# Patient Record
Sex: MALE | Race: OTHER | NOT HISPANIC OR LATINO | URBAN - METROPOLITAN AREA
[De-identification: names, ages, dates, MRNs, and addresses within clinical notes are randomized per-mention and may not be internally consistent; named-entity substitution may affect disease eponyms.]

---

## 2022-04-21 ENCOUNTER — INPATIENT (INPATIENT)
Facility: HOSPITAL | Age: 62
LOS: 9 days | Discharge: NURSING, REHAB OR LONG-TERM CARE | DRG: 982 | End: 2022-05-01
Attending: INTERNAL MEDICINE | Admitting: INTERNAL MEDICINE
Payer: MEDICAID

## 2022-04-21 VITALS
DIASTOLIC BLOOD PRESSURE: 65 MMHG | RESPIRATION RATE: 20 BRPM | TEMPERATURE: 96 F | HEART RATE: 61 BPM | SYSTOLIC BLOOD PRESSURE: 132 MMHG | OXYGEN SATURATION: 100 %

## 2022-04-21 DIAGNOSIS — I63.9 CEREBRAL INFARCTION, UNSPECIFIED: ICD-10-CM

## 2022-04-21 DIAGNOSIS — N17.9 ACUTE KIDNEY FAILURE, UNSPECIFIED: ICD-10-CM

## 2022-04-21 DIAGNOSIS — E87.5 HYPERKALEMIA: ICD-10-CM

## 2022-04-21 LAB
ALBUMIN SERPL ELPH-MCNC: 4.2 G/DL — SIGNIFICANT CHANGE UP (ref 3.3–5)
ALBUMIN SERPL ELPH-MCNC: 4.3 G/DL — SIGNIFICANT CHANGE UP (ref 3.3–5)
ALP SERPL-CCNC: 92 U/L — SIGNIFICANT CHANGE UP (ref 40–120)
ALP SERPL-CCNC: 96 U/L — SIGNIFICANT CHANGE UP (ref 40–120)
ALT FLD-CCNC: 32 U/L — SIGNIFICANT CHANGE UP (ref 10–45)
ALT FLD-CCNC: 32 U/L — SIGNIFICANT CHANGE UP (ref 10–45)
AMMONIA BLD-MCNC: 11 UMOL/L — SIGNIFICANT CHANGE UP (ref 11–55)
ANION GAP SERPL CALC-SCNC: 13 MMOL/L — SIGNIFICANT CHANGE UP (ref 5–17)
ANION GAP SERPL CALC-SCNC: 17 MMOL/L — SIGNIFICANT CHANGE UP (ref 5–17)
ANION GAP SERPL CALC-SCNC: 19 MMOL/L — HIGH (ref 5–17)
APPEARANCE UR: CLEAR — SIGNIFICANT CHANGE UP
APTT BLD: 26.6 SEC — LOW (ref 27.5–35.5)
AST SERPL-CCNC: 23 U/L — SIGNIFICANT CHANGE UP (ref 10–40)
AST SERPL-CCNC: 23 U/L — SIGNIFICANT CHANGE UP (ref 10–40)
BACTERIA # UR AUTO: NEGATIVE — SIGNIFICANT CHANGE UP
BASE EXCESS BLDV CALC-SCNC: -11.9 MMOL/L — LOW (ref -2–2)
BASE EXCESS BLDV CALC-SCNC: -9.3 MMOL/L — LOW (ref -2–2)
BASE EXCESS BLDV CALC-SCNC: -9.8 MMOL/L — LOW (ref -2–2)
BASOPHILS # BLD AUTO: 0 K/UL — SIGNIFICANT CHANGE UP (ref 0–0.2)
BASOPHILS # BLD AUTO: 0 K/UL — SIGNIFICANT CHANGE UP (ref 0–0.2)
BASOPHILS NFR BLD AUTO: 0 % — SIGNIFICANT CHANGE UP (ref 0–2)
BASOPHILS NFR BLD AUTO: 0 % — SIGNIFICANT CHANGE UP (ref 0–2)
BILIRUB SERPL-MCNC: 0.3 MG/DL — SIGNIFICANT CHANGE UP (ref 0.2–1.2)
BILIRUB SERPL-MCNC: 0.4 MG/DL — SIGNIFICANT CHANGE UP (ref 0.2–1.2)
BILIRUB UR-MCNC: NEGATIVE — SIGNIFICANT CHANGE UP
BLD GP AB SCN SERPL QL: NEGATIVE — SIGNIFICANT CHANGE UP
BUN SERPL-MCNC: 77 MG/DL — HIGH (ref 7–23)
BUN SERPL-MCNC: 85 MG/DL — HIGH (ref 7–23)
BUN SERPL-MCNC: 87 MG/DL — HIGH (ref 7–23)
BURR CELLS BLD QL SMEAR: SIGNIFICANT CHANGE UP
CA-I SERPL-SCNC: 1.28 MMOL/L — SIGNIFICANT CHANGE UP (ref 1.15–1.33)
CA-I SERPL-SCNC: 1.3 MMOL/L — SIGNIFICANT CHANGE UP (ref 1.15–1.33)
CA-I SERPL-SCNC: 1.33 MMOL/L — SIGNIFICANT CHANGE UP (ref 1.15–1.33)
CALCIUM SERPL-MCNC: 10 MG/DL — SIGNIFICANT CHANGE UP (ref 8.4–10.5)
CALCIUM SERPL-MCNC: 9.4 MG/DL — SIGNIFICANT CHANGE UP (ref 8.4–10.5)
CALCIUM SERPL-MCNC: 9.7 MG/DL — SIGNIFICANT CHANGE UP (ref 8.4–10.5)
CHLORIDE BLDV-SCNC: 104 MMOL/L — SIGNIFICANT CHANGE UP (ref 96–108)
CHLORIDE BLDV-SCNC: 105 MMOL/L — SIGNIFICANT CHANGE UP (ref 96–108)
CHLORIDE BLDV-SCNC: 105 MMOL/L — SIGNIFICANT CHANGE UP (ref 96–108)
CHLORIDE SERPL-SCNC: 103 MMOL/L — SIGNIFICANT CHANGE UP (ref 96–108)
CHLORIDE SERPL-SCNC: 103 MMOL/L — SIGNIFICANT CHANGE UP (ref 96–108)
CHLORIDE SERPL-SCNC: 105 MMOL/L — SIGNIFICANT CHANGE UP (ref 96–108)
CO2 BLDV-SCNC: 17 MMOL/L — LOW (ref 22–26)
CO2 BLDV-SCNC: 19 MMOL/L — LOW (ref 22–26)
CO2 BLDV-SCNC: 19 MMOL/L — LOW (ref 22–26)
CO2 SERPL-SCNC: 13 MMOL/L — LOW (ref 22–31)
CO2 SERPL-SCNC: 15 MMOL/L — LOW (ref 22–31)
CO2 SERPL-SCNC: 16 MMOL/L — LOW (ref 22–31)
COLOR SPEC: SIGNIFICANT CHANGE UP
CREAT ?TM UR-MCNC: 46 MG/DL — SIGNIFICANT CHANGE UP
CREAT SERPL-MCNC: 2.32 MG/DL — HIGH (ref 0.5–1.3)
CREAT SERPL-MCNC: 2.62 MG/DL — HIGH (ref 0.5–1.3)
CREAT SERPL-MCNC: 2.76 MG/DL — HIGH (ref 0.5–1.3)
DIFF PNL FLD: NEGATIVE — SIGNIFICANT CHANGE UP
DIGOXIN SERPL-MCNC: 1.9 NG/ML — SIGNIFICANT CHANGE UP (ref 0.8–2)
EGFR: 25 ML/MIN/1.73M2 — LOW
EGFR: 27 ML/MIN/1.73M2 — LOW
EGFR: 31 ML/MIN/1.73M2 — LOW
EOSINOPHIL # BLD AUTO: 0.27 K/UL — SIGNIFICANT CHANGE UP (ref 0–0.5)
EOSINOPHIL # BLD AUTO: 0.39 K/UL — SIGNIFICANT CHANGE UP (ref 0–0.5)
EOSINOPHIL NFR BLD AUTO: 4.3 % — SIGNIFICANT CHANGE UP (ref 0–6)
EOSINOPHIL NFR BLD AUTO: 5 % — SIGNIFICANT CHANGE UP (ref 0–6)
EPI CELLS # UR: 0 /HPF — SIGNIFICANT CHANGE UP
GAS PNL BLDV: 129 MMOL/L — LOW (ref 136–145)
GAS PNL BLDV: 131 MMOL/L — LOW (ref 136–145)
GAS PNL BLDV: 135 MMOL/L — LOW (ref 136–145)
GAS PNL BLDV: SIGNIFICANT CHANGE UP
GLUCOSE BLDV-MCNC: 151 MG/DL — HIGH (ref 70–99)
GLUCOSE BLDV-MCNC: 174 MG/DL — HIGH (ref 70–99)
GLUCOSE BLDV-MCNC: 65 MG/DL — LOW (ref 70–99)
GLUCOSE SERPL-MCNC: 144 MG/DL — HIGH (ref 70–99)
GLUCOSE SERPL-MCNC: 178 MG/DL — HIGH (ref 70–99)
GLUCOSE SERPL-MCNC: 67 MG/DL — LOW (ref 70–99)
GLUCOSE UR QL: NEGATIVE — SIGNIFICANT CHANGE UP
HCO3 BLDV-SCNC: 16 MMOL/L — LOW (ref 22–29)
HCO3 BLDV-SCNC: 18 MMOL/L — LOW (ref 22–29)
HCO3 BLDV-SCNC: 18 MMOL/L — LOW (ref 22–29)
HCT VFR BLD CALC: 19.3 % — CRITICAL LOW (ref 39–50)
HCT VFR BLD CALC: 35 % — LOW (ref 39–50)
HCT VFR BLDA CALC: 32 % — LOW (ref 39–51)
HCT VFR BLDA CALC: 36 % — LOW (ref 39–51)
HCT VFR BLDA CALC: 36 % — LOW (ref 39–51)
HGB BLD CALC-MCNC: 10.7 G/DL — LOW (ref 12.6–17.4)
HGB BLD CALC-MCNC: 12 G/DL — LOW (ref 12.6–17.4)
HGB BLD CALC-MCNC: 12 G/DL — LOW (ref 12.6–17.4)
HGB BLD-MCNC: 11.5 G/DL — LOW (ref 13–17)
HGB BLD-MCNC: 6.4 G/DL — CRITICAL LOW (ref 13–17)
INR BLD: 1.23 RATIO — HIGH (ref 0.88–1.16)
KETONES UR-MCNC: NEGATIVE — SIGNIFICANT CHANGE UP
LACTATE BLDV-MCNC: 1.2 MMOL/L — SIGNIFICANT CHANGE UP (ref 0.7–2)
LACTATE BLDV-MCNC: 2.3 MMOL/L — HIGH (ref 0.7–2)
LACTATE BLDV-MCNC: 6.9 MMOL/L — CRITICAL HIGH (ref 0.7–2)
LEUKOCYTE ESTERASE UR-ACNC: NEGATIVE — SIGNIFICANT CHANGE UP
LYMPHOCYTES # BLD AUTO: 0.6 K/UL — LOW (ref 1–3.3)
LYMPHOCYTES # BLD AUTO: 1.42 K/UL — SIGNIFICANT CHANGE UP (ref 1–3.3)
LYMPHOCYTES # BLD AUTO: 11 % — LOW (ref 13–44)
LYMPHOCYTES # BLD AUTO: 15.7 % — SIGNIFICANT CHANGE UP (ref 13–44)
MAGNESIUM SERPL-MCNC: 1.6 MG/DL — SIGNIFICANT CHANGE UP (ref 1.6–2.6)
MANUAL SMEAR VERIFICATION: SIGNIFICANT CHANGE UP
MANUAL SMEAR VERIFICATION: SIGNIFICANT CHANGE UP
MCHC RBC-ENTMCNC: 30.3 PG — SIGNIFICANT CHANGE UP (ref 27–34)
MCHC RBC-ENTMCNC: 30.3 PG — SIGNIFICANT CHANGE UP (ref 27–34)
MCHC RBC-ENTMCNC: 32.9 GM/DL — SIGNIFICANT CHANGE UP (ref 32–36)
MCHC RBC-ENTMCNC: 33.2 GM/DL — SIGNIFICANT CHANGE UP (ref 32–36)
MCV RBC AUTO: 91.5 FL — SIGNIFICANT CHANGE UP (ref 80–100)
MCV RBC AUTO: 92.3 FL — SIGNIFICANT CHANGE UP (ref 80–100)
MONOCYTES # BLD AUTO: 0.27 K/UL — SIGNIFICANT CHANGE UP (ref 0–0.9)
MONOCYTES # BLD AUTO: 0.55 K/UL — SIGNIFICANT CHANGE UP (ref 0–0.9)
MONOCYTES NFR BLD AUTO: 5 % — SIGNIFICANT CHANGE UP (ref 2–14)
MONOCYTES NFR BLD AUTO: 6.1 % — SIGNIFICANT CHANGE UP (ref 2–14)
MYELOCYTES NFR BLD: 1 % — HIGH (ref 0–0)
NEUTROPHILS # BLD AUTO: 4.23 K/UL — SIGNIFICANT CHANGE UP (ref 1.8–7.4)
NEUTROPHILS # BLD AUTO: 6.7 K/UL — SIGNIFICANT CHANGE UP (ref 1.8–7.4)
NEUTROPHILS NFR BLD AUTO: 73 % — SIGNIFICANT CHANGE UP (ref 43–77)
NEUTROPHILS NFR BLD AUTO: 78 % — HIGH (ref 43–77)
NEUTS BAND # BLD: 0.9 % — SIGNIFICANT CHANGE UP (ref 0–8)
NITRITE UR-MCNC: NEGATIVE — SIGNIFICANT CHANGE UP
NRBC # BLD: 0 /100 — SIGNIFICANT CHANGE UP (ref 0–0)
PCO2 BLDV: 41 MMHG — LOW (ref 42–55)
PCO2 BLDV: 43 MMHG — SIGNIFICANT CHANGE UP (ref 42–55)
PCO2 BLDV: 46 MMHG — SIGNIFICANT CHANGE UP (ref 42–55)
PH BLDV: 7.18 — CRITICAL LOW (ref 7.32–7.43)
PH BLDV: 7.2 — LOW (ref 7.32–7.43)
PH BLDV: 7.24 — LOW (ref 7.32–7.43)
PH UR: 6 — SIGNIFICANT CHANGE UP (ref 5–8)
PHOSPHATE SERPL-MCNC: 4.5 MG/DL — SIGNIFICANT CHANGE UP (ref 2.5–4.5)
PLAT MORPH BLD: NORMAL — SIGNIFICANT CHANGE UP
PLAT MORPH BLD: NORMAL — SIGNIFICANT CHANGE UP
PLATELET # BLD AUTO: 113 K/UL — LOW (ref 150–400)
PLATELET # BLD AUTO: 162 K/UL — SIGNIFICANT CHANGE UP (ref 150–400)
PO2 BLDV: 33 MMHG — SIGNIFICANT CHANGE UP (ref 25–45)
PO2 BLDV: 37 MMHG — SIGNIFICANT CHANGE UP (ref 25–45)
PO2 BLDV: 38 MMHG — SIGNIFICANT CHANGE UP (ref 25–45)
POIKILOCYTOSIS BLD QL AUTO: SIGNIFICANT CHANGE UP
POTASSIUM BLDV-SCNC: 5.3 MMOL/L — HIGH (ref 3.5–5.1)
POTASSIUM BLDV-SCNC: 5.6 MMOL/L — HIGH (ref 3.5–5.1)
POTASSIUM BLDV-SCNC: 7.4 MMOL/L — CRITICAL HIGH (ref 3.5–5.1)
POTASSIUM SERPL-MCNC: 5.3 MMOL/L — SIGNIFICANT CHANGE UP (ref 3.5–5.3)
POTASSIUM SERPL-MCNC: 5.5 MMOL/L — HIGH (ref 3.5–5.3)
POTASSIUM SERPL-MCNC: 7.3 MMOL/L — CRITICAL HIGH (ref 3.5–5.3)
POTASSIUM SERPL-SCNC: 5.3 MMOL/L — SIGNIFICANT CHANGE UP (ref 3.5–5.3)
POTASSIUM SERPL-SCNC: 5.5 MMOL/L — HIGH (ref 3.5–5.3)
POTASSIUM SERPL-SCNC: 7.3 MMOL/L — CRITICAL HIGH (ref 3.5–5.3)
PROT ?TM UR-MCNC: 17 MG/DL — HIGH (ref 0–12)
PROT SERPL-MCNC: 6.9 G/DL — SIGNIFICANT CHANGE UP (ref 6–8.3)
PROT SERPL-MCNC: 7.1 G/DL — SIGNIFICANT CHANGE UP (ref 6–8.3)
PROT UR-MCNC: ABNORMAL
PROT/CREAT UR-RTO: 0.4 RATIO — HIGH (ref 0–0.2)
PROTHROM AB SERPL-ACNC: 14.3 SEC — HIGH (ref 10.5–13.4)
RAPID RVP RESULT: SIGNIFICANT CHANGE UP
RBC # BLD: 2.11 M/UL — LOW (ref 4.2–5.8)
RBC # BLD: 3.79 M/UL — LOW (ref 4.2–5.8)
RBC # FLD: 12.9 % — SIGNIFICANT CHANGE UP (ref 10.3–14.5)
RBC # FLD: 13 % — SIGNIFICANT CHANGE UP (ref 10.3–14.5)
RBC BLD AUTO: ABNORMAL
RBC BLD AUTO: SIGNIFICANT CHANGE UP
RBC CASTS # UR COMP ASSIST: 1 /HPF — SIGNIFICANT CHANGE UP (ref 0–4)
RH IG SCN BLD-IMP: POSITIVE — SIGNIFICANT CHANGE UP
SAO2 % BLDV: 39.1 % — LOW (ref 67–88)
SAO2 % BLDV: 55.8 % — LOW (ref 67–88)
SAO2 % BLDV: 58.7 % — LOW (ref 67–88)
SARS-COV-2 RNA SPEC QL NAA+PROBE: SIGNIFICANT CHANGE UP
SARS-COV-2 RNA SPEC QL NAA+PROBE: SIGNIFICANT CHANGE UP
SODIUM SERPL-SCNC: 132 MMOL/L — LOW (ref 135–145)
SODIUM SERPL-SCNC: 133 MMOL/L — LOW (ref 135–145)
SODIUM SERPL-SCNC: 139 MMOL/L — SIGNIFICANT CHANGE UP (ref 135–145)
SODIUM UR-SCNC: 79 MMOL/L — SIGNIFICANT CHANGE UP
SP GR SPEC: 1.03 — HIGH (ref 1.01–1.02)
TARGETS BLD QL SMEAR: SIGNIFICANT CHANGE UP
TROPONIN T, HIGH SENSITIVITY RESULT: 37 NG/L — SIGNIFICANT CHANGE UP (ref 0–51)
TSH SERPL-MCNC: 0.79 UIU/ML — SIGNIFICANT CHANGE UP (ref 0.27–4.2)
UROBILINOGEN FLD QL: NEGATIVE — SIGNIFICANT CHANGE UP
WBC # BLD: 5.42 K/UL — SIGNIFICANT CHANGE UP (ref 3.8–10.5)
WBC # BLD: 9.06 K/UL — SIGNIFICANT CHANGE UP (ref 3.8–10.5)
WBC # FLD AUTO: 5.42 K/UL — SIGNIFICANT CHANGE UP (ref 3.8–10.5)
WBC # FLD AUTO: 9.06 K/UL — SIGNIFICANT CHANGE UP (ref 3.8–10.5)
WBC UR QL: 1 /HPF — SIGNIFICANT CHANGE UP (ref 0–5)

## 2022-04-21 PROCEDURE — 99283 EMERGENCY DEPT VISIT LOW MDM: CPT | Mod: GC

## 2022-04-21 PROCEDURE — 99291 CRITICAL CARE FIRST HOUR: CPT

## 2022-04-21 PROCEDURE — 99232 SBSQ HOSP IP/OBS MODERATE 35: CPT | Mod: GC

## 2022-04-21 PROCEDURE — 71045 X-RAY EXAM CHEST 1 VIEW: CPT | Mod: 26

## 2022-04-21 PROCEDURE — 70498 CT ANGIOGRAPHY NECK: CPT | Mod: 26,MA

## 2022-04-21 PROCEDURE — 0042T: CPT

## 2022-04-21 PROCEDURE — 70496 CT ANGIOGRAPHY HEAD: CPT | Mod: 26,MA

## 2022-04-21 PROCEDURE — 93010 ELECTROCARDIOGRAM REPORT: CPT

## 2022-04-21 RX ORDER — ALBUTEROL 90 UG/1
10 AEROSOL, METERED ORAL ONCE
Refills: 0 | Status: DISCONTINUED | OUTPATIENT
Start: 2022-04-21 | End: 2022-04-21

## 2022-04-21 RX ORDER — DEXTROSE 50 % IN WATER 50 %
50 SYRINGE (ML) INTRAVENOUS ONCE
Refills: 0 | Status: COMPLETED | OUTPATIENT
Start: 2022-04-21 | End: 2022-04-21

## 2022-04-21 RX ORDER — SODIUM ZIRCONIUM CYCLOSILICATE 10 G/10G
10 POWDER, FOR SUSPENSION ORAL ONCE
Refills: 0 | Status: COMPLETED | OUTPATIENT
Start: 2022-04-21 | End: 2022-04-21

## 2022-04-21 RX ORDER — ASPIRIN/CALCIUM CARB/MAGNESIUM 324 MG
81 TABLET ORAL DAILY
Refills: 0 | Status: DISCONTINUED | OUTPATIENT
Start: 2022-04-21 | End: 2022-04-29

## 2022-04-21 RX ORDER — CHLORHEXIDINE GLUCONATE 213 G/1000ML
1 SOLUTION TOPICAL
Refills: 0 | Status: DISCONTINUED | OUTPATIENT
Start: 2022-04-21 | End: 2022-04-24

## 2022-04-21 RX ORDER — SODIUM BICARBONATE 1 MEQ/ML
50 SYRINGE (ML) INTRAVENOUS ONCE
Refills: 0 | Status: COMPLETED | OUTPATIENT
Start: 2022-04-21 | End: 2022-04-21

## 2022-04-21 RX ORDER — ATORVASTATIN CALCIUM 80 MG/1
80 TABLET, FILM COATED ORAL AT BEDTIME
Refills: 0 | Status: DISCONTINUED | OUTPATIENT
Start: 2022-04-21 | End: 2022-04-24

## 2022-04-21 RX ORDER — ALBUTEROL 90 UG/1
10 AEROSOL, METERED ORAL ONCE
Refills: 0 | Status: COMPLETED | OUTPATIENT
Start: 2022-04-21 | End: 2022-04-21

## 2022-04-21 RX ORDER — SODIUM BICARBONATE 1 MEQ/ML
0.19 SYRINGE (ML) INTRAVENOUS
Qty: 150 | Refills: 0 | Status: DISCONTINUED | OUTPATIENT
Start: 2022-04-21 | End: 2022-04-22

## 2022-04-21 RX ORDER — CALCIUM GLUCONATE 100 MG/ML
2 VIAL (ML) INTRAVENOUS ONCE
Refills: 0 | Status: COMPLETED | OUTPATIENT
Start: 2022-04-21 | End: 2022-04-21

## 2022-04-21 RX ADMIN — Medication 75 MEQ/KG/HR: at 17:46

## 2022-04-21 RX ADMIN — Medication 50 MILLIEQUIVALENT(S): at 15:29

## 2022-04-21 RX ADMIN — Medication 50 MILLILITER(S): at 13:19

## 2022-04-21 RX ADMIN — ALBUTEROL 10 MILLIGRAM(S): 90 AEROSOL, METERED ORAL at 13:32

## 2022-04-21 RX ADMIN — SODIUM ZIRCONIUM CYCLOSILICATE 10 GRAM(S): 10 POWDER, FOR SUSPENSION ORAL at 15:27

## 2022-04-21 RX ADMIN — Medication 200 GRAM(S): at 12:41

## 2022-04-21 NOTE — CONSULT NOTE ADULT - SUBJECTIVE AND OBJECTIVE BOX
[id number]  Patient is a 61y old  Male who presents with a chief complaint of   HPI:      PAST MEDICAL & SURGICAL HISTORY:    FAMILY HISTORY:    Social Hx:  Nonsmoker, no drug or alcohol use    Home Medications:    MEDICATIONS  (STANDING):  sodium bicarbonate  Injectable 50 milliEquivalent(s) IV Push Once  sodium zirconium cyclosilicate 10 Gram(s) Oral Once    MEDICATIONS  (PRN):    Allergies  No Known Allergies    Intolerances      REVIEW OF SYSTEMS  General:	  Ophthalmologic:  Respiratory and Thorax:	  Cardiovascular:	  Musculoskeletal:	  Neurological:	  	    ROS: Pertinent positives in HPI, all other ROS were reviewed and are negative.      Vital Signs Last 24 Hrs  T(C): 36.4 (21 Apr 2022 14:15), Max: 36.4 (21 Apr 2022 14:15)  T(F): 97.5 (21 Apr 2022 14:15), Max: 97.5 (21 Apr 2022 14:15)  HR: 88 (21 Apr 2022 14:15) (47 - 88)  BP: 163/70 (21 Apr 2022 14:15) (132/65 - 163/70)  BP(mean): 93 (21 Apr 2022 14:00) (86 - 95)  RR: 20 (21 Apr 2022 14:15) (20 - 20)  SpO2: 99% (21 Apr 2022 14:15) (99% - 100%)    GENERAL EXAM:  Constitutional: awake and alert.   HEENT: PERRL, EOMI  Musculoskeletal: no joint swelling/tenderness, no abnormal movements  Skin: no rashes    NEUROLOGICAL EXAM:  MS: AAOX3 to verbal stimulation. There is mild aphasia and dysarthria noted. Follows commands.   CN: VFF, EOMI, PERRL. Mild RT gaze palsy that V1-3 intact, no facial asymmetry, t/p midline,  Motor: Strength: 5/5 4x. Tone: normal. Bulk: normal. DTR 2+ symm.  Plantar flex b/l. Sensation: intact to LT/PP/Vibration/Position/Temperature 4x.   Coordination: intact 4x.   Gait:  Romberg negative, pull test negative; walks with narrow base, pivots in 2 steps.    NIHSS  mRS    Labs:   cbc                      6.4    5.42  )-----------( 113      ( 21 Apr 2022 13:04 )             19.3     [id number]    132<L>  |  103  |  87<H>  ----------------------------<  67<L>  7.3<HH>   |  16<L>  |  2.76<H>    Ca    9.4      21 Apr 2022 13:04  Phos  4.6     04-21  Mg     1.7     04-21    TPro  6.9  /  Alb  4.3  /  TBili  0.3  /  DBili  x   /  AST  23  /  ALT  32  /  AlkPhos  92  04-21    CoagsPT/INR - ( 21 Apr 2022 13:04 )   PT: 14.3 sec;   INR: 1.23 ratio         PTT - ( 21 Apr 2022 13:04 )  PTT:26.6 sec  Lipids  A1C  CardiacMarkers    LFTsLIVER FUNCTIONS - ( 21 Apr 2022 13:04 )  Alb: 4.3 g/dL / Pro: 6.9 g/dL / ALK PHOS: 92 U/L / ALT: 32 U/L / AST: 23 U/L / GGT: x           UA  CSF  Immunological Labs    Radiology:  -CT Head  -MRI brain  -MRA brain/Carotids  -EEG  -EKG  -TTE/СВЕТЛАНА [id number]  Patient is a 61y old  Male who presents with a chief complaint of   HPI:  Patient is a 62yo RT handed Kazakh speaking M with pmh of HTN, DM-II,  dilated cardiomyopathy presents to [facility] as a transfer for Rt M1 occlusion. Per chart review, patient's LWK was at midnight on 4/21. Patient's son was at bedside and provided history. Patient was in the kitchen making coffee and patient suddenly fell. Patient's son states patient had left sided weakness and slurred speech. EMS was called and patient was sent to [facility]. Patient had CT and CTA which was noted to show Rt M1 occlusion. Patient was not a TPa candidate due to out of time window. Patient was MT candidate at that time thus was agreed to transfer patient. Patient had initial NIHSS 4 then progressed to 9 as per repeat code stroke on arrival to [facility]. Patient had repeat imaging which was still consistent with Rt M1 occlusion. 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 Patient was deemed not a candidate at this time due to medical instability. Patient was noted to have NIHSS 6 on repeat exam s/p CT imaging. Patient will be transferred to MICU for further monitoring.       PAST MEDICAL & SURGICAL HISTORY:    FAMILY HISTORY:    Social Hx:  Nonsmoker, no drug or alcohol use    Home Medications:    MEDICATIONS  (STANDING):  sodium bicarbonate  Injectable 50 milliEquivalent(s) IV Push Once  sodium zirconium cyclosilicate 10 Gram(s) Oral Once    MEDICATIONS  (PRN):    Allergies  No Known Allergies    Intolerances      REVIEW OF SYSTEMS  General: Denies fever and chills	  Ophthalmologic: Denies blurred vision   Respiratory and Thorax:	Denies sob   Cardiovascular:	Denies chest pain   Musculoskeletal:	 Denies chest pain   Neurological: Mentions slurred speech   	    ROS: Pertinent positives in HPI, all other ROS were reviewed and are negative.      Vital Signs Last 24 Hrs  T(C): 36.4 (21 Apr 2022 14:15), Max: 36.4 (21 Apr 2022 14:15)  T(F): 97.5 (21 Apr 2022 14:15), Max: 97.5 (21 Apr 2022 14:15)  HR: 88 (21 Apr 2022 14:15) (47 - 88)  BP: 163/70 (21 Apr 2022 14:15) (132/65 - 163/70)  BP(mean): 93 (21 Apr 2022 14:00) (86 - 95)  RR: 20 (21 Apr 2022 14:15) (20 - 20)  SpO2: 99% (21 Apr 2022 14:15) (99% - 100%)    GENERAL EXAM:  Constitutional: awake and alert.   HEENT: PERRL, EOMI  Musculoskeletal: no joint swelling/tenderness, no abnormal movements  Skin: no rashes    NEUROLOGICAL EXAM:  MS: AAOX3 to verbal stimulation. There is mild aphasia and dysarthria noted. Follows commands.   CN: VFF, EOMI, PERRL. Mild RT gaze palsy that corrects past midline.   V1-3 intact, mild left facial palsy, t/p midline,  Motor: Strength: 5/5 in the UE and LE b/l.   Tone: normal. Bulk: normal.   DTR 2+ symm.  in biceps/triceps/brachoradialis  Plantar flex b/l.   Sensation: intact to LT/PP/Vibration/Position/Temperature 4x.   Coordination: FTN intact b/l.   Gait:  Deferred  NIHSS 6  mRS 0    Labs:   cbc                      6.4    5.42  )-----------( 113      ( 21 Apr 2022 13:04 )             19.3     [id number]    132<L>  |  103  |  87<H>  ----------------------------<  67<L>  7.3<HH>   |  16<L>  |  2.76<H>    Ca    9.4      21 Apr 2022 13:04  Phos  4.6     04-21  Mg     1.7     04-21    TPro  6.9  /  Alb  4.3  /  TBili  0.3  /  DBili  x   /  AST  23  /  ALT  32  /  AlkPhos  92  04-21    CoagsPT/INR - ( 21 Apr 2022 13:04 )   PT: 14.3 sec;   INR: 1.23 ratio         PTT - ( 21 Apr 2022 13:04 )  PTT:26.6 sec      LFTsLIVER FUNCTIONS - ( 21 Apr 2022 13:04 )  Alb: 4.3 g/dL / Pro: 6.9 g/dL / ALK PHOS: 92 U/L / ALT: 32 U/L / AST: 23 U/L / GGT: x               Radiology:  CT PERFUSION:  Tmax >6s: 125 mL, right MCA territory.  CBF <30%: 6 mL, right MCA territory.  Mismatch volume: 119 mL.  Mismatch ratio: 20.8.      IMPRESSION:    CT head:  -Loss of gray-white matter differentiation in the right lateral frontal   lobe and insula concerning for acute infarction.  -No acute intracranial hemorrhage.    CT angiogram neck:  -At least mild stenosis of the left vertebral artery origin.  -Atherosclerotic changes at the bilateral carotid bifurcations without   stenosis.    CT angiogram head:  -Acute occlusion of the right MCA distal M1 and proximal M2 divisions.    CT perfusion:  At risk ischemic tissue: 125 mL, right MCA territory.  Core infarct: 6 mL, right MCA territory.  Mismatch volume: 119 mL.  Mismatch ratio: 20.8.    Findings discussed with Dr. Kowalski, neurology 1:28 PM 4/21/2022.

## 2022-04-21 NOTE — H&P ADULT - NSHPLABSRESULTS_GEN_ALL_CORE
11.5   9.06  )-----------( 162      ( 21 Apr 2022 15:10 )             35.0     04-21    133<L>  |  103  |  85<H>  ----------------------------<  178<H>  5.5<H>   |  13<L>  |  2.62<H>    Ca    10.0      21 Apr 2022 15:09  Phos  4.6     04-21  Mg     1.7     04-21    TPro  7.1  /  Alb  4.2  /  TBili  0.4  /  DBili  x   /  AST  23  /  ALT  32  /  AlkPhos  96  04-21    PT/INR - ( 21 Apr 2022 13:04 )   PT: 14.3 sec;   INR: 1.23 ratio         PTT - ( 21 Apr 2022 13:04 )  PTT:26.6 sec    < from: CT Angio Neck w/ IV Cont (04.21.22 @ 14:16) >    IMPRESSION:    CT head:  -Loss of gray-white matter differentiation in the right lateral frontal   lobe and insula concerning for acute infarction.  -No acute intracranial hemorrhage.    CT angiogram neck:  -At least mild stenosis of the left vertebral artery origin.  -Atherosclerotic changes at the bilateral carotid bifurcations without   stenosis.    CT angiogram head:  -Acute occlusion of the right MCA distal M1 and proximal M2 divisions.    CT perfusion:  At risk ischemic tissue: 125 mL, right MCA territory.  Core infarct: 6 mL, right MCA territory.  Mismatch volume: 119 mL.  Mismatch ratio: 20.8.    < end of copied text >

## 2022-04-21 NOTE — ED ADULT NURSE REASSESSMENT NOTE - NS ED NURSE REASSESS COMMENT FT1
spoke with pharmacy regarding sodium bicarbonate infusion. pending medication to be sent down by pharmacy to [facility]

## 2022-04-21 NOTE — ED ADULT NURSE REASSESSMENT NOTE - NS ED NURSE REASSESS COMMENT FT1
Ordered stat labs not yet drawn as per MD order to draw labs after albuterol nebulizer is complete. MICU at bedside currently.

## 2022-04-21 NOTE — ED ADULT NURSE REASSESSMENT NOTE - NS ED NURSE REASSESS COMMENT FT1
MICU present at bedside for US of bladder. Taylor catheter inserted using sterile technique as per MICU, order placed by ED MD. Second RN Rogelio present to confirm sterility. Bedside drainage to gravity. 500mL of clear yellow urine noted in drainage bag. UA/UC collected and sent. Stat lock in place.

## 2022-04-21 NOTE — CONSULT NOTE ADULT - PROBLEM SELECTOR RECOMMENDATION 9
On presentation with K 7.3, s/p lokelma, IV Ca gluc, bicarb amp x1  found with urinary retention and valencia placed with 550cc urine   Also at home on lisinopril and eplerenone     Currently HR has improved ~80-90s - No plan for urgent HD  medical management prn for K >5.5  start bicarb gtt at 75cc/hr x 10 hrs.  hold eplerenone and lisinopril  monitor serial BMP

## 2022-04-21 NOTE — CONSULT NOTE ADULT - ASSESSMENT
61 year old Iranian speaking male with past medical history of DM, HTN, unknown heart condition on digoxin, p/w left hemiparesis, 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 61 year old Greenlandic speaking male with past medical history of DM, HTN, unknown heart condition on digoxin, p/w left hemiparesis, 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 MICU consulted for ARF. Patient is a 61 year old Panamanian speaking male with past medical history of DM, HTN, dilated cardiomyopathy, and possible (CAD?) , p/w left hemiparesis, 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 MICU consulted for ARF and possible need for urgent dialysis most likely 2/2 to bladder outlet obstruction given distended bladder and b/l hydronephrosis seen on POCUS. Suspect ARF i/s/o of post renal etiology from obstruction. Course notably complicated by slow afib w/ peaked t waves and hypoglycemia.     Patient does not require MICU admission at this time and can be admitted to a telemetry monitoring floor.     #Acute renal failure with hyperkalemia   - I/s/o bladder outlet obstruction  - POCUS with bladder distention and hydronephrosis   - Taylor placed with 500cc drained   - Strict i's and O's  - Discussed with Nephrology, no urgent need for dialysis  - Per Nephrology, hyperkalemia can be managed medically  - Admit to a telemetry unit     #Hypoglycemia  - Likely 2/2 to home sulfonylurea i/s/o renal failure   - Recommend obtaining sulfonylurea level   - If hypoglycemic again would start D5W at 50cc/hour  - Recommend finger sticks q4 hours until improvement of hypoglycemic episodes    #Anemia   - Hemoglobin noted to be 6 in ED, 12 at outside hospital suspect lab error  - Repeat CBC pending, follow up repeat CBC   - Transfuse for goal hemoglobin <7   - Patient without signs or symptoms of bleeding      #Stroke   - Neuro checks q4 hours   - Stroke management per Neurology team     #Atrial fibrillation   - Management per Cardiology team   - On bisoprolol at home and digoxin   - No AC     Case was discussed with Attending Dr. Nicko Rubio MD   Internal Medicine PGY2    Patient is a 61 year old St Helenian speaking male with past medical history of DM, HTN, dilated cardiomyopathy, and possible (CAD?) , p/w left hemiparesis, 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 MICU consulted for ARF and possible need for urgent dialysis most likely 2/2 to bladder outlet obstruction given distended bladder and b/l hydronephrosis seen on POCUS. Suspect ARF i/s/o of post renal etiology from obstruction. Course notably complicated by slow afib w/ peaked t waves and hypoglycemia.     #Acute renal failure with hyperkalemia   - I/s/o bladder outlet obstruction  - POCUS with bladder distention and hydronephrosis   - Taylor placed with 500cc drained   - Strict i's and O's  - Discussed with Nephrology, no urgent need for dialysis  - Per Nephrology, hyperkalemia can be managed medically  - Admit to a telemetry unit     #Hypoglycemia  - Likely 2/2 to home sulfonylurea i/s/o renal failure   - Recommend obtaining sulfonylurea level   - If hypoglycemic again would start D5W at 50cc/hour  - Recommend finger sticks q4 hours until improvement of hypoglycemic episodes    #Anemia   - Hemoglobin noted to be 6 in ED, 12 at outside hospital suspect lab error  - Repeat CBC pending, follow up repeat CBC   - Transfuse for goal hemoglobin <7   - Patient without signs or symptoms of bleeding      #Stroke   - Neuro checks q4 hours   - Stroke management per Neurology team     #Atrial fibrillation   - Management per Cardiology team   - On bisoprolol at home and digoxin   - No AC     It was decided patient required critical care services for stroke and neuro check monitoring and not for urgent dialysis.       Case was discussed with Attending Dr. Nicko Rubio MD   Internal Medicine PGY2

## 2022-04-21 NOTE — CONSULT NOTE ADULT - ASSESSMENT
61M with PMhx DM, HTN, unknown heart condition on digoxin, transferred from OSH for stroke evaluation. Nephrology consulted for NANCY and hyperkalemia.

## 2022-04-21 NOTE — CONSULT NOTE ADULT - ASSESSMENT
Patient is a 62yo RT handed Tajik speaking M with pmh of HTN, DM-II,  dilated cardiomyopathy presents to [facility] as a transfer for Rt M1 occlusion. Per chart review, patient's LWK was at midnight on 4/21. Patient had CT and CTA which was noted to show Rt M1 occlusion. Patient was not a TPa candidate due to out of time window. Patient was MT candidate at that time thus was agreed to transfer patient. Patient had initial NIHSS 4 whcih increased to NIHSS 9 then NIHSS 6 on repeat examination. Will need to be further monitored.       Impression   Left hemisensory paresis 2/2 to Rt distal M1 occlusion. Etiology ischemic. Mechanism : Embolic Stroke of Unknown source at this time.   Recommendations:   Keep Permissive HTN for 24hrs then gradual normotension   MRI brain w/o contrast   Start ASA 81mg Qday, if unable to tolerate PO then 300mg Rectal ASA   Lipitor 80mg QHS titrate ldl less than 70   Lipid panel, tsh, hgb a1c   Echo with bubble study   Telemetry   Baseline EKG   Neurochecks Q1hrs   Keep Na above 140- 150   Consult Neurosurgery- Ethan crani watch   dysphagia screen   rest of management per primary team       Case discussed with Stroke Fellow, Dr. Melissa Rosenberg. Case to be seen with Stroke Neurology Attending, Dr. Libman,.

## 2022-04-21 NOTE — ED PROVIDER NOTE - PROGRESS NOTE DETAILS
pt neuro in ed on arrival for rescue stroke for m1 occlusion possible embolectomy , pt with bradycardia given atropine for hr 30 at oSH labs [id number] pt was keiko with hyperk k 5.9 and renal failure cr 2.9 -- concern for brash syndrome -- iv calcium caden as dig level at osh 1.6 - in ed slow afib 30-50 - w hyperacute t wave - tx with ca, tx with glucose no insulin, cards and micu aware will hold on ir intervention as cardiovasc status more imperative at this time pt has bl hydro with retention on micu eval -- valencia placed - awaitng renal consultation Renal at bedside, rpt labs sent. Will ctm pending dispo. Toni Martin, EM PGY3 ap- pt signed out to me pending results of repeat CMP/CBC, anemic, concern for symptomatic hyperkalemia w/ bradycardia vs BRASH syndrome, labs currently sent tp HR improved in the 90s,

## 2022-04-21 NOTE — CONSULT NOTE ADULT - SUBJECTIVE AND OBJECTIVE BOX
Patient seen and evaluated at bedside    Chief Complaint: hemiparesis, found to have slow atrial fibrillation     HPI:      PMHx:       PSHx:       Allergies:  No Known Allergies      Home Meds: As per admission medication reconcilliation.     Current Medications:   sodium bicarbonate  Injectable 50 milliEquivalent(s) IV Push Once  sodium zirconium cyclosilicate 10 Gram(s) Oral Once      FAMILY HISTORY:       Social History: From [place]. Came here 2 weeks ago. Son involved with care.     REVIEW OF SYSTEMS:  Constitutional:     [x ] negative [ ] fevers [ ] chills [ ] weight loss [ ] weight gain  HEENT:                  [x ] negative [ ] dry eyes [ ] eye irritation [ ] postnasal drip [ ] nasal congestion  CV:                         [ x] negative  [ ] chest pain [ ] orthopnea [ ] palpitations [ ] murmur  Resp:                     [ ] negative [ ] cough [x ] shortness of breath [ ] dyspnea [ ] wheezing [ ] sputum [ ]hemoptysis  GI:                          [ x] negative [ ] nausea [ ] vomiting [ ] diarrhea [ ] constipation [ ] abd pain [ ] dysphagia   :                        [ x] negative [ ] dysuria [ ] nocturia [ ] hematuria [ ] increased urinary frequency  Musculoskeletal: [x ] negative [ ] back pain [ ] myalgias [ ] arthralgias [ ] fracture  Skin:                       [ x] negative [ ] rash [ ] itch  Neurological:        [ x] negative [ ] headache [ ] dizziness [ ] syncope [ ] weakness [ ] numbness  Psychiatric:           [ x] negative [ ] anxiety [ ] depression  Endocrine:            [ x] negative [ ] diabetes [ ] thyroid problem  Heme/Lymph:      [ x] negative [ ] anemia [ ] bleeding problem  Allergic/Immune: [ x] negative [ ] itchy eyes [ ] nasal discharge [ ] hives [ ] angioedema    [ x] All other systems negative  [ ] Unable to assess ROS due to      Physical Exam:  T(F): 97.5 (04-21), Max: 97.5 (04-21)  HR: 88 (04-21) (47 - 88)  BP: 163/70 (04-21) (132/65 - 163/70)  RR: 20 (04-21)  SpO2: 99% (04-21)  General: Alert, no acute distress   HEENT: No scleral icterus, no facial dysmorphia, no external ear lesions   Cardiac: slow and irregular rate/rhythm, no murmurs, no rubs, no gallops   Pulmonary: Clear breath sounds throughout, no wheezing, no stridor, no crackles   Abdomen: Nondistended, nontender, appears soft   Skin: no obvious rash or lesions   Extremities: no LE edema  Neurological: Moving all 4 extremities, no overt focal deficits noted on exam  Psych: normal mood and affect     Cardiovascular Diagnostic Testing:    ECG: Personally reviewed:  Several ECGs seen.     Echo: Personally reviewed:  Pending     Stress Testing:    Cath:    Imaging:    CXR: Personally reviewed    Labs: Personally reviewed                        6.4    5.42  )-----------( 113      ( 21 Apr 2022 13:04 )             19.3     04-21    132<L>  |  103  |  87<H>  ----------------------------<  67<L>  7.3<HH>   |  16<L>  |  2.76<H>    Ca    9.4      21 Apr 2022 13:04  Phos  4.6     04-21  Mg     1.7     04-21    TPro  6.9  /  Alb  4.3  /  TBili  0.3  /  DBili  x   /  AST  23  /  ALT  32  /  AlkPhos  92  04-21    PT/INR - ( 21 Apr 2022 13:04 )   PT: 14.3 sec;   INR: 1.23 ratio         PTT - ( 21 Apr 2022 13:04 )  PTT:26.6 sec         Patient seen and evaluated at bedside    Chief Complaint:  Hemiparesis, found to have atrial fibrillation with slow VR.    61M Italian speaking M with pmhx of HTN, HLD, DM and ?dilated cardiomyopathy.     Per son, pt woke up this morning with left hemiparesis and presented to the ER at [facility].  CTA showed R M1 occlusion; patient not a candidate for tPA as LKN > 4.5 hours. Transferred to [facility] as stroke rescue for possible thrombectomy.     At OSH,  HR intermittently in the 30s-40s, reportedly in second degree heart block (but no EKG available); given atropine in route to the hospital.  [facility] staff also concerned re possible acute renal failure (Cr. 2.8, BUN 88, K 5.8 (untreated)) with increased BNP.  Initially they were concerned for digoxin toxicity, although digoxin level was anoted to be 1.6 at OSH.     In the [facility], ED found with HR 30-50s. K of 7.3. BUN/Cr: 87/2.76. s/p IV Ca gluc, Lokelma and 1 amp of bicarb. Patient found with urinary retention and Taylor cath was placed with about 550cc of urine drained.   Pt. describes generalized weakness at this time. Denies CP, shortness of breath, nausea, vomiting. No recent illness.      PMHx: as above      Allergies:  No Known Allergies      HOME MEDICATIONS:   metformin 850mg BID   glyburide 5mg BID  Vildagliptin 50mgs qd   bispropolol 2.5mg daily   lisinopril 10mg   furosemide 20mg qdaily   Spironolactone 25mg daily  digoxin 0.25mg daily   simvastatin 20mg daily   aspirin 81mg daily      Current Medications:   sodium bicarbonate  Injectable 50 milliEquivalent(s) IV Push Once  sodium zirconium cyclosilicate 10 Gram(s) Oral Once      FAMILY HISTORY:   No heart disease    Social History:    From  Came here 2 weeks ago. Son involved with care.         REVIEW OF SYSTEMS:  Constitutional:     [x ] negative [ ] fevers [ ] chills [ ] weight loss [ ] weight gain  HEENT:                  [x ] negative [ ] dry eyes [ ] eye irritation [ ] postnasal drip [ ] nasal congestion  CV:                         [ x] negative  [ ] chest pain [ ] orthopnea [ ] palpitations [ ] murmur  Resp:                     [ ] negative [ ] cough [x ] shortness of breath [ ] dyspnea [ ] wheezing [ ] sputum [ ]hemoptysis  GI:                          [ x] negative [ ] nausea [ ] vomiting [ ] diarrhea [ ] constipation [ ] abd pain [ ] dysphagia   :                        [ x] negative [ ] dysuria [ ] nocturia [ ] hematuria [ ] increased urinary frequency  Musculoskeletal: [x ] negative [ ] back pain [ ] myalgias [ ] arthralgias [ ] fracture  Skin:                       [ x] negative [ ] rash [ ] itch  Psychiatric:           [ x] negative [ ] anxiety [ ] depression  Endocrine:            [ x] negative [ ] diabetes [ ] thyroid problem  Heme/Lymph:      [ x] negative [ ] anemia [ ] bleeding problem  Allergic/Immune: [ x] negative [ ] itchy eyes [ ] nasal discharge [ ] hives [ ] angioedema  [ x] All other systems negative      Physical Exam:  T(F): 97.5 (04-21), Max: 97.5 (04-21)  HR: 88 (04-21) (47 - 88)  BP: 163/70 (04-21) (132/65 - 163/70)  RR: 20 (04-21)  SpO2: 99% (04-21)  General: Alert, no acute distress   HEENT: No scleral icterus, no facial dysmorphia, no external ear lesions   Cardiac: slow and irregular rate/rhythm, no murmurs, no rubs, no gallops   Pulmonary: Clear breath sounds throughout, no wheezing, no stridor, no crackles   Abdomen: Nondistended, nontender, appears soft   Skin: no obvious rash or lesions   Extremities: no LE edema  Neurological: Moving all 4 extremities, no overt focal deficits noted on exam  Psych: normal mood and affect       ECG:  A fib with slow to mod VR of approx 50 bpm.  Indeterminate IVCD/BBB, NS ST/T abnormalities      Labs:                       6.4    5.42  )-----------( 113      ( 21 Apr 2022 13:04 )             19.3     04-21  132<L>  |  103  |  87<H>  ----------------------------<  67<L>  7.3<HH>   |  16<L>  |  2.76<H>    Ca    9.4      21 Apr 2022 13:04  Phos  4.6     04-21  Mg     1.7     04-21    TPro  6.9  /  Alb  4.3  /  TBili  0.3  /  DBili  x   /  AST  23  /  ALT  32  /  AlkPhos  92  04-21    PT/INR - ( 21 Apr 2022 13:04 )   PT: 14.3 sec;   INR: 1.23 ratio    PTT - ( 21 Apr 2022 13:04 )  PTT:26.6 sec    Trop T  37 -> 36    Pro-Brain Natriuretic Peptide (04.21.22 @ 13:04): 2012 pg/mL

## 2022-04-21 NOTE — CONSULT NOTE ADULT - ASSESSMENT
61M history of HTN, DM, ?afib (on digoxin from foreign records) who presented to OSH with left hemiparesis. Found to have acute CVA, not candidate for tPA as LKN > 4.5 hours.  Transfer to [facility] as stroke rescue for possible thrombectomy.   At [facility], found to be in HR 30-60s, irregular and with K 7.3; prompting cardiology consult.     #Bradycardia (slow atrial fibrillation with IVCD)   -Likely 2/2 hyperkalemia or possibly digoxin toxicity in setting of reduced renal clearance  -No AC agent listed for medications. CHADSVASC at least 4, AC recommended particularly with now documented CVA. However given acuity of CVA, would need neurology input on when to safely start on full AC (concern for ICH and/or hemorrhagic conversion of CVA).   -Currently HDS (actually hypertensive) with HR 40-50s. Would hold any AVN blocking agents. Bradycardia will likely improve with normalization of potassium (may be emergent HD candidate).   -Obtain formal TTE.   -Telemetry for at least 48 hours.     #HTN  -SBP 160s. Likely would benefit from permissive HTN given ischemic CVA. Will defer to neurology for further input.  61 M history of HTN, HLD, DM, A. fib and ?dilated cardiomyopathy.  He presented to OSH with left hemiparesis and was found to have acute CVA, not candidate for tPA as LKN > 4.5 hours.    Transferred to [facility] as stroke rescue for possible thrombectomy.   Found to be in AF with VR 30-60s, irregular in setting of K 7.3, prompting cardiology consult.       REC:  #1.  Bradycardia (Atrial fibrillation slow ventricular response and with IVCD/BBB)   - Likely 2/2 hyperkalemia.  Apparently digoxin toxicity ruled out at referring hospital.  - Would hold any AVN blocking agents; bradycardia will likely improve with hold digoxin and normalization of potassium (may be emergent HD candidate).   - Obtain formal TTE to evaluated cardiac structure and function.   - Telemetry for at least 48 hours.     #2.  CVA -No A/C agent listed among medications. CHADSVASC at least 4, A/C recommended, particularly with now documented CVA.   - given acuity of CVA, need neurology input on when to safely start on full A/C (concern for ICH and/or hemorrhagic conversion of CVA).   - Currently HDS (actually hypertensive) with HR 40-50s.     #3.  HTN -SBP 160s.   - Likely would benefit from permissive HTN given ischemic CVA; will defer to neurology.       Samia Rosen M.D.  Cardiology fellow     Plan discussed with cardiology fellow.  Patient seen and examined.  Hx., exam and labs as above.  I agree with the assessment and recommendations. which I have reviewed and edited where appropriate.  Tez Lacy M.D.  Cardiology Attending, Consult Service  For Cardiology consults and questions, all Cardiology service information can be found 24/7 on [email or web address] - use password: [email or web address] to log in.

## 2022-04-21 NOTE — CHART NOTE - NSCHARTNOTEFT_GEN_A_CORE
Call from Dr. Villatoro from [facility] ER.  Patient is a 61 year old Portuguese speaking male with past medical history of DM, HTN, unknown heart condition on digoxin.  LKN midnight, witnessed by wife.  He woke up this morning with left hemiparesis.  NIHSS at [facility] 4 for left hemiparesis.  CTA with right M1 occlusion.  mRS 0.  HR intermittently in the 40s.  Not a candidate for tPA as LKN > 4.5 hours.  Transfer to [facility] as stroke rescue for possible thrombectomy.  OZZY aware.  Will repeat all neuroimaging.  [facility] called back after patient left ER.  Cr. 2.8, BUN 88, K 5.8 (untreated).  BNP increased.  Concern for digoxin toxicity, digoxin therapeutic.  Heart rate in the 30s, given atropine, reportedly stable when he left the ER.    Impression:  Left hemiparesis from right cerebral dysfunction.  Ischemic stroke in the setting of Right M1 occlusion.  Embolic stroke, possibly cardioembolic in the setting of heart failure    Melissa Rosenberg MD  [id number]  Vascular Neurology Fellow Call from Dr. Villatoro from [facility] ER.  Patient is a 61 year old Persian speaking male with past medical history of DM, HTN, unknown heart condition on digoxin.  LKN midnight, witnessed by wife.  He woke up this morning with left hemiparesis.  NIHSS at [facility] 4 for left hemiparesis.  CTA with right M1 occlusion.  mRS 0.  HR intermittently in the 40s.  Not a candidate for tPA as LKN > 4.5 hours.  Transfer to [facility] as stroke rescue for possible thrombectomy.  OZZY aware.  Will repeat all neuroimaging.  [facility] called back after patient left ER.  Cr. 2.8, BUN 88, K 5.8 (untreated).  BNP increased.  Concern for digoxin toxicity, digoxin therapeutic.  Heart rate in the 30s, given atropine, reportedly stable when he left the ER.    Impression:  Left hemiparesis from right cerebral dysfunction.  Ischemic stroke in the setting of Right M1 occlusion.  Embolic stroke, possibly cardioembolic in the setting of heart failure    Melissa Rosenberg MD  PGY5  Vascular Neurology Fellow    Stroke attending. Agree with above

## 2022-04-21 NOTE — H&P ADULT - NSHPREVIEWOFSYSTEMS_GEN_ALL_CORE
CONSTITUTIONAL: No weakness, fevers or chills  EYES/ENT: No visual changes;  No vertigo or throat pain   NECK: No pain or stiffness  RESPIRATORY: No cough, wheezing, hemoptysis; No shortness of breath  CARDIOVASCULAR: No chest pain or palpitations  GASTROINTESTINAL: No abdominal or epigastric pain. No nausea, vomiting, or hematemesis; No diarrhea or constipation. No melena or hematochezia.  GENITOURINARY: Intermittent urine streams and last urination this morning, No dysuria, frequency or hematuria  NEUROLOGICAL: left sided weakness with slurred speech   SKIN: No itching, burning, rashes, or lesions   All other review of systems is negative unless indicated above.

## 2022-04-21 NOTE — ED PROVIDER NOTE - CARE PLAN
Principal Discharge DX:	Stroke  Secondary Diagnosis:	Hyperkalemia  Secondary Diagnosis:	Renal failure, acute  Secondary Diagnosis:	Bradycardia   1

## 2022-04-21 NOTE — H&P ADULT - HISTORY OF PRESENT ILLNESS
61 year old Albanian speaking male with past medical history of DM, HTN, dilated cardiomyopathy. LKN midnight, witnessed by wife. This morning he woke up this morning with left hemiparesis. NIHSS at [facility] 4 for left hemiparesis. CTA with right M1 occlusion. HR intermittently in the 40s, reportedly in second degree heart block (but no EKG on file), given atropine in route to the hospital.  Not a candidate for tPA as LKN > 4.5 hours.  He was transferred to [facility] as stroke rescue for possible thrombectomy.  OZZY aware.    Will repeat all neuroimaging.      [place] staff contacted [facility] staff notified the patient was found to have acute renal failure Cr. 2.8, BUN 88, K 5.8 (untreated).  BNP increased.  Concern for digoxin toxicity, although digoxin level noted to be 1.6 at OSH.     In the ED, T96.4, HR 50, /65, on %. Slow afib with HR 30-50s. FSG 56, given amp D50. Given albuterol and Ca gluc 2g.     Admitted to MICU for CVA and possible thrombectomy. 61 year old Welsh speaking male with past medical history of DM, HTN, dilated cardiomyopathy. LKN midnight, witnessed by wife. This morning he woke up this morning with left hemiparesis. NIHSS at [facility] 4 for left hemiparesis. CTA with right M1 occlusion. HR intermittently in the 40s, reportedly in second degree heart block (but no EKG on file), given atropine in route to the hospital.  Not a candidate for tPA as LKN > 4.5 hours.  He was transferred to [facility] as stroke rescue for possible thrombectomy.  OZZY aware.    Will repeat all neuroimaging.      [place] staff contacted [facility] staff notified the patient was found to have acute renal failure Cr. 2.8, BUN 88, K 5.8 (untreated).  BNP increased.  Concern for digoxin toxicity, although digoxin level noted to be 1.6 at OSH.     In the ED, T96.4, HR 50, /65, on %. Slow afib with HR 30-50s. FSG 56, given amp D50. Given albuterol and Ca gluc 2g.     Admitted to MICU for CVA neuro checks and possible thrombectomy.

## 2022-04-21 NOTE — H&P ADULT - NSHPPHYSICALEXAM_GEN_ALL_CORE
General: NAD, lying in bed   HEENT: No facial droop. No icterus or conjunctival injection   Neck: Supple,   Respiratory: Subtle expiratory wheeze with deep exhalation, Otherwise CTA bilaterally   Cardiovascular: Normal s1 and s2 w/o m/r/g, Irregular rhythm with frequent PVC's   Abdomen: Soft, NT, ND, No hepatosplenomegaly, + suprapubic tenderness   Extremities: Normal temperature without cyanosis, edema, or erythema   Skin: No apparent rashes   Neurological: Alert and Oriented x3, Left sided weakness 3-4/5 right sided 5/5   Psychiatry: Normal affect

## 2022-04-21 NOTE — ED ADULT NURSE NOTE - OBJECTIVE STATEMENT
61 yr old male by EMS txfer from [place] as *stroke rescue, (hx: DM, htn, on digoxin for heart condition) Kinyarwanda speaking male with past medical history of DM, HTN, unknown heart condition on digoxin.  LKN midnight, witnessed by wife.  He woke up this morning with left hemiparesis.  NIHSS at [place] 4 for left hemiparesis.  CTA with right M1 occlusion.  mRS 0.  HR intermittently in the 40s.  Not a candidate for tPA as LKN > 4.5 hours.  Transfer to [facility] as stroke rescue for possible thrombectomy.  OZZY aware.  Will repeat all neuroimaging.  [facility] called back after patient left ER.  Cr. 2.8, BUN 88, K 5.8 (untreated).  BNP increased.  Concern for digoxin toxicity, digoxin therapeutic.  Heart rate in the 30s, given atropine, reportedly stable when he left the ER.    Impression:  Left hemiparesis from right cerebral dysfunction.  Ischemic stroke in the setting of Right M1 occlusion.  Embolic stroke, possibly cardioembolic in the setting of heart failure 61 yr old male by EMS txfer from [place] as *stroke rescue, (hx: DM, htn, on digoxin for heart condition), last known normal: 12 midnight today, witnessed by wife, woke up with L hemiSpanish speaking male with past medical history of DM, HTN, unknown heart condition on digoxin.  LKN midnight, witnessed by wife.  He woke up this morning with left hemiparesis.  NIHSS at [place] 4 for left hemiparesis.  CTA with right M1 occlusion.  mRS 0.  HR intermittently in the 40s.  Not a candidate for tPA as LKN > 4.5 hours.  Transfer to [facility] as stroke rescue for possible thrombectomy.  OZZY aware.  Will repeat all neuroimaging.  [facility] called back after patient left ER.  Cr. 2.8, BUN 88, K 5.8 (untreated).  BNP increased.  Concern for digoxin toxicity, digoxin therapeutic.  Heart rate in the 30s, given atropine, reportedly stable when he left the ER.    Impression:  Left hemiparesis from right cerebral dysfunction.  Ischemic stroke in the setting of Right M1 occlusion.  Embolic stroke, possibly cardioembolic in the setting of heart failure 61 yr old male by EMS txfer from [place] as *stroke rescue, (hx: DM, htn, on digoxin for heart condition), last known normal: 12 midnight today, witnessed by wife, woke up with L hemiparesis, NIHSS at [place] 4 for left hemiparesis, head CTA revealed MCA occlusion, HR intermittently in the 40s - given atropine 1 mg,  Not a candidate for tPA as LKN > 4.5 hours.  Transfer to [facility] as stroke rescue for possible thrombectomy, upon arrival to ED, A&Ox3, following commands with son translating, NIHSS: 9, *code stroke initiated, taken to CT, +noted L sided weakness, skin wdi

## 2022-04-21 NOTE — ED PROVIDER NOTE - NSICDXPASTSURGICALHX_GEN_ALL_CORE_FT
Follow-up for your diabetes in March    Patient Education     Viral or Bacterial Bronchitis with Wheezing (Adult)    Bronchitis is an infection of the air passages. It often occurs during a cold and is usually caused by a virus. Symptoms include cough with mucus (phlegm) and low-grade fever. This illness is contagious during the first few days and is spread through the air by coughing and sneezing, or by direct contact (touching the sick person and then touching your own eyes, nose, or mouth).  If there is a lot of inflammation, air flow is restricted. The air passages may also go into spasm, especially if you have asthma. This causes wheezing and difficulty breathing even in people who do not have asthma.  Bronchitis usually lasts 7 to 14 days. The wheezing should improve with treatment during the first week. An inhaler is often prescribed to relax the air passages and stop wheezing. Antibiotics will be prescribed if your doctor thinks there is also a secondary bacterial infection.  Home care    If symptoms are severe, rest at home for the first 2 to 3 days. When you go back to your usual activities, don't let yourself get too tired.    Dont smoke. Also avoid being exposed to secondhand smoke.    You may use over-the-counter medicine to control fever or pain, unless another medicine was prescribed. Note: If you have chronic liver or kidney disease or have ever had a stomach ulcer or gastrointestinal bleeding, talk with your healthcare provider before using these medicines. Also talk to your provider if you are taking medicine to prevent blood clots.) Aspirin should never be given to anyone younger than 18 years of age who is ill with a viral infection or fever. It may cause severe liver or brain damage.    Your appetite may be poor, so a light diet is fine. Stay well hydrated by drinking 6 to 8 glasses of fluids per day (such as water, soft drinks, sports drinks, juices, tea, or soup). Extra fluids will help loosen  secretions in the nose and lungs.    Over-the-counter cough, cold, and sore-throat medicines will not shorten the length of the illness, but they may be helpful to reduce symptoms. (Note: Don't use decongestants if you have high blood pressure.)    If you were given an inhaler, use it exactly as directed. If you need to use it more often than prescribed, your condition may be worsening. If this happens, contact your healthcare provider.    If prescribed, finish all antibiotic medicine, even if you are feeling better after only a few days.  Follow-up care  Follow up with your healthcare provider, or as advised. If you had an X-ray or ECG (electrocardiogram), a specialist will review it. You will be notified of any new findings that may affect your care.  If you are age 65 or older, or if you have a chronic lung disease or condition that affects your immune system, or you smoke, ask your healthcare provider about getting a pneumococcal vaccine and a yearly flu shot (influenza vaccine).  When to seek medical advice  Call your healthcare provider right away if any of these occur:    Fever of 100.4 F (38 C) or higher, or as directed by your healthcare provider    Coughing up increasing amounts of colored sputum    Weakness, drowsiness, headache, facial pain, ear pain, or a stiff neck  Call 911  Call 911 if any of these occur.    Coughing up blood    Worsening weakness, drowsiness, headache, or stiff neck    Increased wheezing not helped with medication, shortness of breath, or pain with breathing  [employer] last reviewed this educational content on 6/1/2018 2000-2020 [employer]. [address] [place] [address], [place] [address]. All rights reserved. This information is not intended as a substitute for professional medical care. Always follow your healthcare professional's instructions.            PAST SURGICAL HISTORY:  No significant past surgical history

## 2022-04-21 NOTE — ED PROVIDER NOTE - OBJECTIVE STATEMENT
61M with pmhx of HTN, HLD, "heart disease" presenting as a stroke transfer from [facility] for neuro interventional eval for large vessel occlusion. Patient originally presenting to the ER for L sided weakness. Of note patient found to be bradycardic at OSH - to the 30s, sinus keiko s/p atropine. On digoxin per med review. Pt endorses generalized weakness at this time. Denies CP, shortness of breath, nausea, vomiting. No recent illness.

## 2022-04-21 NOTE — CONSULT NOTE ADULT - SUBJECTIVE AND OBJECTIVE BOX
CHIEF COMPLAINT: ARF/ stroke    HPI:  61 year old Czech speaking male with past medical history of DM, HTN, unknown heart condition on digoxin.  LKN midnight, witnessed by wife.  He woke up this morning with left hemiparesis.  NIHSS at [facility] 4 for left hemiparesis.  CTA with right M1 occlusion. HR intermittently in the 40s, reportedly in second degree heart block (but no EKG on file), given atropine.  Not a candidate for tPA as LKN > 4.5 hours.  Transfer to [facility] as stroke rescue for possible thrombectomy.  OZZY aware.  Will repeat all neuroimaging.  [facility] called back after patient left ER.  Cr. 2.8, BUN 88, K 5.8 (untreated).  BNP increased.  Concern for digoxin toxicity, digoxin therapeutic.      In the ED, T96.4, HR 50, /65, on %. Slow afib with HR 30-50s. FSG 56, given amp D50. Given albuterol and Ca gluc 2g. MICU consulted for ARF.    PAST MEDICAL & SURGICAL HISTORY:  as above    Allergies    No Known Allergies    Intolerances        HOME MEDICATIONS:    REVIEW OF SYSTEMS:  Constitutional:   Eyes:  ENT:  CV:  Resp:  GI:  :  MSK:  Integumentary:  Neurological:  Psychiatric:  Endocrine:  Hematologic/Lymphatic:  Allergic/Immunologic:  [ ] All other systems negative  [ ] Unable to assess ROS because ________    OBJECTIVE:  ICU Vital Signs Last 24 Hrs  T(C): 36.4 (21 Apr 2022 14:15), Max: 36.4 (21 Apr 2022 14:15)  T(F): 97.5 (21 Apr 2022 14:15), Max: 97.5 (21 Apr 2022 14:15)  HR: 88 (21 Apr 2022 14:15) (47 - 88)  BP: 163/70 (21 Apr 2022 14:15) (132/65 - 163/70)  BP(mean): 93 (21 Apr 2022 14:00) (86 - 95)  ABP: --  ABP(mean): --  RR: 20 (21 Apr 2022 14:15) (20 - 20)  SpO2: 99% (21 Apr 2022 14:15) (99% - 100%)        CAPILLARY BLOOD GLUCOSE      POCT Blood Glucose.: 58 mg/dL (21 Apr 2022 13:11)      PHYSICAL EXAM:  General:   HEENT:   Lymph Nodes:  Neck:   Respiratory:   Cardiovascular:   Abdomen:   Extremities:   Skin:   Neurological:  Psychiatry:    HOSPITAL MEDICATIONS:  MEDICATIONS  (STANDING):  sodium bicarbonate  Injectable 50 milliEquivalent(s) IV Push Once  sodium zirconium cyclosilicate 10 Gram(s) Oral Once    MEDICATIONS  (PRN):      LABS:                        6.4    5.42  )-----------( 113      ( 21 Apr 2022 13:04 )             19.3     04-21    132<L>  |  103  |  87<H>  ----------------------------<  67<L>  7.3<HH>   |  16<L>  |  2.76<H>    Ca    9.4      21 Apr 2022 13:04  Phos  4.6     04-21  Mg     1.7     04-21    TPro  6.9  /  Alb  4.3  /  TBili  0.3  /  DBili  x   /  AST  23  /  ALT  32  /  AlkPhos  92  04-21    PT/INR - ( 21 Apr 2022 13:04 )   PT: 14.3 sec;   INR: 1.23 ratio         PTT - ( 21 Apr 2022 13:04 )  PTT:26.6 sec      Venous Blood Gas:  04-21 @ 13:04  7.20/46/33/18/39.1  VBG Lactate: 1.2      MICROBIOLOGY:     RADIOLOGY:  [ ] Reviewed and interpreted by me    EKG: CHIEF COMPLAINT: ARF/ stroke    HPI:  Patient is a 61 year old Northern Irish speaking male with past medical history of DM, HTN, dilated cardiomyopathy. LKN midnight, witnessed by wife. This morning he woke up this morning with left hemiparesis. NIHSS at [facility] 4 for left hemiparesis. CTA with right M1 occlusion. HR intermittently in the 40s, reportedly in second degree heart block (but no EKG on file), given atropine in route to the hospital.  Not a candidate for tPA as LKN > 4.5 hours.  He was transferred to [facility] as stroke rescue for possible thrombectomy.  OZZY aware.    Will repeat all neuroimaging.      [place] staff contacted [facility] staff notified the patient was found to have acute renal failure Cr. 2.8, BUN 88, K 5.8 (untreated).  BNP increased.  Concern for digoxin toxicity, although digoxin level noted to be 1.6 at OSH.     In the ED, T96.4, HR 50, /65, on %. Slow afib with HR 30-50s. FSG 56, given amp D50. Given albuterol and Ca gluc 2g.     MICU consulted for ARF. Subjective     PAST MEDICAL & SURGICAL HISTORY:  as above    Allergies    No Known Allergies    Intolerances        HOME MEDICATIONS: Reviewed  - Metformin 850mg BID   - Glyburide 5mg BID  - VBildagliptina 50mgs qdaily  - Bispropolol 2.5mg daily   - Lisinopril 10mg   - Furosemide 20mg qdaily   - Spironolactone 25mg daily  - Digoxin 0.25mg daily   - Simvastatin 20mg daily   - aspirin 81mg daily      REVIEW OF SYSTEMS:  CONSTITUTIONAL: No weakness, fevers or chills  EYES/ENT: No visual changes;  No vertigo or throat pain   NECK: No pain or stiffness  RESPIRATORY: No cough, wheezing, hemoptysis; No shortness of breath  CARDIOVASCULAR: No chest pain or palpitations  GASTROINTESTINAL: No abdominal or epigastric pain. No nausea, vomiting, or hematemesis; No diarrhea or constipation. No melena or hematochezia.  GENITOURINARY: Intermittent urine streams and last urination this morning, No dysuria, frequency or hematuria  NEUROLOGICAL: left sided weakness with slurred speech   SKIN: No itching, burning, rashes, or lesions   All other review of systems is negative unless indicated above.      OBJECTIVE:  ICU Vital Signs Last 24 Hrs  T(C): 36.4 (21 Apr 2022 14:15), Max: 36.4 (21 Apr 2022 14:15)  T(F): 97.5 (21 Apr 2022 14:15), Max: 97.5 (21 Apr 2022 14:15)  HR: 88 (21 Apr 2022 14:15) (47 - 88)  BP: 163/70 (21 Apr 2022 14:15) (132/65 - 163/70)  BP(mean): 93 (21 Apr 2022 14:00) (86 - 95)  ABP: --  ABP(mean): --  RR: 20 (21 Apr 2022 14:15) (20 - 20)  SpO2: 99% (21 Apr 2022 14:15) (99% - 100%)        CAPILLARY BLOOD GLUCOSE      POCT Blood Glucose.: 58 mg/dL (21 Apr 2022 13:11)      PHYSICAL EXAM:  General: NAD, lying in bed   HEENT: No facial droop. No icterus or conjunctival injection   Neck: Supple,   Respiratory: Subtle expiratory wheeze with deep exhalation, Otherwise CTA bilaterally   Cardiovascular: Normal s1 and s2 w/o m/r/g, Irregular rhythm with frequent PVC's   Abdomen: Soft, NT, ND, No hepatosplenomegaly, + suprapubic tenderness   Extremities: Normal temperature without cyanosis, edema, or erythema   Skin: No apparent rashes   Neurological: Alert and Oriented x3, Left sided weakness 3-4/5 right sided 5/5   Psychiatry: Normal affect     POCUS: Bladder severely enlarged with hydronephrosis bilaterally     HOSPITAL MEDICATIONS:  MEDICATIONS  (STANDING):  sodium bicarbonate  Injectable 50 milliEquivalent(s) IV Push Once  sodium zirconium cyclosilicate 10 Gram(s) Oral Once    MEDICATIONS  (PRN):      LABS:                        6.4    5.42  )-----------( 113      ( 21 Apr 2022 13:04 )             19.3     04-21    132<L>  |  103  |  87<H>  ----------------------------<  67<L>  7.3<HH>   |  16<L>  |  2.76<H>    Ca    9.4      21 Apr 2022 13:04  Phos  4.6     04-21  Mg     1.7     04-21    TPro  6.9  /  Alb  4.3  /  TBili  0.3  /  DBili  x   /  AST  23  /  ALT  32  /  AlkPhos  92  04-21    PT/INR - ( 21 Apr 2022 13:04 )   PT: 14.3 sec;   INR: 1.23 ratio         PTT - ( 21 Apr 2022 13:04 )  PTT:26.6 sec      Venous Blood Gas:  04-21 @ 13:04  7.20/46/33/18/39.1  VBG Lactate: 1.2      MICROBIOLOGY:     RADIOLOGY:  [ ] Reviewed and interpreted by me    EKG: Bigeminy, trigeminy with frequent PVC's and Slow afib, Noted to have Peaked T waves

## 2022-04-21 NOTE — ED ADULT NURSE REASSESSMENT NOTE - NS ED NURSE REASSESS COMMENT FT1
received report from MENDEZ Summers. pt transported to critical room D following CT scan. pt's son is present at bedside. pt is a&o4, spontaneous unlabored respirations, bradycardic to 40s, TOBY, equal strength in extremities b/l. US guided IV line placed by MD, labs drawn and sent. EKG done at bedside, pt placed on CCM, pt placed on pacer pads. Medications administered as per MD. Neuro MD and MICU MD consulted at bedside. Pt pending CT scan and repeat labs. received report from MENDEZ Summers. pt transported to critical room D following CT scan. pt's son is present at bedside. pt is a&ox4, spontaneous unlabored respirations, bradycardic to 40s, TOBY, equal strength in extremities b/l, intermittently tremulous. see paper chart for neuro flow-sheet. US guided IV line placed by MD, labs drawn and sent. EKG done at bedside, pt placed on CCM, pt placed on pacer pads. Medications administered as per MD. Neuro MD and MICU MD consulted at bedside. Pt pending CT scan and repeat labs.

## 2022-04-21 NOTE — STROKE CODE NOTE - NSMDCONSULT QTN_Y FT
Case discussed with ER and OZZY.  Patient is currently medically unstable for mechanical thrombectomy and therefore excluded.

## 2022-04-21 NOTE — ED PROVIDER NOTE - CLINICAL SUMMARY MEDICAL DECISION MAKING FREE TEXT BOX
clay - 61 m w dm htn unk kevin blocker meds - presents from osh for stroke recue with dizziness and lsided wseakness found to have a occlusive cva transferred for ir possibvle embolectomy - codes troke on arrival as per ems - pt was keiko - given atropine for hr 30 - reported 2hb no ekg confirming --- pt labs reviewed found pt to be hyper k and renal failure concern for BRash syndrom e- _ pt on dig - dig level at osh 1.6 will give CA -- d50 wo insulin as fs 56- nebs-- iv fluids - will need admisison

## 2022-04-21 NOTE — CONSULT NOTE ADULT - PROBLEM SELECTOR RECOMMENDATION 2
Patient with Cr at 2.7 on admission, likely CKD. Unknown baseline Cr as patient [place] is [place] ([place] recently came from [place] noted with urinary retention s/p Taylor placement.     Please send UA, urine electrolytes, UPCR  check US kidney/bladder.   Monitor labs and urine output.   Avoid NSAIDs, hold lisinopril   Dose medications as per eGFR.

## 2022-04-21 NOTE — H&P ADULT - ASSESSMENT
61 year old Sami speaking male with past medical history of DM, HTN, dilated cardiomyopathy, and possible (CAD?) , p/w left hemiparesis, 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 Admitted to MICU for neurological exams s/p CVA and possible urgent thrombectomy.    Neuro  #MCA CVA  - Neuro checks q1 hours, if acute change in mental status, possible thrombectomy  - Appreciate neuro recs  -  f/u MR brain w/o contrast  - ASA, atorvastatin 80mg  - f/u lipid, tsh, hgA1c  - TTE with bubble study    CV  #Atrial fibrillation with bradycardia likely 2/2 hyperkalemia  - On bisoprolol at home and digoxin, normal digoxin level at OSH  - hold AV kevin blocking agents  - f/u TTE    Resp  -no active issues    GI  -NPO except meds for possible thrombectomy    /Renal  #Acute renal failure with hyperkalemia   - I/s/o bladder outlet obstruction given distended bladder and b/l hydronephrosis seen on POCUS  - POCUS with bladder distention and hydronephrosis   - Taylor placed with 500cc drained   - Strict i's and O's  - Discussed with Nephrology, no urgent need for dialysis  - Per Nephrology, hyperkalemia can be managed medically    ID  -No active issues    Endo  #Hypoglycemia  - Likely 2/2 to home sulfonylurea i/s/o renal failure   - Recommend obtaining sulfonylurea level   - If hypoglycemic again would start D5W at 50cc/hour  - Recommend finger sticks q4 hours until improvement of hypoglycemic episodes    Heme  #Anemia   - Hemoglobin noted to be 6 in ED, 12 at outside hospital suspect lab error  - Repeat CBC pending, follow up repeat CBC   - Transfuse for goal hemoglobin <7   - Patient without signs or symptoms of bleeding      Ethics  -Full code         61 year old Pashto speaking male with past medical history of DM, HTN, dilated cardiomyopathy, and possible (CAD?) , p/w left hemiparesis, 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 Admitted to MICU for neurological exams s/p CVA and possible urgent thrombectomy.    Neuro  #MCA CVA  - Neuro checks q1 hours, if acute change in mental status, possible thrombectomy  - Appreciate neuro recs  -  f/u MR brain w/o contrast  - ASA, atorvastatin 80mg  - f/u lipid, tsh, hgA1c  - TTE with bubble study    CV  #Atrial fibrillation with bradycardia likely 2/2 hyperkalemia  - On bisoprolol at home and digoxin, normal digoxin level at OSH  - hold AV kevin blocking agents  - f/u TTE    #dilated cardiomyopathy  Documented in [facility] [place] paperwork  -hold home meds spironolactone, lasix, lisinopril    Resp  -no active issues    GI  -NPO except meds for possible thrombectomy    /Renal  #Acute renal failure with hyperkalemia   - I/s/o bladder outlet obstruction given distended bladder and b/l hydronephrosis seen on POCUS  - POCUS with bladder distention and hydronephrosis   - Taylor placed with 500cc drained   - Strict i's and O's  - Discussed with Nephrology, no urgent need for dialysis  - Per Nephrology, hyperkalemia can be managed medically    ID  -No active issues    Endo  #Hypoglycemia  - Likely 2/2 to home sulfonylurea i/s/o renal failure   - Recommend obtaining sulfonylurea level   - If hypoglycemic again would start D5W at 50cc/hour  - Recommend finger sticks q4 hours until improvement of hypoglycemic episodes    Heme  #Anemia   - Hemoglobin noted to be 6 in ED, 12 at outside hospital suspect lab error  - Repeat CBC pending, follow up repeat CBC   - Transfuse for goal hemoglobin <7   - Patient without signs or symptoms of bleeding      Ethics  -Full code

## 2022-04-21 NOTE — CONSULT NOTE ADULT - SUBJECTIVE AND OBJECTIVE BOX
[facility] DIVISION OF KIDNEY DISEASES AND HYPERTENSION -- [phone]  -- INITIAL CONSULT NOTE  --------------------------------------------------------------------------------  If any questions, please feel free to contact me  NS pager: [phone], [facility]: [phone]  Fred Alvarado M.D.  Nephrology Fellow  --------------------------------------------------------------------------------    HPI:  61 year old [place] speaking male with past medical history of DM, HTN, unknown heart condition on digoxin. transferred from OSH for stroke evaluation. As per son pt woke up this morning with left hemiparesis.  NIHSS at OSH 4 for left hemiparesis.  CTA with right M1 occlusion. Not a candidate for tPA as LKN > 4.5 hours. Transfer to [facility] as stroke rescue for possible thrombectomy. In the [facility] ED found with HR 30-50s. K of 7.3. BUN/Cr: 87/2.76. s/p IV Ca gluc, lokelma and 1 amp of bicarb. Patient found with urinary retention and Taylor cath was placed with about 550cc of Urine drained.     of note, as per review of home medications he is taking Digoxin, Eplerenone, Metformin, lasix, bisoprolol, and lisinopril.     Nephrology consulted for NANCY    PAST HISTORY  --------------------------------------------------------------------------------  PAST MEDICAL & SURGICAL HISTORY:  HTN (hypertension)    HLD (hyperlipidemia)    No significant past surgical history      FAMILY HISTORY:  non contributory   PAST SOCIAL HISTORY:    ALLERGIES & MEDICATIONS  --------------------------------------------------------------------------------  Allergies    No Known Allergies    Intolerances      Standing Inpatient Medications  sodium bicarbonate  Injectable 50 milliEquivalent(s) IV Push Once  sodium zirconium cyclosilicate 10 Gram(s) Oral Once    PRN Inpatient Medications      REVIEW OF SYSTEMS  --------------------------------------------------------------------------------  Gen: +fatigue +weakness No fevers/chills  Skin: No rashes  Respiratory: No dyspnea, cough  CV: No chest pain  GI: No abdominal pain, diarrhea  MSK: No  edema    All other systems were reviewed and are negative, except as noted.    VITALS/PHYSICAL EXAM  --------------------------------------------------------------------------------  T(C): 36.4 (04-21-22 @ 14:15), Max: 36.4 (04-21-22 @ 14:15)  HR: 88 (04-21-22 @ 14:15) (47 - 88)  BP: 163/70 (04-21-22 @ 14:15) (132/65 - 163/70)  RR: 20 (04-21-22 @ 14:15) (20 - 20)  SpO2: 99% (04-21-22 @ 14:15) (99% - 100%)  Wt(kg): --    Weight (kg): 59.9 (04-21-22 @ 13:07)      Physical Exam:  	Gen: seems in NAD  	Pulm: CTA B/L  	CV: S1S2  	Abd: Soft, +BS   	Ext: No LE edema B/L  	Neuro: Awake  	Skin: Warm and dry            : +Taylor cath with clear urine    LABS/STUDIES  --------------------------------------------------------------------------------              6.4    5.42  >-----------<  113      [04-21-22 @ 13:04]              19.3     132  |  103  |  87  ----------------------------<  67      [04-21-22 @ 13:04]  7.3   |  16  |  2.76        Ca     9.4     [04-21-22 @ 13:04]      Mg     1.7     [04-21-22 @ 13:04]      Phos  4.6     [04-21-22 @ 13:04]    TPro  6.9  /  Alb  4.3  /  TBili  0.3  /  DBili  x   /  AST  23  /  ALT  32  /  AlkPhos  92  [04-21-22 @ 13:04]    PT/INR: PT 14.3 , INR 1.23       [04-21-22 @ 13:04]  PTT: 26.6       [04-21-22 @ 13:04]      Creatinine Trend:  SCr 2.76 [04-21 @ 13:04]

## 2022-04-21 NOTE — CONSULT NOTE ADULT - TIME BILLING
61-year-old right-handed gentleman first evaluated at [facility] on 4/22/2022 with aphasia.  History and exam as above, except that there is no evidence of aphasia.  ROS otherwise negative.  CT head (4/21/2022) to my eye showed a small-moderate-sized acute right MCA infarct involving the operculum and insula.  CTA neck and head (4/21/2022) to my eye showed a right M1 occlusion.    Impression.  Mild agitation and very mild left hemiparesis, due to a right MCA infarct with a right M1 occlusion.  Mechanism may be cardioembolism related to cardiomyopathy, although if his cardiomyopathy is not severe, then mechanism may be embolic stroke of undetermined source.  He did not undergo endovascular thrombectomy due to medical instability.  Suggest.  If feasible and clinically stable: MRI brain/MRA neck and head; TTE; eventual ILR; continue aspirin; atorvastatin-titrate dose depending on 10-year ASCVD risk calculator; permissive hypertension if safe from the cardiac standpoint; on hemicraniectomy watch (so far the infarct is small, but this can change); PT/OT.

## 2022-04-22 LAB
A1C WITH ESTIMATED AVERAGE GLUCOSE RESULT: 7.6 % — HIGH (ref 4–5.6)
ALBUMIN SERPL ELPH-MCNC: 3.3 G/DL — SIGNIFICANT CHANGE UP (ref 3.3–5)
ALBUMIN SERPL ELPH-MCNC: 4.1 G/DL — SIGNIFICANT CHANGE UP (ref 3.3–5)
ALP SERPL-CCNC: 77 U/L — SIGNIFICANT CHANGE UP (ref 40–120)
ALP SERPL-CCNC: 87 U/L — SIGNIFICANT CHANGE UP (ref 40–120)
ALT FLD-CCNC: 23 U/L — SIGNIFICANT CHANGE UP (ref 10–45)
ALT FLD-CCNC: 31 U/L — SIGNIFICANT CHANGE UP (ref 10–45)
ANION GAP SERPL CALC-SCNC: 12 MMOL/L — SIGNIFICANT CHANGE UP (ref 5–17)
ANION GAP SERPL CALC-SCNC: 15 MMOL/L — SIGNIFICANT CHANGE UP (ref 5–17)
ANION GAP SERPL CALC-SCNC: 18 MMOL/L — HIGH (ref 5–17)
APTT BLD: 26.5 SEC — LOW (ref 27.5–35.5)
APTT BLD: 27.7 SEC — SIGNIFICANT CHANGE UP (ref 27.5–35.5)
AST SERPL-CCNC: 22 U/L — SIGNIFICANT CHANGE UP (ref 10–40)
AST SERPL-CCNC: 22 U/L — SIGNIFICANT CHANGE UP (ref 10–40)
BASOPHILS # BLD AUTO: 0.02 K/UL — SIGNIFICANT CHANGE UP (ref 0–0.2)
BASOPHILS # BLD AUTO: 0.03 K/UL — SIGNIFICANT CHANGE UP (ref 0–0.2)
BASOPHILS NFR BLD AUTO: 0.2 % — SIGNIFICANT CHANGE UP (ref 0–2)
BASOPHILS NFR BLD AUTO: 0.4 % — SIGNIFICANT CHANGE UP (ref 0–2)
BILIRUB SERPL-MCNC: 0.4 MG/DL — SIGNIFICANT CHANGE UP (ref 0.2–1.2)
BILIRUB SERPL-MCNC: 0.6 MG/DL — SIGNIFICANT CHANGE UP (ref 0.2–1.2)
BLD GP AB SCN SERPL QL: NEGATIVE — SIGNIFICANT CHANGE UP
BUN SERPL-MCNC: 39 MG/DL — HIGH (ref 7–23)
BUN SERPL-MCNC: 53 MG/DL — HIGH (ref 7–23)
BUN SERPL-MCNC: 73 MG/DL — HIGH (ref 7–23)
CALCIUM SERPL-MCNC: 10.2 MG/DL — SIGNIFICANT CHANGE UP (ref 8.4–10.5)
CALCIUM SERPL-MCNC: 10.6 MG/DL — HIGH (ref 8.4–10.5)
CALCIUM SERPL-MCNC: 9.5 MG/DL — SIGNIFICANT CHANGE UP (ref 8.4–10.5)
CHLORIDE SERPL-SCNC: 105 MMOL/L — SIGNIFICANT CHANGE UP (ref 96–108)
CHLORIDE SERPL-SCNC: 106 MMOL/L — SIGNIFICANT CHANGE UP (ref 96–108)
CHLORIDE SERPL-SCNC: 110 MMOL/L — HIGH (ref 96–108)
CHOLEST SERPL-MCNC: 103 MG/DL — SIGNIFICANT CHANGE UP
CO2 SERPL-SCNC: 18 MMOL/L — LOW (ref 22–31)
CO2 SERPL-SCNC: 20 MMOL/L — LOW (ref 22–31)
CO2 SERPL-SCNC: 22 MMOL/L — SIGNIFICANT CHANGE UP (ref 22–31)
CREAT SERPL-MCNC: 1.26 MG/DL — SIGNIFICANT CHANGE UP (ref 0.5–1.3)
CREAT SERPL-MCNC: 1.52 MG/DL — HIGH (ref 0.5–1.3)
CREAT SERPL-MCNC: 2.21 MG/DL — HIGH (ref 0.5–1.3)
CRP SERPL-MCNC: 5 MG/L — HIGH (ref 0–4)
CULTURE RESULTS: NO GROWTH — SIGNIFICANT CHANGE UP
EGFR: 33 ML/MIN/1.73M2 — LOW
EGFR: 52 ML/MIN/1.73M2 — LOW
EGFR: 65 ML/MIN/1.73M2 — SIGNIFICANT CHANGE UP
EOSINOPHIL # BLD AUTO: 0.48 K/UL — SIGNIFICANT CHANGE UP (ref 0–0.5)
EOSINOPHIL # BLD AUTO: 0.91 K/UL — HIGH (ref 0–0.5)
EOSINOPHIL NFR BLD AUTO: 12.5 % — HIGH (ref 0–6)
EOSINOPHIL NFR BLD AUTO: 4.8 % — SIGNIFICANT CHANGE UP (ref 0–6)
ESTIMATED AVERAGE GLUCOSE: 171 MG/DL — HIGH (ref 68–114)
FERRITIN SERPL-MCNC: 299 NG/ML — SIGNIFICANT CHANGE UP (ref 30–400)
FOLATE SERPL-MCNC: 16.1 NG/ML — SIGNIFICANT CHANGE UP
GLUCOSE BLDC GLUCOMTR-MCNC: 113 MG/DL — HIGH (ref 70–99)
GLUCOSE BLDC GLUCOMTR-MCNC: 172 MG/DL — HIGH (ref 70–99)
GLUCOSE BLDC GLUCOMTR-MCNC: 230 MG/DL — HIGH (ref 70–99)
GLUCOSE BLDC GLUCOMTR-MCNC: 86 MG/DL — SIGNIFICANT CHANGE UP (ref 70–99)
GLUCOSE SERPL-MCNC: 128 MG/DL — HIGH (ref 70–99)
GLUCOSE SERPL-MCNC: 150 MG/DL — HIGH (ref 70–99)
GLUCOSE SERPL-MCNC: 85 MG/DL — SIGNIFICANT CHANGE UP (ref 70–99)
HAPTOGLOB SERPL-MCNC: 166 MG/DL — SIGNIFICANT CHANGE UP (ref 34–200)
HCT VFR BLD CALC: 30.6 % — LOW (ref 39–50)
HCT VFR BLD CALC: 33.9 % — LOW (ref 39–50)
HCV AB S/CO SERPL IA: 0.09 S/CO — SIGNIFICANT CHANGE UP (ref 0–0.99)
HCV AB SERPL-IMP: SIGNIFICANT CHANGE UP
HDLC SERPL-MCNC: 42 MG/DL — SIGNIFICANT CHANGE UP
HGB BLD-MCNC: 10.3 G/DL — LOW (ref 13–17)
HGB BLD-MCNC: 11.3 G/DL — LOW (ref 13–17)
IMM GRANULOCYTES NFR BLD AUTO: 0.4 % — SIGNIFICANT CHANGE UP (ref 0–1.5)
IMM GRANULOCYTES NFR BLD AUTO: 0.5 % — SIGNIFICANT CHANGE UP (ref 0–1.5)
INR BLD: 1.18 RATIO — HIGH (ref 0.88–1.16)
INR BLD: 1.28 RATIO — HIGH (ref 0.88–1.16)
IRON SATN MFR SERPL: 28 % — SIGNIFICANT CHANGE UP (ref 16–55)
IRON SATN MFR SERPL: 77 UG/DL — SIGNIFICANT CHANGE UP (ref 45–165)
LACTATE SERPL-SCNC: 4.3 MMOL/L — CRITICAL HIGH (ref 0.7–2)
LDH SERPL L TO P-CCNC: 159 U/L — SIGNIFICANT CHANGE UP (ref 50–242)
LDH SERPL L TO P-CCNC: 199 U/L — SIGNIFICANT CHANGE UP (ref 50–242)
LIPID PNL WITH DIRECT LDL SERPL: 44 MG/DL — SIGNIFICANT CHANGE UP
LYMPHOCYTES # BLD AUTO: 0.7 K/UL — LOW (ref 1–3.3)
LYMPHOCYTES # BLD AUTO: 0.79 K/UL — LOW (ref 1–3.3)
LYMPHOCYTES # BLD AUTO: 10.8 % — LOW (ref 13–44)
LYMPHOCYTES # BLD AUTO: 7.1 % — LOW (ref 13–44)
MAGNESIUM SERPL-MCNC: 1.4 MG/DL — LOW (ref 1.6–2.6)
MAGNESIUM SERPL-MCNC: 1.6 MG/DL — SIGNIFICANT CHANGE UP (ref 1.6–2.6)
MCHC RBC-ENTMCNC: 30.2 PG — SIGNIFICANT CHANGE UP (ref 27–34)
MCHC RBC-ENTMCNC: 30.4 PG — SIGNIFICANT CHANGE UP (ref 27–34)
MCHC RBC-ENTMCNC: 33.3 GM/DL — SIGNIFICANT CHANGE UP (ref 32–36)
MCHC RBC-ENTMCNC: 33.7 GM/DL — SIGNIFICANT CHANGE UP (ref 32–36)
MCV RBC AUTO: 90.3 FL — SIGNIFICANT CHANGE UP (ref 80–100)
MCV RBC AUTO: 90.6 FL — SIGNIFICANT CHANGE UP (ref 80–100)
MONOCYTES # BLD AUTO: 0.58 K/UL — SIGNIFICANT CHANGE UP (ref 0–0.9)
MONOCYTES # BLD AUTO: 0.84 K/UL — SIGNIFICANT CHANGE UP (ref 0–0.9)
MONOCYTES NFR BLD AUTO: 8 % — SIGNIFICANT CHANGE UP (ref 2–14)
MONOCYTES NFR BLD AUTO: 8.5 % — SIGNIFICANT CHANGE UP (ref 2–14)
NEUTROPHILS # BLD AUTO: 4.95 K/UL — SIGNIFICANT CHANGE UP (ref 1.8–7.4)
NEUTROPHILS # BLD AUTO: 7.81 K/UL — HIGH (ref 1.8–7.4)
NEUTROPHILS NFR BLD AUTO: 67.9 % — SIGNIFICANT CHANGE UP (ref 43–77)
NEUTROPHILS NFR BLD AUTO: 78.9 % — HIGH (ref 43–77)
NON HDL CHOLESTEROL: 61 MG/DL — SIGNIFICANT CHANGE UP
NRBC # BLD: 0 /100 WBCS — SIGNIFICANT CHANGE UP (ref 0–0)
NRBC # BLD: 0 /100 WBCS — SIGNIFICANT CHANGE UP (ref 0–0)
PHOSPHATE SERPL-MCNC: 3.4 MG/DL — SIGNIFICANT CHANGE UP (ref 2.5–4.5)
PHOSPHATE SERPL-MCNC: 4.7 MG/DL — HIGH (ref 2.5–4.5)
PLATELET # BLD AUTO: 169 K/UL — SIGNIFICANT CHANGE UP (ref 150–400)
PLATELET # BLD AUTO: 171 K/UL — SIGNIFICANT CHANGE UP (ref 150–400)
POTASSIUM SERPL-MCNC: 4.6 MMOL/L — SIGNIFICANT CHANGE UP (ref 3.5–5.3)
POTASSIUM SERPL-MCNC: 5.1 MMOL/L — SIGNIFICANT CHANGE UP (ref 3.5–5.3)
POTASSIUM SERPL-MCNC: 5.6 MMOL/L — HIGH (ref 3.5–5.3)
POTASSIUM SERPL-SCNC: 4.6 MMOL/L — SIGNIFICANT CHANGE UP (ref 3.5–5.3)
POTASSIUM SERPL-SCNC: 5.1 MMOL/L — SIGNIFICANT CHANGE UP (ref 3.5–5.3)
POTASSIUM SERPL-SCNC: 5.6 MMOL/L — HIGH (ref 3.5–5.3)
PROCALCITONIN SERPL-MCNC: 0.11 NG/ML — HIGH (ref 0.02–0.1)
PROT SERPL-MCNC: 6.2 G/DL — SIGNIFICANT CHANGE UP (ref 6–8.3)
PROT SERPL-MCNC: 6.9 G/DL — SIGNIFICANT CHANGE UP (ref 6–8.3)
PROTHROM AB SERPL-ACNC: 13.6 SEC — HIGH (ref 10.5–13.4)
PROTHROM AB SERPL-ACNC: 14.7 SEC — HIGH (ref 10.5–13.4)
RBC # BLD: 3.39 M/UL — LOW (ref 4.2–5.8)
RBC # BLD: 3.74 M/UL — LOW (ref 4.2–5.8)
RBC # FLD: 12.9 % — SIGNIFICANT CHANGE UP (ref 10.3–14.5)
RBC # FLD: 13 % — SIGNIFICANT CHANGE UP (ref 10.3–14.5)
RH IG SCN BLD-IMP: POSITIVE — SIGNIFICANT CHANGE UP
SODIUM SERPL-SCNC: 141 MMOL/L — SIGNIFICANT CHANGE UP (ref 135–145)
SODIUM SERPL-SCNC: 141 MMOL/L — SIGNIFICANT CHANGE UP (ref 135–145)
SODIUM SERPL-SCNC: 144 MMOL/L — SIGNIFICANT CHANGE UP (ref 135–145)
SPECIMEN SOURCE: SIGNIFICANT CHANGE UP
TIBC SERPL-MCNC: 279 UG/DL — SIGNIFICANT CHANGE UP (ref 220–430)
TRIGL SERPL-MCNC: 84 MG/DL — SIGNIFICANT CHANGE UP
TSH SERPL-MCNC: 0.78 UIU/ML — SIGNIFICANT CHANGE UP (ref 0.27–4.2)
TSH SERPL-MCNC: 0.89 UIU/ML — SIGNIFICANT CHANGE UP (ref 0.27–4.2)
UIBC SERPL-MCNC: 202 UG/DL — SIGNIFICANT CHANGE UP (ref 110–370)
VIT B12 SERPL-MCNC: 291 PG/ML — SIGNIFICANT CHANGE UP (ref 232–1245)
WBC # BLD: 7.29 K/UL — SIGNIFICANT CHANGE UP (ref 3.8–10.5)
WBC # BLD: 9.9 K/UL — SIGNIFICANT CHANGE UP (ref 3.8–10.5)
WBC # FLD AUTO: 7.29 K/UL — SIGNIFICANT CHANGE UP (ref 3.8–10.5)
WBC # FLD AUTO: 9.9 K/UL — SIGNIFICANT CHANGE UP (ref 3.8–10.5)

## 2022-04-22 PROCEDURE — 99291 CRITICAL CARE FIRST HOUR: CPT

## 2022-04-22 PROCEDURE — 99232 SBSQ HOSP IP/OBS MODERATE 35: CPT | Mod: GC

## 2022-04-22 PROCEDURE — 93010 ELECTROCARDIOGRAM REPORT: CPT

## 2022-04-22 PROCEDURE — 99233 SBSQ HOSP IP/OBS HIGH 50: CPT | Mod: GC

## 2022-04-22 PROCEDURE — 99255 IP/OBS CONSLTJ NEW/EST HI 80: CPT

## 2022-04-22 PROCEDURE — 70450 CT HEAD/BRAIN W/O DYE: CPT | Mod: 26

## 2022-04-22 PROCEDURE — 99233 SBSQ HOSP IP/OBS HIGH 50: CPT

## 2022-04-22 RX ORDER — DEXMEDETOMIDINE HYDROCHLORIDE IN 0.9% SODIUM CHLORIDE 4 UG/ML
0.2 INJECTION INTRAVENOUS
Qty: 200 | Refills: 0 | Status: DISCONTINUED | OUTPATIENT
Start: 2022-04-22 | End: 2022-04-24

## 2022-04-22 RX ORDER — DEXTROSE 50 % IN WATER 50 %
50 SYRINGE (ML) INTRAVENOUS ONCE
Refills: 0 | Status: COMPLETED | OUTPATIENT
Start: 2022-04-22 | End: 2022-04-22

## 2022-04-22 RX ORDER — HYDRALAZINE HCL 50 MG
25 TABLET ORAL EVERY 8 HOURS
Refills: 0 | Status: DISCONTINUED | OUTPATIENT
Start: 2022-04-22 | End: 2022-04-23

## 2022-04-22 RX ORDER — LIDOCAINE HCL 20 MG/ML
4 VIAL (ML) INJECTION ONCE
Refills: 0 | Status: DISCONTINUED | OUTPATIENT
Start: 2022-04-22 | End: 2022-05-01

## 2022-04-22 RX ORDER — MAGNESIUM SULFATE 500 MG/ML
2 VIAL (ML) INJECTION ONCE
Refills: 0 | Status: COMPLETED | OUTPATIENT
Start: 2022-04-22 | End: 2022-04-23

## 2022-04-22 RX ORDER — INSULIN HUMAN 100 [IU]/ML
5 INJECTION, SOLUTION SUBCUTANEOUS ONCE
Refills: 0 | Status: COMPLETED | OUTPATIENT
Start: 2022-04-22 | End: 2022-04-22

## 2022-04-22 RX ORDER — HYDRALAZINE HCL 50 MG
25 TABLET ORAL ONCE
Refills: 0 | Status: COMPLETED | OUTPATIENT
Start: 2022-04-22 | End: 2022-04-22

## 2022-04-22 RX ORDER — CALCIUM GLUCONATE 100 MG/ML
2 VIAL (ML) INTRAVENOUS ONCE
Refills: 0 | Status: COMPLETED | OUTPATIENT
Start: 2022-04-22 | End: 2022-04-22

## 2022-04-22 RX ADMIN — Medication 75 MEQ/KG/HR: at 08:27

## 2022-04-22 RX ADMIN — DEXMEDETOMIDINE HYDROCHLORIDE IN 0.9% SODIUM CHLORIDE 3 MICROGRAM(S)/KG/HR: 4 INJECTION INTRAVENOUS at 08:27

## 2022-04-22 RX ADMIN — Medication 81 MILLIGRAM(S): at 12:44

## 2022-04-22 RX ADMIN — Medication 50 MILLILITER(S): at 05:44

## 2022-04-22 RX ADMIN — INSULIN HUMAN 5 UNIT(S): 100 INJECTION, SOLUTION SUBCUTANEOUS at 05:42

## 2022-04-22 RX ADMIN — CHLORHEXIDINE GLUCONATE 1 APPLICATION(S): 213 SOLUTION TOPICAL at 15:08

## 2022-04-22 RX ADMIN — DEXMEDETOMIDINE HYDROCHLORIDE IN 0.9% SODIUM CHLORIDE 3 MICROGRAM(S)/KG/HR: 4 INJECTION INTRAVENOUS at 04:13

## 2022-04-22 RX ADMIN — Medication 75 MEQ/KG/HR: at 01:49

## 2022-04-22 RX ADMIN — Medication 25 MILLIGRAM(S): at 15:08

## 2022-04-22 RX ADMIN — DEXMEDETOMIDINE HYDROCHLORIDE IN 0.9% SODIUM CHLORIDE 3 MICROGRAM(S)/KG/HR: 4 INJECTION INTRAVENOUS at 20:55

## 2022-04-22 RX ADMIN — Medication 25 MILLIGRAM(S): at 21:53

## 2022-04-22 RX ADMIN — ATORVASTATIN CALCIUM 80 MILLIGRAM(S): 80 TABLET, FILM COATED ORAL at 21:54

## 2022-04-22 RX ADMIN — Medication 200 GRAM(S): at 05:44

## 2022-04-22 NOTE — PROGRESS NOTE ADULT - ATTENDING COMMENTS
61 M history of HTN, HLD, DM, A. fib and ?dilated cardiomyopathy presented to OSH with left hemiparesis and was found to have right MCA CVA, but not candidate for tPA as LKN > 4.5 hours. Transferred to [facility] as stroke rescue for possible thrombectomy.   Found to be in AF with VR 30-60s, irregular in setting of K 7.3 and NANCY.  Found to have b/l hydronephrosis due to urinary retention, voided >500cc urine post valencia placement.  Patient sedated on precedex at time of my exam but noted improvement in LLE weakness with persistent LUE weakness but less profound, and persistent flattening of the right nasolabial fold. HR in 50s on precedex, normotensive.  Good UOP.  Holding digoxin and BB in setting of bradycardia.  Continue ASA and statin.  Neurology follow-up appreciated - outside window for thrombectomy.  Will need repeat Head CT.  Once neurocheck no longer necessary q1hr patient can be stepped down from MICU.

## 2022-04-22 NOTE — CONSULT NOTE ADULT - ASSESSMENT
OLEGARIO PRADHAN  61M hx dialated cardiomyopathy xferred from [facility] for neuor IR eval yesterday AM for R M1 occlusion. LKW 12AM 4/21, CTH ASPECTS 8, CTA distal M1 occl, also stenosis L vert origin, CTP mismatch 120cc. NIHSS at [facility] 4, repeat stroke code on arrival NIHSS 9 per report. Pt noted to be hyperkalemic w/ lactic acidosis likely 2/2 BRASH syndrome (AV kevin agent toxicity) w/ NANCY/shock, deemed to medically unstable for thrombectomy, ADM MICU. Called overnight for worsening in exam: AOx3, dec BTT on L, partial hemianopia, PERRL, gaze crosses midline, PERRL, +L facial, LUE+drift hits bed, LLE+drift doesn't hit bed, RUE/RLE 5/5, +L neglect. NIHSS 8.   - no acute nsgy intervention, now >24h since LKW  - repeat CTH in AM, if any e/o malignant MCA infarct, please stop ASA and re-consult  - stroke care/ w/u per neurology

## 2022-04-22 NOTE — PROGRESS NOTE ADULT - SUBJECTIVE AND OBJECTIVE BOX
[facility] DIVISION OF KIDNEY DISEASES AND HYPERTENSION -- FOLLOW UP NOTE  --------------------------------------------------------------------------------  If any questions, please feel free to contact me  NS pager: [phone], [facility]: [phone]  Fred Alvarado M.D.  Nephrology Fellow  --------------------------------------------------------------------------------    HPI:  61M PMHx of DM, HTN, unknown heart condition on digoxin. transferred from OSH for stroke evaluation. CTA with right M1 occlusion. In the [facility] ED found with HR 30-50s. K of 7.3. BUN/Cr: 87/2.76. s/p medical management. Patient found with urinary retention and Taylor cath was placed with about 550cc of Urine drained. At home on Digoxin, Eplerenone, and lisinopril. Nephrology consulted for NANCY and Hyperkalemia.     Patient seen and examined at bedside, in NAD, in ICU for neuro checks. Currently he reports no acute complaints. Cr today improving at 2.2mg/dl, K improving - on Bicarb gtt. Vitals/labs/imaging reviewed     PAST HISTORY  --------------------------------------------------------------------------------  No significant changes to PMH, PSH, FHx, SHx, unless otherwise noted    ALLERGIES & MEDICATIONS  --------------------------------------------------------------------------------  Allergies    No Known Allergies    Intolerances      Standing Inpatient Medications  aspirin enteric coated 81 milliGRAM(s) Oral daily  atorvastatin 80 milliGRAM(s) Oral at bedtime  chlorhexidine 4% Liquid 1 Application(s) Topical <User Schedule>  dexMEDEtomidine Infusion 0.2 MICROgram(s)/kG/Hr IV Continuous <Continuous>  lidocaine 2% Jelly 4 milliLiter(s) IntraUrethral once  sodium bicarbonate  Infusion 0.188 mEq/kG/Hr IV Continuous <Continuous>    PRN Inpatient Medications      REVIEW OF SYSTEMS  --------------------------------------------------------------------------------  Gen: +fatigue  No fevers/chills  Skin: No rashes  Respiratory: No dyspnea, cough  CV: No chest pain  GI: No abdominal pain, diarrhea  : No dysuria, hematuria  MSK: No  edema    All other systems were reviewed and are negative, except as noted.    VITALS/PHYSICAL EXAM  --------------------------------------------------------------------------------  T(C): 36.9 (04-22-22 @ 04:00), Max: 37.1 (04-21-22 @ 22:00)  HR: 61 (04-22-22 @ 08:00) (47 - 96)  BP: 133/65 (04-22-22 @ 08:00) (114/64 - 163/70)  RR: 28 (04-22-22 @ 08:00) (19 - 65)  SpO2: 100% (04-22-22 @ 08:00) (84% - 100%)  Wt(kg): --    Weight (kg): 59.9 (04-21-22 @ 13:07)      04-21-22 @ 07:01  -  04-22-22 @ 07:00  --------------------------------------------------------  IN: 778.7 mL / OUT: 1290 mL / NET: -511.3 mL    04-22-22 @ 07:01  -  04-22-22 @ 08:45  --------------------------------------------------------  IN: 167 mL / OUT: 400 mL / NET: -233 mL        Physical Exam:  	Gen: NAD  	Pulm: CTA B/L  	CV: S1S2  	Abd: Soft, +BS   	Ext: No LE edema B/L  	Neuro: Awake, left facial weakness   	Skin: Warm and dry  	: +Brandon greenfield.       LABS/STUDIES  --------------------------------------------------------------------------------              10.3   9.90  >-----------<  171      [04-22-22 @ 01:46]              30.6     141  |  105  |  73  ----------------------------<  150      [04-22-22 @ 01:46]  5.6   |  18  |  2.21        Ca     10.2     [04-22-22 @ 01:46]      Mg     1.6     [04-22-22 @ 01:46]      Phos  4.7     [04-22-22 @ 01:46]    TPro  6.9  /  Alb  4.1  /  TBili  0.4  /  DBili  x   /  AST  22  /  ALT  31  /  AlkPhos  87  [04-22-22 @ 01:46]    PT/INR: PT 13.6 , INR 1.18       [04-22-22 @ 01:46]  PTT: 26.5       [04-22-22 @ 01:46]      Creatinine Trend:  SCr 2.21 [04-22 @ 01:46]  SCr 2.32 [04-21 @ 21:13]  SCr 2.62 [04-21 @ 15:09]  SCr 2.76 [04-21 @ 13:04]    Urinalysis - [04-21-22 @ 15:13]      Color Light Yellow / Appearance Clear / SG 1.028 / pH 6.0      Gluc Negative / Ketone Negative  / Bili Negative / Urobili Negative       Blood Negative / Protein Trace / Leuk Est Negative / Nitrite Negative      RBC 1 / WBC 1 / Hyaline  / Gran  / Sq Epi  / Non Sq Epi 0 / Bacteria Negative    Urine Creatinine 46      [04-21-22 @ 17:36]  Urine Protein 17      [04-21-22 @ 17:36]  Urine Sodium 79      [04-21-22 @ 17:36]    TSH 0.89      [04-22-22 @ 06:03]

## 2022-04-22 NOTE — PROGRESS NOTE ADULT - ASSESSMENT
61 year old Sami speaking male with past medical history of DM, HTN, dilated cardiomyopathy, and possible (CAD?) , p/w left hemiparesis, 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 Admitted to MICU for neurological exams s/p CVA and possible urgent thrombectomy.    Neuro  #MCA CVA  - Appreciate neuro recs, out of window for TPA and thrombectomy  -  f/u MR brain w/o contrast, and CTH  - TTE with bubble study  - ASA, atorvastatin 80mg  - lipid panel normal, TSH normal    CV  #Atrial fibrillation with bradycardia likely 2/2 hyperkalemia  - On bisoprolol at home and digoxin, normal digoxin level at OSH  - hold AV kevin blocking agents  - f/u TTE  - bedside POCUs showing enlarged LV, currently HR 50s    #dilated cardiomyopathy  Documented in [place] paperwork  -hold home meds spironolactone, lasix, lisinopril    Resp  -no active issues    GI  -NPO except meds    /Renal  #Acute renal failure with hyperkalemia   - I/s/o bladder outlet obstruction given distended bladder and b/l hydronephrosis seen on POCUS  - POCUS with bladder distention and hydronephrosis   - Taylor placed with 500cc drained   - Strict i's and O's  - Discussed with Nephrology, no urgent need for dialysis  - Per Nephrology, hyperkalemia can be managed medically    ID  -No active issues    Endo  #Hypoglycemia  - Likely 2/2 to home sulfonylurea i/s/o renal failure   - Recommend obtaining sulfonylurea level   - If hypoglycemic again would start D5W at 50cc/hour  - Recommend finger sticks q4 hours until improvement of hypoglycemic episodes  - Hg A1c 7.6    Heme  #Anemia   - Hemoglobin noted to be 6 in ED, 12 at outside hospital suspect lab error  - Repeat CBC pending, follow up repeat CBC   - Transfuse for goal hemoglobin <7   - Patient without signs or symptoms of bleeding   - F/u iron studies     Ethics  -Full code

## 2022-04-22 NOTE — CHART NOTE - NSCHARTNOTEFT_GEN_A_CORE
MICU Transfer Note  ---------------------------    Transfer from: MICU  Transfer to:  (  ) Medicine    (  ) Telemetry    (  ) RCU    (  ) Palliative    (  ) Stroke Unit    (  ) _______________  Accepting Physician:    JUANIS  61 year old Azerbaijani speaking male with past medical history of DM, HTN, dilated cardiomyopathy. LKN midnight, witnessed by wife. This morning he woke up this morning with left hemiparesis. NIHSS at [facility] 4 for left hemiparesis. CTA with right M1 occlusion. HR intermittently in the 40s, reportedly in second degree heart block (but no EKG on file), given atropine in route to the hospital.  Not a candidate for tPA as LKN > 4.5 hours.  He was transferred to [facility] as stroke rescue for possible thrombectomy.  OZZY aware.    Will repeat all neuroimaging.      [place] staff contacted [facility] staff notified the patient was found to have acute renal failure Cr. 2.8, BUN 88, K 5.8 (untreated).  BNP increased.  Concern for digoxin toxicity, although digoxin level noted to be 1.6 at OSH.     In the ED, T96.4, HR 50, /65, on %. Slow afib with HR 30-50s. FSG 56, given amp D50. Given albuterol and Ca gluc 2g.     Admitted to MICU for CVA neuro checks and possible thrombectomy    MICU COURSE  Monitored on MICU for neuro checks. Evaluated by neuro overnight for increase in stroke scale, recommended to repeat CTH. Repeat CTH pending, MRI pending. Pt given precedex for agitation. Pt is out of window for TPA and thrombectomy.       OBJECTIVE --  Vital Signs Last 24 Hrs  T(C): 37 (22 Apr 2022 11:00), Max: 37.1 (21 Apr 2022 22:00)  T(F): 98.6 (22 Apr 2022 11:00), Max: 98.8 (21 Apr 2022 22:00)  HR: 56 (22 Apr 2022 13:00) (47 - 96)  BP: 187/79 (22 Apr 2022 13:00) (114/64 - 187/79)  BP(mean): 113 (22 Apr 2022 13:00) (80 - 113)  RR: 30 (22 Apr 2022 13:00) (14 - 65)  SpO2: 100% (22 Apr 2022 13:00) (84% - 100%)    I&O's Summary    21 Apr 2022 07:01  -  22 Apr 2022 07:00  --------------------------------------------------------  IN: 778.7 mL / OUT: 1290 mL / NET: -511.3 mL    22 Apr 2022 07:01  -  22 Apr 2022 13:28  --------------------------------------------------------  IN: 167 mL / OUT: 400 mL / NET: -233 mL        MEDICATIONS  (STANDING):  aspirin enteric coated 81 milliGRAM(s) Oral daily  atorvastatin 80 milliGRAM(s) Oral at bedtime  chlorhexidine 4% Liquid 1 Application(s) Topical <User Schedule>  dexMEDEtomidine Infusion 0.2 MICROgram(s)/kG/Hr (3 mL/Hr) IV Continuous <Continuous>  lidocaine 2% Jelly 4 milliLiter(s) IntraUrethral once    MEDICATIONS  (PRN):        LABS                                            10.3                  Neurophils% (auto):   78.9   (04-22 @ 01:46):    9.90 )-----------(171          Lymphocytes% (auto):  7.1                                           30.6                   Eosinphils% (auto):   4.8      Manual%: Neutrophils x    ; Lymphocytes x    ; Eosinophils x    ; Bands%: x    ; Blasts x                                    141    |  105    |  73                  Calcium: 10.2  / iCa: x      (04-22 @ 01:46)    ----------------------------<  150       Magnesium: 1.6                              5.6     |  18     |  2.21             Phosphorous: 4.7      TPro  6.9    /  Alb  4.1    /  TBili  0.4    /  DBili  x      /  AST  22     /  ALT  31     /  AlkPhos  87     22 Apr 2022 01:46    ( 04-22 @ 01:46 )   PT: 13.6 sec;   INR: 1.18 ratio  aPTT: 26.5 sec          ASSESSMENT & PLAN:   61 year old Azerbaijani speaking male with past medical history of DM, HTN, dilated cardiomyopathy, and possible (CAD?) , p/w left hemiparesis, 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 Admitted to MICU for neurological exams s/p CVA and possible urgent thrombectomy.    Neuro  #MCA CVA  - Appreciate neuro recs, out of window for TPA and thrombectomy  -  f/u MR brain w/o contrast, and CTH  - TTE with bubble study  - ASA, atorvastatin 80mg  - lipid panel normal, TSH normal    CV  #Atrial fibrillation with bradycardia likely 2/2 hyperkalemia  - On bisoprolol at home and digoxin, normal digoxin level at OSH  - hold AV kevin blocking agents  - f/u TTE  - bedside POCUs showing enlarged LV, currently HR 50s    #dilated cardiomyopathy  Documented in [place] paperwork  -hold home meds spironolactone, lasix, lisinopril    Resp  -no active issues    GI  -NPO except meds    /Renal  #Acute renal failure with hyperkalemia   - I/s/o bladder outlet obstruction given distended bladder and b/l hydronephrosis seen on POCUS  - POCUS with bladder distention and hydronephrosis   - Taylor placed with 500cc drained   - Strict i's and O's  - Discussed with Nephrology, no urgent need for dialysis  - Per Nephrology, hyperkalemia can be managed medically    ID  -No active issues    Endo  #Hypoglycemia  - Likely 2/2 to home sulfonylurea i/s/o renal failure   - Recommend obtaining sulfonylurea level   - If hypoglycemic again would start D5W at 50cc/hour  - Recommend finger sticks q4 hours until improvement of hypoglycemic episodes  - Hg A1c 7.6    Heme  #Anemia   - Hemoglobin noted to be 6 in ED, 12 at outside hospital suspect lab error  - Repeat CBC pending, follow up repeat CBC   - Transfuse for goal hemoglobin <7   - Patient without signs or symptoms of bleeding   - F/u iron studies     Ethics  -Full code        For Follow-Up:  [ ] F/u CTH  [ ] F/u MR brain  [ ] F/u ECHO with bubble study  [ ] F/u iron studies MICU Transfer Note  ---------------------------    Transfer from: MICU  Transfer to:  (  ) Medicine    ( x ) Telemetry    (  ) RCU    (  ) Palliative    (  ) Stroke Unit    (  ) _______________  Accepting Physician:    JUANIS  61 year old Filipino speaking male with past medical history of DM, HTN, dilated cardiomyopathy. LKN midnight, witnessed by wife. This morning he woke up this morning with left hemiparesis. NIHSS at [facility] 4 for left hemiparesis. CTA with right M1 occlusion. HR intermittently in the 40s, reportedly in second degree heart block (but no EKG on file), given atropine in route to the hospital.  Not a candidate for tPA as LKN > 4.5 hours.  He was transferred to [facility] as stroke rescue for possible thrombectomy.      [place] staff contacted [facility] staff notified the patient was found to have acute renal failure Cr. 2.8, BUN 88, K 5.8 (untreated).  BNP increased.  Concern for digoxin toxicity, although digoxin level noted to be 1.6 at OSH.     MICU COURSE  Admitted to MICU for CVA neuro checks and possible thrombectomy. Evaluated by neuro overnight for increase in stroke scale, recommended to repeat CTH. Repeat CTH pending, MRI pending. Pt given precedex for agitation. Pt is out of window for TPA and thrombectomy. HR 50s, no trigeminy noted on EKG      OBJECTIVE --  Vital Signs Last 24 Hrs  T(C): 37 (22 Apr 2022 11:00), Max: 37.1 (21 Apr 2022 22:00)  T(F): 98.6 (22 Apr 2022 11:00), Max: 98.8 (21 Apr 2022 22:00)  HR: 56 (22 Apr 2022 13:00) (47 - 96)  BP: 187/79 (22 Apr 2022 13:00) (114/64 - 187/79)  BP(mean): 113 (22 Apr 2022 13:00) (80 - 113)  RR: 30 (22 Apr 2022 13:00) (14 - 65)  SpO2: 100% (22 Apr 2022 13:00) (84% - 100%)    I&O's Summary    21 Apr 2022 07:01  -  22 Apr 2022 07:00  --------------------------------------------------------  IN: 778.7 mL / OUT: 1290 mL / NET: -511.3 mL    22 Apr 2022 07:01  -  22 Apr 2022 13:28  --------------------------------------------------------  IN: 167 mL / OUT: 400 mL / NET: -233 mL        MEDICATIONS  (STANDING):  aspirin enteric coated 81 milliGRAM(s) Oral daily  atorvastatin 80 milliGRAM(s) Oral at bedtime  chlorhexidine 4% Liquid 1 Application(s) Topical <User Schedule>  dexMEDEtomidine Infusion 0.2 MICROgram(s)/kG/Hr (3 mL/Hr) IV Continuous <Continuous>  lidocaine 2% Jelly 4 milliLiter(s) IntraUrethral once    MEDICATIONS  (PRN):        LABS                                            10.3                  Neurophils% (auto):   78.9   (04-22 @ 01:46):    9.90 )-----------(171          Lymphocytes% (auto):  7.1                                           30.6                   Eosinphils% (auto):   4.8      Manual%: Neutrophils x    ; Lymphocytes x    ; Eosinophils x    ; Bands%: x    ; Blasts x                                    141    |  105    |  73                  Calcium: 10.2  / iCa: x      (04-22 @ 01:46)    ----------------------------<  150       Magnesium: 1.6                              5.6     |  18     |  2.21             Phosphorous: 4.7      TPro  6.9    /  Alb  4.1    /  TBili  0.4    /  DBili  x      /  AST  22     /  ALT  31     /  AlkPhos  87     22 Apr 2022 01:46    ( 04-22 @ 01:46 )   PT: 13.6 sec;   INR: 1.18 ratio  aPTT: 26.5 sec          ASSESSMENT & PLAN:   61 year old Filipino speaking male with past medical history of DM, HTN, dilated cardiomyopathy, and possible (CAD?) , p/w left hemiparesis, 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 Admitted to MICU for neurological exams s/p CVA and possible urgent thrombectomy.    Neuro  #MCA CVA  - Appreciate neuro recs, out of window for TPA and thrombectomy  -  f/u MR brain w/o contrast, and CTH  - TTE with bubble study  - ASA, atorvastatin 80mg  - lipid panel normal, TSH normal    CV  #Atrial fibrillation with bradycardia likely 2/2 hyperkalemia  - On bisoprolol at home and digoxin, normal digoxin level at OSH  - hold AV kevin blocking agents  - f/u TTE  - bedside POCUs showing enlarged LV, currently HR 50s    #dilated cardiomyopathy  Documented in [place] paperwork  -hold home meds spironolactone, lasix, lisinopril    Resp  -no active issues    GI  -NPO except meds    /Renal  #Acute renal failure with hyperkalemia   - I/s/o bladder outlet obstruction given distended bladder and b/l hydronephrosis seen on POCUS  - POCUS with bladder distention and hydronephrosis   - Taylor placed with 500cc drained   - Strict i's and O's  - Discussed with Nephrology, no urgent need for dialysis  - Per Nephrology, hyperkalemia can be managed medically    ID  -No active issues    Endo  #Hypoglycemia  - Likely 2/2 to home sulfonylurea i/s/o renal failure   - Recommend obtaining sulfonylurea level   - If hypoglycemic again would start D5W at 50cc/hour  - Recommend finger sticks q4 hours until improvement of hypoglycemic episodes  - Hg A1c 7.6    Heme  #Anemia   - Hemoglobin noted to be 6 in ED, 12 at outside hospital suspect lab error  - Repeat CBC pending, follow up repeat CBC   - Transfuse for goal hemoglobin <7   - Patient without signs or symptoms of bleeding   - F/u iron studies     Ethics  -Full code        For Follow-Up:  [ ] F/u CTH  [ ] F/u MR brain  [ ] F/u ECHO with bubble study  [ ] F/u iron studies MICU Transfer Note  ---------------------------    Transfer from: MICU  Transfer to:  (  ) Medicine    ( x ) Telemetry    (  ) RCU    (  ) Palliative    (  ) Stroke Unit    (  ) _______________  Accepting Physician:    JUANIS  61 year old Taiwanese speaking male with past medical history of DM, HTN, dilated cardiomyopathy. LKN midnight, witnessed by wife. This morning he woke up this morning with left hemiparesis. NIHSS at [facility] 4 for left hemiparesis. CTA with right M1 occlusion. HR intermittently in the 40s, reportedly in second degree heart block (but no EKG on file), given atropine in route to the hospital.  Not a candidate for tPA as LKN > 4.5 hours.  He was transferred to [facility] as stroke rescue for possible thrombectomy.      [place] staff contacted [facility] staff notified the patient was found to have acute renal failure Cr. 2.8, BUN 88, K 5.8 (untreated).  BNP increased.  Concern for digoxin toxicity, although digoxin level noted to be 1.6 at OSH.     MICU COURSE  Admitted to MICU for CVA neuro checks and possible thrombectomy. Evaluated by neuro overnight for increase in stroke scale, recommended to repeat CTH. Repeat CTH showing R frontal temporal cortical lucency consistent with small infarcts in R MCA. MRI pending. Pt given precedex for agitation. Pt is out of window for TPA and thrombectomy. HR 50s, no trigeminy noted on EKG.      OBJECTIVE --  Vital Signs Last 24 Hrs  T(C): 37 (22 Apr 2022 11:00), Max: 37.1 (21 Apr 2022 22:00)  T(F): 98.6 (22 Apr 2022 11:00), Max: 98.8 (21 Apr 2022 22:00)  HR: 56 (22 Apr 2022 13:00) (47 - 96)  BP: 187/79 (22 Apr 2022 13:00) (114/64 - 187/79)  BP(mean): 113 (22 Apr 2022 13:00) (80 - 113)  RR: 30 (22 Apr 2022 13:00) (14 - 65)  SpO2: 100% (22 Apr 2022 13:00) (84% - 100%)    I&O's Summary    21 Apr 2022 07:01  -  22 Apr 2022 07:00  --------------------------------------------------------  IN: 778.7 mL / OUT: 1290 mL / NET: -511.3 mL    22 Apr 2022 07:01  -  22 Apr 2022 13:28  --------------------------------------------------------  IN: 167 mL / OUT: 400 mL / NET: -233 mL        MEDICATIONS  (STANDING):  aspirin enteric coated 81 milliGRAM(s) Oral daily  atorvastatin 80 milliGRAM(s) Oral at bedtime  chlorhexidine 4% Liquid 1 Application(s) Topical <User Schedule>  dexMEDEtomidine Infusion 0.2 MICROgram(s)/kG/Hr (3 mL/Hr) IV Continuous <Continuous>  lidocaine 2% Jelly 4 milliLiter(s) IntraUrethral once    MEDICATIONS  (PRN):        LABS                                            10.3                  Neurophils% (auto):   78.9   (04-22 @ 01:46):    9.90 )-----------(171          Lymphocytes% (auto):  7.1                                           30.6                   Eosinphils% (auto):   4.8      Manual%: Neutrophils x    ; Lymphocytes x    ; Eosinophils x    ; Bands%: x    ; Blasts x                                    141    |  105    |  73                  Calcium: 10.2  / iCa: x      (04-22 @ 01:46)    ----------------------------<  150       Magnesium: 1.6                              5.6     |  18     |  2.21             Phosphorous: 4.7      TPro  6.9    /  Alb  4.1    /  TBili  0.4    /  DBili  x      /  AST  22     /  ALT  31     /  AlkPhos  87     22 Apr 2022 01:46    ( 04-22 @ 01:46 )   PT: 13.6 sec;   INR: 1.18 ratio  aPTT: 26.5 sec          ASSESSMENT & PLAN:   61 year old Taiwanese speaking male with past medical history of DM, HTN, dilated cardiomyopathy, and possible (CAD?) , p/w left hemiparesis, 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 Admitted to MICU for neurological exams s/p CVA and possible urgent thrombectomy.    Neuro  #MCA CVA  - Appreciate neuro recs, out of window for TPA and thrombectomy  -  f/u MR brain w/o contrast, and CTH  - TTE with bubble study  - ASA, atorvastatin 80mg  - lipid panel normal, TSH normal    CV  #Atrial fibrillation with bradycardia likely 2/2 hyperkalemia  - On bisoprolol at home and digoxin, normal digoxin level at OSH  - hold AV kevin blocking agents  - f/u TTE  - bedside POCUs showing enlarged LV, currently HR 50s    #dilated cardiomyopathy  Documented in [place] paperwork  -hold home meds spironolactone, lasix, lisinopril    Resp  -no active issues    GI  -NPO except meds    /Renal  #Acute renal failure with hyperkalemia   - I/s/o bladder outlet obstruction given distended bladder and b/l hydronephrosis seen on POCUS  - POCUS with bladder distention and hydronephrosis   - Taylor placed with 500cc drained   - Strict i's and O's  - Discussed with Nephrology, no urgent need for dialysis  - Per Nephrology, hyperkalemia can be managed medically    ID  -No active issues    Endo  #Hypoglycemia  - Likely 2/2 to home sulfonylurea i/s/o renal failure   - Recommend obtaining sulfonylurea level   - If hypoglycemic again would start D5W at 50cc/hour  - Recommend finger sticks q4 hours until improvement of hypoglycemic episodes  - Hg A1c 7.6    Heme  #Anemia   - Hemoglobin noted to be 6 in ED, 12 at outside hospital suspect lab error  - Repeat CBC pending, follow up repeat CBC   - Transfuse for goal hemoglobin <7   - Patient without signs or symptoms of bleeding   - F/u iron studies     Ethics  -Full code        For Follow-Up:  [ ] F/u MR brain  [ ] F/u ECHO with bubble study  [ ] F/u iron studies  [ ] F/u neuro recs  [ ] F/u card recs MICU Transfer Note  ---------------------------    Transfer from: MICU  Transfer to:  (  ) Medicine    ( x ) Telemetry    (  ) RCU    (  ) Palliative    (  ) Stroke Unit    (  ) _______________  Accepting Physician: Dr Rosenberg     HPI  61 year old [place]ut[place] speaking male with past medical history of DM, HTN, dilated cardiomyopathy. LKN midnight, witnessed by wife. This morning he woke up this morning with left hemiparesis. NIHSS at [facility] 4 for left hemiparesis. CTA with right M1 occlusion. HR intermittently in the 40s, reportedly in second degree heart block (but no EKG on file), given atropine in route to the hospital.  Not a candidate for tPA as LKN > 4.5 hours.  He was transferred to [facility] as stroke rescue for possible thrombectomy.      [place] staff contacted [facility] staff notified the patient was found to have acute renal failure Cr. 2.8, BUN 88, K 5.8 (untreated).  BNP increased.  Concern for digoxin toxicity, although digoxin level noted to be 1.6 at OSH.     MICU COURSE  Admitted to MICU for CVA neuro checks and possible thrombectomy. Evaluated by neuro overnight for increase in stroke scale, recommended to repeat CTH. Repeat CTH showing R frontal temporal cortical lucency consistent with small infarcts in R MCA. MRI pending. Pt given precedex for agitation. Pt is out of window for TPA and thrombectomy. HR 50s, no trigeminy noted on EKG.      OBJECTIVE --  Vital Signs Last 24 Hrs  T(C): 37 (22 Apr 2022 11:00), Max: 37.1 (21 Apr 2022 22:00)  T(F): 98.6 (22 Apr 2022 11:00), Max: 98.8 (21 Apr 2022 22:00)  HR: 56 (22 Apr 2022 13:00) (47 - 96)  BP: 187/79 (22 Apr 2022 13:00) (114/64 - 187/79)  BP(mean): 113 (22 Apr 2022 13:00) (80 - 113)  RR: 30 (22 Apr 2022 13:00) (14 - 65)  SpO2: 100% (22 Apr 2022 13:00) (84% - 100%)    I&O's Summary    21 Apr 2022 07:01  -  22 Apr 2022 07:00  --------------------------------------------------------  IN: 778.7 mL / OUT: 1290 mL / NET: -511.3 mL    22 Apr 2022 07:01  -  22 Apr 2022 13:28  --------------------------------------------------------  IN: 167 mL / OUT: 400 mL / NET: -233 mL        MEDICATIONS  (STANDING):  aspirin enteric coated 81 milliGRAM(s) Oral daily  atorvastatin 80 milliGRAM(s) Oral at bedtime  chlorhexidine 4% Liquid 1 Application(s) Topical <User Schedule>  dexMEDEtomidine Infusion 0.2 MICROgram(s)/kG/Hr (3 mL/Hr) IV Continuous <Continuous>  lidocaine 2% Jelly 4 milliLiter(s) IntraUrethral once    MEDICATIONS  (PRN):        LABS                                            10.3                  Neurophils% (auto):   78.9   (04-22 @ 01:46):    9.90 )-----------(171          Lymphocytes% (auto):  7.1                                           30.6                   Eosinphils% (auto):   4.8      Manual%: Neutrophils x    ; Lymphocytes x    ; Eosinophils x    ; Bands%: x    ; Blasts x                                    141    |  105    |  73                  Calcium: 10.2  / iCa: x      (04-22 @ 01:46)    ----------------------------<  150       Magnesium: 1.6                              5.6     |  18     |  2.21             Phosphorous: 4.7      TPro  6.9    /  Alb  4.1    /  TBili  0.4    /  DBili  x      /  AST  22     /  ALT  31     /  AlkPhos  87     22 Apr 2022 01:46    ( 04-22 @ 01:46 )   PT: 13.6 sec;   INR: 1.18 ratio  aPTT: 26.5 sec          ASSESSMENT & PLAN:   61 year old Bhutanese speaking male with past medical history of DM, HTN, dilated cardiomyopathy, and possible (CAD?) , p/w left hemiparesis, 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 Admitted to MICU for neurological exams s/p CVA and possible urgent thrombectomy.    Neuro  #MCA CVA  - Appreciate neuro recs, out of window for TPA and thrombectomy  -  f/u MR brain w/o contrast, and CTH  - TTE with bubble study  - ASA, atorvastatin 80mg  - lipid panel normal, TSH normal    CV  #Atrial fibrillation with bradycardia likely 2/2 hyperkalemia  - On bisoprolol at home and digoxin, normal digoxin level at OSH  - hold AV kevin blocking agents  - f/u TTE  - bedside POCUs showing enlarged LV, currently HR 50s    #dilated cardiomyopathy  Documented in [place] paperwork  -hold home meds spironolactone, lasix, lisinopril    Resp  -no active issues    GI  -NPO except meds    /Renal  #Acute renal failure with hyperkalemia   - I/s/o bladder outlet obstruction given distended bladder and b/l hydronephrosis seen on POCUS  - POCUS with bladder distention and hydronephrosis   - Taylor placed with 500cc drained   - Strict i's and O's  - Discussed with Nephrology, no urgent need for dialysis  - Per Nephrology, hyperkalemia can be managed medically    ID  -No active issues    Endo  #Hypoglycemia  - Likely 2/2 to home sulfonylurea i/s/o renal failure   - Recommend obtaining sulfonylurea level   - If hypoglycemic again would start D5W at 50cc/hour  - Recommend finger sticks q4 hours until improvement of hypoglycemic episodes  - Hg A1c 7.6    Heme  #Anemia   - Hemoglobin noted to be 6 in ED, 12 at outside hospital suspect lab error  - Repeat CBC pending, follow up repeat CBC   - Transfuse for goal hemoglobin <7   - Patient without signs or symptoms of bleeding   - F/u iron studies     Ethics  -Full code        For Follow-Up:  [ ] F/u MR brain  [ ] F/u ECHO with bubble study  [ ] F/u iron studies  [ ] F/u neuro recs  [ ] F/u card recs MICU Transfer Note  ---------------------------    Transfer from: MICU  Transfer to:  (  ) Medicine    (  ) Telemetry    (  ) RCU    (  ) Palliative    ( X ) Stroke Unit    (  ) _______________  Accepting Physician: Dr Rosenberg     HPI  61 year old British speaking male with past medical history of DM, HTN, dilated cardiomyopathy. LKN midnight, witnessed by wife. This morning he woke up this morning with left hemiparesis. NIHSS at [place] 4 for left hemiparesis. CTA with right M1 occlusion. HR intermittently in the 40s, reportedly in second degree heart block (but no EKG on file), given atropine in route to the hospital.  Not a candidate for tPA as LKN > 4.5 hours.  He was transferred to [facility] as stroke rescue for possible thrombectomy.      [place] staff contacted [facility] staff notified the patient was found to have acute renal failure Cr. 2.8, BUN 88, K 5.8 (untreated).  BNP increased.  Concern for digoxin toxicity, although digoxin level noted to be 1.6 at OSH.     MICU COURSE  Admitted to MICU for CVA neuro checks and possible thrombectomy. Evaluated by neuro overnight for increase in stroke scale, recommended to repeat CTH. Repeat CTH showing R frontal temporal cortical lucency consistent with small infarcts in R MCA. MRI pending. Pt given precedex for agitation. Pt is out of window for TPA and thrombectomy. HR 50s, no trigeminy noted on EKG. Patient's left sided hemiparesis gradually resolved.       OBJECTIVE --  Vital Signs Last 24 Hrs  T(C): 37 (22 Apr 2022 11:00), Max: 37.1 (21 Apr 2022 22:00)  T(F): 98.6 (22 Apr 2022 11:00), Max: 98.8 (21 Apr 2022 22:00)  HR: 56 (22 Apr 2022 13:00) (47 - 96)  BP: 187/79 (22 Apr 2022 13:00) (114/64 - 187/79)  BP(mean): 113 (22 Apr 2022 13:00) (80 - 113)  RR: 30 (22 Apr 2022 13:00) (14 - 65)  SpO2: 100% (22 Apr 2022 13:00) (84% - 100%)    I&O's Summary    21 Apr 2022 07:01  -  22 Apr 2022 07:00  --------------------------------------------------------  IN: 778.7 mL / OUT: 1290 mL / NET: -511.3 mL    22 Apr 2022 07:01  -  22 Apr 2022 13:28  --------------------------------------------------------  IN: 167 mL / OUT: 400 mL / NET: -233 mL        MEDICATIONS  (STANDING):  aspirin enteric coated 81 milliGRAM(s) Oral daily  atorvastatin 80 milliGRAM(s) Oral at bedtime  chlorhexidine 4% Liquid 1 Application(s) Topical <User Schedule>  dexMEDEtomidine Infusion 0.2 MICROgram(s)/kG/Hr (3 mL/Hr) IV Continuous <Continuous>  lidocaine 2% Jelly 4 milliLiter(s) IntraUrethral once    MEDICATIONS  (PRN):        LABS                                            10.3                  Neurophils% (auto):   78.9   (04-22 @ 01:46):    9.90 )-----------(171          Lymphocytes% (auto):  7.1                                           30.6                   Eosinphils% (auto):   4.8      Manual%: Neutrophils x    ; Lymphocytes x    ; Eosinophils x    ; Bands%: x    ; Blasts x                                    141    |  105    |  73                  Calcium: 10.2  / iCa: x      (04-22 @ 01:46)    ----------------------------<  150       Magnesium: 1.6                              5.6     |  18     |  2.21             Phosphorous: 4.7      TPro  6.9    /  Alb  4.1    /  TBili  0.4    /  DBili  x      /  AST  22     /  ALT  31     /  AlkPhos  87     22 Apr 2022 01:46    ( 04-22 @ 01:46 )   PT: 13.6 sec;   INR: 1.18 ratio  aPTT: 26.5 sec          ASSESSMENT & PLAN:   61 year old British speaking male with past medical history of DM, HTN, dilated cardiomyopathy, and possible (CAD?) , p/w left hemiparesis, 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 Admitted to MICU for neurological exams s/p CVA and possible urgent thrombectomy.    Neuro  #MCA CVA  - Appreciate neuro recs, out of window for TPA and thrombectomy  - neuro check q4  -  f/u MR brain w/o contrast. CTH noncon shows R frontal/temporal cortical lucency consistent with small infarct in R MCA distribution.  - f/u TTE with bubble study  - ASA, atorvastatin 80mg  - lipid panel normal, TSH normal    CV  #Atrial fibrillation with bradycardia likely 2/2 hyperkalemia  - On bisoprolol at home and digoxin, normal digoxin level at OSH  - hold AV kevin blocking agents  - f/u TTE  - bedside POCUs showing enlarged LV, currently HR 50s  - now sinus bradycardia with LBBB    #dilated cardiomyopathy  Documented in [place] paperwork  -hold home meds spironolactone, lasix, lisinopril  -f/u TTE    #HTN  - on hydral  - restart lisinopril at 10mg    Resp  -no active issues    GI  - start on diet, passed bedside swallow    /Renal  #Acute renal failure with hyperkalemia   - I/s/o bladder outlet obstruction given distended bladder and b/l hydronephrosis seen on POCUS  - POCUS with bladder distention and hydronephrosis   - Taylor placed with 500cc drained   - Strict i's and O's  - Discussed with Nephrology, no urgent need for dialysis  - Per Nephrology, hyperkalemia can be managed medically  Will TOV 4/23 and start on tamsulosin, bladder scan to check for retention    ID  -No active issues    Endo  #Hypoglycemia  - Likely 2/2 to home sulfonylurea i/s/o renal failure   - Recommend obtaining sulfonylurea level  - Recommend finger sticks q4 hours until improvement of hypoglycemic episodes  - Hg A1c 7.6  - started on PO, resolution of hypoglycemia    Heme  #Anemia   - Hemoglobin noted to be 6 in ED, 12 at outside hospital suspect lab error  - Repeat CBC 11.3  - Transfuse for goal hemoglobin <7   - Patient without signs or symptoms of bleeding   - iron studies WNL  DVT ppx: lovenox, cleared by neuro     Ethics  -Full code          For Follow-Up:  [ ] F/u MR brain  [ ] F/u ECHO with bubble study  [ ] F/u neuro recs  [ ] F/u card recs MICU Transfer Note  ---------------------------    Transfer from: MICU  Transfer to:  (  ) Medicine    (  ) Telemetry    (  ) RCU    (  ) Palliative    ( X ) Stroke Unit    (  ) _______________  Accepting Physician: Dr Rosenberg     HPI  61 year old Bangl[place] speaking male with past medical history of DM, HTN, dilated cardiomyopathy. LKN midnight, witnessed by wife. This morning he woke up this morning with left hemiparesis. NIHSS at [facility] for left hemiparesis. CTA with right M1 occlusion. HR intermittently in the 40s, reportedly in second degree heart block (but no EKG on file), given atropine in route to the hospital.  Not a candidate for tPA as LKN > 4.5 hours.  He was transferred to [facility] as stroke rescue for possible thrombectomy.      [place] staff contacted [facility] staff notified the patient was found to have acute renal failure Cr. 2.8, BUN 88, K 5.8 (untreated).  BNP increased.  Concern for digoxin toxicity, although digoxin level noted to be 1.6 at OSH.     MICU COURSE  Admitted to MICU for CVA neuro checks and possible thrombectomy. Evaluated by neuro overnight for increase in stroke scale, recommended to repeat CTH. Repeat CTH showing R frontal temporal cortical lucency consistent with small infarcts in R MCA. MRI pending. Pt given precedex for agitation. Pt is out of window for TPA and thrombectomy. HR 50s, no trigeminy noted on EKG. Patient's left sided hemiparesis gradually resolved.       OBJECTIVE --  Vital Signs Last 24 Hrs  T(C): 37 (22 Apr 2022 11:00), Max: 37.1 (21 Apr 2022 22:00)  T(F): 98.6 (22 Apr 2022 11:00), Max: 98.8 (21 Apr 2022 22:00)  HR: 56 (22 Apr 2022 13:00) (47 - 96)  BP: 187/79 (22 Apr 2022 13:00) (114/64 - 187/79)  BP(mean): 113 (22 Apr 2022 13:00) (80 - 113)  RR: 30 (22 Apr 2022 13:00) (14 - 65)  SpO2: 100% (22 Apr 2022 13:00) (84% - 100%)    I&O's Summary    21 Apr 2022 07:01  -  22 Apr 2022 07:00  --------------------------------------------------------  IN: 778.7 mL / OUT: 1290 mL / NET: -511.3 mL    22 Apr 2022 07:01  -  22 Apr 2022 13:28  --------------------------------------------------------  IN: 167 mL / OUT: 400 mL / NET: -233 mL        MEDICATIONS  (STANDING):  aspirin enteric coated 81 milliGRAM(s) Oral daily  atorvastatin 80 milliGRAM(s) Oral at bedtime  chlorhexidine 4% Liquid 1 Application(s) Topical <User Schedule>  dexMEDEtomidine Infusion 0.2 MICROgram(s)/kG/Hr (3 mL/Hr) IV Continuous <Continuous>  lidocaine 2% Jelly 4 milliLiter(s) IntraUrethral once    MEDICATIONS  (PRN):        LABS                                            10.3                  Neurophils% (auto):   78.9   (04-22 @ 01:46):    9.90 )-----------(171          Lymphocytes% (auto):  7.1                                           30.6                   Eosinphils% (auto):   4.8      Manual%: Neutrophils x    ; Lymphocytes x    ; Eosinophils x    ; Bands%: x    ; Blasts x                                    141    |  105    |  73                  Calcium: 10.2  / iCa: x      (04-22 @ 01:46)    ----------------------------<  150       Magnesium: 1.6                              5.6     |  18     |  2.21             Phosphorous: 4.7      TPro  6.9    /  Alb  4.1    /  TBili  0.4    /  DBili  x      /  AST  22     /  ALT  31     /  AlkPhos  87     22 Apr 2022 01:46    ( 04-22 @ 01:46 )   PT: 13.6 sec;   INR: 1.18 ratio  aPTT: 26.5 sec          ASSESSMENT & PLAN:   61 year old Bangladeshi speaking male with past medical history of DM, HTN, dilated cardiomyopathy, and possible (CAD?) , p/w left hemiparesis, 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 Admitted to MICU for neurological exams s/p CVA and possible urgent thrombectomy.    Neuro  #MCA CVA  - Appreciate neuro recs, out of window for TPA and thrombectomy  - neuro check q4  -  f/u MR brain w/o contrast. CTH noncon shows R frontal/temporal cortical lucency consistent with small infarct in R MCA distribution.  - f/u TTE with bubble study  - ASA, atorvastatin 80mg  - lipid panel normal, TSH normal    CV  #Atrial fibrillation with bradycardia likely 2/2 hyperkalemia  - On bisoprolol at home and digoxin, normal digoxin level at OSH  - hold AV kevin blocking agents  - f/u TTE  - bedside POCUs showing enlarged LV, currently HR 50s  - now sinus bradycardia with LBBB    #dilated cardiomyopathy  Documented in [place] paperwork  -hold home meds spironolactone, lasix, lisinopril  -f/u TTE    #HTN  - on hydral  - restart lisinopril at 10mg    Resp  -no active issues    GI  - start on diet, passed bedside swallow    /Renal  #Acute renal failure with hyperkalemia   - I/s/o bladder outlet obstruction given distended bladder and b/l hydronephrosis seen on POCUS  - POCUS with bladder distention and hydronephrosis   - Taylor placed with 500cc drained   - Strict i's and O's  - Discussed with Nephrology, no urgent need for dialysis  - Per Nephrology, hyperkalemia can be managed medically  Will TOV 4/23 and start on tamsulosin, bladder scan to check for retention    ID  -No active issues    Endo  #Hypoglycemia  - Likely 2/2 to home sulfonylurea i/s/o renal failure   - Recommend obtaining sulfonylurea level  - Recommend finger sticks q4 hours until improvement of hypoglycemic episodes  - Hg A1c 7.6  - started on PO, resolution of hypoglycemia    Heme  #Anemia   - Hemoglobin noted to be 6 in ED, 12 at outside hospital suspect lab error  - Repeat CBC 11.3  - Transfuse for goal hemoglobin <7   - Patient without signs or symptoms of bleeding   - iron studies WNL  DVT ppx: lovenox, cleared by neuro     Ethics  -Full code          For Follow-Up:  [ ] F/u MR brain  [ ] F/u ECHO with bubble study  [ ] F/u neuro recs  [ ] F/u card recs  [ ] bladder scan q6, TOV MICU Transfer Note  ---------------------------    Transfer from: MICU  Transfer to:  (  ) Medicine    (  ) Telemetry    (  ) RCU    (  ) Palliative    ( X ) Stroke Unit    (  ) _______________  Accepting Physician: Dr Libman    HPI  61 year old [place] speaking male with past medical history of DM, HTN, dilated cardiomyopathy. LKN midnight, witnessed by wife. This morning he woke up this morning with left hemiparesis. NIHSS at [facility] for left hemiparesis. CTA with right M1 occlusion. HR intermittently in the 40s, reportedly in second degree heart block (but no EKG on file), given atropine in route to the hospital.  Not a candidate for tPA as LKN > 4.5 hours.  He was transferred to [facility] as stroke rescue for possible thrombectomy.      [place] staff contacted [facility] staff notified the patient was found to have acute renal failure Cr. 2.8, BUN 88, K 5.8 (untreated).  BNP increased.  Concern for digoxin toxicity, although digoxin level noted to be 1.6 at OSH.     MICU COURSE  Admitted to MICU for CVA neuro checks and possible thrombectomy. Evaluated by neuro overnight for increase in stroke scale, recommended to repeat CTH. Repeat CTH showing R frontal temporal cortical lucency consistent with small infarcts in R MCA. MRI pending. Pt given precedex for agitation. Pt is out of window for TPA and thrombectomy. HR 50s, no trigeminy noted on EKG. Patient's left sided hemiparesis gradually resolved.       OBJECTIVE --  Vital Signs Last 24 Hrs  T(C): 37 (22 Apr 2022 11:00), Max: 37.1 (21 Apr 2022 22:00)  T(F): 98.6 (22 Apr 2022 11:00), Max: 98.8 (21 Apr 2022 22:00)  HR: 56 (22 Apr 2022 13:00) (47 - 96)  BP: 187/79 (22 Apr 2022 13:00) (114/64 - 187/79)  BP(mean): 113 (22 Apr 2022 13:00) (80 - 113)  RR: 30 (22 Apr 2022 13:00) (14 - 65)  SpO2: 100% (22 Apr 2022 13:00) (84% - 100%)    I&O's Summary    21 Apr 2022 07:01  -  22 Apr 2022 07:00  --------------------------------------------------------  IN: 778.7 mL / OUT: 1290 mL / NET: -511.3 mL    22 Apr 2022 07:01  -  22 Apr 2022 13:28  --------------------------------------------------------  IN: 167 mL / OUT: 400 mL / NET: -233 mL        MEDICATIONS  (STANDING):  aspirin enteric coated 81 milliGRAM(s) Oral daily  atorvastatin 80 milliGRAM(s) Oral at bedtime  chlorhexidine 4% Liquid 1 Application(s) Topical <User Schedule>  dexMEDEtomidine Infusion 0.2 MICROgram(s)/kG/Hr (3 mL/Hr) IV Continuous <Continuous>  lidocaine 2% Jelly 4 milliLiter(s) IntraUrethral once    MEDICATIONS  (PRN):        LABS                                            10.3                  Neurophils% (auto):   78.9   (04-22 @ 01:46):    9.90 )-----------(171          Lymphocytes% (auto):  7.1                                           30.6                   Eosinphils% (auto):   4.8      Manual%: Neutrophils x    ; Lymphocytes x    ; Eosinophils x    ; Bands%: x    ; Blasts x                                    141    |  105    |  73                  Calcium: 10.2  / iCa: x      (04-22 @ 01:46)    ----------------------------<  150       Magnesium: 1.6                              5.6     |  18     |  2.21             Phosphorous: 4.7      TPro  6.9    /  Alb  4.1    /  TBili  0.4    /  DBili  x      /  AST  22     /  ALT  31     /  AlkPhos  87     22 Apr 2022 01:46    ( 04-22 @ 01:46 )   PT: 13.6 sec;   INR: 1.18 ratio  aPTT: 26.5 sec          ASSESSMENT & PLAN:   61 year old [place] speaking male with past medical history of DM, HTN, dilated cardiomyopathy, and possible (CAD?) , p/w left hemiparesis, 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 Admitted to MICU for neurological exams s/p CVA and possible urgent thrombectomy.    Neuro  #MCA CVA  - Appreciate neuro recs, out of window for TPA and thrombectomy  - neuro check q4  -  f/u MR brain w/o contrast. CTH noncon shows R frontal/temporal cortical lucency consistent with small infarct in R MCA distribution.  - f/u TTE with bubble study  - ASA, atorvastatin 80mg  - lipid panel normal, TSH normal    CV  #Atrial fibrillation with bradycardia likely 2/2 hyperkalemia  - On bisoprolol at home and digoxin, normal digoxin level at OSH  - hold AV kevin blocking agents  - f/u TTE  - bedside POCUs showing enlarged LV, currently HR 50s  - now sinus bradycardia with LBBB    #dilated cardiomyopathy  Documented in [place] paperwork  -hold home meds spironolactone, lasix, lisinopril  -f/u TTE    #HTN  - on hydral  - restart lisinopril at 10mg    Resp  -no active issues    GI  - start on diet, passed bedside swallow    /Renal  #Acute renal failure with hyperkalemia   - I/s/o bladder outlet obstruction given distended bladder and b/l hydronephrosis seen on POCUS  - POCUS with bladder distention and hydronephrosis   - Taylor placed with 500cc drained   - Strict i's and O's  - Discussed with Nephrology, no urgent need for dialysis  - Per Nephrology, hyperkalemia can be managed medically  Will TOV 4/23 and start on tamsulosin, bladder scan to check for retention    ID  -No active issues    Endo  #Hypoglycemia  - Likely 2/2 to home sulfonylurea i/s/o renal failure   - Recommend obtaining sulfonylurea level  - Recommend finger sticks q4 hours until improvement of hypoglycemic episodes  - Hg A1c 7.6  - started on PO, resolution of hypoglycemia    Heme  #Anemia   - Hemoglobin noted to be 6 in ED, 12 at outside hospital suspect lab error  - Repeat CBC 11.3  - Transfuse for goal hemoglobin <7   - Patient without signs or symptoms of bleeding   - iron studies WNL  DVT ppx: lovenox, cleared by neuro     Ethics  -Full code          For Follow-Up:  [ ] F/u MR brain  [ ] F/u ECHO with bubble study  [ ] F/u neuro recs  [ ] F/u card recs  [ ] bladder scan q6, TOV MICU Transfer Note  ---------------------------    Transfer from: MICU  Transfer to:  (  ) Medicine    (  ) Telemetry    (  ) RCU    (  ) Palliative    ( X ) Stroke Unit    (  ) _______________  Accepting Physician: Dr Libman    HPI  61 year old [place] speaking male with past medical history of DM, HTN, dilated cardiomyopathy. LKN midnight, witnessed by wife. This morning he woke up this morning with left hemiparesis. NIHSS at [facility] for left hemiparesis. CTA with right M1 occlusion. HR intermittently in the 40s, reportedly in second degree heart block (but no EKG on file), given atropine in route to the hospital.  Not a candidate for tPA as LKN > 4.5 hours.  He was transferred to [facility] as stroke rescue for possible thrombectomy.      [place] staff contacted [facility] staff notified the patient was found to have acute renal failure Cr. 2.8, BUN 88, K 5.8 (untreated).  BNP increased.  Concern for digoxin toxicity, although digoxin level noted to be 1.6 at OSH.     MICU COURSE  Admitted to MICU for CVA neuro checks and possible thrombectomy. Evaluated by neuro overnight for increase in stroke scale, recommended to repeat CTH. Repeat CTH showing R frontal temporal cortical lucency consistent with small infarcts in R MCA. MRI pending. Pt given precedex for agitation. Pt is out of window for TPA and thrombectomy. HR 50s, no trigeminy noted on EKG. Patient's left sided hemiparesis gradually resolved. Found to have new HFrEF (EF 25%) on echo.       OBJECTIVE --  Vital Signs Last 24 Hrs  T(C): 37 (22 Apr 2022 11:00), Max: 37.1 (21 Apr 2022 22:00)  T(F): 98.6 (22 Apr 2022 11:00), Max: 98.8 (21 Apr 2022 22:00)  HR: 56 (22 Apr 2022 13:00) (47 - 96)  BP: 187/79 (22 Apr 2022 13:00) (114/64 - 187/79)  BP(mean): 113 (22 Apr 2022 13:00) (80 - 113)  RR: 30 (22 Apr 2022 13:00) (14 - 65)  SpO2: 100% (22 Apr 2022 13:00) (84% - 100%)    I&O's Summary    21 Apr 2022 07:01  -  22 Apr 2022 07:00  --------------------------------------------------------  IN: 778.7 mL / OUT: 1290 mL / NET: -511.3 mL    22 Apr 2022 07:01  -  22 Apr 2022 13:28  --------------------------------------------------------  IN: 167 mL / OUT: 400 mL / NET: -233 mL        MEDICATIONS  (STANDING):  aspirin enteric coated 81 milliGRAM(s) Oral daily  atorvastatin 80 milliGRAM(s) Oral at bedtime  chlorhexidine 4% Liquid 1 Application(s) Topical <User Schedule>  dexMEDEtomidine Infusion 0.2 MICROgram(s)/kG/Hr (3 mL/Hr) IV Continuous <Continuous>  lidocaine 2% Jelly 4 milliLiter(s) IntraUrethral once    MEDICATIONS  (PRN):        LABS                                            10.3                  Neurophils% (auto):   78.9   (04-22 @ 01:46):    9.90 )-----------(171          Lymphocytes% (auto):  7.1                                           30.6                   Eosinphils% (auto):   4.8      Manual%: Neutrophils x    ; Lymphocytes x    ; Eosinophils x    ; Bands%: x    ; Blasts x                                    141    |  105    |  73                  Calcium: 10.2  / iCa: x      (04-22 @ 01:46)    ----------------------------<  150       Magnesium: 1.6                              5.6     |  18     |  2.21             Phosphorous: 4.7      TPro  6.9    /  Alb  4.1    /  TBili  0.4    /  DBili  x      /  AST  22     /  ALT  31     /  AlkPhos  87     22 Apr 2022 01:46    ( 04-22 @ 01:46 )   PT: 13.6 sec;   INR: 1.18 ratio  aPTT: 26.5 sec          ASSESSMENT & PLAN:   61 year old [place] speaking male with past medical history of DM, HTN, dilated cardiomyopathy, and possible (CAD?) , p/w left hemiparesis, 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 Admitted to MICU for neurological exams s/p CVA and possible urgent thrombectomy.    Neuro  #MCA CVA  - Appreciate neuro recs, out of window for TPA and thrombectomy  - neuro check q4  -  f/u MR brain w/o contrast. CTH noncon shows R frontal/temporal cortical lucency consistent with small infarct in R MCA distribution.  - f/u TTE with bubble study  - ASA, atorvastatin 80mg  - lipid panel normal, TSH normal    CV  #Atrial fibrillation with bradycardia likely 2/2 hyperkalemia  - On bisoprolol at home and digoxin, normal digoxin level at OSH  - hold AV kevin blocking agents  - f/u TTE  - bedside POCUs showing enlarged LV, currently HR 50s  - now sinus bradycardia with LBBB    #dilated cardiomyopathy  Documented in [place] paperwork  -hold home meds spironolactone, lasix, lisinopril  -f/u TTE    #HTN  - on hydral  - restart lisinopril at 10mg    Resp  -no active issues    GI  - start on diet, passed bedside swallow    /Renal  #Acute renal failure with hyperkalemia   - I/s/o bladder outlet obstruction given distended bladder and b/l hydronephrosis seen on POCUS  - POCUS with bladder distention and hydronephrosis   - Taylor placed with 500cc drained   - Strict i's and O's  - Discussed with Nephrology, no urgent need for dialysis  - Per Nephrology, hyperkalemia can be managed medically  Will TOV 4/23 and start on tamsulosin, bladder scan to check for retention    ID  -No active issues    Endo  #Hypoglycemia  - Likely 2/2 to home sulfonylurea i/s/o renal failure   - Recommend obtaining sulfonylurea level  - Recommend finger sticks q4 hours until improvement of hypoglycemic episodes  - Hg A1c 7.6  - started on PO, resolution of hypoglycemia    Heme  #Anemia   - Hemoglobin noted to be 6 in ED, 12 at outside hospital suspect lab error  - Repeat CBC 11.3  - Transfuse for goal hemoglobin <7   - Patient without signs or symptoms of bleeding   - iron studies WNL  DVT ppx: lovenox, cleared by neuro     Ethics  -Full code          For Follow-Up:  [ ] F/u MR brain  [ ] F/u neuro recs  [ ] F/u card recs  [ ] bladder scan q6, TOV  [ ] start Beta blocker in AM if no reoccurrence of bradycardia

## 2022-04-22 NOTE — PROGRESS NOTE ADULT - PROBLEM SELECTOR PLAN 1
On presentation with K 7.3 and bradycardic, s/p medical management in the ED. Found with urinary retention and valencia placed with 550cc urine. Also at home on lisinopril and eplerenone     K level improving   medical management prn for K >5.5  patient remains acidotic would c/w bicarb gtt at 100cc/hr x 10 hrs.  hold eplerenone and lisinopril  monitor serial BMP.

## 2022-04-22 NOTE — PROGRESS NOTE ADULT - SUBJECTIVE AND OBJECTIVE BOX
INTERVAL HPI/OVERNIGHT EVENTS:  Admitted ovn for MCA stroke and neurological checks    SUBJECTIVE: Patient seen and examined at bedside. Pt agitated and on precedex gtt. Communicated with RN who spoke [place] of [place], patient asking to have phone and speak to wife. Denies pain, unable to obtain further ROS      VITAL SIGNS:  ICU Vital Signs Last 24 Hrs  T(C): 36.9 (2022 04:00), Max: 37.1 (2022 22:00)  T(F): 98.5 (2022 04:00), Max: 98.8 (2022 22:00)  HR: 51 (2022 10:00) (47 - 96)  BP: 179/74 (2022 10:00) (114/64 - 185/74)  BP(mean): 106 (2022 10:00) (80 - 110)  ABP: --  ABP(mean): --  RR: 15 (2022 10:00) (14 - 65)  SpO2: 100% (2022 10:00) (84% - 100%)      Plateau pressure:   P/F ratio:      @ 07:  -   @ 07:00  --------------------------------------------------------  IN: 778.7 mL / OUT: 1290 mL / NET: -511.3 mL     @ 07:  -   @ 13:21  --------------------------------------------------------  IN: 167 mL / OUT: 400 mL / NET: -233 mL      CAPILLARY BLOOD GLUCOSE      POCT Blood Glucose.: 230 mg/dL (2022 06:02)    ECG:    PHYSICAL EXAM:  General: NAD, lying in bed   HEENT: Mild L facial droop. No icterus or conjunctival injection   Neck: Supple,   Respiratory: CTA bilaterally   Cardiovascular: Normal s1 and s2 w/o m/r/g, Irregular rhythm with frequent PVC's   Abdomen: Soft, NT, ND  Extremities: Normal temperature without cyanosis, edema, or erythema   Skin: No apparent rashes   Neurological: Alert, Left sided weakness 3-4/5 right sided 5/5     MEDICATIONS:  MEDICATIONS  (STANDING):  aspirin enteric coated 81 milliGRAM(s) Oral daily  atorvastatin 80 milliGRAM(s) Oral at bedtime  chlorhexidine 4% Liquid 1 Application(s) Topical <User Schedule>  dexMEDEtomidine Infusion 0.2 MICROgram(s)/kG/Hr (3 mL/Hr) IV Continuous <Continuous>  lidocaine 2% Jelly 4 milliLiter(s) IntraUrethral once    MEDICATIONS  (PRN):      ALLERGIES:  Allergies    No Known Allergies    Intolerances        LABS:                        10.3   9.90  )-----------( 171      ( 2022 01:46 )             30.6     04-    141  |  105  |  73<H>  ----------------------------<  150<H>  5.6<H>   |  18<L>  |  2.21<H>    Ca    10.2      2022 01:46  Phos  4.7     04-22  Mg     1.6     04-22    TPro  6.9  /  Alb  4.1  /  TBili  0.4  /  DBili  x   /  AST  22  /  ALT  31  /  AlkPhos  87  04-22    PT/INR - ( 2022 01:46 )   PT: 13.6 sec;   INR: 1.18 ratio         PTT - ( 2022 01:46 )  PTT:26.5 sec  Urinalysis Basic - ( 2022 15:13 )    Color: Light Yellow / Appearance: Clear / S.028 / pH: x  Gluc: x / Ketone: Negative  / Bili: Negative / Urobili: Negative   Blood: x / Protein: Trace / Nitrite: Negative   Leuk Esterase: Negative / RBC: 1 /hpf / WBC 1 /HPF   Sq Epi: x / Non Sq Epi: 0 /hpf / Bacteria: Negative        RADIOLOGY & ADDITIONAL TESTS: Reviewed.

## 2022-04-22 NOTE — PROGRESS NOTE ADULT - SUBJECTIVE AND OBJECTIVE BOX
Patient seen and examined at bedside.    Overnight Events:   Patient now SR converted. Agitated overnight, started on Precedex. Asymptomatic.   Review Of Systems: No chest pain, shortness of breath, or palpitations            Current Meds:  aspirin enteric coated 81 milliGRAM(s) Oral daily  atorvastatin 80 milliGRAM(s) Oral at bedtime  chlorhexidine 4% Liquid 1 Application(s) Topical <User Schedule>  dexMEDEtomidine Infusion 0.2 MICROgram(s)/kG/Hr IV Continuous <Continuous>  lidocaine 2% Jelly 4 milliLiter(s) IntraUrethral once      Vitals:  T(F): 98.5 (), Max: 98.8 ()  HR: 51 () (47 - 96)  BP: 179/74 () (114/64 - 185/74)  RR: 15 ()  SpO2: 100% ()  I&O's Summary    2022 07:  -  2022 07:00  --------------------------------------------------------  IN: 778.7 mL / OUT: 1290 mL / NET: -511.3 mL    2022 07:01  -  2022 13:09  --------------------------------------------------------  IN: 167 mL / OUT: 400 mL / NET: -233 mL        Physical Exam:  Appearance: No acute distress; well appearing  Eyes: no scleral icterus   HEENT: Normal oral mucosa  Cardiovascular: RRR, S1, S2, no murmurs, rubs, or gallops; no edema; no JVD  Respiratory: Clear to auscultation bilaterally, no auditory stridor   Gastrointestinal: soft, non-tender, non-distended with normal bowel sounds  Musculoskeletal: No clubbing; no joint deformity   Neurologic: L sided weakness  Psychiatry: AAOx3, mood & affect appropriate  Skin: No rashes, ecchymoses, or cyanosis                          10.3   9.90  )-----------( 171      ( 2022 01:46 )             30.6         141  |  105  |  73<H>  ----------------------------<  150<H>  5.6<H>   |  18<L>  |  2.21<H>    Ca    10.2      2022 01:46  Phos  4.7       Mg     1.6         TPro  6.9  /  Alb  4.1  /  TBili  0.4  /  DBili  x   /  AST  22  /  ALT  31  /  AlkPhos  87      PT/INR - ( 2022 01:46 )   PT: 13.6 sec;   INR: 1.18 ratio         PTT - ( 2022 01:46 )  PTT:26.5 sec  CARDIAC MARKERS ( 2022 15:09 )  36 ng/L / x     / x     / x     / x     / x      CARDIAC MARKERS ( 2022 13:04 )  37 ng/L / x     / x     / x     / x     / x          Serum Pro-Brain Natriuretic Peptide: 2012 pg/mL ( @ 13:04)    Total Cholesterol: 103  LDL: --  HDL: 42  T        New ECG(s): Personally reviewed    Echo:  Pending     Stress Testing:     Cath:    Imaging:    Interpretation of Telemetry: Sinus keiko currently.    Patient seen and examined at bedside.    Overnight Events:   Patient now SR converted. Agitated overnight, started on Precedex. Asymptomatic.   Review Of Systems: No chest pain, shortness of breath, or palpitations            Current Meds:  aspirin enteric coated 81 milliGRAM(s) Oral daily  atorvastatin 80 milliGRAM(s) Oral at bedtime  chlorhexidine 4% Liquid 1 Application(s) Topical <User Schedule>  dexMEDEtomidine Infusion 0.2 MICROgram(s)/kG/Hr IV Continuous <Continuous>  lidocaine 2% Jelly 4 milliLiter(s) IntraUrethral once    ROS:  Unable    Vitals:  T(F): 98.5 (), Max: 98.8 ()  HR: 51 () (47 - 96)  BP: 179/74 () (114/64 - 185/74)  RR: 15 ()    Physical Exam:  Appearance: No acute distress; well appearing  Eyes: no scleral icterus   HEENT: Normal oral mucosa  Cardiovascular: RRR, S1, S2, no murmurs, rubs, or gallops; no edema; no JVD  Respiratory: Clear to auscultation bilaterally, no auditory stridor   Gastrointestinal: soft, non-tender, non-distended with normal bowel sounds  Musculoskeletal: No clubbing; no joint deformity   Neurologic: L sided weakness  Psychiatry: AAOx3, mood & affect appropriate  Skin: No rashes, ecchymoses, or cyanosis      SpO2: 100% ()  I&O's Summary  2022 07:  -  2022 07:00  --------------------------------------------------------  IN: 778.7 mL / OUT: 1290 mL / NET: -511.3 mL    2022 07:  -  2022 13:09  --------------------------------------------------------  IN: 167 mL / OUT: 400 mL / NET: -233 mL      LABS:                      10.3   9.90  )-----------( 171      ( 2022 01:46 )             30.6       141  |  105  |  73<H>  ----------------------------<  150<H>  5.6<H>   |  18<L>  |  2.21<H>    Ca    10.2      2022 01:46  Phos  4.7       Mg     1.6         TPro  6.9  /  Alb  4.1  /  TBili  0.4  /  DBili  x   /  AST  22  /  ALT  31  /  AlkPhos  87      PT/INR - ( 2022 01:46 )   PT: 13.6 sec;   INR: 1.18 ratio    PTT - ( 2022 01:46 )  PTT:26.5 sec    CARDIAC MARKERS ( 2022 15:09 )  36 ng/L / x     / x     / x     / x     / x      CARDIAC MARKERS ( 2022 13:04 )  37 ng/L / x     / x     / x     / x     / x        Serum Pro-Brain Natriuretic Peptide: 2012 pg/mL ( @ 13:04)    Total Cholesterol: 103  LDL: --  HDL: 42  T    Interpretation of Telemetry: Sinus keiko currently.

## 2022-04-22 NOTE — CONSULT NOTE ADULT - SUBJECTIVE AND OBJECTIVE BOX
p (1480)     HPI:  61M hx dialated cardiomyopathy xferred from [facility] for neuor IR eval yesterday AM for R M1 occlusion. LKW 12AM 4/21, CTH ASPECTS 8, CTA distal M1 occl, also stenosis L vert origin, CTP mismatch 120cc. NIHSS at [facility] 4, repeat stroke code on arrival NIHSS 9 per report. Pt noted to be hyperkalemic w/ lactic acidosis likely 2/2 BRASH syndrome (AV kevin agent toxicity) w/ NANCY/shock, deemed to medically unstable for thrombectomy, ADM MICU. Called overnight for worsening in exam: AOx3, dec BTT on L, partial hemianopia, PERRL, gaze crosses midline, PERRL, +L facial, LUE+drift hits bed, LLE+drift doesn't hit bed, RUE/RLE 5/5, +L neglect. NIHSS 8.       Imaging:    Exam:    --Anticoagulation:  aspirin enteric coated 81 milliGRAM(s) Oral daily    =====================  PAST MEDICAL HISTORY   HTN (hypertension)    HLD (hyperlipidemia)      PAST SURGICAL HISTORY   No significant past surgical history          MEDICATIONS:  Antibiotics:    Neuro:    Other:  atorvastatin 80 milliGRAM(s) Oral at bedtime  sodium bicarbonate  Infusion 0.188 mEq/kG/Hr IV Continuous <Continuous>      SOCIAL HISTORY:   Occupation:   Marital Status:     FAMILY HISTORY:      ROS: Negative except per HPI    LABS:  PT/INR - ( 21 Apr 2022 13:04 )   PT: 14.3 sec;   INR: 1.23 ratio         PTT - ( 21 Apr 2022 13:04 )  PTT:26.6 sec                        10.3   9.90  )-----------( 171      ( 22 Apr 2022 01:46 )             30.6     04-21    139  |  105  |  77<H>  ----------------------------<  144<H>  5.3   |  15<L>  |  2.32<H>    Ca    9.7      21 Apr 2022 21:13  Phos  4.5     04-21  Mg     1.6     04-21    TPro  7.1  /  Alb  4.2  /  TBili  0.4  /  DBili  x   /  AST  23  /  ALT  32  /  AlkPhos  96  04-21

## 2022-04-22 NOTE — PATIENT PROFILE ADULT - FALL HARM RISK - HARM RISK INTERVENTIONS
Assistance with ambulation/Assistance OOB with selected safe patient handling equipment/Communicate Risk of Fall with Harm to all staff/Discuss with provider need for PT consult/Monitor gait and stability/Reinforce activity limits and safety measures with patient and family/Tailored Fall Risk Interventions/Visual Cue: Yellow wristband and red socks/Bed in lowest position, wheels locked, appropriate side rails in place/Call bell, personal items and telephone in reach/Instruct patient to call for assistance before getting out of bed or chair/Non-slip footwear when patient is out of bed/[place] to call system/Physically safe environment - no spills, clutter or unnecessary equipment/Purposeful Proactive Rounding/Room/bathroom lighting operational, light cord in reach

## 2022-04-22 NOTE — PATIENT PROFILE ADULT - PATIENT REPRESENTATIVE: ( YOU CAN CHOOSE ANY PERSON THAT CAN ASSIST YOU WITH YOUR HEALTH CARE PREFERENCES, DOES NOT HAVE TO BE A SPOUSE, IMMEDIATE FAMILY OR SIGNIFICANT OTHER/PARTNER)
declines [place] Counseling: I discussed with the patient the risks of ixekizumab including but not limited to immunosuppression, serious infections, worsening of inflammatory bowel disease and drug reactions.  The patient understands that monitoring is required including a PPD at baseline and must alert us or the primary physician if symptoms of infection or other concerning signs are noted.

## 2022-04-22 NOTE — PROGRESS NOTE ADULT - PROBLEM SELECTOR PLAN 2
Patient with Cr at 2.7 on admission, likely CKD. Unknown baseline Cr as patient [place] is [place] (he recently came from [place] noted with urinary retention s/p Taylor placement. UA bland with UPCR of 0.4g    Cr today improving to 2.2mg/dl  No indications for dialysis as of today.   check US kidney/bladder.   Monitor labs and urine output.   Avoid NSAIDs, hold lisinopril   Dose medications as per eGFR.

## 2022-04-22 NOTE — PROGRESS NOTE ADULT - ASSESSMENT
61 M history of HTN, HLD, DM, A. fib and ?dilated cardiomyopathy.  He presented to OSH with left hemiparesis and was found to have acute CVA, not candidate for tPA as LKN > 4.5 hours.    Transferred to [facility] as stroke rescue for possible thrombectomy.   Found to be in AF with VR 30-60s, irregular in setting of K 7.3, prompting cardiology consult.     REC:  #1.  Bradycardia - initially Atrial fibrillation slow ventricular response and with IVCD/BBB now sinus bradycardia with LBBB  - Likely 2/2 hyperkalemia.  Apparently digoxin toxicity ruled out at referring hospital.  - Would hold any AVN blocking agent  - Dysrhythmia improved/resolved with normalization of potassium (may be emergent HD candidate). Current bradycardia HDS and likely 2/2 precedex.   - Obtain formal TTE to evaluated cardiac structure and function.   - AC discussed below  - will f/u TTE    #2.  CVA -No A/C agent listed among medications. CHADSVASC at least 4, A/C recommended, particularly with now documented CVA.   - given acuity of CVA, need neurology input on when to safely start on full A/C (concern for ICH and/or hemorrhagic conversion of CVA).   - Currently HDS (actually hypertensive) with HR 50s.     #3.  HTN -SBP 160s.   - Likely would benefit from permissive HTN given ischemic CVA; will defer to neurology.      61 M history of HTN, HLD, DM, A. fib and ?dilated cardiomyopathy.  He presented to OSH with left hemiparesis and was found to have acute CVA, not candidate for tPA as LKN > 4.5 hours.    Transferred to [facility] as stroke rescue for possible thrombectomy.   Found to be in AF with VR 30-60s, irregular in setting of K 7.3, prompting cardiology consult.     REC:  #1.  Bradycardia - initially Atrial fibrillation slow ventricular response and with IVCD/BBB, now sinus bradycardia with LBBB  - Likely 2/2 hyperkalemia.  Apparently digoxin toxicity ruled out at referring hospital.  - Would hold any AVN blocking agents  - Dysrhythmia improved/resolved with normalization of potassium (may be emergent HD candidate). Current bradycardia HDS and likely 2/2 precedex.   - Obtain formal TTE to evaluated cardiac structure and function.   - AC discussed below  - will f/u TTE    #2.  CVA -No A/C agent listed among medications. CHADSVASC at least 4, A/C recommended, particularly with now documented CVA.   - given acuity of CVA, need neurology input on when to safely start on full A/C (concern for ICH and/or hemorrhagic conversion of CVA).   - Currently HDS (actually hypertensive) with HR 50s.     #3.  HTN -SBP 160s.   - Likely would benefit from permissive HTN given ischemic CVA; will defer to neurology.       Samia Rosen M.D.  Cardiology fellow     Plan discussed with cardiology fellow.  Patient seen and examined.  Hx., exam and labs as above.  I agree with the assessment and recommendations. which I have reviewed and edited where appropriate.  Tez Lacy M.D.  Cardiology Attending, Consult Service  For Cardiology consults and questions, all Cardiology service information can be found 24/7 on [email or web address] - use password: [email or web address] to log in.

## 2022-04-23 LAB
FERRITIN SERPL-MCNC: 294 NG/ML — SIGNIFICANT CHANGE UP (ref 30–400)
FOLATE SERPL-MCNC: 13.1 NG/ML — SIGNIFICANT CHANGE UP
GLUCOSE BLDC GLUCOMTR-MCNC: 105 MG/DL — HIGH (ref 70–99)
GLUCOSE BLDC GLUCOMTR-MCNC: 122 MG/DL — HIGH (ref 70–99)
GLUCOSE BLDC GLUCOMTR-MCNC: 175 MG/DL — HIGH (ref 70–99)
GLUCOSE BLDC GLUCOMTR-MCNC: 219 MG/DL — HIGH (ref 70–99)
HAPTOGLOB SERPL-MCNC: 157 MG/DL — SIGNIFICANT CHANGE UP (ref 34–200)
IRON SATN MFR SERPL: 32 % — SIGNIFICANT CHANGE UP (ref 16–55)
IRON SATN MFR SERPL: 77 UG/DL — SIGNIFICANT CHANGE UP (ref 45–165)
TIBC SERPL-MCNC: 238 UG/DL — SIGNIFICANT CHANGE UP (ref 220–430)
UIBC SERPL-MCNC: 161 UG/DL — SIGNIFICANT CHANGE UP (ref 110–370)
VIT B12 SERPL-MCNC: 266 PG/ML — SIGNIFICANT CHANGE UP (ref 232–1245)

## 2022-04-23 PROCEDURE — 99291 CRITICAL CARE FIRST HOUR: CPT

## 2022-04-23 PROCEDURE — 99233 SBSQ HOSP IP/OBS HIGH 50: CPT

## 2022-04-23 PROCEDURE — 93306 TTE W/DOPPLER COMPLETE: CPT | Mod: 26

## 2022-04-23 PROCEDURE — 70551 MRI BRAIN STEM W/O DYE: CPT | Mod: 26

## 2022-04-23 RX ORDER — TAMSULOSIN HYDROCHLORIDE 0.4 MG/1
0.4 CAPSULE ORAL AT BEDTIME
Refills: 0 | Status: DISCONTINUED | OUTPATIENT
Start: 2022-04-24 | End: 2022-05-01

## 2022-04-23 RX ORDER — TAMSULOSIN HYDROCHLORIDE 0.4 MG/1
0.4 CAPSULE ORAL ONCE
Refills: 0 | Status: COMPLETED | OUTPATIENT
Start: 2022-04-23 | End: 2022-04-23

## 2022-04-23 RX ORDER — ENOXAPARIN SODIUM 100 MG/ML
40 INJECTION SUBCUTANEOUS EVERY 24 HOURS
Refills: 0 | Status: DISCONTINUED | OUTPATIENT
Start: 2022-04-23 | End: 2022-04-25

## 2022-04-23 RX ORDER — LISINOPRIL 2.5 MG/1
10 TABLET ORAL DAILY
Refills: 0 | Status: DISCONTINUED | OUTPATIENT
Start: 2022-04-23 | End: 2022-04-23

## 2022-04-23 RX ADMIN — ATORVASTATIN CALCIUM 80 MILLIGRAM(S): 80 TABLET, FILM COATED ORAL at 21:45

## 2022-04-23 RX ADMIN — CHLORHEXIDINE GLUCONATE 1 APPLICATION(S): 213 SOLUTION TOPICAL at 05:32

## 2022-04-23 RX ADMIN — TAMSULOSIN HYDROCHLORIDE 0.4 MILLIGRAM(S): 0.4 CAPSULE ORAL at 10:16

## 2022-04-23 RX ADMIN — Medication 100 GRAM(S): at 00:25

## 2022-04-23 RX ADMIN — Medication 25 MILLIGRAM(S): at 13:34

## 2022-04-23 RX ADMIN — Medication 81 MILLIGRAM(S): at 13:34

## 2022-04-23 NOTE — PROGRESS NOTE ADULT - SUBJECTIVE AND OBJECTIVE BOX
THE PATIENT WAS SEEN AND EXAMINED BY ME WITH THE HOUSESTAFF AND STROKE TEAM DURING MORNING ROUNDS.   HPI:  61 year old Mongolian speaking male with past medical history of DM, HTN, dilated cardiomyopathy. LKN midnight, witnessed by wife. This morning he woke up this morning with left hemiparesis. NIHSS at [facility] 4 for left hemiparesis. CTA with right M1 occlusion. HR intermittently in the 40s, reportedly in second degree heart block (but no EKG on file), given atropine in route to the hospital.  Not a candidate for tPA as LKN > 4.5 hours.  He was transferred to [facility] as stroke rescue for possible thrombectomy.  OZZY aware.    Will repeat all neuroimaging.      [place] staff contacted [facility] staff notified the patient was found to have acute renal failure Cr. 2.8, BUN 88, K 5.8 (untreated).  BNP increased.  Concern for digoxin toxicity, although digoxin level noted to be 1.6 at OSH.     In the ED, T96.4, HR 50, /65, on %. Slow afib with HR 30-50s. FSG 56, given amp D50. Given albuterol and Ca gluc 2g.     Admitted to MICU for CVA neuro checks and possible thrombectomy. (21 Apr 2022 18:10)      ROS: Otherwise negative.    SUBJECTIVE: No events overnight.  No new neurologic complaints.      aspirin enteric coated 81 milliGRAM(s) Oral daily  atorvastatin 80 milliGRAM(s) Oral at bedtime  chlorhexidine 4% Liquid 1 Application(s) Topical <User Schedule>  dexMEDEtomidine Infusion 0.2 MICROgram(s)/kG/Hr IV Continuous <Continuous>  enoxaparin Injectable 40 milliGRAM(s) SubCutaneous every 24 hours  lidocaine 2% Jelly 4 milliLiter(s) IntraUrethral once      PHYSICAL EXAM:   Vital Signs Last 24 Hrs  T(C): 37.1 (23 Apr 2022 13:00), Max: 37.4 (23 Apr 2022 08:00)  T(F): 98.8 (23 Apr 2022 13:00), Max: 99.4 (23 Apr 2022 08:00)  HR: 76 (23 Apr 2022 15:30) (50 - 76)  BP: 163/77 (23 Apr 2022 15:30) (114/55 - 163/77)  BP(mean): 110 (23 Apr 2022 15:30) (77 - 110)  RR: 22 (23 Apr 2022 15:30) (13 - 39)  SpO2: 97% (23 Apr 2022 15:30) (97% - 100%)    General: No acute distress  HEENT: EOM intact, visual fields full  Abdomen: Soft, nontender, nondistended   Extremities: No edema    NEUROLOGICAL EXAM:  Mental status: Awake, alert, oriented x3, speech fluent, follows commands, no neglect, normal memory   Cranial Nerves: No facial asymmetry, no nystagmus, no dysarthria,  tongue midline  Motor exam: Normal tone, no drift, 5/5 RUE, 5/5 RLE, 5/5 LUE, 5/5 LLE, normal fine finger movements.  Sensation: Intact to light touch   Coordination/ Gait: No dysmetria, SHAR intact and symmetric bilaterally, gait not tested    LABS:                        11.3   7.29  )-----------( 169      ( 22 Apr 2022 22:22 )             33.9    04-22    144  |  110<H>  |  39<H>  ----------------------------<  85  4.6   |  22  |  1.26    Ca    9.5      22 Apr 2022 22:22  Phos  3.4     04-22  Mg     1.4     04-22    TPro  6.2  /  Alb  3.3  /  TBili  0.6  /  DBili  x   /  AST  22  /  ALT  23  /  AlkPhos  77  04-22  PT/INR - ( 22 Apr 2022 22:22 )   PT: 14.7 sec;   INR: 1.28 ratio         PTT - ( 22 Apr 2022 22:22 )  PTT:27.7 sec      IMAGING: Reviewed by me.      THE PATIENT WAS SEEN AND EXAMINED BY ME WITH THE HOUSESTAFF AND STROKE TEAM DURING MORNING ROUNDS.   HPI:  61 year old Khmer speaking male with past medical history of DM, HTN, dilated cardiomyopathy. LKN midnight, witnessed by wife. This morning he woke up this morning with left hemiparesis. NIHSS at [facility] 4 for left hemiparesis. CTA with right M1 occlusion. HR intermittently in the 40s, reportedly in second degree heart block (but no EKG on file), given atropine in route to the hospital.  Not a candidate for tPA as LKN > 4.5 hours.  He was transferred to [facility] as stroke rescue for possible thrombectomy.  OZZY aware.    Will repeat all neuroimaging.      [place] staff contacted [facility] staff notified the patient was found to have acute renal failure Cr. 2.8, BUN 88, K 5.8 (untreated).  BNP increased.  Concern for digoxin toxicity, although digoxin level noted to be 1.6 at OSH.     In the ED, T96.4, HR 50, /65, on %. Slow afib with HR 30-50s. FSG 56, given amp D50. Given albuterol and Ca gluc 2g.     Admitted to MICU for CVA neuro checks and possible thrombectomy. (21 Apr 2022 18:10)      ROS: Otherwise negative.    SUBJECTIVE: No events overnight.  No new neurologic complaints.      aspirin enteric coated 81 milliGRAM(s) Oral daily  atorvastatin 80 milliGRAM(s) Oral at bedtime  chlorhexidine 4% Liquid 1 Application(s) Topical <User Schedule>  dexMEDEtomidine Infusion 0.2 MICROgram(s)/kG/Hr IV Continuous <Continuous>  enoxaparin Injectable 40 milliGRAM(s) SubCutaneous every 24 hours  lidocaine 2% Jelly 4 milliLiter(s) IntraUrethral once      PHYSICAL EXAM:   Vital Signs Last 24 Hrs  T(C): 37.1 (23 Apr 2022 13:00), Max: 37.4 (23 Apr 2022 08:00)  T(F): 98.8 (23 Apr 2022 13:00), Max: 99.4 (23 Apr 2022 08:00)  HR: 76 (23 Apr 2022 15:30) (50 - 76)  BP: 163/77 (23 Apr 2022 15:30) (114/55 - 163/77)  BP(mean): 110 (23 Apr 2022 15:30) (77 - 110)  RR: 22 (23 Apr 2022 15:30) (13 - 39)  SpO2: 97% (23 Apr 2022 15:30) (97% - 100%)    General: No acute distress  HEENT: EOM intact, visual fields full  Abdomen: Soft, nontender, nondistended   Extremities: No edema    NEUROLOGICAL EXAM:  Mental status: Awake, alert, correctly states age, place and year, speech fluent, follows commands, no negle  Cranial Nerves: No facial asymmetry, no nystagmus, mild dysarthria,  tongue midline  Motor exam: Normal tone, no drift, 5/5 RUE, 5/5 RLE, 5/5 LUE, 5/5 LLE, normal fine finger movements.  Sensation: Intact to light touch   Coordination/ Gait: No dysmetria, SHAR intact and symmetric bilaterally, gait not tested    LABS:                        11.3   7.29  )-----------( 169      ( 22 Apr 2022 22:22 )             33.9    04-22    144  |  110<H>  |  39<H>  ----------------------------<  85  4.6   |  22  |  1.26    Ca    9.5      22 Apr 2022 22:22  Phos  3.4     04-22  Mg     1.4     04-22    TPro  6.2  /  Alb  3.3  /  TBili  0.6  /  DBili  x   /  AST  22  /  ALT  23  /  AlkPhos  77  04-22  PT/INR - ( 22 Apr 2022 22:22 )   PT: 14.7 sec;   INR: 1.28 ratio         PTT - ( 22 Apr 2022 22:22 )  PTT:27.7 sec      IMAGING: Reviewed by me.      THE PATIENT WAS SEEN AND EXAMINED BY ME WITH THE HOUSESTAFF AND STROKE TEAM DURING MORNING ROUNDS.   HPI:  61 year old Romanian speaking male with past medical history of DM, HTN, dilated cardiomyopathy. LKN midnight, witnessed by wife. This morning he woke up this morning with left hemiparesis. NIHSS at [facility] 4 for left hemiparesis. CTA with right M1 occlusion. HR intermittently in the 40s, reportedly in second degree heart block (but no EKG on file), given atropine in route to the hospital.  Not a candidate for tPA as LKN > 4.5 hours.  He was transferred to [facility] as stroke rescue for possible thrombectomy.  OZZY aware.    Will repeat all neuroimaging.      [place] staff contacted [facility] staff notified the patient was found to have acute renal failure Cr. 2.8, BUN 88, K 5.8 (untreated).  BNP increased.  Concern for digoxin toxicity, although digoxin level noted to be 1.6 at OSH.     In the ED, T96.4, HR 50, /65, on %. Slow afib with HR 30-50s. FSG 56, given amp D50. Given albuterol and Ca gluc 2g.     Admitted to MICU for CVA neuro checks and possible thrombectomy. (21 Apr 2022 18:10)      ROS: Otherwise negative.    SUBJECTIVE: No events overnight.  No new neurologic complaints.      aspirin enteric coated 81 milliGRAM(s) Oral daily  atorvastatin 80 milliGRAM(s) Oral at bedtime  chlorhexidine 4% Liquid 1 Application(s) Topical <User Schedule>  dexMEDEtomidine Infusion 0.2 MICROgram(s)/kG/Hr IV Continuous <Continuous>  enoxaparin Injectable 40 milliGRAM(s) SubCutaneous every 24 hours  lidocaine 2% Jelly 4 milliLiter(s) IntraUrethral once      PHYSICAL EXAM:   Vital Signs Last 24 Hrs  T(C): 37.1 (23 Apr 2022 13:00), Max: 37.4 (23 Apr 2022 08:00)  T(F): 98.8 (23 Apr 2022 13:00), Max: 99.4 (23 Apr 2022 08:00)  HR: 76 (23 Apr 2022 15:30) (50 - 76)  BP: 163/77 (23 Apr 2022 15:30) (114/55 - 163/77)  BP(mean): 110 (23 Apr 2022 15:30) (77 - 110)  RR: 22 (23 Apr 2022 15:30) (13 - 39)  SpO2: 97% (23 Apr 2022 15:30) (97% - 100%)    General: No acute distress  HEENT: EOM intact, visual fields full  Abdomen: Soft, nontender, nondistended   Extremities: No edema    NEUROLOGICAL EXAM:  Mental status: Awake, alert, correctly states age, place and year, speech fluent, follows commands, slight L hemineglect  Cranial Nerves: No facial asymmetry, no nystagmus, mild dysarthria, no visual field cut.  Motor exam: Normal tone, no drift, 5/5 RUE, 5/5 RLE, 5/5 LUE, 5/5 LLE, normal fine finger movements.  Sensation: Intact to light touch. L extinction   Coordination/ Gait: No dysmetria, SHAR intact and symmetric bilaterally, gait not tested    LABS:                        11.3   7.29  )-----------( 169      ( 22 Apr 2022 22:22 )             33.9    04-22    144  |  110<H>  |  39<H>  ----------------------------<  85  4.6   |  22  |  1.26    Ca    9.5      22 Apr 2022 22:22  Phos  3.4     04-22  Mg     1.4     04-22    TPro  6.2  /  Alb  3.3  /  TBili  0.6  /  DBili  x   /  AST  22  /  ALT  23  /  AlkPhos  77  04-22  PT/INR - ( 22 Apr 2022 22:22 )   PT: 14.7 sec;   INR: 1.28 ratio         PTT - ( 22 Apr 2022 22:22 )  PTT:27.7 sec      IMAGING: Reviewed by me.      THE PATIENT WAS SEEN AND EXAMINED BY ME WITH THE HOUSESTAFF AND STROKE TEAM DURING MORNING ROUNDS.   HPI:  61 year old Italian speaking male with past medical history of DM, HTN, dilated cardiomyopathy. LKN midnight, witnessed by wife. This morning he woke up this morning with left hemiparesis. NIHSS at [facility] 4 for left hemiparesis. CTA with right M1 occlusion. HR intermittently in the 40s, reportedly in second degree heart block (but no EKG on file), given atropine in route to the hospital.  Not a candidate for tPA as LKN > 4.5 hours.  He was transferred to [facility] as stroke rescue for possible thrombectomy.  OZZY aware.    Will repeat all neuroimaging.      [place] staff contacted [facility] staff notified the patient was found to have acute renal failure Cr. 2.8, BUN 88, K 5.8 (untreated).  BNP increased.  Concern for digoxin toxicity, although digoxin level noted to be 1.6 at OSH.     In the ED, T96.4, HR 50, /65, on %. Slow afib with HR 30-50s. FSG 56, given amp D50. Given albuterol and Ca gluc 2g.     Admitted to MICU for CVA neuro checks and possible thrombectomy. (21 Apr 2022 18:10)      ROS: Otherwise negative.    SUBJECTIVE: No events overnight.  No new neurologic complaints.      aspirin enteric coated 81 milliGRAM(s) Oral daily  atorvastatin 80 milliGRAM(s) Oral at bedtime  chlorhexidine 4% Liquid 1 Application(s) Topical <User Schedule>  dexMEDEtomidine Infusion 0.2 MICROgram(s)/kG/Hr IV Continuous <Continuous>  enoxaparin Injectable 40 milliGRAM(s) SubCutaneous every 24 hours  lidocaine 2% Jelly 4 milliLiter(s) IntraUrethral once      PHYSICAL EXAM:   Vital Signs Last 24 Hrs  T(C): 37.1 (23 Apr 2022 13:00), Max: 37.4 (23 Apr 2022 08:00)  T(F): 98.8 (23 Apr 2022 13:00), Max: 99.4 (23 Apr 2022 08:00)  HR: 76 (23 Apr 2022 15:30) (50 - 76)  BP: 163/77 (23 Apr 2022 15:30) (114/55 - 163/77)  BP(mean): 110 (23 Apr 2022 15:30) (77 - 110)  RR: 22 (23 Apr 2022 15:30) (13 - 39)  SpO2: 97% (23 Apr 2022 15:30) (97% - 100%)    General: No acute distress  HEENT: EOM intact, visual fields full  Abdomen: Soft, nontender, nondistended   Extremities: No edema    NEUROLOGICAL EXAM:  Mental status: Awake, alert, correctly states age, place and year, speech fluent, follows commands, slight L hemineglect  Cranial Nerves: No facial asymmetry, no nystagmus, mild dysarthria, no visual field cut.  Motor exam: Normal tone, no drift, 5/5 RUE, 5/5 RLE, 5/5 LUE, 5/5 LLE, normal fine finger movements.  Sensation: Intact to light touch. +extinction on left with double simultaneous stimulation   Coordination/ Gait: No dysmetria, gait not tested    LABS:                        11.3   7.29  )-----------( 169      ( 22 Apr 2022 22:22 )             33.9    04-22    144  |  110<H>  |  39<H>  ----------------------------<  85  4.6   |  22  |  1.26    Ca    9.5      22 Apr 2022 22:22  Phos  3.4     04-22  Mg     1.4     04-22    TPro  6.2  /  Alb  3.3  /  TBili  0.6  /  DBili  x   /  AST  22  /  ALT  23  /  AlkPhos  77  04-22  PT/INR - ( 22 Apr 2022 22:22 )   PT: 14.7 sec;   INR: 1.28 ratio         PTT - ( 22 Apr 2022 22:22 )  PTT:27.7 sec      IMAGING: Reviewed by me.        THE PATIENT WAS SEEN AND EXAMINED BY ME WITH THE HOUSESTAFF AND STROKE TEAM DURING MORNING ROUNDS.   HPI:  61 year old Turkmen speaking male with past medical history of DM, HTN, dilated cardiomyopathy. LKN midnight, witnessed by wife. This morning he woke up this morning with left hemiparesis. NIHSS at [facility] 4 for left hemiparesis. CTA with right M1 occlusion. HR intermittently in the 40s, reportedly in second degree heart block (but no EKG on file), given atropine in route to the hospital.  Not a candidate for tPA as LKN > 4.5 hours.  He was transferred to [facility] as stroke rescue for possible thrombectomy.  OZZY aware.    Will repeat all neuroimaging.      [place] staff contacted [facility] staff notified the patient was found to have acute renal failure Cr. 2.8, BUN 88, K 5.8 (untreated).  BNP increased.  Concern for digoxin toxicity, although digoxin level noted to be 1.6 at OSH.     In the ED, T96.4, HR 50, /65, on %. Slow afib with HR 30-50s. FSG 56, given amp D50. Given albuterol and Ca gluc 2g.     Admitted to MICU for CVA neuro checks and possible thrombectomy.       ROS: Otherwise negative.    SUBJECTIVE: No events overnight.  No new neurologic complaints.      aspirin enteric coated 81 milliGRAM(s) Oral daily  atorvastatin 80 milliGRAM(s) Oral at bedtime  chlorhexidine 4% Liquid 1 Application(s) Topical <User Schedule>  dexMEDEtomidine Infusion 0.2 MICROgram(s)/kG/Hr IV Continuous <Continuous>  enoxaparin Injectable 40 milliGRAM(s) SubCutaneous every 24 hours  lidocaine 2% Jelly 4 milliLiter(s) IntraUrethral once      PHYSICAL EXAM:   Vital Signs Last 24 Hrs  T(C): 37.1 (23 Apr 2022 13:00), Max: 37.4 (23 Apr 2022 08:00)  T(F): 98.8 (23 Apr 2022 13:00), Max: 99.4 (23 Apr 2022 08:00)  HR: 76 (23 Apr 2022 15:30) (50 - 76)  BP: 163/77 (23 Apr 2022 15:30) (114/55 - 163/77)  BP(mean): 110 (23 Apr 2022 15:30) (77 - 110)  RR: 22 (23 Apr 2022 15:30) (13 - 39)  SpO2: 97% (23 Apr 2022 15:30) (97% - 100%)    General: No acute distress  HEENT: EOM intact, visual fields full  Abdomen: Soft, nontender, nondistended   Extremities: No edema    NEUROLOGICAL EXAM:  Mental status: Awake, alert, correctly states age, place and year, speech fluent, follows commands, slight L hemineglect  Cranial Nerves: No facial asymmetry, no nystagmus, mild dysarthria, no visual field cut.  Motor exam: Normal tone, no drift, 5/5 RUE, 5/5 RLE, 5/5 LUE, 5/5 LLE, normal fine finger movements.  Sensation: Intact to light touch. +extinction on left with double simultaneous stimulation   Coordination/ Gait: No dysmetria, gait not tested    LABS:                        11.3   7.29  )-----------( 169      ( 22 Apr 2022 22:22 )             33.9    04-22    144  |  110<H>  |  39<H>  ----------------------------<  85  4.6   |  22  |  1.26    Ca    9.5      22 Apr 2022 22:22  Phos  3.4     04-22  Mg     1.4     04-22    TPro  6.2  /  Alb  3.3  /  TBili  0.6  /  DBili  x   /  AST  22  /  ALT  23  /  AlkPhos  77  04-22  PT/INR - ( 22 Apr 2022 22:22 )   PT: 14.7 sec;   INR: 1.28 ratio         PTT - ( 22 Apr 2022 22:22 )  PTT:27.7 sec      IMAGING: Reviewed by me.        THE PATIENT WAS SEEN AND EXAMINED BY ME WITH THE HOUSESTAFF AND STROKE TEAM DURING MORNING ROUNDS.   HPI:  61 year old Estonian speaking male with past medical history of DM, HTN, dilated cardiomyopathy. LKN midnight, witnessed by wife. This morning he woke up this morning with left hemiparesis. NIHSS at [facility] 4 for left hemiparesis. CTA with right M1 occlusion. HR intermittently in the 40s, reportedly in second degree heart block (but no EKG on file), given atropine in route to the hospital.  Not a candidate for tPA as LKN > 4.5 hours.  He was transferred to [facility] as stroke rescue for possible thrombectomy.  OZZY aware.    Will repeat all neuroimaging.      [place] staff contacted [facility] staff notified the patient was found to have acute renal failure Cr. 2.8, BUN 88, K 5.8 (untreated).  BNP increased.  Concern for digoxin toxicity, although digoxin level noted to be 1.6 at OSH.     In the ED, T96.4, HR 50, /65, on %. Slow afib with HR 30-50s. FSG 56, given amp D50. Given albuterol and Ca gluc 2g.     Admitted to MICU for CVA neuro checks and possible thrombectomy.       ROS: Otherwise negative.    SUBJECTIVE: No events overnight.  No new neurologic complaints.      aspirin enteric coated 81 milliGRAM(s) Oral daily  atorvastatin 80 milliGRAM(s) Oral at bedtime  chlorhexidine 4% Liquid 1 Application(s) Topical <User Schedule>  dexMEDEtomidine Infusion 0.2 MICROgram(s)/kG/Hr IV Continuous <Continuous>  enoxaparin Injectable 40 milliGRAM(s) SubCutaneous every 24 hours  lidocaine 2% Jelly 4 milliLiter(s) IntraUrethral once      PHYSICAL EXAM:   Vital Signs Last 24 Hrs  T(C): 37.1 (23 Apr 2022 13:00), Max: 37.4 (23 Apr 2022 08:00)  T(F): 98.8 (23 Apr 2022 13:00), Max: 99.4 (23 Apr 2022 08:00)  HR: 76 (23 Apr 2022 15:30) (50 - 76)  BP: 163/77 (23 Apr 2022 15:30) (114/55 - 163/77)  BP(mean): 110 (23 Apr 2022 15:30) (77 - 110)  RR: 22 (23 Apr 2022 15:30) (13 - 39)  SpO2: 97% (23 Apr 2022 15:30) (97% - 100%)    General: No acute distress  HEENT: EOM intact, visual fields full  Abdomen: Soft, nontender, nondistended   Extremities: No edema    NEUROLOGICAL EXAM:  Mental status: Awake, alert, correctly states age, place and year, speech fluent, follows commands, slight L hemineglect  Cranial Nerves: No facial asymmetry, no nystagmus, mild dysarthria, no visual field cut. Slight L gaze palsy.   Motor exam: Normal tone, no drift, 5/5 RUE, 5/5 RLE, 5/5 LUE, 5/5 LLE, normal fine finger movements.  Sensation: Intact to light touch. +extinction on left with double simultaneous stimulation   Coordination/ Gait: No dysmetria, gait not tested    LABS:                        11.3   7.29  )-----------( 169      ( 22 Apr 2022 22:22 )             33.9    04-22    144  |  110<H>  |  39<H>  ----------------------------<  85  4.6   |  22  |  1.26    Ca    9.5      22 Apr 2022 22:22  Phos  3.4     04-22  Mg     1.4     04-22    TPro  6.2  /  Alb  3.3  /  TBili  0.6  /  DBili  x   /  AST  22  /  ALT  23  /  AlkPhos  77  04-22  PT/INR - ( 22 Apr 2022 22:22 )   PT: 14.7 sec;   INR: 1.28 ratio         PTT - ( 22 Apr 2022 22:22 )  PTT:27.7 sec      IMAGING: Reviewed by me.   CT Brain stroke protocol, CTA H&N, CTP 4/21  IMPRESSION:    CT head:  -Loss of gray-white matter differentiation in the right lateral frontal   lobe and insula concerning for acute infarction.  -No acute intracranial hemorrhage.    CT angiogram neck:  -At least mild stenosis of the left vertebral artery origin.  -Atherosclerotic changes at the bilateral carotid bifurcations without   stenosis.    CT angiogram head:  -Acute occlusion of the right MCA distal M1 and proximal M2 divisions.    CT perfusion:  At risk ischemic tissue: 125 mL, right MCA territory.  Core infarct: 6 mL, right MCA territory.  Mismatch volume: 119 mL.  Mismatch ratio: 20.8.    CTH non con 4/22  IMPRESSION: New right frontal temporal cortical lucency consistent with a   small infarct in the right middle cerebral artery distribution compared   with 4/21/2022.    MR Head non con 4/23  IMPRESSION:  Acute infarct in the right MCA territory involving the anterior temporal   segment extending intothe insula and right posterior frontal parietal   region. Some associated petechial hemorrhage suggested on the   susceptibility weighted imaging. Subtle mass effect on the right lateral   ventricle. Suspicion of a small amount of thrombus in the distal right M1   proximal M2 segment             THE PATIENT WAS SEEN AND EXAMINED BY ME WITH THE HOUSESTAFF AND STROKE TEAM DURING MORNING ROUNDS.   HPI:  61 year old Portuguese speaking male with past medical history of DM, HTN, dilated cardiomyopathy. LKN midnight, witnessed by wife. This morning he woke up this morning with left hemiparesis. NIHSS at [facility] 4 for left hemiparesis. CTA with right M1 occlusion. HR intermittently in the 40s, reportedly in second degree heart block (but no EKG on file), given atropine in route to the hospital.  Not a candidate for tPA as LKN > 4.5 hours.  He was transferred to [facility] as stroke rescue for possible thrombectomy.  OZZY aware.    Will repeat all neuroimaging.      [place] staff contacted [facility] staff notified the patient was found to have acute renal failure Cr. 2.8, BUN 88, K 5.8 (untreated).  BNP increased.  Concern for digoxin toxicity, although digoxin level noted to be 1.6 at OSH.     In the ED, T96.4, HR 50, /65, on %. Slow afib with HR 30-50s. FSG 56, given amp D50. Given albuterol and Ca gluc 2g.     Admitted to MICU for CVA neuro checks and possible thrombectomy.       ROS: Otherwise negative.    SUBJECTIVE: No events overnight.  No new neurologic complaints.      aspirin enteric coated 81 milliGRAM(s) Oral daily  atorvastatin 80 milliGRAM(s) Oral at bedtime  chlorhexidine 4% Liquid 1 Application(s) Topical <User Schedule>  dexMEDEtomidine Infusion 0.2 MICROgram(s)/kG/Hr IV Continuous <Continuous>  enoxaparin Injectable 40 milliGRAM(s) SubCutaneous every 24 hours  lidocaine 2% Jelly 4 milliLiter(s) IntraUrethral once      PHYSICAL EXAM:   Vital Signs Last 24 Hrs  T(C): 37.1 (23 Apr 2022 13:00), Max: 37.4 (23 Apr 2022 08:00)  T(F): 98.8 (23 Apr 2022 13:00), Max: 99.4 (23 Apr 2022 08:00)  HR: 76 (23 Apr 2022 15:30) (50 - 76)  BP: 163/77 (23 Apr 2022 15:30) (114/55 - 163/77)  BP(mean): 110 (23 Apr 2022 15:30) (77 - 110)  RR: 22 (23 Apr 2022 15:30) (13 - 39)  SpO2: 97% (23 Apr 2022 15:30) (97% - 100%)    General: No acute distress  HEENT: EOM intact, visual fields full  Abdomen: Soft, nontender, nondistended   Extremities: No edema    NEUROLOGICAL EXAM:  Mental status: Awake, alert, correctly states age, place and year, speech fluent, follows commands, mild L hemineglect  Cranial Nerves: No facial asymmetry, no nystagmus, mild dysarthria, no visual field cut. Slight L gaze palsy.   Motor exam: Normal tone, no drift, 5/5 RUE, 5/5 RLE, 5/5 LUE, 5/5 LLE, normal fine finger movements.  Sensation: Intact to light touch. +extinction on left with double simultaneous stimulation   Coordination/ Gait: No dysmetria, gait not tested    LABS:                        11.3   7.29  )-----------( 169      ( 22 Apr 2022 22:22 )             33.9    04-22    144  |  110<H>  |  39<H>  ----------------------------<  85  4.6   |  22  |  1.26    Ca    9.5      22 Apr 2022 22:22  Phos  3.4     04-22  Mg     1.4     04-22    TPro  6.2  /  Alb  3.3  /  TBili  0.6  /  DBili  x   /  AST  22  /  ALT  23  /  AlkPhos  77  04-22  PT/INR - ( 22 Apr 2022 22:22 )   PT: 14.7 sec;   INR: 1.28 ratio         PTT - ( 22 Apr 2022 22:22 )  PTT:27.7 sec      IMAGING: Reviewed by me.   CT Brain stroke protocol, CTA H&N, CTP 4/21  IMPRESSION:    CT head:  -Loss of gray-white matter differentiation in the right lateral frontal   lobe and insula concerning for acute infarction.  -No acute intracranial hemorrhage.    CT angiogram neck:  -At least mild stenosis of the left vertebral artery origin.  -Atherosclerotic changes at the bilateral carotid bifurcations without   stenosis.    CT angiogram head:  -Acute occlusion of the right MCA distal M1 and proximal M2 divisions.    CT perfusion:  At risk ischemic tissue: 125 mL, right MCA territory.  Core infarct: 6 mL, right MCA territory.  Mismatch volume: 119 mL.  Mismatch ratio: 20.8.    CTH non con 4/22  IMPRESSION: New right frontal temporal cortical lucency consistent with a   small infarct in the right middle cerebral artery distribution compared   with 4/21/2022.    MR Head non con 4/23  IMPRESSION:  Acute infarct in the right MCA territory involving the anterior temporal   segment extending intothe insula and right posterior frontal parietal   region. Some associated petechial hemorrhage suggested on the   susceptibility weighted imaging. Subtle mass effect on the right lateral   ventricle. Suspicion of a small amount of thrombus in the distal right M1   proximal M2 segment

## 2022-04-23 NOTE — PHYSICAL THERAPY INITIAL EVALUATION ADULT - PERTINENT HX OF CURRENT PROBLEM, REHAB EVAL
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 Admitted to MICU for neurological exams s/p CVA and possible urgent thrombectomy.: Pt outside the window for TPA/Thrombectomy; CTH= New right frontal temporal cortical lucency consistent with a small infarct in the right middle cerebral artery distribution compared to 4/21

## 2022-04-23 NOTE — PROGRESS NOTE ADULT - SUBJECTIVE AND OBJECTIVE BOX
Overnight Events:   Patient now SR converted. Agitated overnight, started on Precedex. Asymptomatic.   Review Of Systems: No chest pain, shortness of breath, or palpitations            Current Meds:  aspirin enteric coated 81 milliGRAM(s) Oral daily  atorvastatin 80 milliGRAM(s) Oral at bedtime  chlorhexidine 4% Liquid 1 Application(s) Topical <User Schedule>  dexMEDEtomidine Infusion 0.2 MICROgram(s)/kG/Hr IV Continuous <Continuous>  lidocaine 2% Jelly 4 milliLiter(s) IntraUrethral once    ROS:  Unable    Vitals:  T(F): 98.5 (), Max: 98.8 ()  HR: 51 () (47 - 96)  BP: 179/74 () (114/64 - 185/74)  RR: 15 ()    Physical Exam:  Appearance: No acute distress; well appearing  Eyes: no scleral icterus   HEENT: Normal oral mucosa  Cardiovascular: RRR, S1, S2, no murmurs, rubs, or gallops; no edema; no JVD  Respiratory: Clear to auscultation bilaterally, no auditory stridor   Gastrointestinal: soft, non-tender, non-distended with normal bowel sounds  Musculoskeletal: No clubbing; no joint deformity   Neurologic: L sided weakness  Psychiatry: AAOx3, mood & affect appropriate  Skin: No rashes, ecchymoses, or cyanosis      SpO2: 100% ()  I&O's Summary  2022 07:  -  2022 07:00  --------------------------------------------------------  IN: 778.7 mL / OUT: 1290 mL / NET: -511.3 mL    2022 07:  -  2022 13:09  --------------------------------------------------------  IN: 167 mL / OUT: 400 mL / NET: -233 mL      LABS:                      10.3   9.90  )-----------( 171      ( 2022 01:46 )             30.6       141  |  105  |  73<H>  ----------------------------<  150<H>  5.6<H>   |  18<L>  |  2.21<H>    Ca    10.2      2022 01:46  Phos  4.7       Mg     1.6         TPro  6.9  /  Alb  4.1  /  TBili  0.4  /  DBili  x   /  AST  22  /  ALT  31  /  AlkPhos  87      PT/INR - ( 2022 01:46 )   PT: 13.6 sec;   INR: 1.18 ratio    PTT - ( :46 )  PTT:26.5 sec    CARDIAC MARKERS ( 2022 15:09 )  36 ng/L / x     / x     / x     / x     / x      CARDIAC MARKERS ( 2022 13:04 )  37 ng/L / x     / x     / x     / x     / x        Serum Pro-Brain Natriuretic Peptide: 2012 pg/mL ( @ 13:04)    Total Cholesterol: 103  LDL: --  HDL: 42  T    Interpretation of Telemetry: Sinus keiko currently.     Assessment and Plan:   · Assessment	  61 M history of HTN, HLD, DM, A. fib and severe LV dysfunction  He presented to OSH with left hemiparesis and was found to have acute CVA, not candidate for tPA as LKN > 4.5 hours.    Transferred to [facility] as stroke rescue for possible thrombectomy.   Found to be in AF with VR 30-60s, irregular in setting of K 7.3, prompting cardiology consult.     REC:  New HFrEF  - To the extent the patient can be started on afterload reduction and BB, would start low doses    #1.  Bradycardia - initially Atrial fibrillation slow ventricular response and with IVCD/BBB, now sinus bradycardia with LBBB  - Likely 2/2 hyperkalemia.  Apparently digoxin toxicity ruled out at referring hospital.  - Would hold any AVN blocking agents  - Dysrhythmia improved/resolved with normalization of potassium (may be emergent HD candidate). Current bradycardia HDS and likely 2/2 precedex.   - Please replete magnesium   - Obtain formal TTE to evaluated cardiac structure and function.   - AC discussed below  - will f/u TTE    #2.  CVA -No A/C agent listed among medications. CHADSVASC at least 4, A/C recommended, particularly with now documented CVA.   - given acuity of CVA, need neurology input on when to safely start on full A/C (concern for ICH and/or hemorrhagic conversion of CVA).   - Currently HDS (actually hypertensive) with HR 50s.     #3.  HTN -SBP 160s.   - Likely would benefit from permissive HTN given ischemic CVA; will defer to neurology.                                                                                                                                                                                                                                                                                                                                                                           40 minutes spent in face-to-face time with critical care of ICU patient with end-organ dysfunction continuous drips, including evaluation, formulation of plan, discussion with primary team. > 50% of time spent in counseling with family and other physicians involved in care of patient.     All labs, imaging personally reviewed.                              .

## 2022-04-23 NOTE — PROGRESS NOTE ADULT - ASSESSMENT
61 year old Azeri speaking male with past medical history of DM, HTN, dilated cardiomyopathy, and possible (CAD?) , p/w left hemiparesis, 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 Admitted to MICU for neurological exams s/p CVA and possible urgent thrombectomy.    Neuro  #MCA CVA  - Appreciate neuro recs, out of window for TPA and thrombectomy  -  f/u MR brain w/o contrast, and CTH  - TTE with bubble study  - ASA, atorvastatin 80mg  - lipid panel normal, TSH normal    CV  #Atrial fibrillation with bradycardia likely 2/2 hyperkalemia  - On bisoprolol at home and digoxin, normal digoxin level at OSH  - hold AV kevin blocking agents  - f/u TTE  - bedside POCUs showing enlarged LV, currently HR 50s    #dilated cardiomyopathy  Documented in [place] paperwork  -hold home meds spironolactone, lasix, lisinopril    Resp  -no active issues    GI  -NPO except meds    /Renal  #Acute renal failure with hyperkalemia   - I/s/o bladder outlet obstruction given distended bladder and b/l hydronephrosis seen on POCUS  - POCUS with bladder distention and hydronephrosis   - Taylor placed with 500cc drained   - Strict i's and O's  - Discussed with Nephrology, no urgent need for dialysis  - Per Nephrology, hyperkalemia can be managed medically    ID  -No active issues    Endo  #Hypoglycemia  - Likely 2/2 to home sulfonylurea i/s/o renal failure   - Recommend obtaining sulfonylurea level   - If hypoglycemic again would start D5W at 50cc/hour  - Recommend finger sticks q4 hours until improvement of hypoglycemic episodes  - Hg A1c 7.6    Heme  #Anemia   - Hemoglobin noted to be 6 in ED, 12 at outside hospital suspect lab error  - Repeat CBC pending, follow up repeat CBC   - Transfuse for goal hemoglobin <7   - Patient without signs or symptoms of bleeding   - F/u iron studies     Ethics  -Full code   61 year old [place] speaking male with past medical history of DM, HTN, dilated cardiomyopathy, and possible (CAD?) , p/w left hemiparesis, 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 Admitted to MICU for neurological exams s/p CVA and possible urgent thrombectomy.    Neuro  #MCA CVA  - Appreciate neuro recs, out of window for TPA and thrombectomy  -  f/u MR brain w/o contrast, and CTH  - TTE with bubble study  - ASA, atorvastatin 80mg  - lipid panel normal, TSH normal    CV  #Atrial fibrillation with bradycardia likely 2/2 hyperkalemia  - On bisoprolol at home and digoxin, normal digoxin level at OSH  - hold AV kevin blocking agents  - f/u TTE  - bedside POCUs showing enlarged LV, currently HR 50s  - now sinus bradycardia with LBBB    #dilated cardiomyopathy  Documented in [place] paperwork  -hold home meds spironolactone, lasix, lisinopril  -f/u TTE    Resp  -no active issues    GI  -NPO except meds    /Renal  #Acute renal failure with hyperkalemia   - I/s/o bladder outlet obstruction given distended bladder and b/l hydronephrosis seen on POCUS  - POCUS with bladder distention and hydronephrosis   - Taylor placed with 500cc drained   - Strict i's and O's  - Discussed with Nephrology, no urgent need for dialysis  - Per Nephrology, hyperkalemia can be managed medically    ID  -No active issues    Endo  #Hypoglycemia  - Likely 2/2 to home sulfonylurea i/s/o renal failure   - Recommend obtaining sulfonylurea level   - If hypoglycemic again would start D5W at 50cc/hour  - Recommend finger sticks q4 hours until improvement of hypoglycemic episodes  - Hg A1c 7.6    Heme  #Anemia   - Hemoglobin noted to be 6 in ED, 12 at outside hospital suspect lab error  - Repeat CBC 11.3  - Transfuse for goal hemoglobin <7   - Patient without signs or symptoms of bleeding   - iron studies WNL     Ethics  -Full code   61 year old Bengali speaking male with past medical history of DM, HTN, dilated cardiomyopathy, and possible (CAD?) , p/w left hemiparesis, 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 Admitted to MICU for neurological exams s/p CVA and possible urgent thrombectomy.    Neuro  #MCA CVA  - Appreciate neuro recs, out of window for TPA and thrombectomy  - neuro check q4  -  f/u MR brain w/o contrast. CTH noncon shows R frontal/temporal cortical lucency consistent with small infarct in R MCA distribution.  - f/u TTE with bubble study  - ASA, atorvastatin 80mg  - lipid panel normal, TSH normal    CV  #Atrial fibrillation with bradycardia likely 2/2 hyperkalemia  - On bisoprolol at home and digoxin, normal digoxin level at OSH  - hold AV kevin blocking agents  - f/u TTE  - bedside POCUs showing enlarged LV, currently HR 50s  - now sinus bradycardia with LBBB    #dilated cardiomyopathy  Documented in [place] paperwork  -hold home meds spironolactone, lasix, lisinopril  -f/u TTE    #HTN  - on hydral  - restart lisinopril at 10mg    Resp  -no active issues    GI  - start on diet, passed bedside swallow    /Renal  #Acute renal failure with hyperkalemia   - I/s/o bladder outlet obstruction given distended bladder and b/l hydronephrosis seen on POCUS  - POCUS with bladder distention and hydronephrosis   - Taylor placed with 500cc drained   - Strict i's and O's  - Discussed with Nephrology, no urgent need for dialysis  - Per Nephrology, hyperkalemia can be managed medically  Will TOV 4/23 and start on tamsulosin, bladder scan to check for retention    ID  -No active issues    Endo  #Hypoglycemia  - Likely 2/2 to home sulfonylurea i/s/o renal failure   - Recommend obtaining sulfonylurea level  - Recommend finger sticks q4 hours until improvement of hypoglycemic episodes  - Hg A1c 7.6  - started on PO, resolution of hypoglycemia    Heme  #Anemia   - Hemoglobin noted to be 6 in ED, 12 at outside hospital suspect lab error  - Repeat CBC 11.3  - Transfuse for goal hemoglobin <7   - Patient without signs or symptoms of bleeding   - iron studies WNL     Ethics  -Full code   61 year old Kiswahili speaking male with past medical history of DM, HTN, dilated cardiomyopathy, and possible (CAD?) , p/w left hemiparesis, 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 Admitted to MICU for neurological exams s/p CVA and possible urgent thrombectomy.    Neuro  #MCA CVA  - Appreciate neuro recs, out of window for TPA and thrombectomy  - neuro check q4  -  f/u MR brain w/o contrast. CTH noncon shows R frontal/temporal cortical lucency consistent with small infarct in R MCA distribution.  - f/u TTE with bubble study  - ASA, atorvastatin 80mg  - lipid panel normal, TSH normal    CV  #Atrial fibrillation with bradycardia likely 2/2 hyperkalemia  - On bisoprolol at home and digoxin, normal digoxin level at OSH  - hold AV kevin blocking agents  - f/u TTE  - bedside POCUs showing enlarged LV, currently HR 50s  - now sinus bradycardia with LBBB    #dilated cardiomyopathy  Documented in [place] paperwork  -hold home meds spironolactone, lasix, lisinopril  -f/u TTE    #HTN  - on hydral  - restart lisinopril at 10mg    Resp  -no active issues    GI  - start on diet, passed bedside swallow    /Renal  #Acute renal failure with hyperkalemia   - I/s/o bladder outlet obstruction given distended bladder and b/l hydronephrosis seen on POCUS  - POCUS with bladder distention and hydronephrosis   - Taylor placed with 500cc drained   - Strict i's and O's  - Discussed with Nephrology, no urgent need for dialysis  - Per Nephrology, hyperkalemia can be managed medically  Will TOV 4/23 and start on tamsulosin, bladder scan to check for retention    ID  -No active issues    Endo  #Hypoglycemia  - Likely 2/2 to home sulfonylurea i/s/o renal failure   - Recommend obtaining sulfonylurea level  - Recommend finger sticks q4 hours until improvement of hypoglycemic episodes  - Hg A1c 7.6  - started on PO, resolution of hypoglycemia    Heme  #Anemia   - Hemoglobin noted to be 6 in ED, 12 at outside hospital suspect lab error  - Repeat CBC 11.3  - Transfuse for goal hemoglobin <7   - Patient without signs or symptoms of bleeding   - iron studies WNL  DVT ppx: lovenox, cleared by neuro     Ethics  -Full code

## 2022-04-23 NOTE — PROGRESS NOTE ADULT - ASSESSMENT
ASSESSMENT:     NEURO: Continue close monitoring for neurologic deterioration, permissive HTN to gradual normotension, holding antihypertensives, avoid fluctuations in BP. titrate statin to LDL goal less than 70, MRI Brain w/o, MRA Head w/o and Neck w/contrast. Physical therapy/OT/Speech eval/treatment.     ANTITHROMBOTIC THERAPY:     PULMONARY: CXR clear, protecting airway, saturating well     CARDIOVASCULAR: check TTE, cardiac monitoring                              SBP goal:     GASTROINTESTINAL:  dysphagia screen       Diet:     RENAL: BUN/Cr within normal limits, good urine output      Na Goal: Greater than 135     Taylor:    HEMATOLOGY: H/H without change, Platelets normal      DVT ppx: Heparin s.c [] LMWH []     ID: afebrile, no leukocytosis     OTHER:     DISPOSITION: Rehab or home depending on PT eval once stable and workup is complete    CORE MEASURES:        Admission NIHSS:      TPA: [] YES [] NO      LDL/HDL:     Depression Screen:      Statin Therapy:     Dysphagia Screen: [] PASS [] FAIL     Smoking [] YES [] NO      Afib [] YES [] NO     Stroke Education [] YES [] NO    Obtain screening lower extremity venous ultrasound in patients who meet 1 or more of the following criteria as patient is high risk for DVT/PE on admission:   [] History of DVT/PE  []Hypercoagulable states (Factor V Leiden, Cancer, OCP, etc. )  []Prolonged immobility (hemiplegia/hemiparesis/post operative or any other extended immobilization)  [] Transferred from outside facility (Rehab or Long term care)  [] Age </= to 50 ASSESSMENT:     Impression. Mild agitation and very mild left hemiparesis, due to a right MCA infarct with a right M1 occlusion.  Mechanism may be cardioembolism related to cardiomyopathy, although if his cardiomyopathy is not severe, then mechanism may be embolic stroke of undetermined source.     NEURO: Continue close monitoring for neurologic deterioration, permissive HTN to gradual normotension, holding antihypertensives, avoid fluctuations in BP. titrate statin to LDL goal less than 70, MRI Brain w/o, MRA Head w/o and Neck w/contrast. Physical therapy/OT/Speech eval/treatment.     ANTITHROMBOTIC THERAPY:     PULMONARY: CXR clear, protecting airway, saturating well     CARDIOVASCULAR: check TTE, cardiac monitoring                              SBP goal:     GASTROINTESTINAL:  dysphagia screen       Diet:     RENAL: BUN/Cr within normal limits, good urine output      Na Goal: Greater than 135     Taylor:    HEMATOLOGY: H/H without change, Platelets normal      DVT ppx: Heparin s.c [] LMWH []     ID: afebrile, no leukocytosis     OTHER:     DISPOSITION: Rehab or home depending on PT eval once stable and workup is complete    CORE MEASURES:        Admission NIHSS:      TPA: [] YES [] NO      LDL/HDL:     Depression Screen:      Statin Therapy:     Dysphagia Screen: [] PASS [] FAIL     Smoking [] YES [] NO      Afib [] YES [] NO     Stroke Education [] YES [] NO    Obtain screening lower extremity venous ultrasound in patients who meet 1 or more of the following criteria as patient is high risk for DVT/PE on admission:   [] History of DVT/PE  []Hypercoagulable states (Factor V Leiden, Cancer, OCP, etc. )  []Prolonged immobility (hemiplegia/hemiparesis/post operative or any other extended immobilization)  [] Transferred from outside facility (Rehab or Long term care)  [] Age </= to 50 ASSESSMENT: Patient is a 60yo RT handed Armenian speaking M with pmh of HTN, DM-II,  dilated cardiomyopathy presents to [facility] as a transfer for Rt M1 occlusion. Per chart review, patient's LWK was at midnight on 4/21. Patient had CT and CTA which was noted to show Rt M1 occlusion. Patient was not a TPa candidate due to out of time window. Patient was MT candidate at that time thus was agreed to transfer patient. Patient had initial NIHSS 4 whcih increased to NIHSS 9 then NIHSS 6 on repeat examination. Will need to be further monitored.     Impression. Mild agitation and very mild left hemiparesis, due to a right MCA infarct with a right M1 occlusion.  Mechanism may be cardioembolism related to cardiomyopathy, although if his cardiomyopathy is not severe, then mechanism may be embolic stroke of undetermined source.     NEURO: Patient with neurologic fluctuations currently improved with no acute changes. Continue close monitoring for neurologic deterioration, permissive HTN to gradual normotension, holding antihypertensives, avoid fluctuations in BP. LDL 44, titrate statin to LDL goal less than 70, MRI Brain w/o, MRA Head w/o and Neck w/contrast. Physical therapy/OT/Speech eval/treatment.     ANTITHROMBOTIC THERAPY:     PULMONARY: CXR clear, protecting airway, saturating well     CARDIOVASCULAR: check TTE, cardiac monitoring                              SBP goal:     GASTROINTESTINAL:  dysphagia screen       Diet:     RENAL: BUN/Cr within normal limits, good urine output      Na Goal: Greater than 135     Taylor:    HEMATOLOGY: H/H without change, Platelets normal      DVT ppx: Heparin s.c [] LMWH []     ID: afebrile, no leukocytosis     OTHER:     DISPOSITION: Rehab or home depending on PT eval once stable and workup is complete    CORE MEASURES:        Admission NIHSS:      TPA: [] YES [] NO      LDL/HDL:     Depression Screen:      Statin Therapy:     Dysphagia Screen: [] PASS [] FAIL     Smoking [] YES [] NO      Afib [] YES [] NO     Stroke Education [] YES [] NO    Obtain screening lower extremity venous ultrasound in patients who meet 1 or more of the following criteria as patient is high risk for DVT/PE on admission:   [] History of DVT/PE  []Hypercoagulable states (Factor V Leiden, Cancer, OCP, etc. )  []Prolonged immobility (hemiplegia/hemiparesis/post operative or any other extended immobilization)  [] Transferred from outside facility (Rehab or Long term care)  [] Age </= to 50 ASSESSMENT: Patient is a 62yo RT handed German speaking M with pmh of HTN, DM-II,  dilated cardiomyopathy presents to [facility] as a transfer for Rt M1 occlusion. Per chart review, patient's LWK was at midnight on 4/21. Patient had CT and CTA which was noted to show Rt M1 occlusion. Patient was not a TPa candidate due to out of time window. Patient was MT candidate at that time thus was agreed to transfer patient. Patient had initial NIHSS 4 whcih increased to NIHSS 9 then NIHSS 6 on repeat examination. Upon transfer patient deemed medically unstable for thrombectomy due to BRASH syndrome and admitted to MICU for further monitoring.     Impression. Mild agitation and very mild left hemiparesis, due to a right MCA infarct with a right M1 occlusion.  Mechanism may be cardioembolism related to cardiomyopathy, although if his cardiomyopathy is not severe, then mechanism may be embolic stroke of undetermined source.     NEURO: Patient neurologically overall improved with no acute changes. Continue close monitoring for neurologic deterioration, permissive HTN to gradual normotension, holding antihypertensives, avoid fluctuations in BP. LDL 44, titrate statin to LDL goal less than 70, MR Head non con as noted above. Patient having episodes of agitation in MICU, was placed on Precedex drip. Agitation improved and now off Precedex. If patient becomes agitated when transferred to  [place] can try Seroquel if QTC not prolonged on EKG. Physical therapy/OT: no skilled PT needs at discharge.     ANTITHROMBOTIC THERAPY: ASA 81 mg    PULMONARY: CXR clear, protecting airway, saturating well     CARDIOVASCULAR: TTE: Severe left ventricular enlargement. Estimated ejection fraction 25%. Diffuse hypokinesis, with inferior and inferoseptal akinesis. On admission patient was found to be in Atrial fibrillation slow ventricular response and with IVCD/BBB, now sinus bradycardia with LBBB. Cardiology consulted and recommends to continue holding AV kevin blockers.      SBP goal: permissive HTN to gradual normotension    GASTROINTESTINAL: Passed dysphagia screening, tolerating diet.     Diet: Consistent carb no snacks    RENAL: Patient initially with NANCY, creatinine 2.76 & K+ 7.5. Renal was consulted, no HD needed and was treated with a bicarb drip. NANCY and hyperkalemia resolved & patient off bicarb drip. Creatinine now 1.26 & K+ 4.6. Will continue to monitor kidney function and electrolytes. Taylor removed.  Monitor for changes in UO.     Na Goal: Greater than 135     Taylor: n    HEMATOLOGY: H/H without change, Hgb 11.3 Platelets normal. Will monitor for signs of bleeding.       DVT ppx: Heparin s.c [] LMWH []     ID: afebrile, no leukocytosis     OTHER: Patient clinically much improved. Accepted for transfer to stroke unit for ongoing workup and management.    DISPOSITION: Rehab or home depending on PT eval once stable and workup is complete    CORE MEASURES:        Admission NIHSS: 6     TPA: [] YES [x] NO      LDL/HDL: 44/41     Depression Screen: p     Statin Therapy: y     Dysphagia Screen: [x] PASS [] FAIL     Smoking [] YES [] NO      Afib [x] YES [] NO     Stroke Education [x] YES [] NO    Obtain screening lower extremity venous ultrasound in patients who meet 1 or more of the following criteria as patient is high risk for DVT/PE on admission:   [] History of DVT/PE  []Hypercoagulable states (Factor V Leiden, Cancer, OCP, etc. )  []Prolonged immobility (hemiplegia/hemiparesis/post operative or any other extended immobilization)  [] Transferred from outside facility (Rehab or Long term care)  [] Age </= to 50 ASSESSMENT: Patient is a 62yo RT handed Amharic speaking M with pmh of HTN, DM-II,  dilated cardiomyopathy presents to [facility] as a transfer for Rt M1 occlusion. Per chart review, patient's LWK was at midnight on 4/21. Patient had CT and CTA which was noted to show Rt M1 occlusion. Patient was not a TPa candidate due to out of time window. Patient was MT candidate at that time thus was agreed to transfer patient. Patient had initial NIHSS 4 whcih increased to NIHSS 9 then NIHSS 6 on repeat examination. Upon transfer patient deemed medically unstable for thrombectomy due to BRASH syndrome and admitted to MICU for further monitoring.     Impression. Mild agitation and very mild left hemiparesis, due to a right MCA infarct with a right M1 occlusion.  Mechanism may be cardioembolism related to cardiomyopathy,     NEURO: Patient neurologically overall improved with no acute changes. Continue close monitoring for neurologic deterioration, permissive HTN to gradual normotension, holding antihypertensives, avoid fluctuations in BP. LDL 44, titrate statin to LDL goal less than 70, MR Head non con as noted above. Patient having episodes of agitation in MICU, was placed on Precedex drip. Agitation improved and now off Precedex. If patient becomes agitated when transferred to  [place] can try Seroquel if QTC not prolonged on EKG. Physical therapy/OT: no skilled PT needs at discharge.     ANTITHROMBOTIC THERAPY: ASA 81 mg for now. Given AF noted on admission, may change to Eliquis in the next day or 2.     PULMONARY: CXR clear, protecting airway, saturating well     CARDIOVASCULAR: TTE: Severe left ventricular enlargement. Estimated ejection fraction 25%. Diffuse hypokinesis, with inferior and inferoseptal akinesis. On admission patient was found to be in Atrial fibrillation slow ventricular response and with IVCD/BBB, now sinus bradycardia with LBBB. Cardiology consulted and recommends to continue holding AV kevin blockers.      SBP goal: permissive HTN to gradual normotension    GASTROINTESTINAL: Passed dysphagia screening, tolerating diet.     Diet: Consistent carb no snacks    RENAL: Patient initially with NANCY, creatinine 2.76 & K+ 7.5. Renal was consulted, no HD needed and was treated with a bicarb drip. NANCY and hyperkalemia resolved & patient off bicarb drip. Creatinine now 1.26 & K+ 4.6. Will continue to monitor kidney function and electrolytes. Taylor removed.  Monitor for changes in UO.     Na Goal: Greater than 135     Taylor: n    HEMATOLOGY: H/H without change, Hgb 11.3 Platelets normal. Will monitor for signs of bleeding.       DVT ppx: Heparin s.c [] LMWH []     ID: afebrile, no leukocytosis     OTHER: Patient clinically much improved. Accepted for transfer to stroke unit for ongoing workup and management.    DISPOSITION: Rehab or home depending on PT eval once stable and workup is complete    CORE MEASURES:        Admission NIHSS: 6     TPA: [] YES [x] NO      LDL/HDL: 44/41     Depression Screen: p     Statin Therapy: y     Dysphagia Screen: [x] PASS [] FAIL     Smoking [] YES [] NO      Afib [x] YES [] NO     Stroke Education [x] YES [] NO    Obtain screening lower extremity venous ultrasound in patients who meet 1 or more of the following criteria as patient is high risk for DVT/PE on admission:   [] History of DVT/PE  []Hypercoagulable states (Factor V Leiden, Cancer, OCP, etc. )  []Prolonged immobility (hemiplegia/hemiparesis/post operative or any other extended immobilization)  [] Transferred from outside facility (Rehab or Long term care)  [] Age </= to 50

## 2022-04-23 NOTE — PHYSICAL THERAPY INITIAL EVALUATION ADULT - ADDITIONAL COMMENTS
Pt reports living in a 3rd flr walk up apt w/ son who is not around during the day 2/2 work. PTA pt was indep w/ all ADLs w/o an assist device.

## 2022-04-23 NOTE — PROGRESS NOTE ADULT - ATTENDING COMMENTS
61 M history of HTN, HLD, DM, A. fib and ?dilated cardiomyopathy presented to OSH with left hemiparesis and was found to have right MCA CVA, but not candidate for tPA as LKN > 4.5 hours. Transferred to [facility] as stroke rescue for possible thrombectomy.   Found to be in AF with VR 30-60s, irregular in setting of K 7.3 and NANCY.  Found to have b/l hydronephrosis due to urinary retention, voided >500cc urine post valencia placement. Patient is awake and alert this AM following commands.  Strength is intact in all extremities.  Continue ASA and statin.  Neurology follow-up appreciated - outside window for thrombectomy.  Patient examined and case reviewed in detail on bedside rounds. Frequent bedside visits with therapy change today.  Prognosis guarded.

## 2022-04-23 NOTE — PROGRESS NOTE ADULT - SUBJECTIVE AND OBJECTIVE BOX
Miguel Veras, PGY-1    Patient is a 61y old  Male who presents with a chief complaint of CVA (2022 13:21)      INTERVAL HPI/OVERNIGHT EVENTS:    SUBJECTIVE: Patient seen and examined at bedside.       VITAL SIGNS:  ICU Vital Signs Last 24 Hrs  T(C): 36.8 (2022 04:00), Max: 37 (2022 11:00)  T(F): 98.2 (2022 04:00), Max: 98.6 (2022 11:00)  HR: 55 (2022 04:00) (51 - 72)  BP: 114/56 (2022 04:00) (114/55 - 187/79)  BP(mean): 80 (2022 04:00) (77 - 117)  ABP: --  ABP(mean): --  RR: 15 (2022 04:00) (13 - 39)  SpO2: 99% (2022 04:00) (97% - 100%)      Plateau pressure:   P/F ratio:     04- @ 07:01  -   @ 07:00  --------------------------------------------------------  IN: 778.7 mL / OUT: 1290 mL / NET: -511.3 mL     @ 07:01  -   @ 06:46  --------------------------------------------------------  IN: 518 mL / OUT: 2005 mL / NET: -1487 mL      CAPILLARY BLOOD GLUCOSE      POCT Blood Glucose.: 122 mg/dL (2022 04:24)    ECG:    PHYSICAL EXAM:        MEDICATIONS:  MEDICATIONS  (STANDING):  aspirin enteric coated 81 milliGRAM(s) Oral daily  atorvastatin 80 milliGRAM(s) Oral at bedtime  chlorhexidine 4% Liquid 1 Application(s) Topical <User Schedule>  dexMEDEtomidine Infusion 0.2 MICROgram(s)/kG/Hr (3 mL/Hr) IV Continuous <Continuous>  hydrALAZINE 25 milliGRAM(s) Oral every 8 hours  lidocaine 2% Jelly 4 milliLiter(s) IntraUrethral once    MEDICATIONS  (PRN):      ALLERGIES:  Allergies    No Known Allergies    Intolerances        LABS:                        11.3   7.29  )-----------( 169      ( 2022 22:22 )             33.9         144  |  110<H>  |  39<H>  ----------------------------<  85  4.6   |  22  |  1.26    Ca    9.5      2022 22:22  Phos  3.4     -  Mg     1.4         TPro  6.2  /  Alb  3.3  /  TBili  0.6  /  DBili  x   /  AST  22  /  ALT  23  /  AlkPhos  77  04-    PT/INR - ( 2022 22:22 )   PT: 14.7 sec;   INR: 1.28 ratio         PTT - ( 2022 22:22 )  PTT:27.7 sec  Urinalysis Basic - ( 2022 15:13 )    Color: Light Yellow / Appearance: Clear / S.028 / pH: x  Gluc: x / Ketone: Negative  / Bili: Negative / Urobili: Negative   Blood: x / Protein: Trace / Nitrite: Negative   Leuk Esterase: Negative / RBC: 1 /hpf / WBC 1 /HPF   Sq Epi: x / Non Sq Epi: 0 /hpf / Bacteria: Negative        RADIOLOGY & ADDITIONAL TESTS: Reviewed. Miguel Veras, PGY-1    Patient is a 61y old  Male who presents with a chief complaint of CVA (2022 13:21)      INTERVAL HPI/OVERNIGHT EVENTS: No overnight events.    SUBJECTIVE: Patient seen and examined at bedside. Spoke with patient using  ID #[id number]. He is AAOx3 this morning. He states that his strength has greatly improved. He denies fever, chills, CP, SOB, ab pain, issues with urination. Taylor in place draining clear yellow urine.        VITAL SIGNS:  ICU Vital Signs Last 24 Hrs  T(C): 36.8 (2022 04:00), Max: 37 (2022 11:00)  T(F): 98.2 (2022 04:00), Max: 98.6 (2022 11:00)  HR: 55 (2022 04:00) (51 - 72)  BP: 114/56 (2022 04:00) (114/55 - 187/79)  BP(mean): 80 (2022 04:00) (77 - 117)  ABP: --  ABP(mean): --  RR: 15 (2022 04:00) (13 - 39)  SpO2: 99% (2022 04:00) (97% - 100%)      Plateau pressure:   P/F ratio:      @ 07:  -   @ 07:00  --------------------------------------------------------  IN: 778.7 mL / OUT: 1290 mL / NET: -511.3 mL     @ 07:  -   @ 06:46  --------------------------------------------------------  IN: 518 mL / OUT: 2005 mL / NET: -1487 mL      CAPILLARY BLOOD GLUCOSE      POCT Blood Glucose.: 122 mg/dL (2022 04:24)    ECG:    PHYSICAL EXAM:  GENERAL: NAD, lying in bed comfortably  HEAD:  Atraumatic, Normocephalic  EYES: EOMI, PERRLA, conjunctiva and sclera clear  ENT: Moist mucous membranes  NECK: Supple, No JVD  CHEST/LUNG: Clear to auscultation bilaterally; No rales, rhonchi, wheezing, or rubs. Unlabored respirations  HEART: Regular rate and rhythm; No murmurs, rubs, or gallops  ABDOMEN: Bowel sounds present; Soft, Nontender, Nondistended. No hepatomegally  EXTREMITIES:  2+ Peripheral Pulses, brisk capillary refill. No clubbing, cyanosis, or edema  NERVOUS SYSTEM:  Alert & Oriented X3, speech clear. CN 2,3,4,5,6 intact. Possible facial droop. LUE and LLE 4+/5, RUE and RLE 5/5. Left arm pronator drift  MSK: FROM all 4 extremities, full and equal strength  SKIN: No rashes or lesions        MEDICATIONS:  MEDICATIONS  (STANDING):  aspirin enteric coated 81 milliGRAM(s) Oral daily  atorvastatin 80 milliGRAM(s) Oral at bedtime  chlorhexidine 4% Liquid 1 Application(s) Topical <User Schedule>  dexMEDEtomidine Infusion 0.2 MICROgram(s)/kG/Hr (3 mL/Hr) IV Continuous <Continuous>  hydrALAZINE 25 milliGRAM(s) Oral every 8 hours  lidocaine 2% Jelly 4 milliLiter(s) IntraUrethral once    MEDICATIONS  (PRN):      ALLERGIES:  Allergies    No Known Allergies    Intolerances        LABS:                        11.3   7.29  )-----------( 169      ( 2022 22:22 )             33.9         144  |  110<H>  |  39<H>  ----------------------------<  85  4.6   |  22  |  1.26    Ca    9.5      2022 22:22  Phos  3.4       Mg     1.4         TPro  6.2  /  Alb  3.3  /  TBili  0.6  /  DBili  x   /  AST  22  /  ALT  23  /  AlkPhos  77      PT/INR - ( 2022 22:22 )   PT: 14.7 sec;   INR: 1.28 ratio         PTT - ( 2022 22:22 )  PTT:27.7 sec  Urinalysis Basic - ( 2022 15:13 )    Color: Light Yellow / Appearance: Clear / S.028 / pH: x  Gluc: x / Ketone: Negative  / Bili: Negative / Urobili: Negative   Blood: x / Protein: Trace / Nitrite: Negative   Leuk Esterase: Negative / RBC: 1 /hpf / WBC 1 /HPF   Sq Epi: x / Non Sq Epi: 0 /hpf / Bacteria: Negative        RADIOLOGY & ADDITIONAL TESTS: Reviewed.

## 2022-04-24 LAB
ALBUMIN SERPL ELPH-MCNC: 3.7 G/DL — SIGNIFICANT CHANGE UP (ref 3.3–5)
ALP SERPL-CCNC: 95 U/L — SIGNIFICANT CHANGE UP (ref 40–120)
ALT FLD-CCNC: 37 U/L — SIGNIFICANT CHANGE UP (ref 10–45)
ANION GAP SERPL CALC-SCNC: 13 MMOL/L — SIGNIFICANT CHANGE UP (ref 5–17)
APTT BLD: 27.2 SEC — LOW (ref 27.5–35.5)
AST SERPL-CCNC: 29 U/L — SIGNIFICANT CHANGE UP (ref 10–40)
BASOPHILS # BLD AUTO: 0.02 K/UL — SIGNIFICANT CHANGE UP (ref 0–0.2)
BASOPHILS NFR BLD AUTO: 0.2 % — SIGNIFICANT CHANGE UP (ref 0–2)
BILIRUB SERPL-MCNC: 0.4 MG/DL — SIGNIFICANT CHANGE UP (ref 0.2–1.2)
BUN SERPL-MCNC: 37 MG/DL — HIGH (ref 7–23)
CALCIUM SERPL-MCNC: 9.7 MG/DL — SIGNIFICANT CHANGE UP (ref 8.4–10.5)
CHLORIDE SERPL-SCNC: 103 MMOL/L — SIGNIFICANT CHANGE UP (ref 96–108)
CO2 SERPL-SCNC: 22 MMOL/L — SIGNIFICANT CHANGE UP (ref 22–31)
CREAT SERPL-MCNC: 1.23 MG/DL — SIGNIFICANT CHANGE UP (ref 0.5–1.3)
EGFR: 67 ML/MIN/1.73M2 — SIGNIFICANT CHANGE UP
EOSINOPHIL # BLD AUTO: 1.26 K/UL — HIGH (ref 0–0.5)
EOSINOPHIL NFR BLD AUTO: 14.6 % — HIGH (ref 0–6)
GLUCOSE BLDC GLUCOMTR-MCNC: 205 MG/DL — HIGH (ref 70–99)
GLUCOSE SERPL-MCNC: 176 MG/DL — HIGH (ref 70–99)
HCT VFR BLD CALC: 34.8 % — LOW (ref 39–50)
HGB BLD-MCNC: 11.7 G/DL — LOW (ref 13–17)
IMM GRANULOCYTES NFR BLD AUTO: 0.3 % — SIGNIFICANT CHANGE UP (ref 0–1.5)
INR BLD: 1.21 RATIO — HIGH (ref 0.88–1.16)
LYMPHOCYTES # BLD AUTO: 1.05 K/UL — SIGNIFICANT CHANGE UP (ref 1–3.3)
LYMPHOCYTES # BLD AUTO: 12.2 % — LOW (ref 13–44)
MAGNESIUM SERPL-MCNC: 1.7 MG/DL — SIGNIFICANT CHANGE UP (ref 1.6–2.6)
MCHC RBC-ENTMCNC: 29.8 PG — SIGNIFICANT CHANGE UP (ref 27–34)
MCHC RBC-ENTMCNC: 33.6 GM/DL — SIGNIFICANT CHANGE UP (ref 32–36)
MCV RBC AUTO: 88.8 FL — SIGNIFICANT CHANGE UP (ref 80–100)
MONOCYTES # BLD AUTO: 0.74 K/UL — SIGNIFICANT CHANGE UP (ref 0–0.9)
MONOCYTES NFR BLD AUTO: 8.6 % — SIGNIFICANT CHANGE UP (ref 2–14)
NEUTROPHILS # BLD AUTO: 5.51 K/UL — SIGNIFICANT CHANGE UP (ref 1.8–7.4)
NEUTROPHILS NFR BLD AUTO: 64.1 % — SIGNIFICANT CHANGE UP (ref 43–77)
NRBC # BLD: 0 /100 WBCS — SIGNIFICANT CHANGE UP (ref 0–0)
PHOSPHATE SERPL-MCNC: 2.5 MG/DL — SIGNIFICANT CHANGE UP (ref 2.5–4.5)
PLATELET # BLD AUTO: 193 K/UL — SIGNIFICANT CHANGE UP (ref 150–400)
POTASSIUM SERPL-MCNC: 4.6 MMOL/L — SIGNIFICANT CHANGE UP (ref 3.5–5.3)
POTASSIUM SERPL-SCNC: 4.6 MMOL/L — SIGNIFICANT CHANGE UP (ref 3.5–5.3)
PROT SERPL-MCNC: 6.9 G/DL — SIGNIFICANT CHANGE UP (ref 6–8.3)
PROTHROM AB SERPL-ACNC: 14.1 SEC — HIGH (ref 10.5–13.4)
RBC # BLD: 3.92 M/UL — LOW (ref 4.2–5.8)
RBC # FLD: 12.6 % — SIGNIFICANT CHANGE UP (ref 10.3–14.5)
SODIUM SERPL-SCNC: 138 MMOL/L — SIGNIFICANT CHANGE UP (ref 135–145)
WBC # BLD: 8.61 K/UL — SIGNIFICANT CHANGE UP (ref 3.8–10.5)
WBC # FLD AUTO: 8.61 K/UL — SIGNIFICANT CHANGE UP (ref 3.8–10.5)

## 2022-04-24 PROCEDURE — 99233 SBSQ HOSP IP/OBS HIGH 50: CPT

## 2022-04-24 RX ORDER — ATORVASTATIN CALCIUM 80 MG/1
40 TABLET, FILM COATED ORAL AT BEDTIME
Refills: 0 | Status: DISCONTINUED | OUTPATIENT
Start: 2022-04-24 | End: 2022-05-01

## 2022-04-24 RX ORDER — HYDROCORTISONE 1 %
1 OINTMENT (GRAM) TOPICAL
Refills: 0 | Status: DISCONTINUED | OUTPATIENT
Start: 2022-04-24 | End: 2022-05-01

## 2022-04-24 RX ORDER — INSULIN LISPRO 100/ML
VIAL (ML) SUBCUTANEOUS AT BEDTIME
Refills: 0 | Status: DISCONTINUED | OUTPATIENT
Start: 2022-04-24 | End: 2022-05-01

## 2022-04-24 RX ORDER — DEXTROSE 50 % IN WATER 50 %
25 SYRINGE (ML) INTRAVENOUS ONCE
Refills: 0 | Status: DISCONTINUED | OUTPATIENT
Start: 2022-04-24 | End: 2022-05-01

## 2022-04-24 RX ORDER — LORATADINE 10 MG/1
10 TABLET ORAL DAILY
Refills: 0 | Status: DISCONTINUED | OUTPATIENT
Start: 2022-04-24 | End: 2022-05-01

## 2022-04-24 RX ORDER — INSULIN LISPRO 100/ML
VIAL (ML) SUBCUTANEOUS
Refills: 0 | Status: DISCONTINUED | OUTPATIENT
Start: 2022-04-24 | End: 2022-05-01

## 2022-04-24 RX ORDER — DEXTROSE 50 % IN WATER 50 %
12.5 SYRINGE (ML) INTRAVENOUS ONCE
Refills: 0 | Status: DISCONTINUED | OUTPATIENT
Start: 2022-04-24 | End: 2022-05-01

## 2022-04-24 RX ORDER — SODIUM CHLORIDE 9 MG/ML
1000 INJECTION, SOLUTION INTRAVENOUS
Refills: 0 | Status: DISCONTINUED | OUTPATIENT
Start: 2022-04-24 | End: 2022-05-01

## 2022-04-24 RX ORDER — DEXTROSE 50 % IN WATER 50 %
15 SYRINGE (ML) INTRAVENOUS ONCE
Refills: 0 | Status: DISCONTINUED | OUTPATIENT
Start: 2022-04-24 | End: 2022-05-01

## 2022-04-24 RX ORDER — GLUCAGON INJECTION, SOLUTION 0.5 MG/.1ML
1 INJECTION, SOLUTION SUBCUTANEOUS ONCE
Refills: 0 | Status: DISCONTINUED | OUTPATIENT
Start: 2022-04-24 | End: 2022-05-01

## 2022-04-24 RX ADMIN — Medication 1 APPLICATION(S): at 16:53

## 2022-04-24 RX ADMIN — ATORVASTATIN CALCIUM 40 MILLIGRAM(S): 80 TABLET, FILM COATED ORAL at 22:21

## 2022-04-24 RX ADMIN — Medication 81 MILLIGRAM(S): at 11:04

## 2022-04-24 RX ADMIN — Medication 1 APPLICATION(S): at 05:55

## 2022-04-24 RX ADMIN — LORATADINE 10 MILLIGRAM(S): 10 TABLET ORAL at 16:54

## 2022-04-24 RX ADMIN — ENOXAPARIN SODIUM 40 MILLIGRAM(S): 100 INJECTION SUBCUTANEOUS at 11:04

## 2022-04-24 RX ADMIN — TAMSULOSIN HYDROCHLORIDE 0.4 MILLIGRAM(S): 0.4 CAPSULE ORAL at 22:19

## 2022-04-24 NOTE — PROGRESS NOTE ADULT - TIME BILLING
Diagnosis and treatment plan; counselling for secondary stroke prevention  Agree with above; ROS otherwise negative.
Diagnosis and treatment plan; counselling for secondary stroke prevention  Agree with above; ROS otherwise negative

## 2022-04-24 NOTE — PROGRESS NOTE ADULT - SUBJECTIVE AND OBJECTIVE BOX
THE PATIENT WAS SEEN AND EXAMINED BY ME WITH THE HOUSESTAFF AND STROKE TEAM DURING MORNING ROUNDS.   HPI:  61 year old Georgian speaking male with past medical history of DM, HTN, dilated cardiomyopathy. LKN midnight, witnessed by wife. This morning he woke up this morning with left hemiparesis. NIHSS at [facility] 4 for left hemiparesis. CTA with right M1 occlusion. HR intermittently in the 40s, reportedly in second degree heart block (but no EKG on file), given atropine in route to the hospital.  Not a candidate for tPA as LKN > 4.5 hours.  He was transferred to [facility] as stroke rescue for possible thrombectomy.  OZZY aware.    Will repeat all neuroimaging.      [facility] staff contacted [facility] staff notified the patient was found to have acute renal failure Cr. 2.8, BUN 88, K 5.8 (untreated).  BNP increased.  Concern for digoxin toxicity, although digoxin level noted to be 1.6 at OSH.     In the ED, T96.4, HR 50, /65, on %. Slow afib with HR 30-50s. FSG 56, given amp D50. Given albuterol and Ca gluc 2g.     Admitted to MICU for CVA neuro checks and possible thrombectomy. (21 Apr 2022 18:10)      ROS: Otherwise negative.    SUBJECTIVE: No events overnight.  No new neurologic complaints.      aspirin enteric coated 81 milliGRAM(s) Oral daily  atorvastatin 80 milliGRAM(s) Oral at bedtime  enoxaparin Injectable 40 milliGRAM(s) SubCutaneous every 24 hours  hydrocortisone 1% Cream 1 Application(s) Topical two times a day  lidocaine 2% Jelly 4 milliLiter(s) IntraUrethral once  tamsulosin 0.4 milliGRAM(s) Oral at bedtime      PHYSICAL EXAM:   Vital Signs Last 24 Hrs  T(C): 36.8 (24 Apr 2022 00:00), Max: 37.4 (23 Apr 2022 08:00)  T(F): 98.3 (24 Apr 2022 00:00), Max: 99.4 (23 Apr 2022 08:00)  HR: 73 (24 Apr 2022 06:00) (50 - 83)  BP: 153/76 (24 Apr 2022 06:00) (132/74 - 163/77)  BP(mean): 95 (24 Apr 2022 06:00) (85 - 110)  RR: 22 (24 Apr 2022 06:00) (13 - 31)  SpO2: 98% (24 Apr 2022 06:00) (96% - 100%)    General: No acute distress  HEENT: EOM intact, visual fields full  Abdomen: Soft, nontender, nondistended   Extremities: No edema    NEUROLOGICAL EXAM:  Mental status: Awake, alert, correctly states age, place and year, speech fluent, follows commands, mild L hemineglect  Cranial Nerves: No facial asymmetry, no nystagmus, mild dysarthria, no visual field cut. Slight L gaze palsy.   Motor exam: Normal tone, no drift, 5/5 RUE, 5/5 RLE, 5/5 LUE, 5/5 LLE, normal fine finger movements.  Sensation: Intact to light touch. +extinction on left with double simultaneous stimulation   Coordination/ Gait: No dysmetria, gait not tested    LABS:                        11.7   8.61  )-----------( 193      ( 24 Apr 2022 01:01 )             34.8    04-24    138  |  103  |  37<H>  ----------------------------<  176<H>  4.6   |  22  |  1.23    Ca    9.7      24 Apr 2022 01:01  Phos  2.5     04-24  Mg     1.7     04-24    TPro  6.9  /  Alb  3.7  /  TBili  0.4  /  DBili  x   /  AST  29  /  ALT  37  /  AlkPhos  95  04-24  PT/INR - ( 24 Apr 2022 01:01 )   PT: 14.1 sec;   INR: 1.21 ratio         PTT - ( 24 Apr 2022 01:01 )  PTT:27.2 sec      IMAGING: Reviewed by me.   CT Brain stroke protocol, CTA H&N, CTP 4/21  IMPRESSION:    CT head:  -Loss of gray-white matter differentiation in the right lateral frontal   lobe and insula concerning for acute infarction.  -No acute intracranial hemorrhage.    CT angiogram neck:  -At least mild stenosis of the left vertebral artery origin.  -Atherosclerotic changes at the bilateral carotid bifurcations without   stenosis.    CT angiogram head:  -Acute occlusion of the right MCA distal M1 and proximal M2 divisions.    CT perfusion:  At risk ischemic tissue: 125 mL, right MCA territory.  Core infarct: 6 mL, right MCA territory.  Mismatch volume: 119 mL.  Mismatch ratio: 20.8.    CTH non con 4/22  IMPRESSION: New right frontal temporal cortical lucency consistent with a   small infarct in the right middle cerebral artery distribution compared   with 4/21/2022.    MR Head non con 4/23  IMPRESSION:  Acute infarct in the right MCA territory involving the anterior temporal   segment extending intothe insula and right posterior frontal parietal   region. Some associated petechial hemorrhage suggested on the   susceptibility weighted imaging. Subtle mass effect on the right lateral   ventricle. Suspicion of a small amount of thrombus in the distal right M1   proximal M2 segment       THE PATIENT WAS SEEN AND EXAMINED BY ME WITH THE HOUSESTAFF AND STROKE TEAM DURING MORNING ROUNDS.   HPI:  61 year old Romanian speaking male with past medical history of DM, HTN, dilated cardiomyopathy. LKN midnight, witnessed by wife. This morning he woke up this morning with left hemiparesis. NIHSS at [facility] 4 for left hemiparesis. CTA with right M1 occlusion. HR intermittently in the 40s, reportedly in second degree heart block (but no EKG on file), given atropine in route to the hospital.  Not a candidate for tPA as LKN > 4.5 hours.  He was transferred to [facility] as stroke rescue for possible thrombectomy.  OZZY aware.    Will repeat all neuroimaging.      [facility] staff contacted [facility] staff notified the patient was found to have acute renal failure Cr. 2.8, BUN 88, K 5.8 (untreated).  BNP increased.  Concern for digoxin toxicity, although digoxin level noted to be 1.6 at OSH.     In the ED, T96.4, HR 50, /65, on %. Slow afib with HR 30-50s. FSG 56, given amp D50. Given albuterol and Ca gluc 2g.     Admitted to MICU for CVA neuro checks and possible thrombectomy.    ROS: Otherwise negative.    SUBJECTIVE: No events overnight.  No new neurologic complaints.      aspirin enteric coated 81 milliGRAM(s) Oral daily  atorvastatin 80 milliGRAM(s) Oral at bedtime  enoxaparin Injectable 40 milliGRAM(s) SubCutaneous every 24 hours  hydrocortisone 1% Cream 1 Application(s) Topical two times a day  lidocaine 2% Jelly 4 milliLiter(s) IntraUrethral once  tamsulosin 0.4 milliGRAM(s) Oral at bedtime      PHYSICAL EXAM:   Vital Signs Last 24 Hrs  T(C): 36.8 (24 Apr 2022 00:00), Max: 37.4 (23 Apr 2022 08:00)  T(F): 98.3 (24 Apr 2022 00:00), Max: 99.4 (23 Apr 2022 08:00)  HR: 73 (24 Apr 2022 06:00) (50 - 83)  BP: 153/76 (24 Apr 2022 06:00) (132/74 - 163/77)  BP(mean): 95 (24 Apr 2022 06:00) (85 - 110)  RR: 22 (24 Apr 2022 06:00) (13 - 31)  SpO2: 98% (24 Apr 2022 06:00) (96% - 100%)    General: No acute distress  HEENT: EOM intact, visual fields full  Abdomen: Soft, nontender, nondistended   Extremities: No edema    NEUROLOGICAL EXAM:  Mental status: Awake, alert, correctly states age, place and year, speech fluent, follows commands, mild L hemineglect  Cranial Nerves: No facial asymmetry, no nystagmus, mild dysarthria, no visual field cut. Slight L gaze palsy.   Motor exam: Normal tone, no drift, 5/5 RUE, 5/5 RLE, 5/5 LUE, 5/5 LLE, normal fine finger movements.  Sensation: Intact to light touch. +extinction on left with double simultaneous stimulation   Coordination/ Gait: No dysmetria, gait not tested    LABS:                        11.7   8.61  )-----------( 193      ( 24 Apr 2022 01:01 )             34.8    04-24    138  |  103  |  37<H>  ----------------------------<  176<H>  4.6   |  22  |  1.23    Ca    9.7      24 Apr 2022 01:01  Phos  2.5     04-24  Mg     1.7     04-24    TPro  6.9  /  Alb  3.7  /  TBili  0.4  /  DBili  x   /  AST  29  /  ALT  37  /  AlkPhos  95  04-24  PT/INR - ( 24 Apr 2022 01:01 )   PT: 14.1 sec;   INR: 1.21 ratio         PTT - ( 24 Apr 2022 01:01 )  PTT:27.2 sec      IMAGING: Reviewed by me.   CT Brain stroke protocol, CTA H&N, CTP 4/21  IMPRESSION:    CT head:  -Loss of gray-white matter differentiation in the right lateral frontal   lobe and insula concerning for acute infarction.  -No acute intracranial hemorrhage.    CT angiogram neck:  -At least mild stenosis of the left vertebral artery origin.  -Atherosclerotic changes at the bilateral carotid bifurcations without   stenosis.    CT angiogram head:  -Acute occlusion of the right MCA distal M1 and proximal M2 divisions.    CT perfusion:  At risk ischemic tissue: 125 mL, right MCA territory.  Core infarct: 6 mL, right MCA territory.  Mismatch volume: 119 mL.  Mismatch ratio: 20.8.    CTH non con 4/22  IMPRESSION: New right frontal temporal cortical lucency consistent with a   small infarct in the right middle cerebral artery distribution compared   with 4/21/2022.    MR Head non con 4/23  IMPRESSION:  Acute infarct in the right MCA territory involving the anterior temporal   segment extending intothe insula and right posterior frontal parietal   region. Some associated petechial hemorrhage suggested on the   susceptibility weighted imaging. Subtle mass effect on the right lateral   ventricle. Suspicion of a small amount of thrombus in the distal right M1   proximal M2 segment       THE PATIENT WAS SEEN AND EXAMINED BY ME WITH THE HOUSESTAFF AND STROKE TEAM DURING MORNING ROUNDS.   HPI:  61 year old Mongolian speaking male with past medical history of DM, HTN, dilated cardiomyopathy. LKN midnight, witnessed by wife. This morning he woke up this morning with left hemiparesis. NIHSS at [facility] 4 for left hemiparesis. CTA with right M1 occlusion. HR intermittently in the 40s, reportedly in second degree heart block (but no EKG on file), given atropine in route to the hospital.  Not a candidate for tPA as LKN > 4.5 hours.  He was transferred to [facility] as stroke rescue for possible thrombectomy.  OZZY aware.    Will repeat all neuroimaging.      [facility] staff contacted [facility] staff notified the patient was found to have acute renal failure Cr. 2.8, BUN 88, K 5.8 (untreated).  BNP increased.  Concern for digoxin toxicity, although digoxin level noted to be 1.6 at OSH.     In the ED, T96.4, HR 50, /65, on %. Slow afib with HR 30-50s. FSG 56, given amp D50. Given albuterol and Ca gluc 2g.     Admitted to MICU for CVA neuro checks and possible thrombectomy.    ROS: Otherwise negative.    SUBJECTIVE: No events overnight.  No new neurologic complaints.      aspirin enteric coated 81 milliGRAM(s) Oral daily  atorvastatin 80 milliGRAM(s) Oral at bedtime  enoxaparin Injectable 40 milliGRAM(s) SubCutaneous every 24 hours  hydrocortisone 1% Cream 1 Application(s) Topical two times a day  lidocaine 2% Jelly 4 milliLiter(s) IntraUrethral once  tamsulosin 0.4 milliGRAM(s) Oral at bedtime      PHYSICAL EXAM:   Vital Signs Last 24 Hrs  T(C): 36.8 (24 Apr 2022 00:00), Max: 37.4 (23 Apr 2022 08:00)  T(F): 98.3 (24 Apr 2022 00:00), Max: 99.4 (23 Apr 2022 08:00)  HR: 73 (24 Apr 2022 06:00) (50 - 83)  BP: 153/76 (24 Apr 2022 06:00) (132/74 - 163/77)  BP(mean): 95 (24 Apr 2022 06:00) (85 - 110)  RR: 22 (24 Apr 2022 06:00) (13 - 31)  SpO2: 98% (24 Apr 2022 06:00) (96% - 100%)    General: No acute distress  HEENT: EOM intact, visual fields full  Abdomen: Soft, nontender, nondistended   Extremities: No edema    NEUROLOGICAL EXAM:  Mental status: Awake, alert, correctly states age, place and year, speech fluent, follows commands, mild L hemineglect  Cranial Nerves: No facial asymmetry, no nystagmus, mild dysarthria, no visual field cut. Slight L gaze palsy.   Motor exam: Normal tone, no drift, 5/5 RUE, 5/5 RLE, 5/5 LUE, 5/5 LLE  Sensation: Intact to light touch. No extinction   Coordination/ Gait: No dysmetria, gait not tested    LABS:                        11.7   8.61  )-----------( 193      ( 24 Apr 2022 01:01 )             34.8    04-24    138  |  103  |  37<H>  ----------------------------<  176<H>  4.6   |  22  |  1.23    Ca    9.7      24 Apr 2022 01:01  Phos  2.5     04-24  Mg     1.7     04-24    TPro  6.9  /  Alb  3.7  /  TBili  0.4  /  DBili  x   /  AST  29  /  ALT  37  /  AlkPhos  95  04-24  PT/INR - ( 24 Apr 2022 01:01 )   PT: 14.1 sec;   INR: 1.21 ratio         PTT - ( 24 Apr 2022 01:01 )  PTT:27.2 sec      IMAGING: Reviewed by me.   CT Brain stroke protocol, CTA H&N, CTP 4/21  IMPRESSION:    CT head:  -Loss of gray-white matter differentiation in the right lateral frontal   lobe and insula concerning for acute infarction.  -No acute intracranial hemorrhage.    CT angiogram neck:  -At least mild stenosis of the left vertebral artery origin.  -Atherosclerotic changes at the bilateral carotid bifurcations without   stenosis.    CT angiogram head:  -Acute occlusion of the right MCA distal M1 and proximal M2 divisions.    CT perfusion:  At risk ischemic tissue: 125 mL, right MCA territory.  Core infarct: 6 mL, right MCA territory.  Mismatch volume: 119 mL.  Mismatch ratio: 20.8.    CTH non con 4/22  IMPRESSION: New right frontal temporal cortical lucency consistent with a   small infarct in the right middle cerebral artery distribution compared   with 4/21/2022.    MR Head non con 4/23  IMPRESSION:  Acute infarct in the right MCA territory involving the anterior temporal   segment extending intothe insula and right posterior frontal parietal   region. Some associated petechial hemorrhage suggested on the   susceptibility weighted imaging. Subtle mass effect on the right lateral   ventricle. Suspicion of a small amount of thrombus in the distal right M1   proximal M2 segment

## 2022-04-25 ENCOUNTER — TRANSCRIPTION ENCOUNTER (OUTPATIENT)
Age: 62
End: 2022-04-25

## 2022-04-25 LAB
ANION GAP SERPL CALC-SCNC: 14 MMOL/L — SIGNIFICANT CHANGE UP (ref 5–17)
BUN SERPL-MCNC: 27 MG/DL — HIGH (ref 7–23)
CALCIUM SERPL-MCNC: 9.3 MG/DL — SIGNIFICANT CHANGE UP (ref 8.4–10.5)
CHLORIDE SERPL-SCNC: 103 MMOL/L — SIGNIFICANT CHANGE UP (ref 96–108)
CO2 SERPL-SCNC: 21 MMOL/L — LOW (ref 22–31)
CREAT SERPL-MCNC: 1.19 MG/DL — SIGNIFICANT CHANGE UP (ref 0.5–1.3)
EGFR: 70 ML/MIN/1.73M2 — SIGNIFICANT CHANGE UP
GLUCOSE BLDC GLUCOMTR-MCNC: 162 MG/DL — HIGH (ref 70–99)
GLUCOSE BLDC GLUCOMTR-MCNC: 176 MG/DL — HIGH (ref 70–99)
GLUCOSE BLDC GLUCOMTR-MCNC: 280 MG/DL — HIGH (ref 70–99)
GLUCOSE BLDC GLUCOMTR-MCNC: 286 MG/DL — HIGH (ref 70–99)
GLUCOSE SERPL-MCNC: 148 MG/DL — HIGH (ref 70–99)
HCT VFR BLD CALC: 34.1 % — LOW (ref 39–50)
HGB BLD-MCNC: 11.8 G/DL — LOW (ref 13–17)
MCHC RBC-ENTMCNC: 30.6 PG — SIGNIFICANT CHANGE UP (ref 27–34)
MCHC RBC-ENTMCNC: 34.6 GM/DL — SIGNIFICANT CHANGE UP (ref 32–36)
MCV RBC AUTO: 88.3 FL — SIGNIFICANT CHANGE UP (ref 80–100)
NRBC # BLD: 0 /100 WBCS — SIGNIFICANT CHANGE UP (ref 0–0)
PLATELET # BLD AUTO: 189 K/UL — SIGNIFICANT CHANGE UP (ref 150–400)
POTASSIUM SERPL-MCNC: 3.8 MMOL/L — SIGNIFICANT CHANGE UP (ref 3.5–5.3)
POTASSIUM SERPL-SCNC: 3.8 MMOL/L — SIGNIFICANT CHANGE UP (ref 3.5–5.3)
RBC # BLD: 3.86 M/UL — LOW (ref 4.2–5.8)
RBC # FLD: 12.3 % — SIGNIFICANT CHANGE UP (ref 10.3–14.5)
SARS-COV-2 RNA SPEC QL NAA+PROBE: SIGNIFICANT CHANGE UP
SODIUM SERPL-SCNC: 138 MMOL/L — SIGNIFICANT CHANGE UP (ref 135–145)
WBC # BLD: 7.69 K/UL — SIGNIFICANT CHANGE UP (ref 3.8–10.5)
WBC # FLD AUTO: 7.69 K/UL — SIGNIFICANT CHANGE UP (ref 3.8–10.5)

## 2022-04-25 PROCEDURE — 99231 SBSQ HOSP IP/OBS SF/LOW 25: CPT

## 2022-04-25 PROCEDURE — 99252 IP/OBS CONSLTJ NEW/EST SF 35: CPT

## 2022-04-25 PROCEDURE — 99233 SBSQ HOSP IP/OBS HIGH 50: CPT

## 2022-04-25 PROCEDURE — 93010 ELECTROCARDIOGRAM REPORT: CPT

## 2022-04-25 RX ORDER — LANOLIN ALCOHOL/MO/W.PET/CERES
5 CREAM (GRAM) TOPICAL ONCE
Refills: 0 | Status: COMPLETED | OUTPATIENT
Start: 2022-04-25 | End: 2022-04-25

## 2022-04-25 RX ORDER — APIXABAN 2.5 MG/1
5 TABLET, FILM COATED ORAL EVERY 12 HOURS
Refills: 0 | Status: DISCONTINUED | OUTPATIENT
Start: 2022-04-25 | End: 2022-04-26

## 2022-04-25 RX ORDER — PREGABALIN 225 MG/1
1000 CAPSULE ORAL DAILY
Refills: 0 | Status: COMPLETED | OUTPATIENT
Start: 2022-04-25 | End: 2022-04-29

## 2022-04-25 RX ADMIN — APIXABAN 5 MILLIGRAM(S): 2.5 TABLET, FILM COATED ORAL at 11:56

## 2022-04-25 RX ADMIN — Medication 1: at 08:13

## 2022-04-25 RX ADMIN — PREGABALIN 1000 MICROGRAM(S): 225 CAPSULE ORAL at 11:56

## 2022-04-25 RX ADMIN — Medication 1 APPLICATION(S): at 05:25

## 2022-04-25 RX ADMIN — TAMSULOSIN HYDROCHLORIDE 0.4 MILLIGRAM(S): 0.4 CAPSULE ORAL at 20:20

## 2022-04-25 RX ADMIN — Medication 5 MILLIGRAM(S): at 02:30

## 2022-04-25 RX ADMIN — LORATADINE 10 MILLIGRAM(S): 10 TABLET ORAL at 11:57

## 2022-04-25 RX ADMIN — Medication 1 APPLICATION(S): at 17:33

## 2022-04-25 RX ADMIN — Medication 1: at 20:22

## 2022-04-25 RX ADMIN — Medication 1: at 16:49

## 2022-04-25 RX ADMIN — Medication 3: at 11:56

## 2022-04-25 RX ADMIN — APIXABAN 5 MILLIGRAM(S): 2.5 TABLET, FILM COATED ORAL at 20:25

## 2022-04-25 RX ADMIN — Medication 81 MILLIGRAM(S): at 11:56

## 2022-04-25 RX ADMIN — ATORVASTATIN CALCIUM 40 MILLIGRAM(S): 80 TABLET, FILM COATED ORAL at 20:20

## 2022-04-25 NOTE — PHYSICAL THERAPY INITIAL EVALUATION ADULT - PRECAUTIONS/LIMITATIONS, REHAB EVAL
CTH 4/21: Loss of gray-white matter differentiation in the R lateral frontal/fall precautions CTH 4/21: Loss of gray-white matter differentiation in the R lateral frontal/fall precautions/surgical precautions NIHSS increased to NIHSS 9 then NIHSS 6 on repeat examination. Upon transfer patient deemed medically unstable for thrombectomy due to BRASH syndrome & admitted to MICU for further monitoring. CTH 4/21: Loss of gray-white matter differentiation in the R lateral frontal lobe & insula concerning for acute infarction. No acute ICH. CTA neck: At least mild stenosis of the L vertebral artery origin. Atherosclerotic changes at the bilateral carotid bifurcations without stenosis. CTA head: Acute occlusion of the R MCA distal M1 & proximal M2 divisions. CTP: At risk ischemic tissue: 125 mL, R MCA territory. Core infarct: 6 mL, R MCA territory. Mismatch volume: 119 mL. Mismatch ratio: 20.8./fall precautions/surgical precautions

## 2022-04-25 NOTE — CONSULT NOTE ADULT - ASSESSMENT
60 yo M with hx of HTN, HLD, DM, afib not on AC, severe LV dysfunction presented to OSH with L hemiparesis and was found to have acute CVA, not candidate for tPA as LKN > 4.5 hrs. Transferred to [facility] as stroke rescue for possible thrombectomy, found to have in afib with SVR HR 30-60s in the setting of hyperK at 7.3. Now bradycardia resolved after normalization of electrolytes. EP consulted for 2:1 AVB.    #2:1 AVB  - patient asymptomatic  -     #afib  - CHADSVASC of 5 62 yo M with hx of HTN, HLD, DM, afib not on AC, severe LV dysfunction presented to OSH with L hemiparesis and was found to have acute CVA, not candidate for tPA as LKN > 4.5 hrs. Transferred to [facility] as stroke rescue for possible thrombectomy, found to have in afib with SVR HR 30-60s in the setting of hyperK at 7.3. Now bradycardia resolved after normalization of electrolytes. EP consulted for 2:1 AVB. Currently with minimal residual deficits from his CVA.     #2:1 AVB  - patient asymptomatic, resolved to 1' AVB now  - telemetry reviewed with attending, likely Mahnaz  - can hold BB for now  - c/w telemetry  - keep K > 4, Mg > 2    #afib  - CHADSVASC of 5  - c/w Eliquis 5 BID    #LBBB  - has EF of 25% with QRS of > 150 ms  - pt would benefit from CRT-D  - would need LHC (if ok with neuro) to r/o ischemic disease that can be re-vascularized before proceeding with CRT-D 62 yo M with hx of HTN, HLD, DM, afib not on AC, severe LV dysfunction presented to OSH with L hemiparesis and was found to have acute CVA, not candidate for tPA as LKN > 4.5 hrs. Transferred to [facility] as stroke rescue for possible thrombectomy, found to have in afib with SVR HR 30-60s in the setting of hyperK at 7.3. Now bradycardia resolved after normalization of electrolytes. EP consulted for 2:1 AVB. Currently with minimal residual deficits from his CVA.     #2:1 AVB  - patient asymptomatic, resolved to 1' AVB now  - telemetry reviewed with attending, likely Mahnaz  - can hold BB for now  - c/w telemetry  - keep K > 4, Mg > 2    #afib  - CHADSVASC of 5  - c/w Eliquis 5 BID    #LBBB  - has EF of 25% with QRS of > 150 ms  - pt would benefit from CRT-D  - would need LHC (if ok with neuro) to r/o ischemic disease that can be re-vascularized before proceeding with CRT-D  - if LHC without coronary disease, would check Chaga's as etiology for cardiomyopathy

## 2022-04-25 NOTE — DISCHARGE NOTE PROVIDER - NSDCFUSCHEDAPPT_GEN_ALL_CORE_FT
Nidia Cardona  [facility]  GC PreAdmits  Scheduled Appointment: 04/30/2022    [facility]  Cardio Electro [address]  Scheduled Appointment: 05/02/2022

## 2022-04-25 NOTE — SPEECH LANGUAGE PATHOLOGY EVALUATION - COMMENTS
4/22: Neurosurgery - Called overnight for worsening in exam: AOx3, dec BTT on L, partial hemianopia, PERRL, gaze crosses midline, PERRL, +L facial, LUE+drift hits bed, LLE+drift doesn't hit bed, RUE/RLE 5/5, +L neglect. NIHSS 8.   4/23: Neurology - Impression. Mild agitation and very mild left hemiparesis, due to a right MCA infarct with a right M1 occlusion.    NEURO: Pt neurologically overall improved with no acute changes. Pt having episodes of agitation in MICU, was placed on Precedex drip. Agitation improved and now off Precedex. If patient becomes agitated when transferred to  [place] can try Seroquel if QTC not prolonged on EKG.    4/23 MR Head - Acute infarct in the right MCA territory involving the anterior temporal segment extending into the insula and right posterior frontal parietal region. Some associated petechial hemorrhage suggested on the susceptibility weighted imaging. Subtle mass effect on the right lateral   ventricle. Suspicion of a small amount of thrombus in the distal right M1 proximal M2 segment. Expressive language skills WFL. Pt able to express complex thoughts/ideas with accurate grammatical/syntactical form. Pt does not utilize AAC/gestures as primary means of communication Receptive language skills deemed WFL. Pt able to follow mx-step directives and answer complex yes/no questions. WFL in Moroccan at basic level WFL at basic level Articulatory precision deemed good with overall good intelligibility as per language line  in [place]. Aware of mild left facial weakness upon labial retraction; does not appear to negatively impact speech Cognitive-linguistic skills deemed WFL. Functional problem solving for safety as well as ADL's; Good organization during clock drawing task with accurate spacing and placement of hands; good recall given 5 min delay; Sequencing for familiar recipe's WFL; sustained attention for duration of evaluation without distractions WFL at basic level in Latvian WFL

## 2022-04-25 NOTE — OCCUPATIONAL THERAPY INITIAL EVALUATION ADULT - ADDITIONAL COMMENTS
CT head: Loss of gray-white matter differentiation in the right lateral frontal lobe and insula concerning for acute infarction. No acute intracranial hemorrhage. CT angiogram neck: At least mild stenosis of the left vertebral artery origin. Atherosclerotic changes at the bilateral carotid bifurcations without stenosis. CT angiogram head: Acute occlusion of the right MCA distal M1 and proximal M2 divisions. CT perfusion: At risk ischemic tissue: 125 mL, right MCA territory. Core infarct: 6 mL, right MCA territory. Mismatch volume: 119 mL. Mismatch ratio: 20.8.
CT head: Loss of gray-white matter differentiation in the right lateral frontal lobe and insula concerning for acute infarction. No acute intracranial hemorrhage. CT angiogram neck: At least mild stenosis of the left vertebral artery origin. Atherosclerotic changes at the bilateral carotid bifurcations without stenosis. CT angiogram head: Acute occlusion of the right MCA distal M1 and proximal M2 divisions. CT perfusion: At risk ischemic tissue: 125 mL, right MCA territory. Core infarct: 6 mL, right MCA territory. Mismatch volume: 119 mL. Mismatch ratio: 20.8.

## 2022-04-25 NOTE — OCCUPATIONAL THERAPY INITIAL EVALUATION ADULT - MD ORDER
OT Initial Eval & Treat  Activity: Inc as tolerated
OT Re-Eval & Treat  Activity: ODHRUV vaughan assist

## 2022-04-25 NOTE — OCCUPATIONAL THERAPY INITIAL EVALUATION ADULT - PERTINENT HX OF CURRENT PROBLEM, REHAB EVAL
61 year old [place] speaking male with past medical history of DM, HTN, dilated cardiomyopathy, and possible (CAD?) , p/w left hemiparesis, 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 Admitted to MICU for neurological exams s/p CVA and possible urgent thrombectomy.
61 year old Polish speaking male with past medical history of DM, HTN, dilated cardiomyopathy, and possible (CAD?) , p/w left hemiparesis, 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 Admitted to MICU for neurological exams s/p CVA and possible urgent thrombectomy.

## 2022-04-25 NOTE — SPEECH LANGUAGE PATHOLOGY EVALUATION - SLP BEHAVIORAL OBSERVATIONS
Duration Of Freeze Thaw-Cycle (Seconds): 0 Post-Care Instructions: I reviewed with the patient in detail post-care instructions. Patient is to wear sunprotection, and avoid picking at any of the treated lesions. Pt may apply Vaseline to crusted or scabbing areas. Render Post-Care Instructions In Note?: no Detail Level: Detailed Consent: The patient's consent was obtained including but not limited to risks of crusting, scabbing, blistering, scarring, darker or lighter pigmentary change, recurrence, incomplete removal and infection. within functional limits

## 2022-04-25 NOTE — PROGRESS NOTE ADULT - ASSESSMENT
61 M history of HTN, HLD, DM, A. fib and severe LV dysfunction presented to OSH with left hemiparesis and was found to have acute CVA, not candidate for tPA as LKN > 4.5 hours.    Transferred to [facility] as stroke rescue for possible thrombectomy.   Found to be in AF with VR 30-60s, irregular in setting of K 7.3, prompting cardiology consult. Now bradycardia resolved. Course further complicated by 2:1 AVB.     #2:1 AVB  - Telemetry showing 2:1 AVB.   - Avoid AVN blocking agents.   - Please repeat 12 lead ECG.  - EP will see patient.     #Bradycardia  - initially Atrial fibrillation slow ventricular response and with IVCD/BBB, then sinus bradycardia with LBBB  - Likely 2/2 hyperkalemia.  Apparently digoxin toxicity ruled out at referring hospital.  - Would hold any AVN blocking agents  - AC discussed below    #Atrial fibrillation with #CVA   - No A/C agent listed among medications. CHADSVASC at least 4, A/C recommended, particularly with now documented CVA.   - given acuity of CVA, need neurology input on when to safely start on full A/C (concern for ICH and/or hemorrhagic conversion of CVA).     #HFrEF  - Likely not new (patient on GDMT in [place])  - Current euvolemic and well compensated.   - Hold BB due to c/f 2:1 AVB  - Would restart Lisinopril 10mg (neuro recommends permissive htn to gradual normotension).   - After lisinopril, if SBP>140 consistently tomorrow, can likely restart home aldactone as well    # HTN  - Likely would benefit from permissive HTN given ischemic CVA; unclear time for permissive HTN. BP meds as per above.            61 M history of HTN, HLD, DM, A. fib and severe LV dysfunction presented to OSH with left hemiparesis and was found to have acute CVA, not candidate for tPA as LKN > 4.5 hours.    Transferred to [facility] as stroke rescue for possible thrombectomy.   Found to be in AF with VR 30-60s, irregular in setting of K 7.3, prompting cardiology consult. Now bradycardia resolved. Course further complicated by 2:1 AVB.     #2:1 AVB  - Telemetry showing 2:1 AVB.   - Avoid AVN blocking agents.   - Please repeat 12 lead ECG.  - EP following; plan for CRT-D device given EF 25% and QRS>160. Will need ischemic evaluation prior with LHC, would need Neurology input regarding undergoing LHC in setting of recent CVA     #HFrEF   - Likely not new (patient on GDMT in [place])  - Current euvolemic and well compensated.   - Hold BB due to c/f 2:1 AVB  - Would restart Lisinopril 10mg (neuro recommends permissive htn to gradual normotension).   - After lisinopril, if SBP>140 consistently tomorrow, can likely restart home aldactone as well  - Wi    #Atrial fibrillation with #CVA   - No A/C agent listed among medications. CHADSVASC at least 4, A/C recommended, particularly with now documented CVA.   - given acuity of CVA, need neurology input on when to safely start on full A/C (concern for ICH and/or hemorrhagic conversion of CVA).       #Bradycardia - resolved  - initially Atrial fibrillation slow ventricular response and with IVCD/BBB, then sinus bradycardia with LBBB  - Likely 2/2 hyperkalemia.  Apparently digoxin toxicity ruled out at referring hospital.  - Would hold any AVN blocking agents      # HTN  - Likely would benefit from permissive HTN given ischemic CVA; unclear time for permissive HTN. BP meds as per above.

## 2022-04-25 NOTE — SPEECH LANGUAGE PATHOLOGY EVALUATION - SLP GENERAL OBSERVATIONS
61 year old Haitian speaking male with past medical history of DM, HTN, dilated cardiomyopathy, and possible (CAD?) , p/w left hemiparesis, 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 Admitted to MICU for neurological exams s/p CVA and possible urgent thrombectomy. Pt encountered sitting upright in bed, AA&Ox4, pleasant and cooperative. Pt encountered sitting upright in bed, AA&Ox4, pleasant and cooperative. Video language line  utilized in Bruneian, ID#[id number]

## 2022-04-25 NOTE — PHYSICAL THERAPY INITIAL EVALUATION ADULT - PERTINENT HX OF CURRENT PROBLEM, REHAB EVAL
61 year old Citizen of [place] speaking male with past medical history of DM, HTN, dilated cardiomyopathy, and possible (CAD?) , p/w left hemiparesis, 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 Admitted to MICU for neurological exams s/p CVA and possible urgent thrombectomy.: Pt outside the window for TPA/Thrombectomy; CTH= New right frontal temporal cortical lucency consistent with a small infarct in the right middle cerebral artery distribution compared to 4/21 PMHx: DM, HTN, dilated cardiomyopathy. LKN midnight. This morning woke up with L hemiparesis. NIHSS at OSH 4. CTA: R M1 occlusion. HR intermittently in 40s, reportedly in 2nd degree heart block, given atropine in route to hosp. Not a candidate for tPA as LKN >4.5hrs, transferred to [facility] for possible thrombectomy. OHS staff contacted [facility] & notified that pt was found to have acute renal failure, Concern for digoxin toxicity

## 2022-04-25 NOTE — PROGRESS NOTE ADULT - ASSESSMENT
ASSESSMENT: Patient is a 60yo RT handed Malay speaking M with pmh of HTN, DM-II,  dilated cardiomyopathy presents to [facility] as a transfer for Rt M1 occlusion. Per chart review, patient's LWK was at midnight on 4/21. Patient had CT and CTA which was noted to show Rt M1 occlusion. Patient was not a TPa candidate due to out of time window. Patient was MT candidate at that time thus was agreed to transfer patient. Patient had initial NIHSS 4 whcih increased to NIHSS 9 then NIHSS 6 on repeat examination. Upon transfer patient deemed medically unstable for thrombectomy due to BRASH syndrome and admitted to MICU for further monitoring.     Impression. Mild agitation and very mild left hemiparesis, due to a right MCA infarct with a right M1 occlusion. Mechanism may be cardioembolism related to atrial fibrillation.    NEURO: Patient neurologically without acute change, Continue close monitoring for neurologic deterioration, permissive HTN to gradual normotension, holding home antihypertensives, avoid fluctuations in BP. LDL 44, titrate statin to LDL goal less than 70, MR Head non con as noted above. Agitation overall appears improved. . Physical therapy/OT: AR on re-eval.     ANTITHROMBOTIC THERAPY:  Apixaban initiated, d/w Cardiology re; if ASA is indicated.    PULMONARY: CXR clear, protecting airway, saturating well.    CARDIOVASCULAR: TTE: Severe left ventricular enlargement. Estimated ejection fraction 25%. Diffuse hypokinesis, with inferior and inferoseptal akinesis. On admission patient was found to be in Atrial fibrillation slow ventricular response and with IVCD/BBB, now sinus bradycardia with LBBB. Cardiology consulted and recommends to continue holding AV kevin blockers. Currently holding cardiac medications,  no signs of fluid overload, will continue to monitor and introduce diuretics as needed. Will discuss with cardiology regarding heart failure management, noted ? heart block. Cardiology follow up requested for further guidance and evaluation. D/W fellow.      SBP goal: permissive HTN to gradual normotension    GASTROINTESTINAL: Passed dysphagia screening, tolerating diet.     Diet: Consistent carb no snacks    RENAL: Patient initially with NANCY, creatinine 2.76 & K+ 7.5. Renal was consulted, no HD needed and was treated with a bicarb drip. NANCY and hyperkalemia resolved & patient off bicarb drip. Creatinine remains stable 1.19& K+ 4.6. Will continue to monitor kidney function and electrolytes. Taylor removed.  Monitor for changes in UO.     Na Goal: Greater than 135     Taylor: n    HEMATOLOGY: H/H without acute change, Hgb 11.8 Platelets 189. Will monitor for signs of bleeding. He should have all age and risk appropriate malignancy screenings.       DVT ppx: Heparin s.c [] LMWH [x]     ID: afebrile, no leukocytosis     other: social work consulted.    DISPOSITION: PT/OT recommends AR   CORE MEASURES:        Admission NIHSS: 6     TPA: [] YES [x] NO      LDL/HDL: 44/41     Depression Screen: o- wants his long term partner to be able to come.      Statin Therapy: y     Dysphagia Screen: [x] PASS [] FAIL     Smoking [] YES [] NO      Afib [x] YES [] NO     Stroke Education [x] YES [] NO    Obtain screening lower extremity venous ultrasound in patients who meet 1 or more of the following criteria as patient is high risk for DVT/PE on admission:   [] History of DVT/PE  []Hypercoagulable states (Factor V Leiden, Cancer, OCP, etc. )  []Prolonged immobility (hemiplegia/hemiparesis/post operative or any other extended immobilization)  [] Transferred from outside facility (Rehab or Long term care)  [] Age </= to 50

## 2022-04-25 NOTE — OCCUPATIONAL THERAPY INITIAL EVALUATION ADULT - ADL RETRAINING, OT EVAL
Pt will complete lower body dressing with independence within 4 weeks
Pt will complete toileting with independence within 4 weeks

## 2022-04-25 NOTE — CHART NOTE - NSCHARTNOTEFT_GEN_A_CORE
Nephrology on board for NANCY  Kidney function has remained stable.   Now back to baseline.     Nephrology team will sign off at this time, reconsult as needed.     If any questions, please feel free to contact me  NS pager: [phone], [facility]: [phone]  Fred Alvarado M.D.  Nephrology Fellow    (After 5 pm or on weekends please page the on-call fellow)

## 2022-04-25 NOTE — DISCHARGE NOTE PROVIDER - NSDCMRMEDTOKEN_GEN_ALL_CORE_FT
aspirin 81 mg oral tablet: 1 tab(s) orally once a day  bisoprolol: 2.5 milligram(s) orally once a day  digoxin 250 mcg (0.25 mg) oral tablet: 1 tab(s) orally once a day  furosemide 20 mg oral tablet: 1 tab(s) orally once a day  glyBURIDE 5 mg oral tablet: 1 tab(s) orally 2 times a day  lisinopril 10 mg oral tablet: 1 tab(s) orally once a day  metFORMIN 850 mg oral tablet: 1 tab(s) orally 2 times a day  simvastatin 20 mg oral tablet: 1 tab(s) orally once a day (at bedtime)  spironolactone 25 mg oral tablet: 1 tab(s) orally once a day  vildagliptin: 50 milligram(s) orally once a day   apixaban 5 mg oral tablet: 1 tab(s) orally 2 times a day  atorvastatin 40 mg oral tablet: 1 tab(s) orally once a day (at bedtime)  glyBURIDE 5 mg oral tablet: 1 tab(s) orally 2 times a day  hydrocortisone 1% topical cream: 1 application topically 2 times a day  lisinopril 5 mg oral tablet: 1 tab(s) orally once a day  loratadine 10 mg oral tablet: 1 tab(s) orally once a day  metFORMIN 850 mg oral tablet: 1 tab(s) orally 2 times a day  metoprolol succinate 25 mg oral tablet, extended release: 1 tab(s) orally once a day  tamsulosin 0.4 mg oral capsule: 1 cap(s) orally once a day (at bedtime)  traMADol 50 mg oral tablet: 1 tab(s) orally every 6 hours, As needed, Moderate Pain (4 - 6)  vildagliptin: 50 milligram(s) orally once a day

## 2022-04-25 NOTE — PROGRESS NOTE ADULT - SUBJECTIVE AND OBJECTIVE BOX
Patient seen and examined at bedside.    Overnight Events:   No events. Patient asymptomatic. Appears to be 2:1 AVB on telemetry.     Review Of Systems: No chest pain, shortness of breath, or palpitations            Current Meds:  apixaban 5 milliGRAM(s) Oral every 12 hours  aspirin enteric coated 81 milliGRAM(s) Oral daily  atorvastatin 40 milliGRAM(s) Oral at bedtime  cyanocobalamin 1000 MICROGram(s) Oral daily  dextrose 5%. 1000 milliLiter(s) IV Continuous <Continuous>  dextrose 5%. 1000 milliLiter(s) IV Continuous <Continuous>  dextrose 50% Injectable 25 Gram(s) IV Push once  dextrose 50% Injectable 12.5 Gram(s) IV Push once  dextrose 50% Injectable 25 Gram(s) IV Push once  dextrose Oral Gel 15 Gram(s) Oral once PRN  glucagon  Injectable 1 milliGRAM(s) IntraMuscular once  hydrocortisone 1% Cream 1 Application(s) Topical two times a day  insulin lispro (ADMELOG) corrective regimen sliding scale   SubCutaneous three times a day before meals  insulin lispro (ADMELOG) corrective regimen sliding scale   SubCutaneous at bedtime  lidocaine 2% Jelly 4 milliLiter(s) IntraUrethral once  loratadine 10 milliGRAM(s) Oral daily  tamsulosin 0.4 milliGRAM(s) Oral at bedtime      Vitals:  T(F): 98.3 (04-25), Max: 98.3 (04-25)  HR: 74 (04-25) (65 - 95)  BP: 171/67 (04-25) (121/74 - 171/67)  RR: 15 (04-25)  SpO2: 100% (04-25)  I&O's Summary    24 Apr 2022 07:01  -  25 Apr 2022 07:00  --------------------------------------------------------  IN: 0 mL / OUT: 1850 mL / NET: -1850 mL        Physical Exam:  Appearance: No acute distress; well appearing  Eyes: no scleral icterus   HEENT: Normal oral mucosa  Cardiovascular: RRR, S1, S2, no murmurs, rubs, or gallops; no edema; no JVD  Respiratory: Clear to auscultation bilaterally, no auditory stridor   Gastrointestinal: soft, non-tender, non-distended with normal bowel sounds  Musculoskeletal: No clubbing; no joint deformity   Neurologic: L sided weakness residual, appears to be improving   Psychiatry: AAOx3, mood & affect appropriate  Skin: No rashes, ecchymoses, or cyanosis                        11.8   7.69  )-----------( 189      ( 25 Apr 2022 06:20 )             34.1     04-25    138  |  103  |  27<H>  ----------------------------<  148<H>  3.8   |  21<L>  |  1.19    Ca    9.3      25 Apr 2022 06:17  Phos  2.5     04-24  Mg     1.7     04-24    TPro  6.9  /  Alb  3.7  /  TBili  0.4  /  DBili  x   /  AST  29  /  ALT  37  /  AlkPhos  95  04-24    PT/INR - ( 24 Apr 2022 01:01 )   PT: 14.1 sec;   INR: 1.21 ratio         PTT - ( 24 Apr 2022 01:01 )  PTT:27.2 sec  CARDIAC MARKERS ( 21 Apr 2022 15:09 )  36 ng/L / x     / x     / x     / x     / x      CARDIAC MARKERS ( 21 Apr 2022 13:04 )  37 ng/L / x     / x     / x     / x     / x          Serum Pro-Brain Natriuretic Peptide: 2012 pg/mL (04-21 @ 13:04)          New ECG(s): Personally reviewed    Echo:  Conclusions:  Endocardial visualization enhanced with intravenous  injection of Ultrasonic Enhancing Agent (Lumason).  Severe left ventricular enlargement.  Estimated ejection fraction 25%. Diffuse hypokinesis, with  inferior and inferoseptal akinesis.  Agitated saline injection demonstrates no evidence of a  patent foramen ovale.  ------------------------------------------------------------------------  Confirmed on  4/23/2022 - 15:15:48 by Carlos Enrique Velez MD,  [facility]    Stress Testing:     Cath:    Imaging:    Interpretation of Telemetry: Appears to be in 2:1 AVB

## 2022-04-25 NOTE — OCCUPATIONAL THERAPY INITIAL EVALUATION ADULT - TRANSFER TRAINING, PT EVAL
Pt will complete transfers with independence within 4 weeks
Pt will complete transfers with independence within 4 weeks

## 2022-04-25 NOTE — OCCUPATIONAL THERAPY INITIAL EVALUATION ADULT - PLANNED THERAPY INTERVENTIONS, OT EVAL
ADL retraining/balance training/transfer training
ADL retraining/balance training/bed mobility training/transfer training

## 2022-04-25 NOTE — PROGRESS NOTE ADULT - SUBJECTIVE AND OBJECTIVE BOX
THE PATIENT WAS SEEN AND EXAMINED BY ME WITH THE HOUSESTAFF AND STROKE TEAM DURING MORNING ROUNDS.   HPI:  61 year old Mongolian speaking male with past medical history of DM, HTN, dilated cardiomyopathy. LKN midnight prior, witnessed by wife.  Morning of admission he woke up   with left hemiparesis. NIHSS at [facility] 4 for left hemiparesis. CTA with right M1 occlusion. HR intermittently in the 40s, reportedly in second degree heart block (but no EKG on file), given atropine in route to the hospital.  Not a candidate for tPA as LKN > 4.5 hours.  He was transferred to [facility] as stroke rescue for possible thrombectomy.    [place] staff contacted [facility] staff notified the patient was found to have acute renal failure Cr. 2.8, BUN 88, K 5.8 (untreated).  BNP increased.  Concern for digoxin toxicity, although digoxin level noted to be 1.6 at OSH. In the ED, T96.4, HR 50, /65, on %. Slow afib with HR 30-50s. FSG 56, given amp D50. Given albuterol and Ca gluc 2g.  Admitted to MICU for CVA neuro checks and possible thrombectomy.      SUBJECTIVE: No events overnight.  No new neurologic complaints.  ROS reported negative unless otherwise noted.  Translation per PCA Nura in shared language of Mongolian.     apixaban 5 milliGRAM(s) Oral every 12 hours  aspirin enteric coated 81 milliGRAM(s) Oral daily  atorvastatin 40 milliGRAM(s) Oral at bedtime  cyanocobalamin 1000 MICROGram(s) Oral daily  dextrose 5%. 1000 milliLiter(s) IV Continuous <Continuous>  dextrose 5%. 1000 milliLiter(s) IV Continuous <Continuous>  dextrose 50% Injectable 25 Gram(s) IV Push once  dextrose 50% Injectable 12.5 Gram(s) IV Push once  dextrose 50% Injectable 25 Gram(s) IV Push once  dextrose Oral Gel 15 Gram(s) Oral once PRN  glucagon  Injectable 1 milliGRAM(s) IntraMuscular once  hydrocortisone 1% Cream 1 Application(s) Topical two times a day  insulin lispro (ADMELOG) corrective regimen sliding scale   SubCutaneous three times a day before meals  insulin lispro (ADMELOG) corrective regimen sliding scale   SubCutaneous at bedtime  lidocaine 2% Jelly 4 milliLiter(s) IntraUrethral once  loratadine 10 milliGRAM(s) Oral daily  tamsulosin 0.4 milliGRAM(s) Oral at bedtime      PHYSICAL EXAM:   Vital Signs Last 24 Hrs  T(C): 36.8 (25 Apr 2022 07:30), Max: 36.8 (25 Apr 2022 07:30)  T(F): 98.3 (25 Apr 2022 07:30), Max: 98.3 (25 Apr 2022 07:30)  HR: 88 (25 Apr 2022 10:28) (65 - 95)  BP: 128/108 (25 Apr 2022 10:28) (121/74 - 154/86)  BP(mean): 116 (25 Apr 2022 10:28) (84 - 116)  RR: 28 (25 Apr 2022 10:28) (15 - 28)  SpO2: 98% (25 Apr 2022 10:28) (98% - 100%)    General: No acute distress  HEENT: EOM intact, visual fields full  Abdomen: Soft, nontender, nondistended   Extremities: No edema    NEUROLOGICAL EXAM:  Mental status: Awake, alert, correctly states age, place and year, speech fluent, follows commands, mild left visual extinction and neglect   Cranial Nerves: no facial palsy,  improves on smiling, no nystagmus, mild dysarthria, no visual field cut.     Motor exam: Normal tone, Right side moves well against gravity, left hemiparesis: LUE/LLE drift- orbiting.   Sensation: Intact to light touch.    Coordination/ Gait: No dysmetria, gait not tested      LABS:                        11.8   7.69  )-----------( 189      ( 25 Apr 2022 06:20 )             34.1    04-25    138  |  103  |  27<H>  ----------------------------<  148<H>  3.8   |  21<L>  |  1.19    Ca    9.3      25 Apr 2022 06:17  Phos  2.5     04-24  Mg     1.7     04-24    TPro  6.9  /  Alb  3.7  /  TBili  0.4  /  DBili  x   /  AST  29  /  ALT  37  /  AlkPhos  95  04-24  PT/INR - ( 24 Apr 2022 01:01 )   PT: 14.1 sec;   INR: 1.21 ratio         PTT - ( 24 Apr 2022 01:01 )  PTT:27.2 sec      IMAGING: Reviewed by me.     CT Brain stroke protocol, CTA H&N, CTP 4/21  IMPRESSION:  CT head:  -Loss of gray-white matter differentiation in the right lateral frontal   lobe and insula concerning for acute infarction.  -No acute intracranial hemorrhage.  CT angiogram neck:  -At least mild stenosis of the left vertebral artery origin.  -Atherosclerotic changes at the bilateral carotid bifurcations without   stenosis.  CT angiogram head:  -Acute occlusion of the right MCA distal M1 and proximal M2 divisions.  CT perfusion:  At risk ischemic tissue: 125 mL, right MCA territory.  Core infarct: 6 mL, right MCA territory.  Mismatch volume: 119 mL.  Mismatch ratio: 20.8.    CTH non con 4/22  IMPRESSION: New right frontal temporal cortical lucency consistent with a   small infarct in the right middle cerebral artery distribution compared   with 4/21/2022.    MR Head non con 4/23  IMPRESSION:  Acute infarct in the right MCA territory involving the anterior temporal   segment extending intothe insula and right posterior frontal parietal   region. Some associated petechial hemorrhage suggested on the   susceptibility weighted imaging. Subtle mass effect on the right lateral   ventricle. Suspicion of a small amount of thrombus in the distal right M1   proximal M2 segment

## 2022-04-25 NOTE — PHYSICAL THERAPY INITIAL EVALUATION ADULT - ADDITIONAL COMMENTS
Pt reports living in a 3rd floor walk up apt with, 1 rail,  son who is not around during the day due to work. PTA pt was indep w/ all ADLs w/o an assist device. right hand dominant. wears glasses  Pashto speaking Pt reports living in a 3rd floor walk up apt with, 1 rail,  son who is not around during the day due to work. PTA pt was indep w/ all ADLs w/o an assist device. right hand dominant. wears glasses  Maori speaking, video  service utilized

## 2022-04-25 NOTE — OCCUPATIONAL THERAPY INITIAL EVALUATION ADULT - NS ASR FOLLOW COMMAND OT EVAL
100% of the time/able to follow single-step instructions
impulsive at times/100% of the time/able to follow single-step instructions

## 2022-04-25 NOTE — OCCUPATIONAL THERAPY INITIAL EVALUATION ADULT - DIAGNOSIS, OT EVAL
Pt p/w deficits in strength and balance impacting ADLs and mobility
Pt p/w deficits in strength and balance impacting ADLs and mobility

## 2022-04-25 NOTE — DISCHARGE NOTE PROVIDER - CARE PROVIDERS DIRECT ADDRESSES
,DirectAddress_Unknown,[email or web address] ,DirectAddress_Unknown,[email or web address],[email or web address]

## 2022-04-25 NOTE — CONSULT NOTE ADULT - SUBJECTIVE AND OBJECTIVE BOX
HPI:  61 year old Czech speaking male with past medical history of DM, HTN, dilated cardiomyopathy. LKN midnight, witnessed by wife. This morning he woke up this morning with left hemiparesis. NIHSS at [place] [id number] for left hemiparesis. CTA with right M1 occlusion. HR intermittently in the 40s, reportedly in second degree heart block (but no EKG on file), given atropine in route to the hospital.  Not a candidate for tPA as LKN > 4.5 hours.  He was transferred to [facility] as stroke rescue for possible thrombectomy.  OZZY aware.    Will repeat all neuroimaging.      [place] staff contacted [facility] staff notified the patient was found to have acute renal failure Cr. 2.8, BUN 88, K 5.8 (untreated).  BNP increased.  Concern for digoxin toxicity, although digoxin level noted to be 1.6 at OSH.     In the ED, T96.4, HR 50, /65, on %. Slow afib with HR 30-50s. FSG 56, given amp D50. Given albuterol and Ca gluc 2g.     Admitted to MICU for CVA neuro checks and possible thrombectomy. (21 Apr 2022 18:10)    Patient was admitted on 4/21, transferred to [address] [place] [address] 4/22, seen earlier today, no new weakness.     REVIEW OF SYSTEMS  Constitutional - No fever, No weight loss, No fatigue  HEENT - No eye pain, No visual disturbances, No difficulty hearing, No tinnitus, No vertigo, No neck pain  Respiratory - No cough, No wheezing, No shortness of breath  Cardiovascular - No chest pain, No palpitations  Gastrointestinal - No abdominal pain, No nausea, No vomiting, No diarrhea, No constipation  Genitourinary - No dysuria, No frequency, No hematuria, No incontinence  Psychiatric - No depression, No anxiety    VITALS  T(C): 36.8 (04-25-22 @ 07:30), Max: 36.8 (04-25-22 @ 07:30)  HR: 74 (04-25-22 @ 14:00) (65 - 95)  BP: 171/67 (04-25-22 @ 14:00) (121/74 - 171/67)  RR: 15 (04-25-22 @ 14:00) (15 - 28)  SpO2: 100% (04-25-22 @ 14:00) (97% - 100%)  Wt(kg): --    PAST MEDICAL & SURGICAL HISTORY  HTN (hypertension)    HLD (hyperlipidemia)    No significant past surgical history        SOCIAL HISTORY  Smoking - Denied  EtOH - Denied   Drugs - Denied    FUNCTIONAL HISTORY  Lives with son, 3rd floor walk up  Independent AMB and ADLs PTA     CURRENT FUNCTIONAL STATUS  4/25 SLP  grossly functional speech-language and cognitive skills    4/25 PT  transfers contact guard with RW  gait CG with RW x 40 feet   +LOB    4/25 OT  transfers CG     FAMILY HISTORY   no pertinent history in first degree relatives     RECENT LABS/IMAGING  CBC Full  -  ( 25 Apr 2022 06:20 )  WBC Count : 7.69 K/uL  RBC Count : 3.86 M/uL  Hemoglobin : 11.8 g/dL  Hematocrit : 34.1 %  Platelet Count - Automated : 189 K/uL  Mean Cell Volume : 88.3 fl  Mean Cell Hemoglobin : 30.6 pg  Mean Cell Hemoglobin Concentration : 34.6 gm/dL  Auto Neutrophil # : x  Auto Lymphocyte # : x  Auto Monocyte # : x  Auto Eosinophil # : x  Auto Basophil # : x  Auto Neutrophil % : x  Auto Lymphocyte % : x  Auto Monocyte % : x  Auto Eosinophil % : x  Auto Basophil % : x    04-25    138  |  103  |  27<H>  ----------------------------<  148<H>  3.8   |  21<L>  |  1.19    Ca    9.3      25 Apr 2022 06:17  Phos  2.5     04-24  Mg     1.7     04-24    TPro  6.9  /  Alb  3.7  /  TBili  0.4  /  DBili  x   /  AST  29  /  ALT  37  /  AlkPhos  95  04-24    < from: MR Head No Cont (04.23.22 @ 14:54) >    IMPRESSION:  Acute infarct in the right MCA territory involving the anterior temporal   segment extending intothe insula and right posterior frontal parietal   region. Some associated petechial hemorrhage suggested on the   susceptibility weighted imaging. Subtle mass effect on the right lateral   ventricle. Suspicion of a small amount of thrombus in the distal right M1   proximal M2 segment    < end of copied text >      ALLERGIES  No Known Allergies      MEDICATIONS   apixaban 5 milliGRAM(s) Oral every 12 hours  aspirin enteric coated 81 milliGRAM(s) Oral daily  atorvastatin 40 milliGRAM(s) Oral at bedtime  cyanocobalamin 1000 MICROGram(s) Oral daily  dextrose 5%. 1000 milliLiter(s) IV Continuous <Continuous>  dextrose 5%. 1000 milliLiter(s) IV Continuous <Continuous>  dextrose 50% Injectable 25 Gram(s) IV Push once  dextrose 50% Injectable 12.5 Gram(s) IV Push once  dextrose 50% Injectable 25 Gram(s) IV Push once  dextrose Oral Gel 15 Gram(s) Oral once PRN  glucagon  Injectable 1 milliGRAM(s) IntraMuscular once  hydrocortisone 1% Cream 1 Application(s) Topical two times a day  insulin lispro (ADMELOG) corrective regimen sliding scale   SubCutaneous at bedtime  insulin lispro (ADMELOG) corrective regimen sliding scale   SubCutaneous three times a day before meals  lidocaine 2% Jelly 4 milliLiter(s) IntraUrethral once  loratadine 10 milliGRAM(s) Oral daily  tamsulosin 0.4 milliGRAM(s) Oral at bedtime      ----------------------------------------------------------------------------------------  PHYSICAL EXAM  Constitutional - NAD, Comfortable in bed   HEENT - NCAT, EOMI  Neck - Supple, No limited ROM  Chest - Breathing comfortably, No wheezing  Cardiovascular - S1S2   Abdomen - Soft   Extremities - No C/C/E, No calf tenderness   Neurologic Exam -   follows commands                  Cognitive - Awake, Alert, AAO to self, place, date, year, situation     Communication - Fluent, No dysarthria       motor: left drift                    LEFT    UE - ShAB 5/5, EF 5/5, EE 5/5, WE 5/5,  5/5                    RIGHT UE - ShAB 5/5, EF 5/5, EE 5/5, WE 5/5,  5/5                    LEFT    LE - HF 5/5, KE 5/5, DF 5/5, PF 5/5                    RIGHT LE - HF 5/5, KE 5/5, DF 5/5, PF 5/5        Sensory - Intact to LT     Psychiatric - Mood stable, Affect WNL  ----------------------------------------------------------------------------------------  ASSESSMENT/PLAN  61yMale h/o HTN, DM, cardiomyopathy with functional deficits after CVA  right MCA infarct, on apixaban   Pain - Tylenol  Rehab - Will continue to follow for ongoing rehab needs and recommendations.   continue bedside therapy  patient requires contact guard with transfers, gait    Recommend ACUTE inpatient rehabilitation for the functional deficits consisting of 3 hours of therapy/day & 24 hour RN/daily PMR physician for comorbid medical management. Patient will be able to tolerate 3 hours a day.

## 2022-04-25 NOTE — CONSULT NOTE ADULT - ATTENDING COMMENTS
Severe cardiomyopathy with wide LBBB [place]. Admitted with CVA and slow AF in the setting of metabolic derangement. Spontaneously converted to sinus rhythm with ECG showing periods of 2:1 AVB. ME on conducted beats is short but there are periods of Wenckebach suggesting this is likely kevin in origin. Advise ischemic evaluation to rule out remediable coronary disease. If negative would pursue CRT-D implant after a cMRI on this hospitalization as patient has been on GDMT for several months preceding arrival.
K 7.3, s/p lokelma, IV Ca gluc, bicarb amp x1  Urinary retention and valencia placed with 500cc urine   On ARB and MRA    medical management for hyperkalemia  start bicarb gtt at 75cc/hr x 10 hrs

## 2022-04-25 NOTE — PHYSICAL THERAPY INITIAL EVALUATION ADULT - GAIT DISTANCE, PT EVAL
40ftx2 with 2 episodes of slight loss of balance from which pt was able to self-correct, pt ambulated 5ft without assistive device min assist x1 with + loss of balance requiring assistance to regain balance and fair- balance
x 2/75 feet

## 2022-04-25 NOTE — CONSULT NOTE ADULT - SUBJECTIVE AND OBJECTIVE BOX
Patient seen and evaluated at bedside    Chief Complaint: 2:1 AVB    HPI:  61 year old Slovak speaking male with past medical history of DM, HTN, dilated cardiomyopathy. LKN midnight, witnessed by wife. This morning he woke up this morning with left hemiparesis. NIHSS at [facility] for left hemiparesis. CTA with right M1 occlusion. HR intermittently in the 40s, reportedly in second degree heart block (but no EKG on file), given atropine in route to the hospital.  Not a candidate for tPA as LKN > 4.5 hours.  He was transferred to [facility] as stroke rescue for possible thrombectomy.  OZZY aware.    Will repeat all neuroimaging.      [place] staff contacted [facility] staff notified the patient was found to have acute renal failure Cr. 2.8, BUN 88, K 5.8 (untreated).  BNP increased.  Concern for digoxin toxicity, although digoxin level noted to be 1.6 at OSH.     In the ED, T96.4, HR 50, /65, on %. Slow afib with HR 30-50s. FSG 56, given amp D50. Given albuterol and Ca gluc 2g.     Admitted to MICU for CVA neuro checks and possible thrombectomy. (21 Apr 2022 18:10)    PMHx:   HTN (hypertension)  HLD (hyperlipidemia)    PSHx:   No significant past surgical history    Allergies:  No Known Allergies    Current Medications:   apixaban 5 milliGRAM(s) Oral every 12 hours  aspirin enteric coated 81 milliGRAM(s) Oral daily  atorvastatin 40 milliGRAM(s) Oral at bedtime  cyanocobalamin 1000 MICROGram(s) Oral daily  dextrose 5%. 1000 milliLiter(s) IV Continuous <Continuous>  dextrose 5%. 1000 milliLiter(s) IV Continuous <Continuous>  dextrose 50% Injectable 25 Gram(s) IV Push once  dextrose 50% Injectable 12.5 Gram(s) IV Push once  dextrose 50% Injectable 25 Gram(s) IV Push once  dextrose Oral Gel 15 Gram(s) Oral once PRN  glucagon  Injectable 1 milliGRAM(s) IntraMuscular once  hydrocortisone 1% Cream 1 Application(s) Topical two times a day  insulin lispro (ADMELOG) corrective regimen sliding scale   SubCutaneous three times a day before meals  insulin lispro (ADMELOG) corrective regimen sliding scale   SubCutaneous at bedtime  lidocaine 2% Jelly 4 milliLiter(s) IntraUrethral once  loratadine 10 milliGRAM(s) Oral daily  tamsulosin 0.4 milliGRAM(s) Oral at bedtime    REVIEW OF SYSTEMS:  Constitutional:     [x ] negative [ ] fevers [ ] chills [ ] weight loss [ ] weight gain  HEENT:                  [x ] negative [ ] dry eyes [ ] eye irritation [ ] postnasal drip [ ] nasal congestion  CV:                         [ x] negative  [ ] chest pain [ ] orthopnea [ ] palpitations [ ] murmur  Resp:                     [x ] negative [ ] cough [ ] shortness of breath [ ] dyspnea [ ] wheezing [ ] sputum [ ]hemoptysis  GI:                          [ x] negative [ ] nausea [ ] vomiting [ ] diarrhea [ ] constipation [ ] abd pain [ ] dysphagia   :                        [ x] negative [ ] dysuria [ ] nocturia [ ] hematuria [ ] increased urinary frequency  Musculoskeletal: [x ] negative [ ] back pain [ ] myalgias [ ] arthralgias [ ] fracture  Skin:                       [ x] negative [ ] rash [ ] itch  Neurological:        [ x] negative [ ] headache [ ] dizziness [ ] syncope [ ] weakness [ ] numbness  Psychiatric:           [ x] negative [ ] anxiety [ ] depression  Endocrine:            [ x] negative [ ] diabetes [ ] thyroid problem  Heme/Lymph:      [ x] negative [ ] anemia [ ] bleeding problem  Allergic/Immune: [ x] negative [ ] itchy eyes [ ] nasal discharge [ ] hives [ ] angioedema    [ x] All other systems negative  [ ] Unable to assess ROS due to    Physical Exam:  T(F): 98.3 (04-25), Max: 98.3 (04-25)  HR: 74 (04-25) (65 - 95)  BP: 171/67 (04-25) (121/74 - 171/67)  RR: 15 (04-25)  SpO2: 100% (04-25)  GENERAL: No acute distress, well-developed  HEAD:  Atraumatic, Normocephalic  ENT: EOMI, PERRLA, conjunctiva and sclera clear, Neck supple, No JVD, moist mucosa  CHEST/LUNG: Clear to auscultation bilaterally; No wheeze, equal breath sounds bilaterally   BACK: No spinal tenderness  HEART: Regular rate and rhythm; No murmurs, rubs, or gallops  ABDOMEN: Soft, Nontender, Nondistended; Bowel sounds present  EXTREMITIES:  No clubbing, cyanosis, or edema  PSYCH: Nl behavior, nl affect  NEUROLOGY: AAOx3, non-focal, cranial nerves intact  SKIN: Normal color, No rashes or lesions  LINES:    Cardiovascular Diagnostic Testing:    ECG: Personally reviewed:    < from: TTE with Doppler (w/Cont) (04.23.22 @ 12:02) >  Conclusions:  Endocardial visualization enhanced with intravenous  injection of Ultrasonic Enhancing Agent (Lumason).  Severe left ventricular enlargement.  Estimated ejection fraction 25%. Diffuse hypokinesis, with  inferior and inferoseptal akinesis.  Agitated saline injection demonstrates no evidence of a  patent foramen ovale.    < end of copied text >      Imaging:    CXR: Personally reviewed    Labs: Personally reviewed                        11.8   7.69  )-----------( 189      ( 25 Apr 2022 06:20 )             34.1     04-25    138  |  103  |  27<H>  ----------------------------<  148<H>  3.8   |  21<L>  |  1.19    Ca    9.3      25 Apr 2022 06:17  Phos  2.5     04-24  Mg     1.7     04-24    TPro  6.9  /  Alb  3.7  /  TBili  0.4  /  DBili  x   /  AST  29  /  ALT  37  /  AlkPhos  95  04-24    PT/INR - ( 24 Apr 2022 01:01 )   PT: 14.1 sec;   INR: 1.21 ratio         PTT - ( 24 Apr 2022 01:01 )  PTT:27.2 sec  Serum Pro-Brain Natriuretic Peptide: 2012 pg/mL (04-21 @ 13:04)        Thyroid Stimulating Hormone, Serum: 0.89 uIU/mL (04-22 @ 06:03)  Thyroid Stimulating Hormone, Serum: 0.78 uIU/mL (04-22 @ 00:02)  Thyroid Stimulating Hormone, Serum: 0.79 uIU/mL (04-21 @ 16:54)

## 2022-04-25 NOTE — SPEECH LANGUAGE PATHOLOGY EVALUATION - SLP DIAGNOSIS
Pt presents with grossly functional receptive/expressive language skills, speech/voice and cognitive-linguistic skills. Pt reportedly lives with his son in [place] and is not currently working. Pt will assist with cleaning within the household, however, is not responsible for cooking, finances or other daily tasks. Pt states he is from the [place], where his wife still resides, and previously employed as a "." No skilled ST services warranted at this time.

## 2022-04-25 NOTE — PHYSICAL THERAPY INITIAL EVALUATION ADULT - ASR WT BEARING STATUS EVAL
no weight-bearing restrictions
CTH 4/22: New R frontal temporal cortical lucency consistent with a small infarct in the R MCA distribution compared with 4/21/2022. MRI 4/23: Acute infarct in the R MCA territory involving the anterior temporal segment extending into the insula & R posterior frontal parietal region. Some associated petechial hemorrhage suggested on the susceptibility weighted imaging. Subtle mass effect on the R lateral ventricle. Suspicion of a small amount of thrombus in the distal R M1 proximal M2 segment/no weight-bearing restrictions

## 2022-04-25 NOTE — OCCUPATIONAL THERAPY INITIAL EVALUATION ADULT - LIVES WITH, PROFILE
[place] w few IRAM. Pt has a tub shower./children
[place] few [place]. Pt has a tub shower./children

## 2022-04-25 NOTE — DISCHARGE NOTE PROVIDER - PROVIDER TOKENS
PROVIDER:[TOKEN:[[id number]],FOLLOWUP:[2 weeks]],PROVIDER:[TOKEN:[[id number]],FOLLOWUP:[2 weeks]] PROVIDER:[TOKEN:[[id number]],FOLLOWUP:[2 weeks]],PROVIDER:[TOKEN:[[id number]],FOLLOWUP:[2 weeks]],FREE:[LAST:[Follow up],PHONE:[(   )    -],FAX:[(   )    -],ADDRESS:[After rehab, outpatient cardiology follow-up with at [facility] by calling [phone] or at [facility] by calling [phone].]]

## 2022-04-25 NOTE — SPEECH LANGUAGE PATHOLOGY EVALUATION - RIGHT/LEFT DISCRIMINATION
intact Xellucreciaz Pregnancy And Lactation Text: This medication is Pregnancy Category D and is not considered safe during pregnancy.  The risk during breast feeding is also uncertain.

## 2022-04-25 NOTE — SPEECH LANGUAGE PATHOLOGY EVALUATION - SLP PERTINENT HISTORY OF CURRENT PROBLEM
61 year old Somali speaking male with past medical history of DM, HTN, dilated cardiomyopathy, and possible (CAD?) , p/w left hemiparesis, 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 Admitted to MICU for neurological exams s/p CVA and possible urgent thrombectomy.

## 2022-04-25 NOTE — OCCUPATIONAL THERAPY INITIAL EVALUATION ADULT - BALANCE TRAINING, PT EVAL
Pt will improve standing balance one grade within 4 weeks
Pt will improve standing balance one grade within 4 weeks

## 2022-04-25 NOTE — PROGRESS NOTE ADULT - ATTENDING COMMENTS
Please note modifications to resident/fellow's note and plan below     Pt interviewed with Maltese interpreters   61 M history of HTN, HLD, DM, A. fib and severe LV dysfunction presented to OSH with left hemiparesis and was found to have acute CVA, now recovering and found to be in 2:1 heart block. Pt initially presented with bradycardia and AF in the setting of elevated K. Pt reports that he follows w a cardiologist back in [place]. He denies cp/sob at this time. Back home he states he is able to walk several blocks without cardiac sx.     Vital Signs Last 24 Hrs  T(C): 37.2 (25 Apr 2022 17:54), Max: 37.2 (25 Apr 2022 17:54)  T(F): 99 (25 Apr 2022 17:54), Max: 99 (25 Apr 2022 17:54)  HR: 76 (25 Apr 2022 20:00) (65 - 95)  BP: 123/91 (25 Apr 2022 20:00) (121/74 - 171/67)  BP(mean): 102 (25 Apr 2022 20:00) (81 - 116)  RR: 20 (25 Apr 2022 20:00) (15 - 28)  SpO2: 100% (25 Apr 2022 20:00) (97% - 100%)  I&O's Summary    24 Apr 2022 07:01  -  25 Apr 2022 07:00  --------------------------------------------------------  IN: 0 mL / OUT: 1850 mL / NET: -1850 mL    PHYSICAL EXAM:  General: No acute distress  HEENT: EOMI  Neck:  No JVD  Lungs: Clear to auscultation bilaterally; No rales or wheezing  Heart: Regular rate and rhythm; No murmurs, rubs, or gallops  Abdomen: soft, non tender, non distended   Extremities: warm, no edema   Nervous system:  Alert & Oriented X3  Psychiatric: Normal affect    Labs noted: Hgb 11.8 Cr stable.     ECG [my interpretation]: 4/22 sinus keiko LBBB     TELEMETRY: 2:1 AV block     ECHO: 4/23  "Conclusions:  Endocardial visualization enhanced with intravenous  injection of Ultrasonic Enhancing Agent (Lumason).  Severe left ventricular enlargement.  Estimated ejection fraction 25%. Diffuse hypokinesis, with  inferior and inferoseptal akinesis.  Agitated saline injection demonstrates no evidence of a  patent foramen ovale."    61 M history of HTN, HLD, DM, A. fib and severe LV dysfunction presented to OSH with left hemiparesis and was found to have acute CVA, now recovering and found to be in 2:1 heart block. Hemodynamically stable, no cp.  Appreciate EP recs, Pt would benefit from CRT-D.   - agree with plan as detailed above, f/u Neuro recs re safety of systemic heparin use if pt were to undergo LHC for ischemic eval on current admission. If LHC negative, consider chagas.  - avoid av kevin blocking agents  - AC for AF, no contraindications per neuro   - to start GDMT with lisinopril (start 5mg po qday, to be uptitrated per BP) when deemed ok from Neurology team standpoint for permissive HTN mgmt post CVA. Down the line to consider spironolactone.   - on asa/statin

## 2022-04-25 NOTE — DISCHARGE NOTE PROVIDER - HOSPITAL COURSE
61 year old Arabic speaking male with past medical history of DM, HTN, dilated cardiomyopathy. LKN midnight, witnessed by wife. This morning he woke up this morning with left hemiparesis. NIHSS at [facility] 4 for left hemiparesis. CTA with right M1 occlusion. HR intermittently in the 40s, reportedly in second degree heart block (but no EKG on file), given atropine in route to the hospital.  Not a candidate for tPA as LKN > 4.5 hours.  He was transferred to [facility] as stroke rescue for possible thrombectomy.  OZZY aware.    Will repeat all neuroimaging.    [facility] staff contacted [facility] staff notified the patient was found to have acute renal failure Cr. 2.8, BUN 88, K 5.8 (untreated).  BNP increased.  Concern for digoxin toxicity, although digoxin level noted to be 1.6 at OSH.   In the ED, T96.4, HR 50, /65, on %. Slow afib with HR 30-50s. FSG 56, given amp D50. Given albuterol and Ca gluc 2g.   Admitted to MICU for CVA neuro checks and possible thrombectomy.    CT Brain stroke protocol, CTA H&N, CTP 4/21  IMPRESSION:    CT head:  -Loss of gray-white matter differentiation in the right lateral frontal   lobe and insula concerning for acute infarction.  -No acute intracranial hemorrhage.    CT angiogram neck:  -At least mild stenosis of the left vertebral artery origin.  -Atherosclerotic changes at the bilateral carotid bifurcations without   stenosis.    CT angiogram head:  -Acute occlusion of the right MCA distal M1 and proximal M2 divisions.    CT perfusion:  At risk ischemic tissue: 125 mL, right MCA territory.  Core infarct: 6 mL, right MCA territory.  Mismatch volume: 119 mL.  Mismatch ratio: 20.8.    CTH non con 4/22  IMPRESSION: New right frontal temporal cortical lucency consistent with a   small infarct in the right middle cerebral artery distribution compared   with 4/21/2022.    MR Head non con 4/23  IMPRESSION:  Acute infarct in the right MCA territory involving the anterior temporal   segment extending intothe insula and right posterior frontal parietal   region. Some associated petechial hemorrhage suggested on the   susceptibility weighted imaging. Subtle mass effect on the right lateral   ventricle. Suspicion of a small amount of thrombus in the distal right M1   proximal M2 segment    Impression: Right MCA infarct with a right M1 occlusion. Mechanism may be cardioembolism related to afib.    Antithrombotic therapy: initially started on ASA 81 mg and switched to Eliqius 5mg BID on 04/25    Evaluated by PT/OT and was recommended AR. Patient stable for discharge.      61 year old Divehi speaking male with past medical history of DM, HTN, dilated cardiomyopathy. LKN midnight, witnessed by wife. This morning he woke up this morning with left hemiparesis. NIHSS at [place] [id number] for left hemiparesis. CTA with right M1 occlusion. HR intermittently in the 40s, reportedly in second degree heart block (but no EKG on file), given atropine in route to the hospital.  Not a candidate for tPA as LKN > 4.5 hours.  He was transferred to [facility] as stroke rescue for possible thrombectomy.  OZZY aware.    Will repeat all neuroimaging.    [place] staff contacted [facility] staff notified the patient was found to have acute renal failure Cr. 2.8, BUN 88, K 5.8 (untreated).  BNP increased.  Concern for digoxin toxicity, although digoxin level noted to be 1.6 at OSH.   In the ED, T96.4, HR 50, /65, on %. Slow afib with HR 30-50s. FSG 56, given amp D50. Given albuterol and Ca gluc 2g.   Admitted to MICU for CVA neuro checks and possible thrombectomy. Patient deemed medically unstable for thrombectomy due to BRASH syndrome     CT Brain stroke protocol, CTA H&N, CTP 4/21  IMPRESSION:    CT head:  -Loss of gray-white matter differentiation in the right lateral frontal   lobe and insula concerning for acute infarction.  -No acute intracranial hemorrhage.    CT angiogram neck:  -At least mild stenosis of the left vertebral artery origin.  -Atherosclerotic changes at the bilateral carotid bifurcations without   stenosis.    CT angiogram head:  -Acute occlusion of the right MCA distal M1 and proximal M2 divisions.    CT perfusion:  At risk ischemic tissue: 125 mL, right MCA territory.  Core infarct: 6 mL, right MCA territory.  Mismatch volume: 119 mL.  Mismatch ratio: 20.8.    CTH non con 4/22  IMPRESSION: New right frontal temporal cortical lucency consistent with a   small infarct in the right middle cerebral artery distribution compared   with 4/21/2022.    MR Head non con 4/23  IMPRESSION:  Acute infarct in the right MCA territory involving the anterior temporal   segment extending intothe insula and right posterior frontal parietal   region. Some associated petechial hemorrhage suggested on the   susceptibility weighted imaging. Subtle mass effect on the right lateral   ventricle. Suspicion of a small amount of thrombus in the distal right M1   proximal M2 segment    Impression: Right MCA infarct with a right M1 occlusion. Mechanism may be cardioembolism related to afib.    Antithrombotic therapy: initially started on ASA 81 mg and switched to Eliqius 5mg BID on 04/25    Evaluated by PT/OT and was recommended AR. Patient stable for discharge.      61 year old Frisian speaking male with past medical history of DM, HTN, dilated cardiomyopathy. LKN midnight, witnessed by wife. This morning he woke up this morning with left hemiparesis. NIHSS at [facility] 4 for left hemiparesis. CTA with right M1 occlusion. HR intermittently in the 40s, reportedly in second degree heart block (but no EKG on file), given atropine in route to the hospital.  Not a candidate for tPA as LKN > 4.5 hours.  He was transferred to [facility] as stroke rescue for possible thrombectomy.  OZZY aware.    Will repeat all neuroimaging.    [facility] staff contacted [facility] staff notified the patient was found to have acute renal failure Cr. 2.8, BUN 88, K 5.8 (untreated).  BNP increased.  Concern for digoxin toxicity, although digoxin level noted to be 1.6 at OSH.   In the ED, T96.4, HR 50, /65, on %. Slow afib with HR 30-50s. FSG 56, given amp D50. Given albuterol and Ca gluc 2g.   Admitted to MICU for CVA neuro checks and possible thrombectomy. Patient deemed medically unstable for thrombectomy due to BRASH syndrome     4/27-S/P Diagnostic Cath-prox LAD 40%, dist RPDA 60%, Right radial access site.     4/28 ICD implant     Eliquis resumed      CT Brain stroke protocol, CTA H&N, CTP 4/21  IMPRESSION:    CT head:  -Loss of gray-white matter differentiation in the right lateral frontal   lobe and insula concerning for acute infarction.  -No acute intracranial hemorrhage.    CT angiogram neck:  -At least mild stenosis of the left vertebral artery origin.  -Atherosclerotic changes at the bilateral carotid bifurcations without   stenosis.    CT angiogram head:  -Acute occlusion of the right MCA distal M1 and proximal M2 divisions.    CT perfusion:  At risk ischemic tissue: 125 mL, right MCA territory.  Core infarct: 6 mL, right MCA territory.  Mismatch volume: 119 mL.  Mismatch ratio: 20.8.    CTH non con 4/22  IMPRESSION: New right frontal temporal cortical lucency consistent with a   small infarct in the right middle cerebral artery distribution compared   with 4/21/2022.    MR Head non con 4/23  IMPRESSION:  Acute infarct in the right MCA territory involving the anterior temporal   segment extending intothe insula and right posterior frontal parietal   region. Some associated petechial hemorrhage suggested on the   susceptibility weighted imaging. Subtle mass effect on the right lateral   ventricle. Suspicion of a small amount of thrombus in the distal right M1   proximal M2 segment    Impression: Right MCA infarct with a right M1 occlusion. Mechanism may be cardioembolism related to afib.    Antithrombotic therapy: initially started on ASA 81 mg and switched to Eliqius 5mg BID on 04/25    Evaluated by PT/OT and was recommended AR. Patient stable for discharge.

## 2022-04-26 LAB
ANION GAP SERPL CALC-SCNC: 16 MMOL/L — SIGNIFICANT CHANGE UP (ref 5–17)
BUN SERPL-MCNC: 28 MG/DL — HIGH (ref 7–23)
CALCIUM SERPL-MCNC: 9.6 MG/DL — SIGNIFICANT CHANGE UP (ref 8.4–10.5)
CHLORIDE SERPL-SCNC: 103 MMOL/L — SIGNIFICANT CHANGE UP (ref 96–108)
CO2 SERPL-SCNC: 21 MMOL/L — LOW (ref 22–31)
CREAT SERPL-MCNC: 1.12 MG/DL — SIGNIFICANT CHANGE UP (ref 0.5–1.3)
EGFR: 75 ML/MIN/1.73M2 — SIGNIFICANT CHANGE UP
GLUCOSE BLDC GLUCOMTR-MCNC: 159 MG/DL — HIGH (ref 70–99)
GLUCOSE BLDC GLUCOMTR-MCNC: 237 MG/DL — HIGH (ref 70–99)
GLUCOSE BLDC GLUCOMTR-MCNC: 238 MG/DL — HIGH (ref 70–99)
GLUCOSE BLDC GLUCOMTR-MCNC: 390 MG/DL — HIGH (ref 70–99)
GLUCOSE SERPL-MCNC: 154 MG/DL — HIGH (ref 70–99)
HCT VFR BLD CALC: 37.4 % — LOW (ref 39–50)
HGB BLD-MCNC: 12.7 G/DL — LOW (ref 13–17)
MCHC RBC-ENTMCNC: 30.6 PG — SIGNIFICANT CHANGE UP (ref 27–34)
MCHC RBC-ENTMCNC: 34 GM/DL — SIGNIFICANT CHANGE UP (ref 32–36)
MCV RBC AUTO: 90.1 FL — SIGNIFICANT CHANGE UP (ref 80–100)
NRBC # BLD: 0 /100 WBCS — SIGNIFICANT CHANGE UP (ref 0–0)
PLATELET # BLD AUTO: 232 K/UL — SIGNIFICANT CHANGE UP (ref 150–400)
POTASSIUM SERPL-MCNC: 3.9 MMOL/L — SIGNIFICANT CHANGE UP (ref 3.5–5.3)
POTASSIUM SERPL-SCNC: 3.9 MMOL/L — SIGNIFICANT CHANGE UP (ref 3.5–5.3)
RBC # BLD: 4.15 M/UL — LOW (ref 4.2–5.8)
RBC # FLD: 12.2 % — SIGNIFICANT CHANGE UP (ref 10.3–14.5)
SODIUM SERPL-SCNC: 140 MMOL/L — SIGNIFICANT CHANGE UP (ref 135–145)
WBC # BLD: 9.43 K/UL — SIGNIFICANT CHANGE UP (ref 3.8–10.5)
WBC # FLD AUTO: 9.43 K/UL — SIGNIFICANT CHANGE UP (ref 3.8–10.5)

## 2022-04-26 PROCEDURE — 99233 SBSQ HOSP IP/OBS HIGH 50: CPT

## 2022-04-26 RX ADMIN — ATORVASTATIN CALCIUM 40 MILLIGRAM(S): 80 TABLET, FILM COATED ORAL at 22:15

## 2022-04-26 RX ADMIN — PREGABALIN 1000 MICROGRAM(S): 225 CAPSULE ORAL at 11:09

## 2022-04-26 RX ADMIN — LORATADINE 10 MILLIGRAM(S): 10 TABLET ORAL at 11:09

## 2022-04-26 RX ADMIN — TAMSULOSIN HYDROCHLORIDE 0.4 MILLIGRAM(S): 0.4 CAPSULE ORAL at 22:15

## 2022-04-26 RX ADMIN — APIXABAN 5 MILLIGRAM(S): 2.5 TABLET, FILM COATED ORAL at 05:02

## 2022-04-26 RX ADMIN — Medication 5: at 16:40

## 2022-04-26 RX ADMIN — Medication 1 APPLICATION(S): at 17:49

## 2022-04-26 RX ADMIN — Medication 81 MILLIGRAM(S): at 11:09

## 2022-04-26 RX ADMIN — Medication 1: at 08:25

## 2022-04-26 RX ADMIN — Medication 1 APPLICATION(S): at 05:03

## 2022-04-26 RX ADMIN — Medication 2: at 11:33

## 2022-04-26 NOTE — CONSULT NOTE ADULT - PROVIDER SPECIALTY LIST ADULT
Cardiology
Neurology
MICU
Nephrology
Internal Medicine
Rehab Medicine
Electrophysiology
Neurosurgery

## 2022-04-26 NOTE — PROGRESS NOTE ADULT - ASSESSMENT
60 yo M with hx of HTN, HLD, DM, afib not on AC, severe LV dysfunction presented to OSH with L hemiparesis and was found to have acute CVA, not candidate for tPA as LKN > 4.5 hrs. Transferred to [facility] as stroke rescue for possible thrombectomy, found to have in afib with SVR HR 30-60s in the setting of hyperK at 7.3. Now bradycardia resolved after normalization of electrolytes. EP consulted for 2:1 AVB. Currently with minimal residual deficits from his CVA.     #2:1 AVB  - patient asymptomatic  - telemetry reviewed with attending, likely Mahnaz  - can hold BB for now  - c/w telemetry  - keep K > 4, Mg > 2    #afib  - CHADSVASC of 5  - c/w Eliquis 5 BID    #LBBB  - has EF of 25% with QRS of > 150 ms  - pt would benefit from CRT-D  - would need LHC to r/o ischemic disease that can be re-vascularized and cMR before proceeding with CRT-D  - if LHC without coronary disease, would check Chaga's as etiology for cardiomyopathy 60 yo M with hx of HTN, HLD, DM, afib not on AC, severe LV dysfunction presented to OSH with L hemiparesis and was found to have acute CVA, not candidate for tPA as LKN > 4.5 hrs. Transferred to [facility] as stroke rescue for possible thrombectomy, found to have in afib with SVR HR 30-60s in the setting of hyperK at 7.3. Now bradycardia resolved after normalization of electrolytes. EP consulted for 2:1 AVB. Currently with minimal residual deficits from his CVA.     #2:1 AVB  - patient asymptomatic  - telemetry reviewed with attending, likely Mahnaz, maybe infra-kevin block  - hold BB  - c/w telemetry  - keep K > 4, Mg > 2    #afib  - CHADSVASC of 5  - c/w Eliquis 5 BID    #LBBB  - has EF of 25% with QRS of > 150 ms  - pt would benefit from CRT-D  - would need LHC to r/o ischemic disease that can be re-vascularized and cMR before proceeding with CRT-D  - if not on schedule for LHC tomorrow, please get cMR

## 2022-04-26 NOTE — PROGRESS NOTE ADULT - ATTENDING COMMENTS
Please note modifications to resident/fellow's note and plan below     - no major events overnight  - Pt feels well, no cp/sob     Vital signs reviewed     PHYSICAL EXAM:  General: No acute distress  HEENT: EOMI  Neck:  No JVD  Lungs: Clear to auscultation bilaterally; No rales or wheezing  Heart: Regular rate and rhythm; No murmurs, rubs, or gallops  Abdomen: soft, non tender, non distended   Extremities: warm, no edema   Nervous system:  Alert & Oriented X3  Psychiatric: Normal affect    Labs noted: Hgb 12.7 Cr stable.     ECG [my interpretation]: 4/22 sinus keiko LBBB 4/25 SR w 1st degree LBBB wiith repol abn     TELEMETRY: Mobitz 1     ECHO: 4/23  "Conclusions:  Endocardial visualization enhanced with intravenous  injection of Ultrasonic Enhancing Agent (Lumason).  Severe left ventricular enlargement.  Estimated ejection fraction 25%. Diffuse hypokinesis, with  inferior and inferoseptal akinesis.  Agitated saline injection demonstrates no evidence of a  patent foramen ovale."    61 M history of HTN, HLD, DM, A. fib and severe LV dysfunction presented to OSH with left hemiparesis and was found to have acute CVA, now recovering and found to be in 2:1 heart block/mobitz1. Hemodynamically stable, no cp.  Appreciate EP recs, Pt would benefit from CRT-D.   - agree with plan as detailed above, per Neuro no contraindication with undergoing LHC and DAPT tx if necessitated pending cath results. If LHC negative, consider chagas.  - avoid av kevin blocking agents  - AC for AF, PM dose on 4/26 and AM dose 4/27 to be held in anticipation for cath   - to start GDMT with lisinopril (start 5mg po qday, to be uptitrated per BP) when deemed ok from Neurology team standpoint for permissive HTN mgmt post CVA. Down the line to consider spironolactone.   - on asa/statin .  - tele

## 2022-04-26 NOTE — CONSULT NOTE ADULT - SUBJECTIVE AND OBJECTIVE BOX
61 year old Malay speaking male with past medical history of DM, HTN, dilated cardiomyopathy. LKN midnight prior, witnessed by wife.  Morning of admission he woke up   with left hemiparesis. NIHSS at [facility] 4 for left hemiparesis. CTA with right M1 occlusion. HR intermittently in the 40s, reportedly in second degree heart block (but no EKG on file), given atropine in route to the hospital.  Not a candidate for tPA as LKN > 4.5 hours.  He was transferred to [facility] as stroke rescue for possible thrombectomy.    [facility] staff contacted [facility] staff notified the patient was found to have acute renal failure Cr. 2.8, BUN 88, K 5.8 (untreated).  BNP increased.  Concern for digoxin toxicity, although digoxin level noted to be 1.6 at OSH. In the ED, T96.4, HR 50, /65, on %. Slow afib with HR 30-50s. FSG 56, given amp D50. Given albuterol and Ca gluc 2g.  Admitted to MICU for CVA neuro checks and possible thrombectomy.    04-26-22 @ 12:16  PAST MEDICAL & SURGICAL HISTORY:  HTN (hypertension)    HLD (hyperlipidemia)    No significant past surgical history        Review of Systems:   CONSTITUTIONAL: No fever, weight loss, or fatigue  EYES: No eye pain, visual disturbances, or discharge  ENMT:  No difficulty hearing, tinnitus, vertigo; No sinus or throat pain  NECK: No pain or stiffness  BREASTS: No pain, masses, or nipple discharge  RESPIRATORY: No cough, wheezing, chills or hemoptysis; No shortness of breath  CARDIOVASCULAR: No chest pain, palpitations, dizziness, or leg swelling  GASTROINTESTINAL: No abdominal or epigastric pain. No nausea, vomiting, or hematemesis; No diarrhea or constipation. No melena or hematochezia.  GENITOURINARY: No dysuria, frequency, hematuria, or incontinence  NEUROLOGICAL: No headaches, memory loss, loss of strength, numbness, or tremors  SKIN: No itching, burning, rashes, or lesions   LYMPH NODES: No enlarged glands  ENDOCRINE: No heat or cold intolerance; No hair loss  MUSCULOSKELETAL: No joint pain or swelling; No muscle, back, or extremity pain  PSYCHIATRIC: No depression, anxiety, mood swings, or difficulty sleeping  HEME/LYMPH: No easy bruising, or bleeding gums  ALLERY AND IMMUNOLOGIC: No hives or eczema    Allergies    No Known Allergies    Intolerances        Social History:     FAMILY HISTORY:      MEDICATIONS  (STANDING):  apixaban 5 milliGRAM(s) Oral every 12 hours  aspirin enteric coated 81 milliGRAM(s) Oral daily  atorvastatin 40 milliGRAM(s) Oral at bedtime  cyanocobalamin 1000 MICROGram(s) Oral daily  dextrose 5%. 1000 milliLiter(s) (50 mL/Hr) IV Continuous <Continuous>  dextrose 5%. 1000 milliLiter(s) (100 mL/Hr) IV Continuous <Continuous>  dextrose 50% Injectable 25 Gram(s) IV Push once  dextrose 50% Injectable 12.5 Gram(s) IV Push once  dextrose 50% Injectable 25 Gram(s) IV Push once  glucagon  Injectable 1 milliGRAM(s) IntraMuscular once  hydrocortisone 1% Cream 1 Application(s) Topical two times a day  insulin lispro (ADMELOG) corrective regimen sliding scale   SubCutaneous three times a day before meals  insulin lispro (ADMELOG) corrective regimen sliding scale   SubCutaneous at bedtime  lidocaine 2% Jelly 4 milliLiter(s) IntraUrethral once  loratadine 10 milliGRAM(s) Oral daily  tamsulosin 0.4 milliGRAM(s) Oral at bedtime    MEDICATIONS  (PRN):  dextrose Oral Gel 15 Gram(s) Oral once PRN Blood Glucose LESS THAN 70 milliGRAM(s)/deciliter      Vital Signs Last 24 Hrs  T(C): 36.6 (26 Apr 2022 07:29), Max: 37.2 (25 Apr 2022 17:54)  T(F): 97.8 (26 Apr 2022 07:29), Max: 99 (25 Apr 2022 17:54)  HR: 84 (26 Apr 2022 12:00) (66 - 87)  BP: 140/78 (26 Apr 2022 12:00) (108/62 - 171/67)  BP(mean): 85 (26 Apr 2022 12:00) (76 - 102)  RR: 20 (26 Apr 2022 08:00) (15 - 20)  SpO2: 100% (26 Apr 2022 12:00) (98% - 100%)  CAPILLARY BLOOD GLUCOSE      POCT Blood Glucose.: 237 mg/dL (26 Apr 2022 11:25)  POCT Blood Glucose.: 159 mg/dL (26 Apr 2022 07:53)  POCT Blood Glucose.: 286 mg/dL (25 Apr 2022 20:17)  POCT Blood Glucose.: 162 mg/dL (25 Apr 2022 16:47)    I&O's Summary      PHYSICAL EXAM:  GENERAL: NAD, well-developed  HEAD:  Atraumatic, Normocephalic  EYES: EOMI, PERRLA, conjunctiva and sclera clear  NECK: Supple, No JVD  CHEST/LUNG: Clear to auscultation bilaterally; No wheeze  HEART: Regular rate and rhythm; No murmurs, rubs, or gallops  ABDOMEN: Soft, Nontender, Nondistended; Bowel sounds present  EXTREMITIES:  2+ Peripheral Pulses, No clubbing, cyanosis, or edema  NEUROLOGICAL EXAM:  Mental status: Awake, alert, correctly states age, place and year, speech fluent, follows commands, mild left visual extinction and neglect   Cranial Nerves: no facial palsy,  improves on smiling, no nystagmus, mild dysarthria, no visual field cut.     Motor exam: Normal tone, Right side moves well against gravity, left hemiparesis: LUE pronator drift /LLE subtle drift- orbiting.   Sensation: Intact to light touch.    Coordination/ Gait: No dysmetria, gait not tested    LABS:                        12.7   9.43  )-----------( 232      ( 26 Apr 2022 06:31 )             37.4     04-26    140  |  103  |  28<H>  ----------------------------<  154<H>  3.9   |  21<L>  |  1.12    Ca    9.6      26 Apr 2022 06:31    < from: MR Head No Cont (04.23.22 @ 14:54) >    IMPRESSION:  Acute infarct in the right MCA territory involving the anterior temporal   segment extending intothe insula and right posterior frontal parietal   region. Some associated petechial hemorrhage suggested on the   susceptibility weighted imaging. Subtle mass effect on the right lateral   ventricle. Suspicion of a small amount of thrombus in the distal right M1   proximal M2 segment    < end of copied text >  < from: TTE with Doppler (w/Cont) (04.23.22 @ 12:02) >  Conclusions:  Endocardial visualization enhanced with intravenous  injection of Ultrasonic Enhancing Agent (Lumason).  Severe left ventricular enlargement.  Estimated ejection fraction 25%. Diffuse hypokinesis, with  inferior and inferoseptal akinesis.  Agitated saline injection demonstrates no evidence of a  patent foramen ovale.    < end of copied text >              RADIOLOGY & ADDITIONAL TESTS:    Imaging Personally Reviewed:    Consultant(s) Notes Reviewed:      Care Discussed with Consultants/Other Providers:

## 2022-04-26 NOTE — PROGRESS NOTE ADULT - SUBJECTIVE AND OBJECTIVE BOX
Patient seen and examined at bedside.    Overnight Events: frequent 2:1 AVB on tele, also 1'AVB.    Review Of Systems: No chest pain, shortness of breath, or palpitations            Current Meds:  apixaban 5 milliGRAM(s) Oral every 12 hours  aspirin enteric coated 81 milliGRAM(s) Oral daily  atorvastatin 40 milliGRAM(s) Oral at bedtime  cyanocobalamin 1000 MICROGram(s) Oral daily  dextrose 5%. 1000 milliLiter(s) IV Continuous <Continuous>  dextrose 5%. 1000 milliLiter(s) IV Continuous <Continuous>  dextrose 50% Injectable 25 Gram(s) IV Push once  dextrose 50% Injectable 12.5 Gram(s) IV Push once  dextrose 50% Injectable 25 Gram(s) IV Push once  dextrose Oral Gel 15 Gram(s) Oral once PRN  glucagon  Injectable 1 milliGRAM(s) IntraMuscular once  hydrocortisone 1% Cream 1 Application(s) Topical two times a day  insulin lispro (ADMELOG) corrective regimen sliding scale   SubCutaneous three times a day before meals  insulin lispro (ADMELOG) corrective regimen sliding scale   SubCutaneous at bedtime  lidocaine 2% Jelly 4 milliLiter(s) IntraUrethral once  loratadine 10 milliGRAM(s) Oral daily  tamsulosin 0.4 milliGRAM(s) Oral at bedtime      Vitals:  T(F): 97.8 (04-26), Max: 99 (04-25)  HR: 66 (04-26) (66 - 87)  BP: 133/78 (04-26) (108/62 - 171/67)  RR: 20 (04-26)  SpO2: 98% (04-26)  I&O's Summary      Physical Exam:  Appearance: No acute distress; well appearing  Eyes: PERRL, EOMI, pink conjunctiva  HEENT: Normal oral mucosa  Cardiovascular: RRR, S1, S2, no murmurs, rubs, or gallops; no edema; no JVD  Respiratory: Clear to auscultation bilaterally  Gastrointestinal: soft, non-tender, non-distended with normal bowel sounds  Musculoskeletal: No clubbing; no joint deformity   Neurologic: Non-focal  Lymphatic: No lymphadenopathy  Psychiatry: AAOx3, mood & affect appropriate  Skin: No rashes, ecchymoses, or cyanosis                          12.7   9.43  )-----------( 232      ( 26 Apr 2022 06:31 )             37.4     04-26    140  |  103  |  28<H>  ----------------------------<  154<H>  3.9   |  21<L>  |  1.12    Ca    9.6      26 Apr 2022 06:31        CARDIAC MARKERS ( 21 Apr 2022 15:09 )  36 ng/L / x     / x     / x     / x     / x      CARDIAC MARKERS ( 21 Apr 2022 13:04 )  37 ng/L / x     / x     / x     / x     / x          Serum Pro-Brain Natriuretic Peptide: 2012 pg/mL (04-21 @ 13:04)      New ECG(s): Personally reviewed    < from: TTE with Doppler (w/Cont) (04.23.22 @ 12:02) >  Conclusions:  Endocardial visualization enhanced with intravenous  injection of Ultrasonic Enhancing Agent (Lumason).  Severe left ventricular enlargement.  Estimated ejection fraction 25%. Diffuse hypokinesis, with  inferior and inferoseptal akinesis.  Agitated saline injection demonstrates no evidence of a  patent foramen ovale.    < end of copied text >

## 2022-04-26 NOTE — PROGRESS NOTE ADULT - ASSESSMENT
ASSESSMENT: Patient is a 62yo RT handed Indonesian speaking M with pmh of HTN, DM-II,  dilated cardiomyopathy presents to [facility] as a transfer for Rt M1 occlusion. Per chart review, patient's LWK was at midnight on 4/21. Patient had CT and CTA which was noted to show Rt M1 occlusion. Patient was not a TPa candidate due to out of time window. Patient was MT candidate at that time thus was agreed to transfer patient. Patient had initial NIHSS 4 whcih increased to NIHSS 9 then NIHSS 6 on repeat examination. Upon transfer patient deemed medically unstable for thrombectomy due to BRASH syndrome and admitted to MICU for further monitoring.     Impression. right MCA infarct with a right M1 occlusion. Mechanism may be cardioembolism related to atrial fibrillation.    NEURO: Patient neurologically without acute change, Continue close monitoring for neurologic deterioration, permissive HTN to gradual normotension, holding home antihypertensives, avoid fluctuations in BP. LDL 44, titrate statin to LDL goal less than 70, MR Head non con as noted above. Agitation overall appears improved. . Physical therapy/OT: AR on re-eval.     ANTITHROMBOTIC THERAPY:  Apixaban initiated, ASA if indicated from cardiac standpoint.     PULMONARY: CXR clear, protecting airway, saturating well.    CARDIOVASCULAR: TTE: Severe left ventricular enlargement. Estimated ejection fraction 25%. Diffuse hypokinesis, with inferior and inferoseptal akinesis. On admission patient was found to be in Atrial fibrillation slow ventricular response and with IVCD/BBB, now sinus bradycardia with LBBB. Cardiology consult appreciated: restart lisinopril as bp permits, would hold for SBP < 130mmHg and introduce gradually from neurological standpoint, aldactone home regimen as tolerates, AC for afib,   bradycardia initially afib with slow ventricular response, IVCD/BBB then sinus keiko with LBBB, dig toxicity ruled out, AVOID ANY AVN BLOCKING AGENTS, bp meds as noted, EP consulted:   1' AVB now, mary anne, hold BB for now, would benefit from CRT-D, [facility], from neurological standpoint no contraindication to cardiac catheterization, should heparin need to be used would be cautious avoiding bolus if feasible, patient is also therapeutically anticoagulated at this time.    SBP goal: permissive HTN to gradual normotension as tolerated, neurologically tolerating SBP right now 120-160mmhg , gradual titration over the next week  avoiding rapid fluctuations and hypotension.     GASTROINTESTINAL: Passed dysphagia screening, tolerating diet.     Diet: Consistent carb no snacks    RENAL: Patient initially with NANCY, creatinine 2.76 & K+ 7.5. Renal was consulted, no HD needed and was treated with a bicarb drip. NANCY and hyperkalemia resolved & patient off bicarb drip. Creatinine remains stable 1.12& K+ 3.9 Will continue to monitor kidney function and electrolytes. Taylor removed.  Monitor for changes in UO.     Na Goal: Greater than 135     Taylor: n    HEMATOLOGY: H/H without acute change, Platelets 232 . No si/sx of bleeding. He should have all age and risk appropriate malignancy screenings.       DVT ppx: Heparin s.c [] LMWH [x]     ID: afebrile, no leukocytosis     other: social work consulted. Continue monitoring for further glucose control.     DISPOSITION: PT/OT recommends AR, D/W Dr. Wheeler for transfer to medical service for further care. Please call with any questions or concerns.      CORE MEASURES:        Admission NIHSS: 6     TPA: [] YES [x] NO      LDL/HDL: 44/41     Depression Screen: o- wants his long term partner to be able to come.      Statin Therapy: y     Dysphagia Screen: [x] PASS [] FAIL     Smoking [] YES [] NO      Afib [x] YES [] NO     Stroke Education [x] YES [] NO    Obtain screening lower extremity venous ultrasound in patients who meet 1 or more of the following criteria as patient is high risk for DVT/PE on admission:   [] History of DVT/PE  []Hypercoagulable states (Factor V Leiden, Cancer, OCP, etc. )  []Prolonged immobility (hemiplegia/hemiparesis/post operative or any other extended immobilization)  [] Transferred from outside facility (Rehab or Long term care)  [] Age </= to 50   ASSESSMENT: Patient is a 60yo RT handed Latvian speaking M with pmh of HTN, DM-II,  dilated cardiomyopathy presents to [facility] as a transfer for Rt M1 occlusion. Per chart review, patient's LWK was at midnight on 4/21. Patient had CT and CTA which was noted to show Rt M1 occlusion. Patient was not a TPa candidate due to out of time window. Patient was MT candidate at that time thus was agreed to transfer patient. Patient had initial NIHSS 4 whcih increased to NIHSS 9 then NIHSS 6 on repeat examination. Upon transfer patient deemed medically unstable for thrombectomy due to BRASH syndrome and admitted to MICU for further monitoring.     Impression. right MCA infarct with a right M1 occlusion. Mechanism may be cardioembolism related to atrial fibrillation.    NEURO: Patient neurologically without acute change, Continue close monitoring for neurologic deterioration, permissive HTN to gradual normotension, holding home antihypertensives, avoid fluctuations in BP. LDL 44, titrate statin to LDL goal less than 70, MR Head non con as noted above. Agitation overall appears improved. . Physical therapy/OT: AR on re-eval.     ANTITHROMBOTIC THERAPY:  Apixaban 5mg BID- cardiology would like to hold for [facility], would restart when safe at soonest time possible, ASA if indicated from cardiac standpoint.       PULMONARY: CXR clear, protecting airway, saturating well.    CARDIOVASCULAR: TTE: Severe left ventricular enlargement. Estimated ejection fraction 25%. Diffuse hypokinesis, with inferior and inferoseptal akinesis. On admission patient was found to be in Atrial fibrillation slow ventricular response and with IVCD/BBB, now sinus bradycardia with LBBB. Cardiology consult appreciated: restart lisinopril as bp permits, would hold for SBP < 130mmHg and introduce gradually from neurological standpoint, aldactone home regimen as tolerates, AC for afib,   bradycardia initially afib with slow ventricular response, IVCD/BBB then sinus keiko with LBBB, dig toxicity ruled out, AVOID ANY AVN BLOCKING AGENTS, bp meds as noted, EP consulted:   1' AVB now, mary anne, hold BB for now, would benefit from CRT-D, [facility], from neurological standpoint no contraindication to cardiac catheterization, should heparin need to be used would be cautious avoiding bolus if feasible, patient is also therapeutically anticoagulated at this time.    SBP goal: permissive HTN to gradual normotension as tolerated, neurologically tolerating SBP right now 120-160mmhg , gradual titration over the next week  avoiding rapid fluctuations and hypotension.     GASTROINTESTINAL: Passed dysphagia screening, tolerating diet.     Diet: Consistent carb no snacks    RENAL: Patient initially with NANCY, creatinine 2.76 & K+ 7.5. Renal was consulted, no HD needed and was treated with a bicarb drip. NANCY and hyperkalemia resolved & patient off bicarb drip. Creatinine remains stable 1.12& K+ 3.9 Will continue to monitor kidney function and electrolytes. Taylor removed.  Monitor for changes in UO.     Na Goal: Greater than 135     Taylor: n    HEMATOLOGY: H/H without acute change, Platelets 232 . No si/sx of bleeding. He should have all age and risk appropriate malignancy screenings.       DVT ppx: Heparin s.c [] LMWH [x]     ID: afebrile, no leukocytosis     other: social work consulted. Continue monitoring for further glucose control.     DISPOSITION: PT/OT recommends AR, D/W Dr. Wheeler for transfer to medical service for further care. Please call with any questions or concerns.      CORE MEASURES:        Admission NIHSS: 6     TPA: [] YES [x] NO      LDL/HDL: 44/41     Depression Screen: o- wants his long term partner to be able to come.      Statin Therapy: y     Dysphagia Screen: [x] PASS [] FAIL     Smoking [] YES [] NO      Afib [x] YES [] NO     Stroke Education [x] YES [] NO    Obtain screening lower extremity venous ultrasound in patients who meet 1 or more of the following criteria as patient is high risk for DVT/PE on admission:   [] History of DVT/PE  []Hypercoagulable states (Factor V Leiden, Cancer, OCP, etc. )  []Prolonged immobility (hemiplegia/hemiparesis/post operative or any other extended immobilization)  [] Transferred from outside facility (Rehab or Long term care)  [] Age </= to 50   ASSESSMENT: Patient is a 60yo RT handed English speaking M with pmh of HTN, DM-II,  dilated cardiomyopathy presents to [facility] as a transfer for Rt M1 occlusion. Per chart review, patient's LWK was at midnight on 4/21. Patient had CT and CTA which was noted to show Rt M1 occlusion. Patient was not a TPa candidate due to out of time window. Patient was MT candidate at that time thus was agreed to transfer patient. Patient had initial NIHSS 4 whcih increased to NIHSS 9 then NIHSS 6 on repeat examination. Upon transfer patient deemed medically unstable for thrombectomy due to BRASH syndrome and admitted to MICU for further monitoring.     Impression. right MCA infarct with a right M1 occlusion, . Mechanism may be cardioembolism related to atrial fibrillation.    NEURO: Patient neurologically without acute change, Continue close monitoring for neurologic deterioration given petechial hemorrhagic transformation- overall clinically stable and has improved, permissive HTN to gradual normotension, holding home antihypertensives, avoid fluctuations in BP. LDL 44, titrate statin to LDL goal less than 70, MR Head non con as noted above. Agitation overall appears improved. . Physical therapy/OT: AR on re-eval.     ANTITHROMBOTIC THERAPY:  Apixaban 5mg BID from the neurological standpoint for secondary stroke prevention in atrial fibrillation- cardiology would like to hold for [facility], would restart when safe at soonest time possible, ASA/Plavix if absolutely indicated from cardiac standpoint should he require for CAD and/or stents.       PULMONARY: CXR clear, protecting airway, saturating well.    CARDIOVASCULAR: TTE: Severe left ventricular enlargement. Estimated ejection fraction 25%. Diffuse hypokinesis, with inferior and inferoseptal akinesis. On admission patient was found to be in Atrial fibrillation slow ventricular response and with IVCD/BBB, now sinus bradycardia with LBBB. Cardiology consult appreciated: hold lisinopril and aldactone for now as noted, AC for afib,  bradycardia initially afib with slow ventricular response, IVCD/BBB then sinus keiko with LBBB, dig toxicity ruled out, AVOID ANY AVN BLOCKING AGENTS, bp meds as  as per cardio, EP consulted:   1' AVB now, mary anne, hold BB for now, would benefit from CRT-D, [facility], from neurological standpoint no contraindication to cardiac catheterization, should heparin need to be used would be cautious avoiding bolus if feasible, patient is also therapeutically anticoagulated at this time.    SBP goal: permissive HTN to gradual normotension as tolerated, neurologically tolerating SBP right now 120-160mmhg , gradual titration over the next week  avoiding rapid fluctuations and hypotension.     GASTROINTESTINAL: Passed dysphagia screening, tolerating diet.     Diet: Consistent carb no snacks    RENAL: Patient initially with NANCY, creatinine 2.76 & K+ 7.5. Renal was consulted, no HD needed and was treated with a bicarb drip. NANCY and hyperkalemia resolved & patient off bicarb drip. Creatinine remains stable 1.12& K+ 3.9 Will continue to monitor kidney function and electrolytes. Taylor removed.  Monitor for changes in UO.     Na Goal: Greater than 135     Taylor: n    HEMATOLOGY: H/H without acute change, Platelets 232 . No si/sx of bleeding. He should have all age and risk appropriate malignancy screenings.       DVT ppx: Heparin s.c [] LMWH [x]     ID: afebrile, no leukocytosis     other: social work consulted. Continue monitoring for further glucose control.     DISPOSITION: PT/OT recommends AR, D/W Dr. Wheeler for transfer to medical service for further care. Please call with any questions or concerns.      CORE MEASURES:        Admission NIHSS: 6     TPA: [] YES [x] NO      LDL/HDL: 44/41     Depression Screen: o- wants his long term partner to be able to come.      Statin Therapy: y     Dysphagia Screen: [x] PASS [] FAIL     Smoking [] YES [] NO      Afib [x] YES [] NO     Stroke Education [x] YES [] NO    Obtain screening lower extremity venous ultrasound in patients who meet 1 or more of the following criteria as patient is high risk for DVT/PE on admission:   [] History of DVT/PE  []Hypercoagulable states (Factor V Leiden, Cancer, OCP, etc. )  []Prolonged immobility (hemiplegia/hemiparesis/post operative or any other extended immobilization)  [] Transferred from outside facility (Rehab or Long term care)  [] Age </= to 50

## 2022-04-26 NOTE — CONSULT NOTE ADULT - CONSULT REQUESTED DATE/TIME
21-Apr-2022 14:44
25-Apr-2022 15:58
26-Apr-2022 12:16
22-Apr-2022 01:55
25-Apr-2022 15:49
21-Apr-2022 14:42
21-Apr-2022 14:45
21-Apr-2022 15:16

## 2022-04-26 NOTE — CHART NOTE - NSCHARTNOTEFT_GEN_A_CORE
Pt was transferred to Medicine service under Dr. Wheeler and sign out was received from [facility] at [id number].

## 2022-04-26 NOTE — PROGRESS NOTE ADULT - ASSESSMENT
61 M history of HTN, HLD, DM, A. fib and severe LV dysfunction presented to OSH with left hemiparesis and was found to have acute CVA, not candidate for tPA as LKN > 4.5 hours.    Transferred to [facility] as stroke rescue for possible thrombectomy.   Found to be in AF with VR 30-60s, irregular in setting of K 7.3, prompting cardiology consult. Now bradycardia resolved. Course further complicated by 2:1 AVB.     #Intermittent 2:1 AVB in setting #HFrEF and #LBBB (QRS>160)  - Avoid AVN blocking agents  - EP following  - plan for potential CRT-D device given EF 25% and QRS>160.   - Current euvolemic and well compensated.   - Hold from starting lisinopril or aldactone at this time  - Plan for C tomorrow (hold eliquis starting now)    #Atrial fibrillation with #CVA   - No A/C agent listed among medications. CHADSVASC at least 4, A/C recommended, particularly with now documented CVA.   - Hold eliquis starting now due to plan for LHC tomorrow

## 2022-04-26 NOTE — CONSULT NOTE ADULT - ASSESSMENT
60yo RT handed Israeli speaking M with pmh of HTN, DM-II,  dilated cardiomyopathy presents to [facility] as a transfer for Rt M1 occlusion. Per chart review, patient's LWK was at midnight on 4/21. Patient had CT and CTA which was noted to show Rt M1 occlusion. Patient was not a TPa candidate due to out of time window. Patient was MT candidate at that time thus was agreed to transfer patient. Patient had initial NIHSS 4 whcih increased to NIHSS 9 then NIHSS 6 on repeat examination. Upon transfer patient deemed medically unstable for thrombectomy due to BRASH syndrome and admitted to MICU for further monitoring.    right MCA infarct with a right M1 occlusion. Mechanism may be cardioembolism related to atrial fibrillation.    CVA  -  Patient neurologically without acute change, Continue close monitoring for neurologic deterioration, permissive HTN to gradual normotension, holding home antihypertensives, avoid fluctuations in BP. LDL 44, titrate statin to LDL goal less than 70, MR Head non con as noted above. Agitation overall appears improved. .   - Physical therapy/OT: AR on re-eval.   -  Apixaban initiated, ASA if indicated from cardiac standpoint.       systolic heart failure  -  TTE: Severe left ventricular enlargement. Estimated ejection fraction 25%. Diffuse hypokinesis, with inferior and inferoseptal akinesis. On admission patient was found to be in Atrial fibrillation slow ventricular response and with IVCD/BBB, now sinus bradycardia with LBBB. Cardiology consult appreciated: restart lisinopril as bp permits, would hold for SBP < 130mmHg and introduce gradually from neurological standpoint, aldactone home regimen as tolerates, AC for afib,   bradycardia initially afib with slow ventricular response, IVCD/BBB then sinus keiko with LBBB, dig toxicity ruled out, AVOID ANY AVN BLOCKING AGENTS, bp meds as noted, EP consulted:   1' AVB now, mary anne, hold BB for now, would benefit from CRT-D, [facility], from neurological standpoint no contraindication to cardiac catheterization, should heparin need to be used would be cautious avoiding bolus if feasible, patient is also therapeutically anticoagulated at this time.    SBP goal: permissive HTN to gradual normotension as tolerated, neurologically tolerating SBP right now 120-160mmhg , gradual titration over the next week  avoiding rapid fluctuations and hypotension.     dysphagia  - Passed dysphagia screening, tolerating diet.     Diet: Consistent carb no snacks    NANCY,   - creatinine 2.76 & K+ 7.5. Renal was consulted, no HD needed and was treated with a bicarb drip. NANCY and hyperkalemia resolved & patient off bicarb drip. Creatinine remains stable 1.12& K+ 3.9 Will continue to monitor kidney function and electrolytes. Taylor removed.  Monitor for changes in UO.     Na Goal: Greater than 135     Taylor: n    HEMATOLOGY: H/H without acute change, Platelets 232 . No si/sx of bleeding. He should have all age and risk appropriate malignancy screenings.       DVT ppx: Heparin s.c [] LMWH [x]       DISPOSITION: PT/OT recommends AR,

## 2022-04-26 NOTE — PROGRESS NOTE ADULT - SUBJECTIVE AND OBJECTIVE BOX
THE PATIENT WAS SEEN AND EXAMINED BY ME WITH THE HOUSESTAFF AND STROKE TEAM DURING MORNING ROUNDS.   HPI:  61 year old Amharic speaking male with past medical history of DM, HTN, dilated cardiomyopathy. LKN midnight prior, witnessed by wife.  Morning of admission he woke up   with left hemiparesis. NIHSS at [place] for left hemiparesis. CTA with right M1 occlusion. HR intermittently in the 40s, reportedly in second degree heart block (but no EKG on file), given atropine in route to the hospital.  Not a candidate for tPA as LKN > 4.5 hours.  He was transferred to [facility] as stroke rescue for possible thrombectomy.    [place] staff contacted [facility] staff notified the patient was found to have acute renal failure Cr. 2.8, BUN 88, K 5.8 (untreated).  BNP increased.  Concern for digoxin toxicity, although digoxin level noted to be 1.6 at OSH. In the ED, T96.4, HR 50, /65, on %. Slow afib with HR 30-50s. FSG 56, given amp D50. Given albuterol and Ca gluc 2g.  Admitted to MICU for CVA neuro checks and possible thrombectomy.      SUBJECTIVE: No events overnight.  No new neurologic complaints.  ROS reported negative unless otherwise noted.    apixaban 5 milliGRAM(s) Oral every 12 hours  aspirin enteric coated 81 milliGRAM(s) Oral daily  atorvastatin 40 milliGRAM(s) Oral at bedtime  cyanocobalamin 1000 MICROGram(s) Oral daily  dextrose 5%. 1000 milliLiter(s) IV Continuous <Continuous>  dextrose 5%. 1000 milliLiter(s) IV Continuous <Continuous>  dextrose 50% Injectable 25 Gram(s) IV Push once  dextrose 50% Injectable 12.5 Gram(s) IV Push once  dextrose 50% Injectable 25 Gram(s) IV Push once  dextrose Oral Gel 15 Gram(s) Oral once PRN  glucagon  Injectable 1 milliGRAM(s) IntraMuscular once  hydrocortisone 1% Cream 1 Application(s) Topical two times a day  insulin lispro (ADMELOG) corrective regimen sliding scale   SubCutaneous three times a day before meals  insulin lispro (ADMELOG) corrective regimen sliding scale   SubCutaneous at bedtime  lidocaine 2% Jelly 4 milliLiter(s) IntraUrethral once  loratadine 10 milliGRAM(s) Oral daily  tamsulosin 0.4 milliGRAM(s) Oral at bedtime      PHYSICAL EXAM:   Vital Signs Last 24 Hrs  T(C): 36.6 (26 Apr 2022 07:29), Max: 37.2 (25 Apr 2022 17:54)  T(F): 97.8 (26 Apr 2022 07:29), Max: 99 (25 Apr 2022 17:54)  HR: 66 (26 Apr 2022 10:00) (66 - 87)  BP: 133/78 (26 Apr 2022 10:00) (108/62 - 171/67)  BP(mean): 94 (26 Apr 2022 10:00) (76 - 102)  RR: 20 (26 Apr 2022 08:00) (15 - 20)  SpO2: 98% (26 Apr 2022 10:00) (98% - 100%)    General: No acute distress  HEENT: EOM intact, visual fields full  Abdomen: Soft, nontender, nondistended   Extremities: No edema    NEUROLOGICAL EXAM:  Mental status: Awake, alert, correctly states age, place and year, speech fluent, follows commands, mild left visual extinction and neglect   Cranial Nerves: no facial palsy,  improves on smiling, no nystagmus, mild dysarthria, no visual field cut.     Motor exam: Normal tone, Right side moves well against gravity, left hemiparesis: LUE pronator drift /LLE subtle drift- orbiting.   Sensation: Intact to light touch.    Coordination/ Gait: No dysmetria, gait not tested      LABS:                        12.7   9.43  )-----------( 232      ( 26 Apr 2022 06:31 )             37.4    04-26    140  |  103  |  28<H>  ----------------------------<  154<H>  3.9   |  21<L>  |  1.12    Ca    9.6      26 Apr 2022 06:31          IMAGING: Reviewed by me.   CT Brain stroke protocol, CTA H&N, CTP 4/21  IMPRESSION:  CT head:  -Loss of gray-white matter differentiation in the right lateral frontal   lobe and insula concerning for acute infarction.  -No acute intracranial hemorrhage.  CT angiogram neck:  -At least mild stenosis of the left vertebral artery origin.  -Atherosclerotic changes at the bilateral carotid bifurcations without   stenosis.  CT angiogram head:  -Acute occlusion of the right MCA distal M1 and proximal M2 divisions.  CT perfusion:  At risk ischemic tissue: 125 mL, right MCA territory.  Core infarct: 6 mL, right MCA territory.  Mismatch volume: 119 mL.  Mismatch ratio: 20.8.    CTH non con 4/22  IMPRESSION: New right frontal temporal cortical lucency consistent with a   small infarct in the right middle cerebral artery distribution compared   with 4/21/2022.    MR Head non con 4/23  IMPRESSION:  Acute infarct in the right MCA territory involving the anterior temporal   segment extending intothe insula and right posterior frontal parietal   region. Some associated petechial hemorrhage suggested on the   susceptibility weighted imaging. Subtle mass effect on the right lateral   ventricle. Suspicion of a small amount of thrombus in the distal right M1   proximal M2 segment       THE PATIENT WAS SEEN AND EXAMINED BY ME WITH THE HOUSESTAFF AND STROKE TEAM DURING MORNING ROUNDS.   HPI:  61 year old English speaking male with past medical history of DM, HTN, dilated cardiomyopathy. LKN midnight prior, witnessed by wife.  Morning of admission he woke up   with left hemiparesis. NIHSS at [place] 4 for left hemiparesis. CTA with right M1 occlusion. HR intermittently in the 40s, reportedly in second degree heart block (but no EKG on file), given atropine in route to the hospital.  Not a candidate for tPA as LKN > 4.5 hours.  He was transferred to [facility] as stroke rescue for possible thrombectomy.    [place] staff contacted [facility] staff notified the patient was found to have acute renal failure Cr. 2.8, BUN 88, K 5.8 (untreated).  BNP increased.  Concern for digoxin toxicity, although digoxin level noted to be 1.6 at OSH. In the ED, T96.4, HR 50, /65, on %. Slow afib with HR 30-50s. FSG 56, given amp D50. Given albuterol and Ca gluc 2g.  Admitted to MICU for CVA neuro checks and possible thrombectomy.      SUBJECTIVE: No events overnight.  No new neurologic complaints.  ROS reported negative unless otherwise noted.  Translation in shared language of English per [place].     apixaban 5 milliGRAM(s) Oral every 12 hours  aspirin enteric coated 81 milliGRAM(s) Oral daily  atorvastatin 40 milliGRAM(s) Oral at bedtime  cyanocobalamin 1000 MICROGram(s) Oral daily  dextrose 5%. 1000 milliLiter(s) IV Continuous <Continuous>  dextrose 5%. 1000 milliLiter(s) IV Continuous <Continuous>  dextrose 50% Injectable 25 Gram(s) IV Push once  dextrose 50% Injectable 12.5 Gram(s) IV Push once  dextrose 50% Injectable 25 Gram(s) IV Push once  dextrose Oral Gel 15 Gram(s) Oral once PRN  glucagon  Injectable 1 milliGRAM(s) IntraMuscular once  hydrocortisone 1% Cream 1 Application(s) Topical two times a day  insulin lispro (ADMELOG) corrective regimen sliding scale   SubCutaneous three times a day before meals  insulin lispro (ADMELOG) corrective regimen sliding scale   SubCutaneous at bedtime  lidocaine 2% Jelly 4 milliLiter(s) IntraUrethral once  loratadine 10 milliGRAM(s) Oral daily  tamsulosin 0.4 milliGRAM(s) Oral at bedtime      PHYSICAL EXAM:   Vital Signs Last 24 Hrs  T(C): 36.6 (26 Apr 2022 07:29), Max: 37.2 (25 Apr 2022 17:54)  T(F): 97.8 (26 Apr 2022 07:29), Max: 99 (25 Apr 2022 17:54)  HR: 66 (26 Apr 2022 10:00) (66 - 87)  BP: 133/78 (26 Apr 2022 10:00) (108/62 - 171/67)  BP(mean): 94 (26 Apr 2022 10:00) (76 - 102)  RR: 20 (26 Apr 2022 08:00) (15 - 20)  SpO2: 98% (26 Apr 2022 10:00) (98% - 100%)    General: No acute distress  HEENT: EOM intact, visual fields full  Abdomen: Soft, nontender, nondistended   Extremities: No edema    NEUROLOGICAL EXAM:  Mental status: Awake, alert, correctly states age, place and year, speech fluent, follows commands, mild left visual extinction and neglect   Cranial Nerves: no facial palsy,  improves on smiling, no nystagmus, mild dysarthria, no visual field cut.     Motor exam: Normal tone, Right side moves well against gravity, left hemiparesis: LUE pronator drift /LLE subtle drift- orbiting.   Sensation: Intact to light touch.    Coordination/ Gait: No dysmetria, gait not tested      LABS:                        12.7   9.43  )-----------( 232      ( 26 Apr 2022 06:31 )             37.4    04-26    140  |  103  |  28<H>  ----------------------------<  154<H>  3.9   |  21<L>  |  1.12    Ca    9.6      26 Apr 2022 06:31          IMAGING: Reviewed by me.   CT Brain stroke protocol, CTA H&N, CTP 4/21  IMPRESSION:  CT head:  -Loss of gray-white matter differentiation in the right lateral frontal   lobe and insula concerning for acute infarction.  -No acute intracranial hemorrhage.  CT angiogram neck:  -At least mild stenosis of the left vertebral artery origin.  -Atherosclerotic changes at the bilateral carotid bifurcations without   stenosis.  CT angiogram head:  -Acute occlusion of the right MCA distal M1 and proximal M2 divisions.  CT perfusion:  At risk ischemic tissue: 125 mL, right MCA territory.  Core infarct: 6 mL, right MCA territory.  Mismatch volume: 119 mL.  Mismatch ratio: 20.8.    CTH non con 4/22  IMPRESSION: New right frontal temporal cortical lucency consistent with a   small infarct in the right middle cerebral artery distribution compared   with 4/21/2022.    MR Head non con 4/23  IMPRESSION:  Acute infarct in the right MCA territory involving the anterior temporal   segment extending intothe insula and right posterior frontal parietal   region. Some associated petechial hemorrhage suggested on the   susceptibility weighted imaging. Subtle mass effect on the right lateral   ventricle. Suspicion of a small amount of thrombus in the distal right M1   proximal M2 segment

## 2022-04-26 NOTE — PROGRESS NOTE ADULT - SUBJECTIVE AND OBJECTIVE BOX
Patient seen and examined at bedside.    Overnight Events:   No events. Patient asymptomatic.     Review Of Systems: No chest pain, shortness of breath, or palpitations            Current Meds:  apixaban 5 milliGRAM(s) Oral every 12 hours  aspirin enteric coated 81 milliGRAM(s) Oral daily  atorvastatin 40 milliGRAM(s) Oral at bedtime  cyanocobalamin 1000 MICROGram(s) Oral daily  dextrose 5%. 1000 milliLiter(s) IV Continuous <Continuous>  dextrose 5%. 1000 milliLiter(s) IV Continuous <Continuous>  dextrose 50% Injectable 25 Gram(s) IV Push once  dextrose 50% Injectable 12.5 Gram(s) IV Push once  dextrose 50% Injectable 25 Gram(s) IV Push once  dextrose Oral Gel 15 Gram(s) Oral once PRN  glucagon  Injectable 1 milliGRAM(s) IntraMuscular once  hydrocortisone 1% Cream 1 Application(s) Topical two times a day  insulin lispro (ADMELOG) corrective regimen sliding scale   SubCutaneous three times a day before meals  insulin lispro (ADMELOG) corrective regimen sliding scale   SubCutaneous at bedtime  lidocaine 2% Jelly 4 milliLiter(s) IntraUrethral once  loratadine 10 milliGRAM(s) Oral daily  tamsulosin 0.4 milliGRAM(s) Oral at bedtime      Vitals:  T(F): 97.8 (04-26), Max: 99 (04-25)  HR: 66 (04-26) (66 - 87)  BP: 133/78 (04-26) (108/62 - 171/67)  RR: 20 (04-26)  SpO2: 98% (04-26)  I&O's Summary      Physical Exam:  Appearance: No acute distress; well appearing  Eyes: EOMI, no scleral icterus   HEENT: Normal oral mucosa  Cardiovascular: RRR, S1, S2, no murmurs, rubs, or gallops; no edema; no JVD  Respiratory: Clear to auscultation bilaterally, no auditory stridor   Gastrointestinal: soft, non-tender, non-distended with normal bowel sounds  Musculoskeletal: No clubbing; no joint deformity   Neurologic: Mild left sided residual weakness  Psychiatry: AAOx3, mood & affect appropriate  Skin: No rashes, ecchymoses, or cyanosis                          12.7   9.43  )-----------( 232      ( 26 Apr 2022 06:31 )             37.4     04-26    140  |  103  |  28<H>  ----------------------------<  154<H>  3.9   |  21<L>  |  1.12    Ca    9.6      26 Apr 2022 06:31        CARDIAC MARKERS ( 21 Apr 2022 15:09 )  36 ng/L / x     / x     / x     / x     / x      CARDIAC MARKERS ( 21 Apr 2022 13:04 )  37 ng/L / x     / x     / x     / x     / x          Serum Pro-Brain Natriuretic Peptide: 2012 pg/mL (04-21 @ 13:04)          New ECG(s): Personally reviewed    Echo:  Conclusions:  Endocardial visualization enhanced with intravenous  injection of Ultrasonic Enhancing Agent (Lumason).  Severe left ventricular enlargement.  Estimated ejection fraction 25%. Diffuse hypokinesis, with  inferior and inferoseptal akinesis.  Agitated saline injection demonstrates no evidence of a  patent foramen ovale.  ------------------------------------------------------------------------  Confirmed on  4/23/2022 - 15:15:48 by Carlos Enrique Velez MD,  [facility]    Stress Testing:     Cath:    Imaging:    Interpretation of Telemetry: 2:1 AVB intermittent

## 2022-04-27 LAB
ANION GAP SERPL CALC-SCNC: 14 MMOL/L — SIGNIFICANT CHANGE UP (ref 5–17)
BUN SERPL-MCNC: 35 MG/DL — HIGH (ref 7–23)
CALCIUM SERPL-MCNC: 9.1 MG/DL — SIGNIFICANT CHANGE UP (ref 8.4–10.5)
CHLORIDE SERPL-SCNC: 105 MMOL/L — SIGNIFICANT CHANGE UP (ref 96–108)
CO2 SERPL-SCNC: 20 MMOL/L — LOW (ref 22–31)
CREAT SERPL-MCNC: 1.19 MG/DL — SIGNIFICANT CHANGE UP (ref 0.5–1.3)
CULTURE RESULTS: SIGNIFICANT CHANGE UP
CULTURE RESULTS: SIGNIFICANT CHANGE UP
EGFR: 70 ML/MIN/1.73M2 — SIGNIFICANT CHANGE UP
GLUCOSE BLDC GLUCOMTR-MCNC: 169 MG/DL — HIGH (ref 70–99)
GLUCOSE BLDC GLUCOMTR-MCNC: 185 MG/DL — HIGH (ref 70–99)
GLUCOSE BLDC GLUCOMTR-MCNC: 365 MG/DL — HIGH (ref 70–99)
GLUCOSE SERPL-MCNC: 192 MG/DL — HIGH (ref 70–99)
HCT VFR BLD CALC: 34.4 % — LOW (ref 39–50)
HGB BLD-MCNC: 11.5 G/DL — LOW (ref 13–17)
MCHC RBC-ENTMCNC: 30.6 PG — SIGNIFICANT CHANGE UP (ref 27–34)
MCHC RBC-ENTMCNC: 33.4 GM/DL — SIGNIFICANT CHANGE UP (ref 32–36)
MCV RBC AUTO: 91.5 FL — SIGNIFICANT CHANGE UP (ref 80–100)
NRBC # BLD: 0 /100 WBCS — SIGNIFICANT CHANGE UP (ref 0–0)
PLATELET # BLD AUTO: 195 K/UL — SIGNIFICANT CHANGE UP (ref 150–400)
POTASSIUM SERPL-MCNC: 4.1 MMOL/L — SIGNIFICANT CHANGE UP (ref 3.5–5.3)
POTASSIUM SERPL-SCNC: 4.1 MMOL/L — SIGNIFICANT CHANGE UP (ref 3.5–5.3)
RBC # BLD: 3.76 M/UL — LOW (ref 4.2–5.8)
RBC # FLD: 12.3 % — SIGNIFICANT CHANGE UP (ref 10.3–14.5)
SODIUM SERPL-SCNC: 139 MMOL/L — SIGNIFICANT CHANGE UP (ref 135–145)
SPECIMEN SOURCE: SIGNIFICANT CHANGE UP
SPECIMEN SOURCE: SIGNIFICANT CHANGE UP
WBC # BLD: 10.7 K/UL — HIGH (ref 3.8–10.5)
WBC # FLD AUTO: 10.7 K/UL — HIGH (ref 3.8–10.5)

## 2022-04-27 PROCEDURE — 99152 MOD SED SAME PHYS/QHP 5/>YRS: CPT

## 2022-04-27 PROCEDURE — 99233 SBSQ HOSP IP/OBS HIGH 50: CPT

## 2022-04-27 PROCEDURE — 93458 L HRT ARTERY/VENTRICLE ANGIO: CPT | Mod: 26

## 2022-04-27 RX ORDER — CHLORHEXIDINE GLUCONATE 213 G/1000ML
1 SOLUTION TOPICAL
Refills: 0 | Status: COMPLETED | OUTPATIENT
Start: 2022-04-27 | End: 2022-04-28

## 2022-04-27 RX ORDER — HEPARIN SODIUM 5000 [USP'U]/ML
4500 INJECTION INTRAVENOUS; SUBCUTANEOUS EVERY 6 HOURS
Refills: 0 | Status: DISCONTINUED | OUTPATIENT
Start: 2022-04-27 | End: 2022-04-27

## 2022-04-27 RX ORDER — HEPARIN SODIUM 5000 [USP'U]/ML
2000 INJECTION INTRAVENOUS; SUBCUTANEOUS EVERY 6 HOURS
Refills: 0 | Status: DISCONTINUED | OUTPATIENT
Start: 2022-04-27 | End: 2022-04-27

## 2022-04-27 RX ORDER — ACETAMINOPHEN 500 MG
650 TABLET ORAL ONCE
Refills: 0 | Status: COMPLETED | OUTPATIENT
Start: 2022-04-27 | End: 2022-04-27

## 2022-04-27 RX ORDER — HEPARIN SODIUM 5000 [USP'U]/ML
INJECTION INTRAVENOUS; SUBCUTANEOUS
Qty: 25000 | Refills: 0 | Status: DISCONTINUED | OUTPATIENT
Start: 2022-04-27 | End: 2022-04-27

## 2022-04-27 RX ADMIN — Medication 1: at 12:27

## 2022-04-27 RX ADMIN — CHLORHEXIDINE GLUCONATE 1 APPLICATION(S): 213 SOLUTION TOPICAL at 22:44

## 2022-04-27 RX ADMIN — TAMSULOSIN HYDROCHLORIDE 0.4 MILLIGRAM(S): 0.4 CAPSULE ORAL at 22:30

## 2022-04-27 RX ADMIN — Medication 1 APPLICATION(S): at 06:19

## 2022-04-27 RX ADMIN — Medication 650 MILLIGRAM(S): at 02:35

## 2022-04-27 RX ADMIN — Medication 1: at 09:00

## 2022-04-27 RX ADMIN — HEPARIN SODIUM 1100 UNIT(S)/HR: 5000 INJECTION INTRAVENOUS; SUBCUTANEOUS at 22:27

## 2022-04-27 RX ADMIN — LORATADINE 10 MILLIGRAM(S): 10 TABLET ORAL at 22:30

## 2022-04-27 RX ADMIN — Medication 650 MILLIGRAM(S): at 02:05

## 2022-04-27 RX ADMIN — Medication 81 MILLIGRAM(S): at 12:27

## 2022-04-27 RX ADMIN — ATORVASTATIN CALCIUM 40 MILLIGRAM(S): 80 TABLET, FILM COATED ORAL at 22:30

## 2022-04-27 RX ADMIN — Medication 3: at 22:31

## 2022-04-27 RX ADMIN — Medication 1 APPLICATION(S): at 22:44

## 2022-04-27 RX ADMIN — PREGABALIN 1000 MICROGRAM(S): 225 CAPSULE ORAL at 22:30

## 2022-04-27 NOTE — PROGRESS NOTE ADULT - ATTENDING COMMENTS
Please note modifications to resident/fellow's note and plan below     - no major events overnight  - Pt feels well, no cp/sob   - LHC today     Vital signs reviewed     PHYSICAL EXAM:  General: No acute distress  HEENT: EOMI  Neck:  No JVD  Lungs: Clear to auscultation bilaterally; No rales or wheezing  Heart: Regular rate and rhythm; No murmurs, rubs, or gallops  Abdomen: soft, non tender, non distended   Extremities: warm, no edema   Nervous system:  Alert & Oriented X3  Psychiatric: Normal affect    Labs noted: Hgb 11.5 Cr stable.     ECG [my interpretation]: 4/22 sinus keiko LBBB 4/25 SR w 1st degree LBBB wiith repol abn     TELEMETRY: 2:1 block     ECHO: 4/23  "Conclusions:  Endocardial visualization enhanced with intravenous  injection of Ultrasonic Enhancing Agent (Lumason).  Severe left ventricular enlargement.  Estimated ejection fraction 25%. Diffuse hypokinesis, with  inferior and inferoseptal akinesis.  Agitated saline injection demonstrates no evidence of a  patent foramen ovale."    61 M history of HTN, HLD, DM, A. fib and severe LV dysfunction presented to OSH with left hemiparesis and was found to have acute CVA, now recovering and found to be in 2:1 heart block/mobitz1. Hemodynamically stable, no cp.  Appreciate EP recs, Pt would benefit from CRT-D.   - agree with plan as detailed above, per Neuro no contraindication with undergoing LHC and DAPT tx if necessitated pending cath results. If LHC negative, consider chagas.  - cMRI tomorrow   - avoid av kevin blocking agents  - eliquis has been held in anticipation for cath (ok per Neuro). To restart AC 24h post cath 4/28 per  (Interventional)- EP recommending heparin gtt when safe from Interventional cardiology standpoint.   - to start GDMT with lisinopril (start 5mg po qday, to be uptitrated per BP) when deemed ok from Neurology team standpoint for permissive HTN mgmt post CVA. Down the line to consider spironolactone.   - on asa/statin  - tele

## 2022-04-27 NOTE — PROGRESS NOTE ADULT - ASSESSMENT
60yo RT handed Malay speaking M with pmh of HTN, DM-II,  dilated cardiomyopathy presents to [facility] as a transfer for Rt M1 occlusion. Per chart review, patient's LWK was at midnight on 4/21. Patient had CT and CTA which was noted to show Rt M1 occlusion. Patient was not a TPa candidate due to out of time window. Patient was MT candidate at that time thus was agreed to transfer patient. Patient had initial NIHSS 4 whcih increased to NIHSS 9 then NIHSS 6 on repeat examination. Upon transfer patient deemed medically unstable for thrombectomy due to BRASH syndrome and admitted to MICU for further monitoring.    right MCA infarct with a right M1 occlusion. Mechanism may be cardioembolism related to atrial fibrillation.    CVA  -  Patient neurologically without acute change, Continue close monitoring for neurologic deterioration, permissive HTN to gradual normotension, holding home antihypertensives, avoid fluctuations in BP. LDL 44, titrate statin to LDL goal less than 70, MR Head non con as noted above. Agitation overall appears improved. .   - Physical therapy/OT: AR on re-eval.   -  Apixaban initiated, ASA if indicated from cardiac standpoint.       systolic heart failure  -  TTE: Severe left ventricular enlargement. Estimated ejection fraction 25%. Diffuse hypokinesis, with inferior and inferoseptal akinesis. On admission patient was found to be in Atrial fibrillation slow ventricular response and with IVCD/BBB, now sinus bradycardia with LBBB. Cardiology consult appreciated: restart lisinopril as bp permits, would hold for SBP < 130mmHg and introduce gradually from neurological standpoint, aldactone home regimen as tolerates, AC for afib,   bradycardia initially afib with slow ventricular response, IVCD/BBB then sinus keiko with LBBB, dig toxicity ruled out, AVOID ANY AVN BLOCKING AGENTS, bp meds as noted, EP consulted:   1' AVB now, mary anne, hold BB for now, would benefit from CRT-D, [facility], from neurological standpoint no contraindication to cardiac catheterization, should heparin need to be used would be cautious avoiding bolus if feasible, patient is also therapeutically anticoagulated at this time.    SBP goal: permissive HTN to gradual normotension as tolerated, neurologically tolerating SBP right now 120-160mmhg , gradual titration over the next week  avoiding rapid fluctuations and hypotension. Intermittent 2:1 AVB in setting #HFrEF and #LBBB (QRS>160)  - Avoid AVN blocking agents  - Current euvolemic and well compensated.   - Hold from starting lisinopril or aldactone at this time  - [facility] today with possible MRI tomorrow  - plan for potential CRT-D device given EF 25% and QRS>160.   - EP following    #Atrial fibrillation with #CVA   - No A/C agent listed among medications. CHADSVASC at least 4, A/C recommended, particularly with now documented CVA.   - Will restart Eliquis after [facility] if ok by cardiac intervention team    dysphagia  - Passed dysphagia screening, tolerating diet.     Diet: Consistent carb no snacks    NANCY,   - creatinine 2.76 & K+ 7.5. Renal was consulted, no HD needed and was treated with a bicarb drip. NANCY and hyperkalemia resolved & patient off bicarb drip. Creatinine remains stable .  Will continue to monitor kidney function and electrolytes. Taylor removed.  Monitor for changes in UO.     Na Goal: Greater than 135     Taylor: n    HEMATOLOGY: H/H without acute change, Platelets 232 . No si/sx of bleeding. He should have all age and risk appropriate malignancy screenings.       DVT ppx: Heparin s.c [] LMWH [x]       DISPOSITION: PT/OT recommends AR,

## 2022-04-27 NOTE — PROGRESS NOTE ADULT - NS ATTEND AMEND GEN_ALL_CORE FT
Severe NICM (non obs CAD on LHC), LBBB with intermittent 2:1 AVB for CRT-D implant.
Osmar Song MD  Vascular Neurology

## 2022-04-27 NOTE — PROGRESS NOTE ADULT - SUBJECTIVE AND OBJECTIVE BOX
Patient seen and examined at bedside.    Overnight Events:   No events. Patient feels well.   Review Of Systems: No chest pain, shortness of breath, or palpitations            Current Meds:  aspirin enteric coated 81 milliGRAM(s) Oral daily  atorvastatin 40 milliGRAM(s) Oral at bedtime  cyanocobalamin 1000 MICROGram(s) Oral daily  dextrose 5%. 1000 milliLiter(s) IV Continuous <Continuous>  dextrose 5%. 1000 milliLiter(s) IV Continuous <Continuous>  dextrose 50% Injectable 25 Gram(s) IV Push once  dextrose 50% Injectable 12.5 Gram(s) IV Push once  dextrose 50% Injectable 25 Gram(s) IV Push once  dextrose Oral Gel 15 Gram(s) Oral once PRN  glucagon  Injectable 1 milliGRAM(s) IntraMuscular once  hydrocortisone 1% Cream 1 Application(s) Topical two times a day  insulin lispro (ADMELOG) corrective regimen sliding scale   SubCutaneous three times a day before meals  insulin lispro (ADMELOG) corrective regimen sliding scale   SubCutaneous at bedtime  lidocaine 2% Jelly 4 milliLiter(s) IntraUrethral once  loratadine 10 milliGRAM(s) Oral daily  tamsulosin 0.4 milliGRAM(s) Oral at bedtime      Vitals:  T(F): 97.7 (04-27), Max: 98.5 (04-27)  HR: 71 (04-27) (67 - 86)  BP: 154/79 (04-27) (129/76 - 154/79)  RR: 18 (04-27)  SpO2: 98% (04-27)  I&O's Summary    26 Apr 2022 07:01  -  27 Apr 2022 07:00  --------------------------------------------------------  IN: 100 mL / OUT: 400 mL / NET: -300 mL        Physical Exam:  Appearance: No acute distress; well appearing  Eyes: EOMI, no scleral icterus   HEENT: Normal oral mucosa  Cardiovascular: RRR, S1, S2, no murmurs, rubs, or gallops; no edema; no JVD  Respiratory: Clear to auscultation bilaterally, no auditory stridor   Gastrointestinal: soft, non-tender, non-distended with normal bowel sounds  Musculoskeletal: No clubbing; no joint deformity   Neurologic: Minimal left sided weakness (residual)  Lymphatic: No lymphadenopathy  Psychiatry: AAOx3, mood & affect appropriate  Skin: No rashes, ecchymoses, or cyanosis                          11.5   10.70 )-----------( 195      ( 27 Apr 2022 05:00 )             34.4     04-27    139  |  105  |  35<H>  ----------------------------<  192<H>  4.1   |  20<L>  |  1.19    Ca    9.1      27 Apr 2022 05:00        CARDIAC MARKERS ( 21 Apr 2022 15:09 )  36 ng/L / x     / x     / x     / x     / x      CARDIAC MARKERS ( 21 Apr 2022 13:04 )  37 ng/L / x     / x     / x     / x     / x          Serum Pro-Brain Natriuretic Peptide: 2012 pg/mL (04-21 @ 13:04)          New ECG(s): Personally reviewed    Echo:  Conclusions:  Endocardial visualization enhanced with intravenous  injection of Ultrasonic Enhancing Agent (Lumason).  Severe left ventricular enlargement.  Estimated ejection fraction 25%. Diffuse hypokinesis, with  inferior and inferoseptal akinesis.  Agitated saline injection demonstrates no evidence of a  patent foramen ovale.  ------------------------------------------------------------------------  Confirmed on  4/23/2022 - 15:15:48 by Carlos Enrique Velez MD,  [facility]    Stress Testing:     Cath:    Imaging:    Interpretation of Telemetry: Intermittent 2:1 AV block

## 2022-04-27 NOTE — PROGRESS NOTE ADULT - SUBJECTIVE AND OBJECTIVE BOX
DATE OF SERVICE: 04-27-22 @ 12:12    Patient is a 61y old  Male who presents with a chief complaint of CVA (27 Apr 2022 11:02)      SUBJECTIVE / OVERNIGHT EVENTS:  No chest pain. No shortness of breath. No complaints. No events overnight.     MEDICATIONS  (STANDING):  aspirin enteric coated 81 milliGRAM(s) Oral daily  atorvastatin 40 milliGRAM(s) Oral at bedtime  cyanocobalamin 1000 MICROGram(s) Oral daily  dextrose 5%. 1000 milliLiter(s) (50 mL/Hr) IV Continuous <Continuous>  dextrose 5%. 1000 milliLiter(s) (100 mL/Hr) IV Continuous <Continuous>  dextrose 50% Injectable 25 Gram(s) IV Push once  dextrose 50% Injectable 12.5 Gram(s) IV Push once  dextrose 50% Injectable 25 Gram(s) IV Push once  glucagon  Injectable 1 milliGRAM(s) IntraMuscular once  hydrocortisone 1% Cream 1 Application(s) Topical two times a day  insulin lispro (ADMELOG) corrective regimen sliding scale   SubCutaneous three times a day before meals  insulin lispro (ADMELOG) corrective regimen sliding scale   SubCutaneous at bedtime  lidocaine 2% Jelly 4 milliLiter(s) IntraUrethral once  loratadine 10 milliGRAM(s) Oral daily  tamsulosin 0.4 milliGRAM(s) Oral at bedtime    MEDICATIONS  (PRN):  dextrose Oral Gel 15 Gram(s) Oral once PRN Blood Glucose LESS THAN 70 milliGRAM(s)/deciliter      Vital Signs Last 24 Hrs  T(C): 36.5 (27 Apr 2022 09:52), Max: 36.9 (26 Apr 2022 20:00)  T(F): 97.7 (27 Apr 2022 09:52), Max: 98.5 (27 Apr 2022 01:00)  HR: 71 (27 Apr 2022 09:52) (67 - 86)  BP: 154/79 (27 Apr 2022 09:52) (129/76 - 154/79)  BP(mean): 89 (26 Apr 2022 18:00) (89 - 103)  RR: 18 (27 Apr 2022 09:52) (18 - 20)  SpO2: 98% (27 Apr 2022 09:52) (98% - 99%)  CAPILLARY BLOOD GLUCOSE      POCT Blood Glucose.: 169 mg/dL (27 Apr 2022 12:02)  POCT Blood Glucose.: 185 mg/dL (27 Apr 2022 08:25)  POCT Blood Glucose.: 238 mg/dL (26 Apr 2022 21:29)  POCT Blood Glucose.: 390 mg/dL (26 Apr 2022 16:35)    I&O's Summary    26 Apr 2022 07:01  -  27 Apr 2022 07:00  --------------------------------------------------------  IN: 100 mL / OUT: 400 mL / NET: -300 mL        PHYSICAL EXAM:  GENERAL: NAD, well-developed  HEAD:  Atraumatic, Normocephalic  EYES: EOMI, PERRLA, conjunctiva and sclera clear  NECK: Supple, No JVD  CHEST/LUNG: Clear to auscultation bilaterally; No wheeze  HEART: Regular rate and rhythm; No murmurs, rubs, or gallops  ABDOMEN: Soft, Nontender, Nondistended; Bowel sounds present  EXTREMITIES:  2+ Peripheral Pulses, No clubbing, cyanosis, or edema  PSYCH: AAOx3  NEUROLOGY: non-focal  SKIN: No rashes or lesions    LABS:                        11.5   10.70 )-----------( 195      ( 27 Apr 2022 05:00 )             34.4     04-27    139  |  105  |  35<H>  ----------------------------<  192<H>  4.1   |  20<L>  |  1.19    Ca    9.1      27 Apr 2022 05:00                RADIOLOGY & ADDITIONAL TESTS:    Imaging Personally Reviewed:    Consultant(s) Notes Reviewed:      Care Discussed with Consultants/Other Providers:

## 2022-04-27 NOTE — PROGRESS NOTE ADULT - ASSESSMENT
61 M history of HTN, HLD, DM, A. fib and severe LV dysfunction presented to OSH with left hemiparesis and was found to have acute CVA, not candidate for tPA as LKN > 4.5 hours.    Transferred to [facility] as stroke rescue for possible thrombectomy.   Found to be in AF with VR 30-60s, irregular in setting of K 7.3, prompting cardiology consult. Now bradycardia resolved. Course further complicated by 2:1 AVB.     #Intermittent 2:1 AVB in setting #HFrEF and #LBBB (QRS>160)  - Avoid AVN blocking agents  - Current euvolemic and well compensated.   - Hold from starting lisinopril or aldactone at this time  - LHC today with possible MRI tomorrow  - plan for potential CRT-D device given EF 25% and QRS>160.   - EP following    #Atrial fibrillation with #CVA   - No A/C agent listed among medications. CHADSVASC at least 4, A/C recommended, particularly with now documented CVA.   - Will restart Eliquis after LHC if ok by cardiac intervention team   61 M history of HTN, HLD, DM, A. fib and severe LV dysfunction presented to OSH with left hemiparesis and was found to have acute CVA, not candidate for tPA as LKN > 4.5 hours.    Transferred to [facility] as stroke rescue for possible thrombectomy.   Found to be in AF with VR 30-60s, irregular in setting of K 7.3, prompting cardiology consult. Now bradycardia resolved. Course further complicated by 2:1 AVB.     #Intermittent 2:1 AVB in setting #HFrEF and #LBBB (QRS>160)  - Avoid AVN blocking agents  - Current euvolemic and well compensated.   - Hold from starting lisinopril or aldactone at this time  - LHC today with possible MRI tomorrow  - plan for potential CRT-D device given EF 25% and QRS>160.   - EP following    #Atrial fibrillation with #CVA   - No A/C agent listed among medications. CHADSVASC at least 4, A/C recommended, particularly with now documented CVA.   - Restart Eliquis tomorrow PM (4/28 - 24 hours post cath)

## 2022-04-27 NOTE — PROGRESS NOTE ADULT - ASSESSMENT
62 yo M with hx of HTN, HLD, DM, afib not on AC, severe LV dysfunction presented to OSH with L hemiparesis and was found to have acute CVA, not candidate for tPA as LKN > 4.5 hrs. Transferred to [facility] as stroke rescue for possible thrombectomy, found to have in afib with SVR HR 30-60s in the setting of hyperK at 7.3. Now bradycardia resolved after normalization of electrolytes. EP consulted for 2:1 AVB. Currently with minimal residual deficits from his CVA.     1. 2:1 AVB  2. AF  3. LBBB    - Continue to monitor on telemetry  - Keep K>4 and Mg>2  - Patient remains asymptomatic  - Continue to hold AV kevin blockers  - CHADSVASC of 5  - C/w Eliquis 5 BID, currently on hold in preparation for cath today  - EF of 25% with QRS >150 ms, possible CRT-D once ischemic eval complete    GEENA HouC  #[phone]   60 yo M with hx of HTN, HLD, DM, afib not on AC, severe LV dysfunction presented to OSH with L hemiparesis and was found to have acute CVA, not candidate for tPA as LKN > 4.5 hrs. Transferred to [facility] as stroke rescue for possible thrombectomy, found to have in afib with SVR HR 30-60s in the setting of hyperK at 7.3. Now bradycardia resolved after normalization of electrolytes. EP consulted for 2:1 AVB. Currently with minimal residual deficits from his CVA.     1. 2:1 AVB  2. AF  3. LBBB    - Continue to monitor on telemetry  - Keep K>4 and Mg>2  - Patient remains asymptomatic  - Continue to hold AV kevin blockers  - CHADSVASC of 5  - C/w Eliquis 5 BID, currently on hold in preparation for cath today  - EF of 25% with QRS >150 ms  - Pending cath today  - Pending cMRI tomorrow 4/28  - Keep NPO after MN for possible CRT-D implant tomorrow   - Chlorhexidine to entire torso @ 2200 and 0600  - Keep Type and Screen and COVID PCR active    GEENA HouC  #[phone]   62 yo M with hx of HTN, HLD, DM, afib not on AC, severe LV dysfunction presented to OSH with L hemiparesis and was found to have acute CVA, not candidate for tPA as LKN > 4.5 hrs. Transferred to [facility] as stroke rescue for possible thrombectomy, found to have in afib with SVR HR 30-60s in the setting of hyperK at 7.3. Now bradycardia resolved after normalization of electrolytes. EP consulted for 2:1 AVB. Currently with minimal residual deficits from his CVA.     1. 2:1 AVB  2. AF  3. LBBB    - Continue to monitor on telemetry  - Keep K>4 and Mg>2  - Patient remains asymptomatic  - Continue to hold AV kevin blockers  - CHADSVASC of 5  - EF of 25% with QRS >150 ms  - Pending cath today  - Pending cMRI tomorrow 4/28  - Hold Eliquis tonight PM and tomorrow AM, please start heparin gtt when cleared by cardiology s/p cath  - Keep NPO after MN for possible CRT-D implant tomorrow   - Chlorhexidine to entire torso @ 2200 and 0600  - Keep Type and Screen and COVID PCR active    Corinna Tillman PA-C  #[phone]

## 2022-04-27 NOTE — CHART NOTE - NSCHARTNOTEFT_GEN_A_CORE
MEDICINE NP    OLEGARIO PRADHAN  61y Male    Patient is a 61y old  Male who presents with a chief complaint of CVA (27 Apr 2022 12:11)         > Event Summary:   Call from Cardiology, Dr. Montgomery, requests to d/c Heparin gtt. and hold AC x24hr s/p LHC today.    -Will endorse to Day Provider in AM and Attending to follow      -Vital Signs Last 24 Hrs  T(C): 36.9 (27 Apr 2022 20:58), Max: 36.9 (27 Apr 2022 01:00)  T(F): 98.4 (27 Apr 2022 20:58), Max: 98.5 (27 Apr 2022 01:00)  HR: 73 (27 Apr 2022 20:58) (61 - 83)  BP: 145/76 (27 Apr 2022 20:58) (128/73 - 166/80)  RR: 18 (27 Apr 2022 20:58) (16 - 18)  SpO2: 96% (27 Apr 2022 20:58) (94% - 99%)        JAYESH Sams-BC  Medicine Department  #[id number]

## 2022-04-28 LAB
ANION GAP SERPL CALC-SCNC: 12 MMOL/L — SIGNIFICANT CHANGE UP (ref 5–17)
BLD GP AB SCN SERPL QL: NEGATIVE — SIGNIFICANT CHANGE UP
BUN SERPL-MCNC: 25 MG/DL — HIGH (ref 7–23)
CALCIUM SERPL-MCNC: 9.5 MG/DL — SIGNIFICANT CHANGE UP (ref 8.4–10.5)
CHLORIDE SERPL-SCNC: 104 MMOL/L — SIGNIFICANT CHANGE UP (ref 96–108)
CO2 SERPL-SCNC: 22 MMOL/L — SIGNIFICANT CHANGE UP (ref 22–31)
CREAT SERPL-MCNC: 1.07 MG/DL — SIGNIFICANT CHANGE UP (ref 0.5–1.3)
EGFR: 79 ML/MIN/1.73M2 — SIGNIFICANT CHANGE UP
GLUCOSE BLDC GLUCOMTR-MCNC: 126 MG/DL — HIGH (ref 70–99)
GLUCOSE BLDC GLUCOMTR-MCNC: 134 MG/DL — HIGH (ref 70–99)
GLUCOSE BLDC GLUCOMTR-MCNC: 151 MG/DL — HIGH (ref 70–99)
GLUCOSE BLDC GLUCOMTR-MCNC: 282 MG/DL — HIGH (ref 70–99)
GLUCOSE SERPL-MCNC: 163 MG/DL — HIGH (ref 70–99)
HCT VFR BLD CALC: 36.7 % — LOW (ref 39–50)
HGB BLD-MCNC: 12.4 G/DL — LOW (ref 13–17)
MCHC RBC-ENTMCNC: 30.4 PG — SIGNIFICANT CHANGE UP (ref 27–34)
MCHC RBC-ENTMCNC: 33.8 GM/DL — SIGNIFICANT CHANGE UP (ref 32–36)
MCV RBC AUTO: 90 FL — SIGNIFICANT CHANGE UP (ref 80–100)
NRBC # BLD: 0 /100 WBCS — SIGNIFICANT CHANGE UP (ref 0–0)
PLATELET # BLD AUTO: 238 K/UL — SIGNIFICANT CHANGE UP (ref 150–400)
POTASSIUM SERPL-MCNC: 4.1 MMOL/L — SIGNIFICANT CHANGE UP (ref 3.5–5.3)
POTASSIUM SERPL-SCNC: 4.1 MMOL/L — SIGNIFICANT CHANGE UP (ref 3.5–5.3)
RBC # BLD: 4.08 M/UL — LOW (ref 4.2–5.8)
RBC # FLD: 12.4 % — SIGNIFICANT CHANGE UP (ref 10.3–14.5)
RH IG SCN BLD-IMP: POSITIVE — SIGNIFICANT CHANGE UP
SODIUM SERPL-SCNC: 138 MMOL/L — SIGNIFICANT CHANGE UP (ref 135–145)
WBC # BLD: 9.84 K/UL — SIGNIFICANT CHANGE UP (ref 3.8–10.5)
WBC # FLD AUTO: 9.84 K/UL — SIGNIFICANT CHANGE UP (ref 3.8–10.5)

## 2022-04-28 PROCEDURE — 33249 INSJ/RPLCMT DEFIB W/LEAD(S): CPT

## 2022-04-28 PROCEDURE — 99233 SBSQ HOSP IP/OBS HIGH 50: CPT

## 2022-04-28 PROCEDURE — 33225 L VENTRIC PACING LEAD ADD-ON: CPT

## 2022-04-28 PROCEDURE — 71045 X-RAY EXAM CHEST 1 VIEW: CPT | Mod: 26

## 2022-04-28 PROCEDURE — 75561 CARDIAC MRI FOR MORPH W/DYE: CPT | Mod: 26

## 2022-04-28 RX ORDER — HEPARIN SODIUM 5000 [USP'U]/ML
INJECTION INTRAVENOUS; SUBCUTANEOUS
Qty: 25000 | Refills: 0 | Status: DISCONTINUED | OUTPATIENT
Start: 2022-04-28 | End: 2022-04-28

## 2022-04-28 RX ORDER — ACETAMINOPHEN 500 MG
650 TABLET ORAL EVERY 6 HOURS
Refills: 0 | Status: DISCONTINUED | OUTPATIENT
Start: 2022-04-28 | End: 2022-05-01

## 2022-04-28 RX ORDER — TRAMADOL HYDROCHLORIDE 50 MG/1
50 TABLET ORAL EVERY 6 HOURS
Refills: 0 | Status: DISCONTINUED | OUTPATIENT
Start: 2022-04-28 | End: 2022-05-01

## 2022-04-28 RX ORDER — CEFAZOLIN SODIUM 1 G
1000 VIAL (EA) INJECTION EVERY 8 HOURS
Refills: 0 | Status: COMPLETED | OUTPATIENT
Start: 2022-04-28 | End: 2022-04-29

## 2022-04-28 RX ORDER — HYDROMORPHONE HYDROCHLORIDE 2 MG/ML
0.5 INJECTION INTRAMUSCULAR; INTRAVENOUS; SUBCUTANEOUS EVERY 4 HOURS
Refills: 0 | Status: DISCONTINUED | OUTPATIENT
Start: 2022-04-28 | End: 2022-05-01

## 2022-04-28 RX ORDER — ALPRAZOLAM 0.25 MG
0.25 TABLET ORAL ONCE
Refills: 0 | Status: DISCONTINUED | OUTPATIENT
Start: 2022-04-28 | End: 2022-04-28

## 2022-04-28 RX ADMIN — Medication 100 MILLIGRAM(S): at 23:07

## 2022-04-28 RX ADMIN — ATORVASTATIN CALCIUM 40 MILLIGRAM(S): 80 TABLET, FILM COATED ORAL at 22:00

## 2022-04-28 RX ADMIN — Medication 1 APPLICATION(S): at 05:05

## 2022-04-28 RX ADMIN — TAMSULOSIN HYDROCHLORIDE 0.4 MILLIGRAM(S): 0.4 CAPSULE ORAL at 22:00

## 2022-04-28 RX ADMIN — Medication 1 APPLICATION(S): at 23:24

## 2022-04-28 RX ADMIN — Medication 0.25 MILLIGRAM(S): at 03:54

## 2022-04-28 RX ADMIN — Medication 81 MILLIGRAM(S): at 14:11

## 2022-04-28 RX ADMIN — Medication 1: at 21:59

## 2022-04-28 RX ADMIN — HEPARIN SODIUM 1100 UNIT(S)/HR: 5000 INJECTION INTRAVENOUS; SUBCUTANEOUS at 09:42

## 2022-04-28 RX ADMIN — TRAMADOL HYDROCHLORIDE 50 MILLIGRAM(S): 50 TABLET ORAL at 22:03

## 2022-04-28 RX ADMIN — TRAMADOL HYDROCHLORIDE 50 MILLIGRAM(S): 50 TABLET ORAL at 23:05

## 2022-04-28 RX ADMIN — CHLORHEXIDINE GLUCONATE 1 APPLICATION(S): 213 SOLUTION TOPICAL at 05:05

## 2022-04-28 NOTE — PROGRESS NOTE ADULT - SUBJECTIVE AND OBJECTIVE BOX
24H hour events: No acute overnight events    MEDICATIONS:  aspirin enteric coated 81 milliGRAM(s) Oral daily  heparin  Infusion.  Unit(s)/Hr IV Continuous <Continuous>  tamsulosin 0.4 milliGRAM(s) Oral at bedtime  loratadine 10 milliGRAM(s) Oral daily  atorvastatin 40 milliGRAM(s) Oral at bedtime  dextrose 50% Injectable 25 Gram(s) IV Push once  dextrose 50% Injectable 12.5 Gram(s) IV Push once  dextrose 50% Injectable 25 Gram(s) IV Push once  dextrose Oral Gel 15 Gram(s) Oral once PRN  glucagon  Injectable 1 milliGRAM(s) IntraMuscular once  insulin lispro (ADMELOG) corrective regimen sliding scale   SubCutaneous three times a day before meals  insulin lispro (ADMELOG) corrective regimen sliding scale   SubCutaneous at bedtime  cyanocobalamin 1000 MICROGram(s) Oral daily  dextrose 5%. 1000 milliLiter(s) IV Continuous <Continuous>  dextrose 5%. 1000 milliLiter(s) IV Continuous <Continuous>  hydrocortisone 1% Cream 1 Application(s) Topical two times a day      REVIEW OF SYSTEMS:  See HPI, otherwise ROS negative.    PHYSICAL EXAM:  T(C): 36.4 (04-28-22 @ 10:00), Max: 37.1 (04-28-22 @ 01:00)  HR: 78 (04-28-22 @ 10:00) (61 - 78)  BP: 148/75 (04-28-22 @ 10:00) (128/73 - 166/80)  RR: 19 (04-28-22 @ 10:00) (16 - 19)  SpO2: 98% (04-28-22 @ 10:00) (94% - 98%)  Wt(kg): --  I&O's Summary    27 Apr 2022 07:01  -  28 Apr 2022 07:00  --------------------------------------------------------  IN: 240 mL / OUT: 500 mL / NET: -260 mL    28 Apr 2022 07:01  -  28 Apr 2022 10:43  --------------------------------------------------------  IN: 11 mL / OUT: 0 mL / NET: 11 mL        Appearance: Alert. NAD	  Cardiovascular: +S1S2 RRR no m/g/r  Respiratory: CTA B/L	  Extremities: No edema BLE  Vascular: Peripheral pulses palpable 2+ bilaterally      LABS:	 	    CBC Full  -  ( 28 Apr 2022 04:21 )  WBC Count : 9.84 K/uL  Hemoglobin : 12.4 g/dL  Hematocrit : 36.7 %  Platelet Count - Automated : 238 K/uL  Mean Cell Volume : 90.0 fl  Mean Cell Hemoglobin : 30.4 pg  Mean Cell Hemoglobin Concentration : 33.8 gm/dL  Auto Neutrophil # : x  Auto Lymphocyte # : x  Auto Monocyte # : x  Auto Eosinophil # : x  Auto Basophil # : x  Auto Neutrophil % : x  Auto Lymphocyte % : x  Auto Monocyte % : x  Auto Eosinophil % : x  Auto Basophil % : x    04-28    138  |  104  |  25<H>  ----------------------------<  163<H>  4.1   |  22  |  1.07  04-27    139  |  105  |  35<H>  ----------------------------<  192<H>  4.1   |  20<L>  |  1.19    Ca    9.5      28 Apr 2022 04:21  Ca    9.1      27 Apr 2022 05:00      TELEMETRY: 2:1 HB (40-50 BPM) vs SR with 1st degree (60-70 BPM)

## 2022-04-28 NOTE — PROGRESS NOTE ADULT - SUBJECTIVE AND OBJECTIVE BOX
DATE OF SERVICE: 04-28-22 @ 11:39    Patient is a 61y old  Male who presents with a chief complaint of CVA (28 Apr 2022 10:38)      SUBJECTIVE / OVERNIGHT EVENTS:    MEDICATIONS  (STANDING):  aspirin enteric coated 81 milliGRAM(s) Oral daily  atorvastatin 40 milliGRAM(s) Oral at bedtime  cyanocobalamin 1000 MICROGram(s) Oral daily  dextrose 5%. 1000 milliLiter(s) (50 mL/Hr) IV Continuous <Continuous>  dextrose 5%. 1000 milliLiter(s) (100 mL/Hr) IV Continuous <Continuous>  dextrose 50% Injectable 25 Gram(s) IV Push once  dextrose 50% Injectable 12.5 Gram(s) IV Push once  dextrose 50% Injectable 25 Gram(s) IV Push once  glucagon  Injectable 1 milliGRAM(s) IntraMuscular once  heparin  Infusion.  Unit(s)/Hr (11 mL/Hr) IV Continuous <Continuous>  hydrocortisone 1% Cream 1 Application(s) Topical two times a day  insulin lispro (ADMELOG) corrective regimen sliding scale   SubCutaneous three times a day before meals  insulin lispro (ADMELOG) corrective regimen sliding scale   SubCutaneous at bedtime  lidocaine 2% Jelly 4 milliLiter(s) IntraUrethral once  loratadine 10 milliGRAM(s) Oral daily  tamsulosin 0.4 milliGRAM(s) Oral at bedtime    MEDICATIONS  (PRN):  dextrose Oral Gel 15 Gram(s) Oral once PRN Blood Glucose LESS THAN 70 milliGRAM(s)/deciliter      Vital Signs Last 24 Hrs  T(C): 36.4 (28 Apr 2022 10:00), Max: 37.1 (28 Apr 2022 01:00)  T(F): 97.6 (28 Apr 2022 10:00), Max: 98.7 (28 Apr 2022 01:00)  HR: 78 (28 Apr 2022 10:00) (61 - 78)  BP: 148/75 (28 Apr 2022 10:00) (128/73 - 166/80)  BP(mean): --  RR: 19 (28 Apr 2022 10:00) (16 - 19)  SpO2: 98% (28 Apr 2022 10:00) (94% - 98%)  CAPILLARY BLOOD GLUCOSE      POCT Blood Glucose.: 151 mg/dL (28 Apr 2022 11:29)  POCT Blood Glucose.: 134 mg/dL (28 Apr 2022 07:55)  POCT Blood Glucose.: 365 mg/dL (27 Apr 2022 21:55)  POCT Blood Glucose.: 169 mg/dL (27 Apr 2022 12:02)    I&O's Summary    27 Apr 2022 07:01  -  28 Apr 2022 07:00  --------------------------------------------------------  IN: 240 mL / OUT: 500 mL / NET: -260 mL    28 Apr 2022 07:01  -  28 Apr 2022 11:39  --------------------------------------------------------  IN: 11 mL / OUT: 0 mL / NET: 11 mL        PHYSICAL EXAM:  GENERAL: NAD, well-developed  HEAD:  Atraumatic, Normocephalic  EYES: EOMI, PERRLA, conjunctiva and sclera clear  NECK: Supple, No JVD  CHEST/LUNG: Clear to auscultation bilaterally; No wheeze  HEART: Regular rate and rhythm; No murmurs, rubs, or gallops  ABDOMEN: Soft, Nontender, Nondistended; Bowel sounds present  EXTREMITIES:  2+ Peripheral Pulses, No clubbing, cyanosis, or edema  PSYCH: AAOx3  NEUROLOGY: non-focal  SKIN: No rashes or lesions    LABS:                        12.4   9.84  )-----------( 238      ( 28 Apr 2022 04:21 )             36.7     04-28    138  |  104  |  25<H>  ----------------------------<  163<H>  4.1   |  22  |  1.07    Ca    9.5      28 Apr 2022 04:21      < from: Cardiac Catheterization (04.27.22 @ 16:27) >  Cath Lab Report    Diagnostic Cardiologist:       Isreal Lawson MD   Fellow:                        Tiffanie Carranza MD   Referring Physician:           Isreal Lawson MD   Referring Physician:           Linda Montgomery MD     Procedures Performed   Procedures:              1.    Arterial Access - Right Radial     2.    Diagnostic Coronary Angiography   3.    Left Heart Cath     Indications:               Cardiomyopathy   Lab Visit Indication:      cardiomyopathy     Diagnostic Conclusions:     Three vessel non-obstructive coronary artery disease   Mild left main coronary artery disease   Right dominant system   No aortic valve stenosis    LVEDP = 4mmHg     Recommendations:     Keep right arm straight for 4 hours following removal of sheath  (radial band, heparin administered)  Continue aggressive medical management of coronary artery disease and  associated risk factors    Findings discussed with cardiology/Dr. Montgomery       Procedure Narrative:   The risks and alternatives of the procedures and conscious sedation  were explained to the patient and informed consent was  obtained. The patient was brought to the cath lab and placed on the  exam table.  Access   Right radial artery:   The puncture site was infiltrated with 1% Lidocaine. Vascular access  was obtained using modified seldinger technique.    Diagnostic Findings:     Coronary Angiography     Patient: OLEGARIO                   MRN: [id number]  Study Date: 04/27/2022   04:27 PM      Page [facility] of 3  [facility]           LM   Left main artery: There is a 10 % stenosis.      LAD   Proximal left anterior descending: There is a 40 % stenosis. First  diagonal: There is a 20 % stenosis.    CX   Mid circumflex: There is a 50 % stenosis. Proximal circumflex: There  is a 35 % stenosis.    RCA   Proximal right coronary artery: There is a 35 % stenosis. Right  posterior descending artery: There is a 45 % stenosis.    X-Ray:   Diagnostic Flouro time:       3.4 min.                    Exam record  DAP:           2638.10 cGycm2  Interventional Flouro time:                Exam fluoro  DAP:           396.40 cGycm2  Total Flouro Time:            3.4 min.                    Exam total  DAP:            3034.50  cGycm2    Exam Start Time:   04:27 PM   Exam End Time:     04:49 PM   Exam Duration:     22 min     Contrast:   Description                     Dose         Unit        Serial No.   Omnipaque                           36.000   mL       < end of copied text >            RADIOLOGY & ADDITIONAL TESTS:    Imaging Personally Reviewed:    Consultant(s) Notes Reviewed:      Care Discussed with Consultants/Other Providers:

## 2022-04-28 NOTE — PROGRESS NOTE ADULT - SUBJECTIVE AND OBJECTIVE BOX
Neurology Progress note   HPI:  61 year old Indonesian speaking male with past medical history of DM, HTN, dilated cardiomyopathy. LKN midnight prior, witnessed by wife.  Morning of admission he woke up   with left hemiparesis. NIHSS at [facility] 4 for left hemiparesis. CTA with right M1 occlusion. HR intermittently in the 40s, reportedly in second degree heart block (but no EKG on file), given atropine in route to the hospital.  Not a candidate for tPA as LKN > 4.5 hours.  He was transferred to [facility] as stroke rescue for possible thrombectomy.    [facility] staff contacted [facility] staff notified the patient was found to have acute renal failure Cr. 2.8, BUN 88, K 5.8 (untreated).  BNP increased.  Concern for digoxin toxicity, although digoxin level noted to be 1.6 at OSH. In the ED, T96.4, HR 50, /65, on %. Slow afib with HR 30-50s. FSG 56, given amp D50. Given albuterol and Ca gluc 2g.  Admitted to MICU for CVA neuro checks and possible thrombectomy.      SUBJECTIVE: No events overnight.  No new neurologic complaints.  ROS reported negative unless otherwise noted.     Medications:    MEDICATIONS  (STANDING):  aspirin enteric coated 81 milliGRAM(s) Oral daily  atorvastatin 40 milliGRAM(s) Oral at bedtime  cyanocobalamin 1000 MICROGram(s) Oral daily  dextrose 5%. 1000 milliLiter(s) (50 mL/Hr) IV Continuous <Continuous>  dextrose 5%. 1000 milliLiter(s) (100 mL/Hr) IV Continuous <Continuous>  dextrose 50% Injectable 25 Gram(s) IV Push once  dextrose 50% Injectable 12.5 Gram(s) IV Push once  dextrose 50% Injectable 25 Gram(s) IV Push once  glucagon  Injectable 1 milliGRAM(s) IntraMuscular once  hydrocortisone 1% Cream 1 Application(s) Topical two times a day  insulin lispro (ADMELOG) corrective regimen sliding scale   SubCutaneous three times a day before meals  insulin lispro (ADMELOG) corrective regimen sliding scale   SubCutaneous at bedtime  lidocaine 2% Jelly 4 milliLiter(s) IntraUrethral once  loratadine 10 milliGRAM(s) Oral daily  tamsulosin 0.4 milliGRAM(s) Oral at bedtime    MEDICATIONS  (PRN):  dextrose Oral Gel 15 Gram(s) Oral once PRN Blood Glucose LESS THAN 70 milliGRAM(s)/deciliter        PHYSICAL EXAM:     Vital Signs Last 24 Hrs  T(C): 37.1 (04-28-22 @ 01:00), Max: 37.1 (04-28-22 @ 01:00)  T(F): 98.7 (04-28-22 @ 01:00), Max: 98.7 (04-28-22 @ 01:00)  HR: 76 (04-28-22 @ 01:00) (61 - 76)  BP: 136/72 (04-28-22 @ 01:00) (128/73 - 166/80)  BP(mean): --  RR: 18 (04-28-22 @ 01:00) (16 - 18)  SpO2: 98% (04-28-22 @ 01:00) (94% - 98%)      General: No acute distress  HEENT: EOM intact, visual fields full  Abdomen: Soft, nontender, nondistended   Extremities: No edema    NEUROLOGICAL EXAM:  Mental status: Awake, alert, correctly states age, place and year, speech fluent, follows commands, mild left visual extinction and neglect   Cranial Nerves: no facial palsy,  improves on smiling, no nystagmus, mild dysarthria, no visual field cut.     Motor exam: Normal tone, Right side moves well against gravity, left hemiparesis: LUE pronator drift /LLE subtle drift- orbiting.   Sensation: Intact to light touch.    Coordination/ Gait: No dysmetria, gait not tested      LABS:    CBC Full  -  ( 28 Apr 2022 04:21 )  WBC Count : 9.84 K/uL  RBC Count : 4.08 M/uL  Hemoglobin : 12.4 g/dL  Hematocrit : 36.7 %  Platelet Count - Automated : 238 K/uL  Mean Cell Volume : 90.0 fl  Mean Cell Hemoglobin : 30.4 pg  Mean Cell Hemoglobin Concentration : 33.8 gm/dL  Auto Neutrophil # : x  Auto Lymphocyte # : x  Auto Monocyte # : x  Auto Eosinophil # : x  Auto Basophil # : x  Auto Neutrophil % : x  Auto Lymphocyte % : x  Auto Monocyte % : x  Auto Eosinophil % : x  Auto Basophil % : x      04-28    138  |  104  |  25<H>  ----------------------------<  163<H>  4.1   |  22  |  1.07    Ca    9.5      28 Apr 2022 04:21              IMAGING: Reviewed by me.   CT Brain stroke protocol, CTA H&N, CTP 4/21  IMPRESSION:  CT head:  -Loss of gray-white matter differentiation in the right lateral frontal   lobe and insula concerning for acute infarction.  -No acute intracranial hemorrhage.  CT angiogram neck:  -At least mild stenosis of the left vertebral artery origin.  -Atherosclerotic changes at the bilateral carotid bifurcations without   stenosis.  CT angiogram head:  -Acute occlusion of the right MCA distal M1 and proximal M2 divisions.  CT perfusion:  At risk ischemic tissue: 125 mL, right MCA territory.  Core infarct: 6 mL, right MCA territory.  Mismatch volume: 119 mL.  Mismatch ratio: 20.8.    CTH non con 4/22  IMPRESSION: New right frontal temporal cortical lucency consistent with a   small infarct in the right middle cerebral artery distribution compared   with 4/21/2022.    MR Head non con 4/23  IMPRESSION:  Acute infarct in the right MCA territory involving the anterior temporal   segment extending intothe insula and right posterior frontal parietal   region. Some associated petechial hemorrhage suggested on the   susceptibility weighted imaging. Subtle mass effect on the right lateral   ventricle. Suspicion of a small amount of thrombus in the distal right M1   proximal M2 segment

## 2022-04-28 NOTE — PROGRESS NOTE ADULT - SUBJECTIVE AND OBJECTIVE BOX
Patient seen and examined at bedside.    Overnight Events:   [facility] today, unremarkable. Plan for ICD placement by EP. Patient feels well.     Review Of Systems: No chest pain, shortness of breath, or palpitations            Current Meds:  aspirin enteric coated 81 milliGRAM(s) Oral daily  atorvastatin 40 milliGRAM(s) Oral at bedtime  cyanocobalamin 1000 MICROGram(s) Oral daily  dextrose 5%. 1000 milliLiter(s) IV Continuous <Continuous>  dextrose 5%. 1000 milliLiter(s) IV Continuous <Continuous>  dextrose 50% Injectable 25 Gram(s) IV Push once  dextrose 50% Injectable 12.5 Gram(s) IV Push once  dextrose 50% Injectable 25 Gram(s) IV Push once  dextrose Oral Gel 15 Gram(s) Oral once PRN  glucagon  Injectable 1 milliGRAM(s) IntraMuscular once  hydrocortisone 1% Cream 1 Application(s) Topical two times a day  insulin lispro (ADMELOG) corrective regimen sliding scale   SubCutaneous three times a day before meals  insulin lispro (ADMELOG) corrective regimen sliding scale   SubCutaneous at bedtime  lidocaine 2% Jelly 4 milliLiter(s) IntraUrethral once  loratadine 10 milliGRAM(s) Oral daily  tamsulosin 0.4 milliGRAM(s) Oral at bedtime      Vitals:  T(F): 98 (04-28), Max: 98.7 (04-28)  HR: 72 (04-28) (61 - 78)  BP: 131/68 (04-28) (131/68 - 166/80)  RR: 18 (04-28)  SpO2: 98% (04-28)  I&O's Summary    27 Apr 2022 07:01 - 28 Apr 2022 07:00  --------------------------------------------------------  IN: 240 mL / OUT: 500 mL / NET: -260 mL    28 Apr 2022 07:01  -  28 Apr 2022 15:55  --------------------------------------------------------  IN: 55 mL / OUT: 0 mL / NET: 55 mL        Physical Exam:  Appearance: No acute distress; well appearing  Eyes: EOMI, no scleral icterus   HEENT: Normal oral mucosa  Cardiovascular: RRR, S1, S2, no murmurs, rubs, or gallops; no edema; no JVD  Respiratory: Clear to auscultation bilaterally, no auditory stridor   Gastrointestinal: soft, non-tender, non-distended with normal bowel sounds  Musculoskeletal: No clubbing; no joint deformity   Neurologic: residual left sided weakness  Lymphatic: No lymphadenopathy  Psychiatry: AAOx3, mood & affect appropriate  Skin: No rashes, ecchymoses, or cyanosis                          12.4   9.84  )-----------( 238      ( 28 Apr 2022 04:21 )             36.7     04-28    138  |  104  |  25<H>  ----------------------------<  163<H>  4.1   |  22  |  1.07    Ca    9.5      28 Apr 2022 04:21                    New ECG(s): Personally reviewed    Echo:  Conclusions:  Endocardial visualization enhanced with intravenous  injection of Ultrasonic Enhancing Agent (Lumason).  Severe left ventricular enlargement.  Estimated ejection fraction 25%. Diffuse hypokinesis, with  inferior and inferoseptal akinesis.  Agitated saline injection demonstrates no evidence of a  patent foramen ovale.  Stress Testing:     Cath:  Diagnostic Conclusions:     Three vessel non-obstructive coronary artery disease   Mild left main coronary artery disease   Right dominant system   No aortic valve stenosis    LVEDP = 4mmHg       Imaging:       Patient seen and examined at bedside.    Overnight Events:   [facility] today, unremarkable. Plan for ICD placement by EP. Patient feels well.     Review Of Systems: No chest pain, shortness of breath, or palpitations            Current Meds:  aspirin enteric coated 81 milliGRAM(s) Oral daily  atorvastatin 40 milliGRAM(s) Oral at bedtime  cyanocobalamin 1000 MICROGram(s) Oral daily  dextrose 5%. 1000 milliLiter(s) IV Continuous <Continuous>  dextrose 5%. 1000 milliLiter(s) IV Continuous <Continuous>  dextrose 50% Injectable 25 Gram(s) IV Push once  dextrose 50% Injectable 12.5 Gram(s) IV Push once  dextrose 50% Injectable 25 Gram(s) IV Push once  dextrose Oral Gel 15 Gram(s) Oral once PRN  glucagon  Injectable 1 milliGRAM(s) IntraMuscular once  hydrocortisone 1% Cream 1 Application(s) Topical two times a day  insulin lispro (ADMELOG) corrective regimen sliding scale   SubCutaneous three times a day before meals  insulin lispro (ADMELOG) corrective regimen sliding scale   SubCutaneous at bedtime  lidocaine 2% Jelly 4 milliLiter(s) IntraUrethral once  loratadine 10 milliGRAM(s) Oral daily  tamsulosin 0.4 milliGRAM(s) Oral at bedtime      Vitals:  T(F): 98 (04-28), Max: 98.7 (04-28)  HR: 72 (04-28) (61 - 78)  BP: 131/68 (04-28) (131/68 - 166/80)  RR: 18 (04-28)  SpO2: 98% (04-28)  I&O's Summary    27 Apr 2022 07:01 - 28 Apr 2022 07:00  --------------------------------------------------------  IN: 240 mL / OUT: 500 mL / NET: -260 mL    28 Apr 2022 07:01  -  28 Apr 2022 15:55  --------------------------------------------------------  IN: 55 mL / OUT: 0 mL / NET: 55 mL        Physical Exam:  Appearance: No acute distress; well appearing  Eyes: EOMI, no scleral icterus   HEENT: Normal oral mucosa  Cardiovascular: RRR, S1, S2, no murmurs, rubs, or gallops; no edema; no JVD  Respiratory: Clear to auscultation bilaterally, no auditory stridor   Gastrointestinal: soft, non-tender, non-distended with normal bowel sounds  Musculoskeletal: No clubbing; no joint deformity   Neurologic: residual left sided weakness  Lymphatic: No lymphadenopathy  Psychiatry: AAOx3, mood & affect appropriate  Skin: No rashes, ecchymoses, or cyanosis                          12.4   9.84  )-----------( 238      ( 28 Apr 2022 04:21 )             36.7     04-28    138  |  104  |  25<H>  ----------------------------<  163<H>  4.1   |  22  |  1.07    Ca    9.5      28 Apr 2022 04:21                    New ECG(s): Personally reviewed    Echo:  Conclusions:  Endocardial visualization enhanced with intravenous  injection of Ultrasonic Enhancing Agent (Lumason).  Severe left ventricular enlargement.  Estimated ejection fraction 25%. Diffuse hypokinesis, with  inferior and inferoseptal akinesis.  Agitated saline injection demonstrates no evidence of a  patent foramen ovale.  Stress Testing:     Cath:  Diagnostic Conclusions:     Three vessel non-obstructive coronary artery disease   Mild left main coronary artery disease   Right dominant system   No aortic valve stenosis    LVEDP = 4mmHg       Imaging:  IMPRESSION:    1.  The LV is dilated with decreased systolic function; LVEF: 32%.    2.  No late gadolinium enhancement to suggest ischemia, scar or fibrosis.    --- End of Report ---

## 2022-04-28 NOTE — PROGRESS NOTE ADULT - ASSESSMENT
61 M history of HTN, HLD, DM, A. fib and severe LV dysfunction presented to OSH with left hemiparesis and was found to have acute CVA, not candidate for tPA as LKN > 4.5 hours.    Transferred to [facility] as stroke rescue for possible thrombectomy.   Found to be in AF with VR 30-60s, irregular in setting of K 7.3, prompting cardiology consult. Now bradycardia resolved. Course further complicated by 2:1 AVB.     #Intermittent 2:1 AVB in setting #HFrEF and #LBBB (QRS>160)  - Avoid AVN blocking agents  - Current euvolemic and well compensated.   - Hold from starting lisinopril or aldactone at this time  - plan for potential CRT-D device given EF 25% and QRS>160.   - EP following    #Atrial fibrillation with #CVA   - No A/C agent listed among medications. CHADSVASC at least 4, A/C recommended, particularly with now documented CVA.  61 M history of HTN, HLD, DM, A. fib and severe LV dysfunction presented to OSH with left hemiparesis and was found to have acute CVA, not candidate for tPA as LKN > 4.5 hours.    Transferred to [facility] as stroke rescue for possible thrombectomy.   Found to be in AF with VR 30-60s, irregular in setting of K 7.3, prompting cardiology consult. Now bradycardia resolved. Course further complicated by 2:1 AVB.     #Intermittent 2:1 AVB in setting #HFrEF and #LBBB (QRS>160)  - Avoid AVN blocking agents  - Current euvolemic and well compensated.   - Hold from starting lisinopril or aldactone at this time  - plan for potential CRT-D device given EF 25% and QRS>160.   - EP following    #Atrial fibrillation with #CVA   - No A/C agent listed among medications. CHADSVASC at least 4, A/C recommended, particularly with now documented CVA.   -Please follow up EP recommendation regarding when safe to restart AC after CRT/ICD placement 61 M history of HTN, HLD, DM, A. fib and severe LV dysfunction presented to OSH with left hemiparesis and was found to have acute CVA, not candidate for tPA as LKN > 4.5 hours.    Transferred to [facility] as stroke rescue for possible thrombectomy.   Found to be in AF with VR 30-60s, irregular in setting of K 7.3, prompting cardiology consult. Now bradycardia resolved. Course further complicated by 2:1 AVB.     #Intermittent 2:1 AVB in setting #HFrEF and #LBBB (QRS>160)  - Avoid AVN blocking agents  - Current euvolemic and well compensated.   - Hold from starting lisinopril or aldactone at this time  - plan for potential CRT-D device given EF 25% and QRS>160.   - EP following    #Atrial fibrillation with #CVA   - No A/C agent listed among medications. CHADSVASC at least 4, A/C recommended, particularly with now documented CVA.   - Please restart AC with Eliquis 5mg BID tomorrow AM (4/29)

## 2022-04-28 NOTE — PROGRESS NOTE ADULT - ASSESSMENT
60yo RT handed Nicaraguan speaking M with pmh of HTN, DM-II,  dilated cardiomyopathy presents to [facility] as a transfer for Rt M1 occlusion. Per chart review, patient's LWK was at midnight on 4/21. Patient had CT and CTA which was noted to show Rt M1 occlusion. Patient was not a TPa candidate due to out of time window. Patient was MT candidate at that time thus was agreed to transfer patient. Patient had initial NIHSS 4 whcih increased to NIHSS 9 then NIHSS 6 on repeat examination. Upon transfer patient deemed medically unstable for thrombectomy due to BRASH syndrome and admitted to MICU for further monitoring.    right MCA infarct with a right M1 occlusion. Mechanism may be cardioembolism related to atrial fibrillation.    CVA  -  Patient neurologically without acute change, Continue close monitoring for neurologic deterioration, permissive HTN to gradual normotension, holding home antihypertensives, avoid fluctuations in BP. LDL 44, titrate statin to LDL goal less than 70, MR Head non con as noted above. Agitation overall appears improved. .   - Physical therapy/OT: AR on re-eval.   -  Apixaban initiated, ASA if indicated from cardiac standpoint.       systolic heart failure  -  TTE: Severe left ventricular enlargement. Estimated ejection fraction 25%. Diffuse hypokinesis, with inferior and inferoseptal akinesis. On admission patient was found to be in Atrial fibrillation slow ventricular response and with IVCD/BBB, now sinus bradycardia with LBBB. Cardiology consult appreciated: restart lisinopril as bp permits, would hold for SBP < 130mmHg and introduce gradually from neurological standpoint, aldactone home regimen as tolerates, AC for afib,   bradycardia initially afib with slow ventricular response, IVCD/BBB then sinus keiko with LBBB, dig toxicity ruled out, AVOID ANY AVN BLOCKING AGENTS, bp meds as noted, EP consulted:   1' AVB now, mary anne, hold BB for now, would benefit from CRT-D, [facility], from neurological standpoint no contraindication to cardiac catheterization, should heparin need to be used would be cautious avoiding bolus if feasible, patient is also therapeutically anticoagulated at this time.    SBP goal: permissive HTN to gradual normotension as tolerated, neurologically tolerating SBP right now 120-160mmhg , gradual titration over the next week  avoiding rapid fluctuations and hypotension. Intermittent 2:1 AVB in setting #HFrEF and #LBBB (QRS>160)  - Avoid AVN blocking agents  - Current euvolemic and well compensated.   - Hold from starting lisinopril or aldactone at this time  - s/ p LHC   - for cardiac MRI  - plan for potential CRT-D device given EF 25% and QRS>160.   - EP following    #Atrial fibrillation with #CVA   - No A/C agent listed among medications. CHADSVASC at least 4, A/C recommended, particularly with now documented CVA.   - Will restart Eliquis after LHC if ok by cardiac intervention team    dysphagia  - Passed dysphagia screening, tolerating diet.     Diet: Consistent carb no snacks    NANCY,   - creatinine 2.76 & K+ 7.5. Renal was consulted, no HD needed and was treated with a bicarb drip. NANCY and hyperkalemia resolved & patient off bicarb drip. Creatinine remains stable .  Will continue to monitor kidney function and electrolytes. Taylor removed.  Monitor for changes in UO.     Na Goal: Greater than 135     Taylor: n    HEMATOLOGY: H/H without acute change, Platelets 232 . No si/sx of bleeding. He should have all age and risk appropriate malignancy screenings.       DVT ppx: Heparin s.c [] LMWH [x]       DISPOSITION: PT/OT recommends AR,

## 2022-04-28 NOTE — PROGRESS NOTE ADULT - ASSESSMENT
62 yo M with hx of HTN, HLD, DM, afib not on AC, severe LV dysfunction presented to OSH with L hemiparesis and was found to have acute CVA, not candidate for tPA as LKN > 4.5 hrs. Transferred to [facility] as stroke rescue for possible thrombectomy, found to have in afib with SVR HR 30-60s in the setting of hyperK at 7.3. Now bradycardia resolved after normalization of electrolytes. EP consulted for 2:1 AVB. Currently with minimal residual deficits from his CVA.     1. 2:1 AVB  2. AF  3. LBBB    - Continue to monitor on telemetry  - Keep K>4 and Mg>2  - Patient remains asymptomatic  - Continue to hold AV kevin blockers  - CHADSVASC of 5  - EF of 25% with QRS >150 ms  - S/p cath - diagnostic  - Pending cMRI @ 1PM  - Maintain NPO for possible CRT-D implant today  - Chlorhexidine to entire torso @ 2200 and 0600  - Keep Type and Screen and COVID PCR active    GEENA HouC  #[phone]   62 yo M with hx of HTN, HLD, DM, afib not on AC, severe LV dysfunction presented to OSH with L hemiparesis and was found to have acute CVA, not candidate for tPA as LKN > 4.5 hrs. Transferred to [facility] as stroke rescue for possible thrombectomy, found to have in afib with SVR HR 30-60s in the setting of hyperK at 7.3. Now bradycardia resolved after normalization of electrolytes. EP consulted for 2:1 AVB. Currently with minimal residual deficits from his CVA.     1. 2:1 AVB  2. AF  3. LBBB    - Continue to monitor on telemetry  - Keep K>4 and Mg>2  - Patient remains asymptomatic  - Continue to hold AV kevin blockers  - CHADSVASC of 5  - EF of 25% with QRS >150 ms  - S/p cath - diagnostic  - Pending cMRI @ 1PM  - Maintain NPO for possible CRT-D implant today  - Chlorhexidine to entire torso @ 2200 and 0600  - Keep Type and Screen and COVID PCR active  - NO HEPARIN/LOVENOX PRODUCTS POST CRT-D IMPLANT    Corinna Tillman PA-C  #[phone]

## 2022-04-28 NOTE — CHART NOTE - NSCHARTNOTEFT_GEN_A_CORE
Patient s/p AICD implant , received in PACU  without complaints. Hx of NICM and EF 20-25% . LACW site stable no bleeding or hematoma. CXR ordered , ancef for 2 more doses. If Left anterior chest wall site stable in am  then ok to start eliquis in am 4/29 .

## 2022-04-28 NOTE — PROGRESS NOTE ADULT - ASSESSMENT
ASSESSMENT: Patient is a 60yo RT handed Bengali speaking M with pmh of HTN, DM-II,  dilated cardiomyopathy presents to [facility] as a transfer for Rt M1 occlusion. Per chart review, patient's LWK was at midnight on 4/21. Patient had CT and CTA which was noted to show Rt M1 occlusion. Patient was not a TPa candidate due to out of time window. Patient was MT candidate at that time thus was agreed to transfer patient. Patient had initial NIHSS 4 whcih increased to NIHSS 9 then NIHSS 6 on repeat examination. Upon transfer patient deemed medically unstable for thrombectomy due to BRASH syndrome and admitted to MICU for further monitoring.     Impression. right MCA infarct with a right M1 occlusion, . Mechanism may be cardioembolism related to atrial fibrillation.    NEURO: Patient neurologically without acute change, Continue close monitoring for neurologic deterioration given petechial hemorrhagic transformation- overall clinically stable and has improved, permissive HTN to gradual normotension, holding home antihypertensives, avoid fluctuations in BP. LDL 44, titrate statin to LDL goal less than 70, MR Head non con as noted above. Agitation overall appears improved. . Physical therapy/OT: AR on re-eval.     ANTITHROMBOTIC THERAPY:  Apixaban 5mg BID from the neurological standpoint for secondary stroke prevention in atrial fibrillation- cardiology would like to hold for [facility], would restart when safe at soonest time possible, ASA/Plavix if absolutely indicated from cardiac standpoint should he require for CAD and/or stents.       PULMONARY: CXR clear, protecting airway, saturating well.    CARDIOVASCULAR: TTE: Severe left ventricular enlargement. Estimated ejection fraction 25%. Diffuse hypokinesis, with inferior and inferoseptal akinesis. On admission patient was found to be in Atrial fibrillation slow ventricular response and with IVCD/BBB, now sinus bradycardia with LBBB. Cardiology consult appreciated: hold lisinopril and aldactone for now as noted, AC for afib,  bradycardia initially afib with slow ventricular response, IVCD/BBB then sinus keiko with LBBB, dig toxicity ruled out, AVOID ANY AVN BLOCKING AGENTS, bp meds as  as per cardio, EP consulted:   1' AVB now, mary anne, hold BB for now, would benefit from CRT-D, LHC, from neurological standpoint no contraindication to cardiac catheterization, should heparin need to be used would be cautious avoiding bolus if feasible, patient is also therapeutically anticoagulated at this time.    SBP goal: permissive HTN to gradual normotension as tolerated, neurologically tolerating SBP right now 120-160mmhg , gradual titration over the next week  avoiding rapid fluctuations and hypotension.     GASTROINTESTINAL: Passed dysphagia screening, tolerating diet.     Diet: Consistent carb no snacks    RENAL: Patient initially with NANCY, creatinine 2.76 & K+ 7.5. Renal was consulted, no HD needed and was treated with a bicarb drip. NANCY and hyperkalemia resolved & patient off bicarb drip. Creatinine remains stable 1.12& K+ 3.9 Will continue to monitor kidney function and electrolytes. Taylor removed.  Monitor for changes in UO.     Na Goal: Greater than 135     Taylor: n    HEMATOLOGY: H/H without acute change, Platelets 232 . No si/sx of bleeding. He should have all age and risk appropriate malignancy screenings.       DVT ppx: Heparin s.c [] LMWH [x]     ID: afebrile, no leukocytosis     other: social work consulted. Continue monitoring for further glucose control.     DISPOSITION: PT/OT recommends AR. Please call with any questions or concerns.      CORE MEASURES:        Admission NIHSS: 6     TPA: [] YES [x] NO      LDL/HDL: 44/41     Depression Screen: o- wants his long term partner to be able to come.      Statin Therapy: y     Dysphagia Screen: [x] PASS [] FAIL     Smoking [] YES [] NO      Afib [x] YES [] NO     Stroke Education [x] YES [] NO    Obtain screening lower extremity venous ultrasound in patients who meet 1 or more of the following criteria as patient is high risk for DVT/PE on admission:   [] History of DVT/PE  []Hypercoagulable states (Factor V Leiden, Cancer, OCP, etc. )  []Prolonged immobility (hemiplegia/hemiparesis/post operative or any other extended immobilization)  [] Transferred from outside facility (Rehab or Long term care)  [] Age </= to 50

## 2022-04-28 NOTE — PROVIDER CONTACT NOTE (OTHER) - ASSESSMENT
Patient complaining of anxiety. Restless in bed, not able to sleep for 3 nights. Patient remains alert and oriented. MENDEZ May helped with translation. RN and NP assessed patient at bedside

## 2022-04-28 NOTE — CHART NOTE - NSCHARTNOTEFT_GEN_A_CORE
OLEGARIO PRADHAN  [id number]    PROCEDURE:  CRT-D implant      INDICATION:  NICM  HFrEF, LVEF 25%  NYHA Class II  LBBB  ms      ELECTROPHYSIOLOGIST(S):  Dr. Chapin  Fellow Dr. Casper      ANESTHESIOLOGY:  MAC, local      FINDINGS:  - uncomplicated implant of CRT-D, left sided via cephalic cut down and axillary vein puncture, with excellent lead parameters and QLV of 110 ms       COMPLICATIONS:  none      RECOMMENDATIONS:  - CXR r/o pneumo  - CXR PA/Lat. in AM  - post op. Abx as ordered

## 2022-04-29 PROBLEM — I10 ESSENTIAL (PRIMARY) HYPERTENSION: Chronic | Status: ACTIVE | Noted: 2022-04-21

## 2022-04-29 PROBLEM — Z00.00 ENCOUNTER FOR PREVENTIVE HEALTH EXAMINATION: Status: ACTIVE | Noted: 2022-04-29

## 2022-04-29 PROBLEM — E78.5 HYPERLIPIDEMIA, UNSPECIFIED: Chronic | Status: ACTIVE | Noted: 2022-04-21

## 2022-04-29 LAB
ANION GAP SERPL CALC-SCNC: 13 MMOL/L — SIGNIFICANT CHANGE UP (ref 5–17)
APTT BLD: 26.9 SEC — LOW (ref 27.5–35.5)
BUN SERPL-MCNC: 23 MG/DL — SIGNIFICANT CHANGE UP (ref 7–23)
CALCIUM SERPL-MCNC: 9 MG/DL — SIGNIFICANT CHANGE UP (ref 8.4–10.5)
CHLORIDE SERPL-SCNC: 105 MMOL/L — SIGNIFICANT CHANGE UP (ref 96–108)
CO2 SERPL-SCNC: 20 MMOL/L — LOW (ref 22–31)
CREAT SERPL-MCNC: 1.12 MG/DL — SIGNIFICANT CHANGE UP (ref 0.5–1.3)
EGFR: 75 ML/MIN/1.73M2 — SIGNIFICANT CHANGE UP
GLUCOSE BLDC GLUCOMTR-MCNC: 241 MG/DL — HIGH (ref 70–99)
GLUCOSE BLDC GLUCOMTR-MCNC: 284 MG/DL — HIGH (ref 70–99)
GLUCOSE BLDC GLUCOMTR-MCNC: 301 MG/DL — HIGH (ref 70–99)
GLUCOSE BLDC GLUCOMTR-MCNC: 344 MG/DL — HIGH (ref 70–99)
GLUCOSE BLDC GLUCOMTR-MCNC: 386 MG/DL — HIGH (ref 70–99)
GLUCOSE SERPL-MCNC: 269 MG/DL — HIGH (ref 70–99)
HCT VFR BLD CALC: 33 % — LOW (ref 39–50)
HGB BLD-MCNC: 11.2 G/DL — LOW (ref 13–17)
INR BLD: 1.26 RATIO — HIGH (ref 0.88–1.16)
MCHC RBC-ENTMCNC: 30.3 PG — SIGNIFICANT CHANGE UP (ref 27–34)
MCHC RBC-ENTMCNC: 33.9 GM/DL — SIGNIFICANT CHANGE UP (ref 32–36)
MCV RBC AUTO: 89.2 FL — SIGNIFICANT CHANGE UP (ref 80–100)
NRBC # BLD: 0 /100 WBCS — SIGNIFICANT CHANGE UP (ref 0–0)
PLATELET # BLD AUTO: 191 K/UL — SIGNIFICANT CHANGE UP (ref 150–400)
POTASSIUM SERPL-MCNC: 4.1 MMOL/L — SIGNIFICANT CHANGE UP (ref 3.5–5.3)
POTASSIUM SERPL-SCNC: 4.1 MMOL/L — SIGNIFICANT CHANGE UP (ref 3.5–5.3)
PROTHROM AB SERPL-ACNC: 14.7 SEC — HIGH (ref 10.5–13.4)
RBC # BLD: 3.7 M/UL — LOW (ref 4.2–5.8)
RBC # FLD: 12.8 % — SIGNIFICANT CHANGE UP (ref 10.3–14.5)
SARS-COV-2 RNA SPEC QL NAA+PROBE: SIGNIFICANT CHANGE UP
SODIUM SERPL-SCNC: 138 MMOL/L — SIGNIFICANT CHANGE UP (ref 135–145)
WBC # BLD: 10.89 K/UL — HIGH (ref 3.8–10.5)
WBC # FLD AUTO: 10.89 K/UL — HIGH (ref 3.8–10.5)

## 2022-04-29 PROCEDURE — 99233 SBSQ HOSP IP/OBS HIGH 50: CPT

## 2022-04-29 PROCEDURE — 71046 X-RAY EXAM CHEST 2 VIEWS: CPT | Mod: 26

## 2022-04-29 PROCEDURE — 93010 ELECTROCARDIOGRAM REPORT: CPT

## 2022-04-29 RX ORDER — METOPROLOL TARTRATE 50 MG
25 TABLET ORAL DAILY
Refills: 0 | Status: DISCONTINUED | OUTPATIENT
Start: 2022-04-29 | End: 2022-05-01

## 2022-04-29 RX ORDER — LISINOPRIL 2.5 MG/1
5 TABLET ORAL DAILY
Refills: 0 | Status: DISCONTINUED | OUTPATIENT
Start: 2022-04-29 | End: 2022-05-01

## 2022-04-29 RX ORDER — APIXABAN 2.5 MG/1
5 TABLET, FILM COATED ORAL
Refills: 0 | Status: DISCONTINUED | OUTPATIENT
Start: 2022-04-29 | End: 2022-05-01

## 2022-04-29 RX ADMIN — Medication 1 APPLICATION(S): at 05:30

## 2022-04-29 RX ADMIN — Medication 1: at 21:18

## 2022-04-29 RX ADMIN — Medication 650 MILLIGRAM(S): at 16:18

## 2022-04-29 RX ADMIN — APIXABAN 5 MILLIGRAM(S): 2.5 TABLET, FILM COATED ORAL at 10:14

## 2022-04-29 RX ADMIN — ATORVASTATIN CALCIUM 40 MILLIGRAM(S): 80 TABLET, FILM COATED ORAL at 21:17

## 2022-04-29 RX ADMIN — Medication 4: at 12:06

## 2022-04-29 RX ADMIN — Medication 4: at 08:37

## 2022-04-29 RX ADMIN — TRAMADOL HYDROCHLORIDE 50 MILLIGRAM(S): 50 TABLET ORAL at 05:53

## 2022-04-29 RX ADMIN — APIXABAN 5 MILLIGRAM(S): 2.5 TABLET, FILM COATED ORAL at 19:57

## 2022-04-29 RX ADMIN — Medication 100 MILLIGRAM(S): at 08:38

## 2022-04-29 RX ADMIN — Medication 650 MILLIGRAM(S): at 15:48

## 2022-04-29 RX ADMIN — Medication 5: at 16:15

## 2022-04-29 RX ADMIN — LISINOPRIL 5 MILLIGRAM(S): 2.5 TABLET ORAL at 12:05

## 2022-04-29 RX ADMIN — Medication 25 MILLIGRAM(S): at 12:05

## 2022-04-29 RX ADMIN — TRAMADOL HYDROCHLORIDE 50 MILLIGRAM(S): 50 TABLET ORAL at 04:53

## 2022-04-29 RX ADMIN — LORATADINE 10 MILLIGRAM(S): 10 TABLET ORAL at 15:47

## 2022-04-29 RX ADMIN — PREGABALIN 1000 MICROGRAM(S): 225 CAPSULE ORAL at 15:48

## 2022-04-29 RX ADMIN — TAMSULOSIN HYDROCHLORIDE 0.4 MILLIGRAM(S): 0.4 CAPSULE ORAL at 21:17

## 2022-04-29 NOTE — PROGRESS NOTE ADULT - SUBJECTIVE AND OBJECTIVE BOX
Neurology Progress note   HPI:  61 year old Vietnamese speaking male with past medical history of DM, HTN, dilated cardiomyopathy. LKN midnight prior, witnessed by wife.  Morning of admission he woke up   with left hemiparesis. NIHSS at [facility] 4 for left hemiparesis. CTA with right M1 occlusion. HR intermittently in the 40s, reportedly in second degree heart block (but no EKG on file), given atropine in route to the hospital.  Not a candidate for tPA as LKN > 4.5 hours.  He was transferred to [facility] as stroke rescue for possible thrombectomy.    [place] staff contacted [facility] staff notified the patient was found to have acute renal failure Cr. 2.8, BUN 88, K 5.8 (untreated).  BNP increased.  Concern for digoxin toxicity, although digoxin level noted to be 1.6 at OSH. In the ED, T96.4, HR 50, /65, on %. Slow afib with HR 30-50s. FSG 56, given amp D50. Given albuterol and Ca gluc 2g.  Admitted to MICU for CVA neuro checks and possible thrombectomy.      SUBJECTIVE: No events overnight.  No new neurologic complaints.  ROS reported negative unless otherwise noted.     Medications:    MEDICATIONS  (STANDING):  apixaban 5 milliGRAM(s) Oral two times a day  atorvastatin 40 milliGRAM(s) Oral at bedtime  dextrose 5%. 1000 milliLiter(s) (50 mL/Hr) IV Continuous <Continuous>  dextrose 5%. 1000 milliLiter(s) (100 mL/Hr) IV Continuous <Continuous>  dextrose 50% Injectable 25 Gram(s) IV Push once  dextrose 50% Injectable 12.5 Gram(s) IV Push once  dextrose 50% Injectable 25 Gram(s) IV Push once  glucagon  Injectable 1 milliGRAM(s) IntraMuscular once  hydrocortisone 1% Cream 1 Application(s) Topical two times a day  insulin lispro (ADMELOG) corrective regimen sliding scale   SubCutaneous three times a day before meals  insulin lispro (ADMELOG) corrective regimen sliding scale   SubCutaneous at bedtime  lidocaine 2% Jelly 4 milliLiter(s) IntraUrethral once  lisinopril 5 milliGRAM(s) Oral daily  loratadine 10 milliGRAM(s) Oral daily  metoprolol succinate ER 25 milliGRAM(s) Oral daily  tamsulosin 0.4 milliGRAM(s) Oral at bedtime    MEDICATIONS  (PRN):  acetaminophen     Tablet .. 650 milliGRAM(s) Oral every 6 hours PRN Temp greater or equal to 38C (100.4F), Mild Pain (1 - 3)  dextrose Oral Gel 15 Gram(s) Oral once PRN Blood Glucose LESS THAN 70 milliGRAM(s)/deciliter  HYDROmorphone  Injectable 0.5 milliGRAM(s) IV Push every 4 hours PRN Severe Pain (7 - 10)  traMADol 50 milliGRAM(s) Oral every 6 hours PRN Moderate Pain (4 - 6)    PHYSICAL EXAM:     Vital Signs Last 24 Hrs  T(C): 36.4 (04-29-22 @ 17:05), Max: 36.9 (04-29-22 @ 13:52)  T(F): 97.5 (04-29-22 @ 17:05), Max: 98.4 (04-29-22 @ 13:52)  HR: 55 (04-29-22 @ 17:05) (55 - 77)  BP: 159/68 (04-29-22 @ 17:05) (129/81 - 175/68)  BP(mean): --  RR: 18 (04-29-22 @ 17:05) (18 - 18)  SpO2: 100% (04-29-22 @ 17:05) (97% - 100%)          General: No acute distress  HEENT: EOM intact, visual fields full  Abdomen: Soft, nontender, nondistended   Extremities: No edema    NEUROLOGICAL EXAM:  Mental status: Awake, alert, correctly states age, place and year, speech fluent, follows commands, mild left visual extinction and neglect   Cranial Nerves: no facial palsy,  improves on smiling, no nystagmus, mild dysarthria, no visual field cut.     Motor exam: Normal tone, Right side moves well against gravity, left hemiparesis: LUE pronator drift /LLE subtle drift- orbiting.   Sensation: Intact to light touch.    Coordination/ Gait: No dysmetria, gait not tested      LABS:    CBC Full  -  ( 29 Apr 2022 07:09 )  WBC Count : 10.89 K/uL  RBC Count : 3.70 M/uL  Hemoglobin : 11.2 g/dL  Hematocrit : 33.0 %  Platelet Count - Automated : 191 K/uL  Mean Cell Volume : 89.2 fl  Mean Cell Hemoglobin : 30.3 pg  Mean Cell Hemoglobin Concentration : 33.9 gm/dL  Auto Neutrophil # : x  Auto Lymphocyte # : x  Auto Monocyte # : x  Auto Eosinophil # : x  Auto Basophil # : x  Auto Neutrophil % : x  Auto Lymphocyte % : x  Auto Monocyte % : x  Auto Eosinophil % : x  Auto Basophil % : x    04-29    138  |  105  |  23  ----------------------------<  269<H>  4.1   |  20<L>  |  1.12    Ca    9.0      29 Apr 2022 07:11              IMAGING: Reviewed by me.   CT Brain stroke protocol, CTA H&N, CTP 4/21  IMPRESSION:  CT head:  -Loss of gray-white matter differentiation in the right lateral frontal   lobe and insula concerning for acute infarction.  -No acute intracranial hemorrhage.  CT angiogram neck:  -At least mild stenosis of the left vertebral artery origin.  -Atherosclerotic changes at the bilateral carotid bifurcations without   stenosis.  CT angiogram head:  -Acute occlusion of the right MCA distal M1 and proximal M2 divisions.  CT perfusion:  At risk ischemic tissue: 125 mL, right MCA territory.  Core infarct: 6 mL, right MCA territory.  Mismatch volume: 119 mL.  Mismatch ratio: 20.8.    CTH non con 4/22  IMPRESSION: New right frontal temporal cortical lucency consistent with a   small infarct in the right middle cerebral artery distribution compared   with 4/21/2022.    MR Head non con 4/23  IMPRESSION:  Acute infarct in the right MCA territory involving the anterior temporal   segment extending intothe insula and right posterior frontal parietal   region. Some associated petechial hemorrhage suggested on the   susceptibility weighted imaging. Subtle mass effect on the right lateral   ventricle. Suspicion of a small amount of thrombus in the distal right M1   proximal M2 segment

## 2022-04-29 NOTE — PROGRESS NOTE ADULT - ASSESSMENT
ASSESSMENT: Patient is a 62yo RT handed Wol[place] speaking M with pmh of HTN, DM-II,  dilated cardiomyopathy presents to [facility] as a transfer for Rt M1 occlusion. Per chart review, patient's LWK was at midnight on 4/21. Patient had CT and CTA which was noted to show Rt M1 occlusion. Patient was not a TPa candidate due to out of time window. Patient was MT candidate at that time thus was agreed to transfer patient. Patient had initial NIHSS 4 whcih increased to NIHSS 9 then NIHSS 6 on repeat examination. Upon transfer patient deemed medically unstable for thrombectomy due to BRASH syndrome and admitted to MICU for further monitoring.     Impression. right MCA infarct with a right M1 occlusion, . Mechanism may be cardioembolism related to atrial fibrillation.    NEURO: Patient neurologically without acute change, Continue close monitoring for neurologic deterioration given petechial hemorrhagic transformation- overall clinically stable and has improved, permissive HTN to gradual normotension, holding home antihypertensives, avoid fluctuations in BP. LDL 44, titrate statin to LDL goal less than 70, MR Head non con as noted above. Agitation overall appears improved. . Physical therapy/OT: AR on re-eval.     ANTITHROMBOTIC THERAPY:  Apixaban 5mg BID from the neurological standpoint for secondary stroke prevention in atrial fibrillation- cardiology would like to hold for [facility], would restart when safe at soonest time possible, ASA/Plavix if absolutely indicated from cardiac standpoint should he require for CAD and/or stents.       PULMONARY: CXR clear, protecting airway, saturating well.    CARDIOVASCULAR: TTE: Severe left ventricular enlargement. Estimated ejection fraction 25%. Diffuse hypokinesis, with inferior and inferoseptal akinesis. On admission patient was found to be in Atrial fibrillation slow ventricular response and with IVCD/BBB, now sinus bradycardia with LBBB. Cardiology consult appreciated: hold lisinopril and aldactone for now as noted, AC for afib,  bradycardia initially afib with slow ventricular response, IVCD/BBB then sinus keiko with LBBB, dig toxicity ruled out, AVOID ANY AVN BLOCKING AGENTS, bp meds as  as per cardio, EP consulted:   1' AVB now, mary anne, hold BB for now, would benefit from CRT-D, LHC, from neurological standpoint no contraindication to cardiac catheterization, should heparin need to be used would be cautious avoiding bolus if feasible, patient is also therapeutically anticoagulated at this time.    SBP goal: permissive HTN to gradual normotension as tolerated, neurologically tolerating SBP right now 120-160mmhg , gradual titration over the next week  avoiding rapid fluctuations and hypotension.     GASTROINTESTINAL: Passed dysphagia screening, tolerating diet.     Diet: Consistent carb no snacks    RENAL: Patient initially with NANCY, creatinine 2.76 & K+ 7.5. Renal was consulted, no HD needed and was treated with a bicarb drip. NANCY and hyperkalemia resolved & patient off bicarb drip. Creatinine remains stable 1.12& K+ 3.9 Will continue to monitor kidney function and electrolytes. Taylor removed.  Monitor for changes in UO.     Na Goal: Greater than 135     Taylor: n    HEMATOLOGY: H/H without acute change, Platelets 232 . No si/sx of bleeding. He should have all age and risk appropriate malignancy screenings.       DVT ppx: Heparin s.c [] LMWH [x]     ID: afebrile, no leukocytosis     other: social work consulted. Continue monitoring for further glucose control.     DISPOSITION: PT/OT recommends AR. Please call with any questions or concerns. d/c plan for 4/30      CORE MEASURES:        Admission NIHSS: 6     TPA: [] YES [x] NO      LDL/HDL: 44/41     Depression Screen: o- wants his long term partner to be able to come.      Statin Therapy: y     Dysphagia Screen: [x] PASS [] FAIL     Smoking [] YES [] NO      Afib [x] YES [] NO     Stroke Education [x] YES [] NO    Obtain screening lower extremity venous ultrasound in patients who meet 1 or more of the following criteria as patient is high risk for DVT/PE on admission:   [] History of DVT/PE  []Hypercoagulable states (Factor V Leiden, Cancer, OCP, etc. )  []Prolonged immobility (hemiplegia/hemiparesis/post operative or any other extended immobilization)  [] Transferred from outside facility (Rehab or Long term care)  [] Age </= to 50

## 2022-04-29 NOTE — PROGRESS NOTE ADULT - SUBJECTIVE AND OBJECTIVE BOX
24H hour events: S/p CRT-D implant 4/28    Int Id# [id number]    MEDICATIONS:  apixaban 5 milliGRAM(s) Oral two times a day  lisinopril 5 milliGRAM(s) Oral daily  metoprolol succinate ER 25 milliGRAM(s) Oral daily  tamsulosin 0.4 milliGRAM(s) Oral at bedtime  loratadine 10 milliGRAM(s) Oral daily  acetaminophen     Tablet .. 650 milliGRAM(s) Oral every 6 hours PRN  HYDROmorphone  Injectable 0.5 milliGRAM(s) IV Push every 4 hours PRN  traMADol 50 milliGRAM(s) Oral every 6 hours PRN  atorvastatin 40 milliGRAM(s) Oral at bedtime  dextrose 50% Injectable 25 Gram(s) IV Push once  dextrose 50% Injectable 12.5 Gram(s) IV Push once  dextrose 50% Injectable 25 Gram(s) IV Push once  dextrose Oral Gel 15 Gram(s) Oral once PRN  glucagon  Injectable 1 milliGRAM(s) IntraMuscular once  insulin lispro (ADMELOG) corrective regimen sliding scale   SubCutaneous three times a day before meals  insulin lispro (ADMELOG) corrective regimen sliding scale   SubCutaneous at bedtime  cyanocobalamin 1000 MICROGram(s) Oral daily  dextrose 5%. 1000 milliLiter(s) IV Continuous <Continuous>  dextrose 5%. 1000 milliLiter(s) IV Continuous <Continuous>  hydrocortisone 1% Cream 1 Application(s) Topical two times a day      REVIEW OF SYSTEMS:  See HPI, otherwise ROS negative.    PHYSICAL EXAM:  T(C): 36.7 (04-29-22 @ 08:00), Max: 36.7 (04-28-22 @ 13:28)  HR: 74 (04-29-22 @ 12:11) (63 - 77)  BP: 161/87 (04-29-22 @ 12:11) (129/81 - 172/84)  RR: 18 (04-29-22 @ 12:11) (18 - 18)  SpO2: 99% (04-29-22 @ 12:11) (97% - 100%)  Wt(kg): --  I&O's Summary    28 Apr 2022 07:01  -  29 Apr 2022 07:00  --------------------------------------------------------  IN: 55 mL / OUT: 0 mL / NET: 55 mL        Appearance: Alert. NAD	  Cardiovascular: +S1S2 RRR no m/g/r  Respiratory: CTA B/L	  Skin: Surgical tape in place, no edema, erythema or ecchymosis  Extremities: No edema BLE  Vascular: Peripheral pulses palpable 2+ bilaterally      LABS:	 	    CBC Full  -  ( 29 Apr 2022 07:09 )  WBC Count : 10.89 K/uL  Hemoglobin : 11.2 g/dL  Hematocrit : 33.0 %  Platelet Count - Automated : 191 K/uL  Mean Cell Volume : 89.2 fl  Mean Cell Hemoglobin : 30.3 pg  Mean Cell Hemoglobin Concentration : 33.9 gm/dL  Auto Neutrophil # : x  Auto Lymphocyte # : x  Auto Monocyte # : x  Auto Eosinophil # : x  Auto Basophil # : x  Auto Neutrophil % : x  Auto Lymphocyte % : x  Auto Monocyte % : x  Auto Eosinophil % : x  Auto Basophil % : x    04-29    138  |  105  |  23  ----------------------------<  269<H>  4.1   |  20<L>  |  1.12  04-28    138  |  104  |  25<H>  ----------------------------<  163<H>  4.1   |  22  |  1.07    Ca    9.0      29 Apr 2022 07:11  Ca    9.5      28 Apr 2022 04:21      TELEMETRY:  70-90 BPM  	    ECG:   @ 70 BPM

## 2022-04-29 NOTE — PROGRESS NOTE ADULT - ATTENDING COMMENTS
Please note modifications to resident/fellow's note and plan below     - s/p CRT/D yesterday   - Pt feels well, no cp/sob     Vital signs reviewed     PHYSICAL EXAM:  General: No acute distress  HEENT: EOMI  Neck:  No JVD  Lungs: Clear to auscultation bilaterally; No rales or wheezing  Heart: Regular rate and rhythm; No murmurs, rubs, or gallops. Dressing over chest C/D/I  Abdomen: soft, non tender, non distended   Extremities: warm, no edema   Nervous system:  Alert & Oriented X3  Psychiatric: Normal affect    Labs noted: Hgb 11.2 Cr 1.1     ECG [my interpretation]: 4/22 sinus keiko LBBB 4/25 SR w 1st degree LBBB with repol abn     TELEMETRY: paced rhythm    ECHO: 4/23  "Conclusions:  Endocardial visualization enhanced with intravenous  injection of Ultrasonic Enhancing Agent (Lumason).  Severe left ventricular enlargement.  Estimated ejection fraction 25%. Diffuse hypokinesis, with  inferior and inferoseptal akinesis.  Agitated saline injection demonstrates no evidence of a  patent foramen ovale."    [facility]  "Diagnostic Conclusions:     Three vessel non-obstructive coronary artery disease   Mild left main coronary artery disease   Right dominant system   No aortic valve stenosis    LVEDP = 4mmHg     Recommendations:     Keep right arm straight for 4 hours following removal of sheath  (radial band, heparin administered)  Continue aggressive medical management of coronary artery disease and  associated risk factors"    cMRI 4/28/22  "IMPRESSION:    1.  The LV is dilated with decreased systolic function; LVEF: 32%.    2.  No late gadolinium enhancement to suggest ischemia, scar or fibrosis."    61 M history of HTN, HLD, DM, AF and severe LV dysfunction presented to OSH with left hemiparesis and was found to have acute CVA, now recovering and found to be in 2:1 heart block/mobitz1. LHC with non obstructive CAD. cMRI without LGE.  Pt underwent CRT-D. This presentation of dilated CM needs to be further worked up. Pt has a cardiologist that he follows with [place] home in [place] and upon lengthy discussion today he notes that his work up involved ecgs and TTE but does not think more than that was done. He has 2 kids and denies etoh/illict drug use hx. He reports he plans to stay in [place] and f/u with cardiologist here.   - send HIV, TSH  - Start Toprol 25mgpo qday  - start GDMT with lisinopril (start 5mg po qday, to be uptitrated per BP) today if deemed ok from Neurology team standpoint for permissive HTN mgmt post CVA. Down the line to consider spironolactone.   - on statin  - tele   - f/u EP recs re re-starting apixaban for stroke/SEE prevention post procedure  - Genetics c/s for further eval of cardiomyopathy   - I had a lengthy discussion w pt re need to f/u with cardiologist upon discharge so that his cardiomyopathy can be further explored. Hereditary C< needs to be evaluated as pt has children. Pt voiced understanding. Please note modifications to resident/fellow's note and plan below     - s/p CRT/D yesterday   - Pt feels well, no cp/sob     Vital signs reviewed     PHYSICAL EXAM:  General: No acute distress  HEENT: EOMI  Neck:  No JVD  Lungs: Clear to auscultation bilaterally; No rales or wheezing  Heart: Regular rate and rhythm; No murmurs, rubs, or gallops. Dressing over chest C/D/I  Abdomen: soft, non tender, non distended   Extremities: warm, no edema   Nervous system:  Alert & Oriented X3  Psychiatric: Normal affect    Labs noted: Hgb 11.2 Cr 1.1     ECG [my interpretation]: 4/22 sinus keiko LBBB 4/25 SR w 1st degree LBBB with repol abn     TELEMETRY: paced rhythm, an episode of undersensing     ECHO: 4/23  "Conclusions:  Endocardial visualization enhanced with intravenous  injection of Ultrasonic Enhancing Agent (Lumason).  Severe left ventricular enlargement.  Estimated ejection fraction 25%. Diffuse hypokinesis, with  inferior and inferoseptal akinesis.  Agitated saline injection demonstrates no evidence of a  patent foramen ovale."    [facility]  "Diagnostic Conclusions:     Three vessel non-obstructive coronary artery disease   Mild left main coronary artery disease   Right dominant system   No aortic valve stenosis    LVEDP = 4mmHg     Recommendations:     Keep right arm straight for 4 hours following removal of sheath  (radial band, heparin administered)  Continue aggressive medical management of coronary artery disease and  associated risk factors"    cMRI 4/28/22  "IMPRESSION:    1.  The LV is dilated with decreased systolic function; LVEF: 32%.    2.  No late gadolinium enhancement to suggest ischemia, scar or fibrosis."    61M history of HTN, HLD, DM, AF and severe LV dysfunction presented to OSH with left hemiparesis and was found to have acute CVA, now recovering and found to be in 2:1 heart block/mobitz1. LHC with non obstructive CAD. cMRI without LGE.  Pt underwent CRT-D. This presentation of dilated CM needs to be further worked up. Pt has a cardiologist that he follows with [place] home in [place] and upon lengthy discussion today he notes that his work up involved ecgs and TTE but does not think more than that was done. He has 2 kids and denies etoh/illict drug use hx. He reports he plans to stay in [place] and f/u with cardiologist here.   - send HIV, TSH  - Start Toprol 25mgpo qday  - start GDMT with lisinopril (start 5mg po qday, to be uptitrated per BP) today if deemed ok from Neurology team standpoint for permissive HTN mgmt post CVA. Down the line to consider spironolactone.   - on statin  - tele   - f/u EP recs re re-starting apixaban for stroke/SEE prevention post procedure  - Genetics c/s for further eval of cardiomyopathy   - I had a lengthy discussion w pt re need to f/u with cardiologist upon discharge so that his cardiomyopathy can be further explored. Possible genetic CM needs to be evaluated as pt has children. Pt voiced understanding.

## 2022-04-29 NOTE — PROGRESS NOTE ADULT - ASSESSMENT
61 M history of HTN, HLD, DM, A. fib and severe LV dysfunction presented to OSH with left hemiparesis and was found to have acute CVA, not candidate for tPA as LKN > 4.5 hours.    Transferred to [facility] as stroke rescue for possible thrombectomy.   Found to be in AF with VR 30-60s, irregular in setting of K 7.3, prompting cardiology consult. Now bradycardia resolved. Course further complicated by 2:1 AVB now s/p CRT-D.     #Intermittent 2:1 AVB in setting #HFrEF and #LBBB (QRS>160)  - s/p CRT-D 10/28; has follow up with EP as outpatient   - Resume Eliquis for AC  - Resume Toprol and Lisinopril; if SBP>105 consistently tomorrow, would restart home Aldactone 25mg QD    #Atrial fibrillation with #CVA   - CHADSVASC at least 4-5, Restart AC as per above, Toprol for rate control

## 2022-04-29 NOTE — CHART NOTE - NSCHARTNOTESELECT_GEN_ALL_CORE
Event Note
Brief Op Note/Event Note
Telestroke
Transfer Note
Transfer/Event Note
heparin infusion/Event Note
nephrology

## 2022-04-29 NOTE — PROGRESS NOTE ADULT - SUBJECTIVE AND OBJECTIVE BOX
DATE OF SERVICE: 04-29-22 @ 11:20    Patient is a 61y old  Male who presents with a chief complaint of CVA (28 Apr 2022 15:55)      SUBJECTIVE / OVERNIGHT EVENTS:  No chest pain. No shortness of breath. No complaints. No events overnight.     MEDICATIONS  (STANDING):  apixaban 5 milliGRAM(s) Oral two times a day  atorvastatin 40 milliGRAM(s) Oral at bedtime  cyanocobalamin 1000 MICROGram(s) Oral daily  dextrose 5%. 1000 milliLiter(s) (100 mL/Hr) IV Continuous <Continuous>  dextrose 5%. 1000 milliLiter(s) (50 mL/Hr) IV Continuous <Continuous>  dextrose 50% Injectable 25 Gram(s) IV Push once  dextrose 50% Injectable 12.5 Gram(s) IV Push once  dextrose 50% Injectable 25 Gram(s) IV Push once  glucagon  Injectable 1 milliGRAM(s) IntraMuscular once  hydrocortisone 1% Cream 1 Application(s) Topical two times a day  insulin lispro (ADMELOG) corrective regimen sliding scale   SubCutaneous three times a day before meals  insulin lispro (ADMELOG) corrective regimen sliding scale   SubCutaneous at bedtime  lidocaine 2% Jelly 4 milliLiter(s) IntraUrethral once  loratadine 10 milliGRAM(s) Oral daily  tamsulosin 0.4 milliGRAM(s) Oral at bedtime    MEDICATIONS  (PRN):  acetaminophen     Tablet .. 650 milliGRAM(s) Oral every 6 hours PRN Temp greater or equal to 38C (100.4F), Mild Pain (1 - 3)  dextrose Oral Gel 15 Gram(s) Oral once PRN Blood Glucose LESS THAN 70 milliGRAM(s)/deciliter  HYDROmorphone  Injectable 0.5 milliGRAM(s) IV Push every 4 hours PRN Severe Pain (7 - 10)  traMADol 50 milliGRAM(s) Oral every 6 hours PRN Moderate Pain (4 - 6)      Vital Signs Last 24 Hrs  T(C): 36.7 (29 Apr 2022 08:00), Max: 36.7 (28 Apr 2022 13:28)  T(F): 98 (29 Apr 2022 08:00), Max: 98 (28 Apr 2022 13:28)  HR: 75 (29 Apr 2022 08:00) (63 - 77)  BP: 172/84 (29 Apr 2022 08:00) (129/81 - 172/84)  BP(mean): --  RR: 18 (29 Apr 2022 08:00) (18 - 18)  SpO2: 99% (29 Apr 2022 08:00) (97% - 100%)  CAPILLARY BLOOD GLUCOSE      POCT Blood Glucose.: 301 mg/dL (29 Apr 2022 07:45)  POCT Blood Glucose.: 241 mg/dL (29 Apr 2022 04:31)  POCT Blood Glucose.: 282 mg/dL (28 Apr 2022 21:23)  POCT Blood Glucose.: 126 mg/dL (28 Apr 2022 18:49)  POCT Blood Glucose.: 151 mg/dL (28 Apr 2022 11:29)    I&O's Summary    28 Apr 2022 07:01  -  29 Apr 2022 07:00  --------------------------------------------------------  IN: 55 mL / OUT: 0 mL / NET: 55 mL        PHYSICAL EXAM:  GENERAL: NAD, well-developed  HEAD:  Atraumatic, Normocephalic  EYES: EOMI, PERRLA, conjunctiva and sclera clear  NECK: Supple, No JVD  CHEST/LUNG: Clear to auscultation bilaterally; No wheeze  HEART: Regular rate and rhythm; No murmurs, rubs, or gallops  ABDOMEN: Soft, Nontender, Nondistended; Bowel sounds present  EXTREMITIES:  2+ Peripheral Pulses, No clubbing, cyanosis, or edema  PSYCH: AAOx3  NEUROLOGY: non-focal  SKIN: No rashes or lesions    LABS:                        11.2   10.89 )-----------( 191      ( 29 Apr 2022 07:09 )             33.0     04-29    138  |  105  |  23  ----------------------------<  269<H>  4.1   |  20<L>  |  1.12    Ca    9.0      29 Apr 2022 07:11      PT/INR - ( 29 Apr 2022 07:09 )   PT: 14.7 sec;   INR: 1.26 ratio         PTT - ( 29 Apr 2022 07:09 )  PTT:26.9 sec    < from: MR Cardiac w/wo IV Cont (04.28.22 @ 13:43) >  FINDINGS:    The cardiac chambers demonstrate normal atrioventricular and   ventriculoarterial concordance. The systemic and pulmonary venous return   appears to be normal.    The left ventricle is qualitatively dilated with decreased systolic   function. No LV thrombus.  The right ventricle is qualitatively normal in size with normal systolic   function.  The left atrium is normal in size. There is qualitatively no significant   mitral regurgitation.  The right atrium is normal in size. There is qualitatively no significant   tricuspid regurgitation.  Qualitatively, there is no significant aortic regurgitation and no   significant aortic stenosis.  The thoracic aorta is normal in diameter.  No pericardial effusion.    REGIONAL FUNCTION:    Global hypokinesia, with inferior and inferoseptal akinesis.    PERFUSION:    No perfusion abnormality.    LATE GADOLINIUM ENHANCEMENT (LGE):    On delayed contrast enhanced images, there is no late gadolinium   enhancement to suggest ischemia, scar or fibrosis.    QUANTITATIVE ANALYSIS:    BSA: 1.7 m2    LV MEASUREMENTS:    LVEF: 32% (normal: male = 56-78%; female = 56-78%)  LVEDV: 246 ml (normal: male =  ml; female =  ml)  LVESV: 168 ml (normal: male = 19-72 ml; female = 13-51 ml)  SV: 78 ml (normal: male =  ml; female = 33-97)  LVEDVi (EDV/BSA): 144 ml/m2 (normal: male= 47-92 ml/m2; female= 41-81   ml/m2)    RV MEASUREMENTS:    RVEF: 55% (normal: male = 52-72%; female = 51-71%)  RVEDV: 128 ml (normal: male = 118-250 ml; female = 77-201ml)  RVESV: 57 ml (normal: male =  ml; female = 24-84 ml)  SV: 71 ml (normal: male =  ml; female = )  RVEDVi (RVEDV/BSA): 75 ml/m2 (normal: male=  ml/m2; female= 48-90   ml/m2)      ADDITIONAL FINDINGS:    None. Please note this examination is focused on the heart.    IMPRESSION:    1.  The LV is dilated with decreased systolic function; LVEF: 32%.    2.  No late gadolinium enhancement to suggest ischemia, scar or fibrosis.      < end of copied text >  < from: MR Head No Cont (04.23.22 @ 14:54) >  INTERPRETATION:  INDICATIONS:  61M with pmhx of HTN, HLD, "heart disease"   presenting as a stroke transfer from [facility] for neuro interventional   eval for large vessel occlusion. Patient originally presenting to the ER   for L sided weakness. Of note patient found to be bradycardic at OSH - to   the 30s, sinus keiko s/p atropine. On digoxin per med review. Pt endorses   generalized weakness at this time. Denies CP, shortness of breath,   nausea, vomiting. No recent illness.      TECHNIQUE:  Multiplanar imaging was performed using T1 weighted, T2   weighted and FLAIR sequences.  Diffusion weighted and susceptibility   sensitive images were also obtained.    COMPARISON EXAMINATION:  CT CTA 4/21/2022 as well as CT head 4/22/2022    FINDINGS:  VENTRICLES AND SULCI:  Very subtle mass effect on the right lateral   ventricle appreciated  INTRA-AXIAL:  Diffusion with signal hyperintensity in the rightMCA   territory involving the anterior temporal lobe as well as the insular   region. Diffusion is positive with restricted diffusion on the ADC map in   this territory. There is some associated petechial susceptibility   identified on the susceptibility weighted imaging suggesting small amount   of hemorrhagic component to the infarct  EXTRA-AXIAL:  Suspicion of subtle thrombus in the right M1 extending to   the M2 segment on the susceptibility weighted imaging as well as on the   axial 3-D SPGR  VISUALIZED SINUSES:  Normal.  VISUALIZED MASTOIDS:  Clear.  CALVARIUM:  Normal.  CAROTID FLOW VOIDS:  Present.  MISCELLANEOUS:  None.      IMPRESSION:  Acute infarct in the right MCA territory involving the anterior temporal   segment extending intothe insula and right posterior frontal parietal   region. Some associated petechial hemorrhage suggested on the   susceptibility weighted imaging. Subtle mass effect on the right lateral   ventricle. Suspicion of a small amount of thrombus in the distal right M1   proximal M2 segment    < end of copied text >        RADIOLOGY & ADDITIONAL TESTS:    Imaging Personally Reviewed:    Consultant(s) Notes Reviewed:      Care Discussed with Consultants/Other Providers:

## 2022-04-29 NOTE — CHART NOTE - NSCHARTNOTEFT_GEN_A_CORE
Medicine NP note   62 yo M Greenlandic speaking with hx of HTN, HLD, DM, afib not on AC, severe LV dysfunction presented to OSH with L hemiparesis and was found to have acute CVA, not candidate for tPA as LKN > 4.5 hrs. Transferred to [facility] as stroke rescue for possible thrombectomy, found to have in afib with SVR HR 30-60s in the setting of hyperK at 7.3. Now bradycardia resolved after normalization of electrolytes.   EP following for 2:1 AVB. Currently with minimal residual deficits from his CVA.      EP following  K>4 and Mg>2  CHADSVASC of 5, resume Eliquis 5mg BID  S/p CRT-D implant 4/28 given EF 25% and QRS>160.   CXR reviewed with EP attending, leads in place & device interrogated and paired with MDT rep  Post-procedure education provided to patient at bedside  Follow Up in EP clinic on 5/2 @ 10:40 AM - will post pone s/p acute rehab on Saturday 4/30  Per EP - NO HEPARIN / LOVENOX PRODUCTS POST CRT-D IMPLANT  Cleared for discharge from EP perspective    On 4/29 NP Medicine after discussion with Dr. Mobley and EP NP will plan for followup out-pt to be adjusted.  Spoke with EP NP and plan for followup will be after acute rehab;  as per rehab facility will not be able to transfer pt. out to 5/2 appt. as originally planned.   Pt. will receive discharge instructions to call an make an appt. with cards @[phone]. (Dr. Wheeler aware).  Planned for discharge [facility]. 4/30.      JANINA Kemp, LINNEA  - BC   [phone]

## 2022-04-29 NOTE — PROGRESS NOTE ADULT - SUBJECTIVE AND OBJECTIVE BOX
Patient seen and examined at bedside.    Overnight Events:   No events. Patient feels well. Site appears clean.   Review Of Systems: No chest pain, shortness of breath, or palpitations            Current Meds:  acetaminophen     Tablet .. 650 milliGRAM(s) Oral every 6 hours PRN  apixaban 5 milliGRAM(s) Oral two times a day  atorvastatin 40 milliGRAM(s) Oral at bedtime  cyanocobalamin 1000 MICROGram(s) Oral daily  dextrose 5%. 1000 milliLiter(s) IV Continuous <Continuous>  dextrose 5%. 1000 milliLiter(s) IV Continuous <Continuous>  dextrose 50% Injectable 25 Gram(s) IV Push once  dextrose 50% Injectable 12.5 Gram(s) IV Push once  dextrose 50% Injectable 25 Gram(s) IV Push once  dextrose Oral Gel 15 Gram(s) Oral once PRN  glucagon  Injectable 1 milliGRAM(s) IntraMuscular once  hydrocortisone 1% Cream 1 Application(s) Topical two times a day  HYDROmorphone  Injectable 0.5 milliGRAM(s) IV Push every 4 hours PRN  insulin lispro (ADMELOG) corrective regimen sliding scale   SubCutaneous three times a day before meals  insulin lispro (ADMELOG) corrective regimen sliding scale   SubCutaneous at bedtime  lidocaine 2% Jelly 4 milliLiter(s) IntraUrethral once  lisinopril 5 milliGRAM(s) Oral daily  loratadine 10 milliGRAM(s) Oral daily  metoprolol succinate ER 25 milliGRAM(s) Oral daily  tamsulosin 0.4 milliGRAM(s) Oral at bedtime  traMADol 50 milliGRAM(s) Oral every 6 hours PRN      Vitals:  T(F): 98 (04-29), Max: 98 (04-28)  HR: 74 (04-29) (63 - 77)  BP: 161/87 (04-29) (129/81 - 172/84)  RR: 18 (04-29)  SpO2: 99% (04-29)  I&O's Summary    28 Apr 2022 07:01  -  29 Apr 2022 07:00  --------------------------------------------------------  IN: 55 mL / OUT: 0 mL / NET: 55 mL        Physical Exam:  Appearance: No acute distress; well appearing  Eyes: EOMI, no scleral icterus   HEENT: Normal oral mucosa  Cardiovascular: RRR, S1, S2, no murmurs, rubs, or gallops; no edema; no JVD  Respiratory: Clear to auscultation bilaterally, no auditory stridor   Gastrointestinal: soft, non-tender, non-distended with normal bowel sounds  Musculoskeletal: No clubbing; no joint deformity   Neurologic: Minimal residual left sided weakness  Psychiatry: AAOx3, mood & affect appropriate  Skin: ICD site appear and clean.                           11.2   10.89 )-----------( 191      ( 29 Apr 2022 07:09 )             33.0     04-29    138  |  105  |  23  ----------------------------<  269<H>  4.1   |  20<L>  |  1.12    Ca    9.0      29 Apr 2022 07:11      PT/INR - ( 29 Apr 2022 07:09 )   PT: 14.7 sec;   INR: 1.26 ratio         PTT - ( 29 Apr 2022 07:09 )  PTT:26.9 sec              New ECG(s): Personally reviewed    Echo:  Severely reduced LVF.     Stress Testing:     Cath:  No significant CAD.     Imaging:

## 2022-04-29 NOTE — PROGRESS NOTE ADULT - ASSESSMENT
62yo RT handed Czech speaking M with pmh of HTN, DM-II,  dilated cardiomyopathy presents to [facility] as a transfer for Rt M1 occlusion. Per chart review, patient's LWK was at midnight on 4/21. Patient had CT and CTA which was noted to show Rt M1 occlusion. Patient was not a TPa candidate due to out of time window. Patient was MT candidate at that time thus was agreed to transfer patient. Patient had initial NIHSS 4 whcih increased to NIHSS 9 then NIHSS 6 on repeat examination. Upon transfer patient deemed medically unstable for thrombectomy due to BRASH syndrome and admitted to MICU for further monitoring.    right MCA infarct with a right M1 occlusion. Mechanism may be cardioembolism related to atrial fibrillation.    CVA  -  Patient neurologically without acute change, Continue close monitoring for neurologic deterioration, permissive HTN to gradual normotension, holding home antihypertensives, avoid fluctuations in BP. LDL 44, titrate statin to LDL goal less than 70, MR Head non con as noted above. Agitation overall appears improved. .   - Physical therapy/OT: AR on re-eval.   -  Apixaban initiated, ASA if indicated from cardiac standpoint.       systolic heart failure  -  TTE: Severe left ventricular enlargement. Estimated ejection fraction 25%. Diffuse hypokinesis, with inferior and inferoseptal akinesis. On admission patient was found to be in Atrial fibrillation slow ventricular response and with IVCD/BBB, now sinus bradycardia with LBBB. Cardiology consult appreciated: restart lisinopril as bp permits, would hold for SBP < 130mmHg and introduce gradually from neurological standpoint, aldactone home regimen as tolerates, AC for afib,   bradycardia initially afib with slow ventricular response, IVCD/BBB then sinus keiko with LBBB, dig toxicity ruled out, AVOID ANY AVN BLOCKING AGENTS, bp meds as noted, EP consulted:   1' AVB now, mary anne, hold BB for now, would benefit from CRT-D, [facility], from neurological standpoint no contraindication to cardiac catheterization, should heparin need to be used would be cautious avoiding bolus if feasible, patient is also therapeutically anticoagulated at this time.    SBP goal: permissive HTN to gradual normotension as tolerated, neurologically tolerating SBP right now 120-160mmhg , gradual titration over the next week  avoiding rapid fluctuations and hypotension. Intermittent 2:1 AVB in setting #HFrEF and #LBBB (QRS>160)  - Avoid AVN blocking agents  - Current euvolemic and well compensated.   - Hold from starting lisinopril or aldactone at this time  - s/ p LHC   - had cardiac MRI  - s/p CRT-D device given EF 25% and QRS>160.   - EP following    #Atrial fibrillation with #CVA   - CHADSVASC at least 4, A/C recommended, particularly with now documented CVA.   - Will restart Eliquis     dysphagia  - Passed dysphagia screening, tolerating diet.     Diet: Consistent carb no snacks    NANCY,   - creatinine 2.76 & K+ 7.5. Renal was consulted, no HD needed and was treated with a bicarb drip. NANCY and hyperkalemia resolved & patient off bicarb drip. Creatinine remains stable .  Will continue to monitor kidney function and electrolytes. Taylor removed.  Monitor for changes in UO.     Na Goal: Greater than 135     Taylor: n    HEMATOLOGY: H/H without acute change, Platelets 232 . No si/sx of bleeding. He should have all age and risk appropriate malignancy screenings.       DVT ppx: Heparin s.c [] LMWH [x]       DISPOSITION: PT/OT recommends AR,

## 2022-04-29 NOTE — PROGRESS NOTE ADULT - ASSESSMENT
62 yo M with hx of HTN, HLD, DM, afib not on AC, severe LV dysfunction presented to OSH with L hemiparesis and was found to have acute CVA, not candidate for tPA as LKN > 4.5 hrs. Transferred to [facility] as stroke rescue for possible thrombectomy, found to have in afib with SVR HR 30-60s in the setting of hyperK at 7.3. Now bradycardia resolved after normalization of electrolytes. EP consulted for 2:1 AVB. Currently with minimal residual deficits from his CVA.     1. 2:1 AVB  2. AF  3. LBBB    - Keep K>4 and Mg>2  - CHADSVASC of 5, resume Eliquis 5mg BID  - S/p CRT-D implant 4/28  - CXR reviewed with attending, leads in place  - Device interrogated and paired with MDT rep  - Post-procedure education provided to patient at bedside  - Follow Up in EP clinic on 5/2 @ 10:40 AM  - NO HEPARIN/LOVENOX PRODUCTS POST CRT-D IMPLANT  - Cleared for discharge from EP perspective    Corinna Tillman PA-C  #[phone]   60 yo M with hx of HTN, HLD, DM, afib not on AC, severe LV dysfunction presented to OSH with L hemiparesis and was found to have acute CVA, not candidate for tPA as LKN > 4.5 hrs. Transferred to [facility] as stroke rescue for possible thrombectomy, found to have in afib with SVR HR 30-60s in the setting of hyperK at 7.3. Now bradycardia resolved after normalization of electrolytes. EP consulted for 2:1 AVB. Currently with minimal residual deficits from his CVA.     1. 2:1 AVB  2. AF  3. LBBB    - Keep K>4 and Mg>2  - CHADSVASC of 5, resume Eliquis 5mg BID  - S/p CRT-D implant 4/28  - CXR reviewed with attending, leads in place  - Device interrogated and paired with MDT rep  - Post-procedure education provided to patient at bedside  - Follow Up in EP clinic, patient calling to set up appointment when rehab is complete  - NO HEPARIN/LOVENOX PRODUCTS POST CRT-D IMPLANT  - Cleared for discharge from EP perspective    Corinna Tillman PA-C  #[phone]

## 2022-04-30 ENCOUNTER — TRANSCRIPTION ENCOUNTER (OUTPATIENT)
Age: 62
End: 2022-04-30

## 2022-04-30 LAB
GLUCOSE BLDC GLUCOMTR-MCNC: 242 MG/DL — HIGH (ref 70–99)
GLUCOSE BLDC GLUCOMTR-MCNC: 263 MG/DL — HIGH (ref 70–99)
GLUCOSE BLDC GLUCOMTR-MCNC: 333 MG/DL — HIGH (ref 70–99)
GLUCOSE BLDC GLUCOMTR-MCNC: 366 MG/DL — HIGH (ref 70–99)
HCT VFR BLD CALC: 31 % — LOW (ref 39–50)
HGB BLD-MCNC: 10.7 G/DL — LOW (ref 13–17)
MCHC RBC-ENTMCNC: 30.9 PG — SIGNIFICANT CHANGE UP (ref 27–34)
MCHC RBC-ENTMCNC: 34.5 GM/DL — SIGNIFICANT CHANGE UP (ref 32–36)
MCV RBC AUTO: 89.6 FL — SIGNIFICANT CHANGE UP (ref 80–100)
NRBC # BLD: 0 /100 WBCS — SIGNIFICANT CHANGE UP (ref 0–0)
PLATELET # BLD AUTO: 177 K/UL — SIGNIFICANT CHANGE UP (ref 150–400)
RBC # BLD: 3.46 M/UL — LOW (ref 4.2–5.8)
RBC # FLD: 12.7 % — SIGNIFICANT CHANGE UP (ref 10.3–14.5)
WBC # BLD: 10 K/UL — SIGNIFICANT CHANGE UP (ref 3.8–10.5)
WBC # FLD AUTO: 10 K/UL — SIGNIFICANT CHANGE UP (ref 3.8–10.5)

## 2022-04-30 RX ORDER — METOPROLOL TARTRATE 50 MG
1 TABLET ORAL
Qty: 0 | Refills: 0 | DISCHARGE
Start: 2022-04-30

## 2022-04-30 RX ORDER — LISINOPRIL 2.5 MG/1
5 TABLET ORAL DAILY
Refills: 0 | Status: DISCONTINUED | OUTPATIENT
Start: 2022-05-02 | End: 2022-05-10

## 2022-04-30 RX ORDER — TAMSULOSIN HYDROCHLORIDE 0.4 MG/1
0.4 CAPSULE ORAL AT BEDTIME
Refills: 0 | Status: DISCONTINUED | OUTPATIENT
Start: 2022-05-01 | End: 2022-05-10

## 2022-04-30 RX ORDER — ASPIRIN/CALCIUM CARB/MAGNESIUM 324 MG
1 TABLET ORAL
Qty: 0 | Refills: 0 | DISCHARGE

## 2022-04-30 RX ORDER — INSULIN LISPRO 100/ML
VIAL (ML) SUBCUTANEOUS AT BEDTIME
Refills: 0 | Status: DISCONTINUED | OUTPATIENT
Start: 2022-05-01 | End: 2022-05-10

## 2022-04-30 RX ORDER — METOPROLOL TARTRATE 50 MG
25 TABLET ORAL DAILY
Refills: 0 | Status: DISCONTINUED | OUTPATIENT
Start: 2022-05-02 | End: 2022-05-10

## 2022-04-30 RX ORDER — DEXTROSE 50 % IN WATER 50 %
12.5 SYRINGE (ML) INTRAVENOUS ONCE
Refills: 0 | Status: DISCONTINUED | OUTPATIENT
Start: 2022-05-01 | End: 2022-05-10

## 2022-04-30 RX ORDER — DEXTROSE 50 % IN WATER 50 %
25 SYRINGE (ML) INTRAVENOUS ONCE
Refills: 0 | Status: DISCONTINUED | OUTPATIENT
Start: 2022-05-01 | End: 2022-05-10

## 2022-04-30 RX ORDER — LISINOPRIL 2.5 MG/1
1 TABLET ORAL
Qty: 0 | Refills: 0 | DISCHARGE
Start: 2022-04-30

## 2022-04-30 RX ORDER — HYDROCORTISONE 1 %
1 OINTMENT (GRAM) TOPICAL
Qty: 0 | Refills: 0 | DISCHARGE
Start: 2022-04-30

## 2022-04-30 RX ORDER — ATORVASTATIN CALCIUM 80 MG/1
1 TABLET, FILM COATED ORAL
Qty: 0 | Refills: 0 | DISCHARGE
Start: 2022-04-30

## 2022-04-30 RX ORDER — GLUCAGON INJECTION, SOLUTION 0.5 MG/.1ML
1 INJECTION, SOLUTION SUBCUTANEOUS ONCE
Refills: 0 | Status: DISCONTINUED | OUTPATIENT
Start: 2022-05-01 | End: 2022-05-10

## 2022-04-30 RX ORDER — TRAMADOL HYDROCHLORIDE 50 MG/1
1 TABLET ORAL
Qty: 0 | Refills: 0 | DISCHARGE
Start: 2022-04-30

## 2022-04-30 RX ORDER — ACETAMINOPHEN 500 MG
650 TABLET ORAL EVERY 6 HOURS
Refills: 0 | Status: DISCONTINUED | OUTPATIENT
Start: 2022-05-01 | End: 2022-05-10

## 2022-04-30 RX ORDER — BISOPROLOL FUMARATE 10 MG/1
2.5 TABLET, FILM COATED ORAL
Qty: 0 | Refills: 0 | DISCHARGE

## 2022-04-30 RX ORDER — SODIUM CHLORIDE 9 MG/ML
1000 INJECTION, SOLUTION INTRAVENOUS
Refills: 0 | Status: DISCONTINUED | OUTPATIENT
Start: 2022-05-01 | End: 2022-05-10

## 2022-04-30 RX ORDER — LISINOPRIL 2.5 MG/1
1 TABLET ORAL
Qty: 0 | Refills: 0 | DISCHARGE

## 2022-04-30 RX ORDER — POLYETHYLENE GLYCOL 3350 17 G/17G
17 POWDER, FOR SOLUTION ORAL DAILY
Refills: 0 | Status: DISCONTINUED | OUTPATIENT
Start: 2022-05-01 | End: 2022-05-03

## 2022-04-30 RX ORDER — FUROSEMIDE 40 MG
1 TABLET ORAL
Qty: 0 | Refills: 0 | DISCHARGE

## 2022-04-30 RX ORDER — INSULIN LISPRO 100/ML
VIAL (ML) SUBCUTANEOUS
Refills: 0 | Status: DISCONTINUED | OUTPATIENT
Start: 2022-05-01 | End: 2022-05-10

## 2022-04-30 RX ORDER — SENNA PLUS 8.6 MG/1
2 TABLET ORAL AT BEDTIME
Refills: 0 | Status: DISCONTINUED | OUTPATIENT
Start: 2022-05-01 | End: 2022-05-10

## 2022-04-30 RX ORDER — METFORMIN HYDROCHLORIDE 850 MG/1
850 TABLET ORAL
Refills: 0 | Status: DISCONTINUED | OUTPATIENT
Start: 2022-05-01 | End: 2022-05-05

## 2022-04-30 RX ORDER — LORATADINE 10 MG/1
1 TABLET ORAL
Qty: 0 | Refills: 0 | DISCHARGE
Start: 2022-04-30

## 2022-04-30 RX ORDER — APIXABAN 2.5 MG/1
1 TABLET, FILM COATED ORAL
Qty: 0 | Refills: 0 | DISCHARGE
Start: 2022-04-30

## 2022-04-30 RX ORDER — APIXABAN 2.5 MG/1
5 TABLET, FILM COATED ORAL
Refills: 0 | Status: DISCONTINUED | OUTPATIENT
Start: 2022-05-01 | End: 2022-05-10

## 2022-04-30 RX ORDER — SIMVASTATIN 20 MG/1
1 TABLET, FILM COATED ORAL
Qty: 0 | Refills: 0 | DISCHARGE

## 2022-04-30 RX ORDER — DIGOXIN 250 MCG
1 TABLET ORAL
Qty: 0 | Refills: 0 | DISCHARGE

## 2022-04-30 RX ORDER — ATORVASTATIN CALCIUM 80 MG/1
40 TABLET, FILM COATED ORAL AT BEDTIME
Refills: 0 | Status: DISCONTINUED | OUTPATIENT
Start: 2022-05-01 | End: 2022-05-09

## 2022-04-30 RX ORDER — TAMSULOSIN HYDROCHLORIDE 0.4 MG/1
1 CAPSULE ORAL
Qty: 0 | Refills: 0 | DISCHARGE
Start: 2022-04-30

## 2022-04-30 RX ORDER — SPIRONOLACTONE 25 MG/1
1 TABLET, FILM COATED ORAL
Qty: 0 | Refills: 0 | DISCHARGE

## 2022-04-30 RX ORDER — LORATADINE 10 MG/1
10 TABLET ORAL DAILY
Refills: 0 | Status: DISCONTINUED | OUTPATIENT
Start: 2022-05-01 | End: 2022-05-04

## 2022-04-30 RX ORDER — DEXTROSE 50 % IN WATER 50 %
15 SYRINGE (ML) INTRAVENOUS ONCE
Refills: 0 | Status: DISCONTINUED | OUTPATIENT
Start: 2022-05-01 | End: 2022-05-10

## 2022-04-30 RX ADMIN — APIXABAN 5 MILLIGRAM(S): 2.5 TABLET, FILM COATED ORAL at 05:17

## 2022-04-30 RX ADMIN — Medication 5: at 17:18

## 2022-04-30 RX ADMIN — Medication 650 MILLIGRAM(S): at 08:04

## 2022-04-30 RX ADMIN — Medication 4: at 11:50

## 2022-04-30 RX ADMIN — ATORVASTATIN CALCIUM 40 MILLIGRAM(S): 80 TABLET, FILM COATED ORAL at 21:40

## 2022-04-30 RX ADMIN — LISINOPRIL 5 MILLIGRAM(S): 2.5 TABLET ORAL at 05:17

## 2022-04-30 RX ADMIN — Medication 1 APPLICATION(S): at 17:17

## 2022-04-30 RX ADMIN — LORATADINE 10 MILLIGRAM(S): 10 TABLET ORAL at 11:51

## 2022-04-30 RX ADMIN — Medication 1 APPLICATION(S): at 05:17

## 2022-04-30 RX ADMIN — Medication 10 MILLIGRAM(S): at 12:57

## 2022-04-30 RX ADMIN — Medication 2: at 08:05

## 2022-04-30 RX ADMIN — APIXABAN 5 MILLIGRAM(S): 2.5 TABLET, FILM COATED ORAL at 17:17

## 2022-04-30 RX ADMIN — TAMSULOSIN HYDROCHLORIDE 0.4 MILLIGRAM(S): 0.4 CAPSULE ORAL at 21:39

## 2022-04-30 RX ADMIN — Medication 25 MILLIGRAM(S): at 05:17

## 2022-04-30 RX ADMIN — Medication 650 MILLIGRAM(S): at 09:02

## 2022-04-30 RX ADMIN — Medication 1: at 21:54

## 2022-04-30 NOTE — PROGRESS NOTE ADULT - ASSESSMENT
62yo RT handed Trinidadian speaking M with pmh of HTN, DM-II,  dilated cardiomyopathy presents to [facility] as a transfer for Rt M1 occlusion. Per chart review, patient's LWK was at midnight on 4/21. Patient had CT and CTA which was noted to show Rt M1 occlusion. Patient was not a TPa candidate due to out of time window. Patient was MT candidate at that time thus was agreed to transfer patient. Patient had initial NIHSS 4 whcih increased to NIHSS 9 then NIHSS 6 on repeat examination. Upon transfer patient deemed medically unstable for thrombectomy due to BRASH syndrome and admitted to MICU for further monitoring.    right MCA infarct with a right M1 occlusion. Mechanism may be cardioembolism related to atrial fibrillation.    CVA  -  Patient neurologically without acute change, Continue close monitoring for neurologic deterioration, permissive HTN to gradual normotension, holding home antihypertensives, avoid fluctuations in BP. LDL 44, titrate statin to LDL goal less than 70, MR Head non con as noted above. Agitation overall appears improved. .   - Physical therapy/OT: AR on re-eval.   -  Apixaban initiated, ASA if indicated from cardiac standpoint.       systolic heart failure  -  TTE: Severe left ventricular enlargement. Estimated ejection fraction 25%. Diffuse hypokinesis, with inferior and inferoseptal akinesis. On admission patient was found to be in Atrial fibrillation slow ventricular response and with IVCD/BBB, now sinus bradycardia with LBBB. Cardiology consult appreciated: restart lisinopril as bp permits, would hold for SBP < 130mmHg and introduce gradually from neurological standpoint, aldactone home regimen as tolerates, AC for afib,   bradycardia initially afib with slow ventricular response, IVCD/BBB then sinus keiko with LBBB, dig toxicity ruled out, AVOID ANY AVN BLOCKING AGENTS, bp meds as noted, EP consulted:   1' AVB now, mary anne, hold BB for now, would benefit from CRT-D, [facility], from neurological standpoint no contraindication to cardiac catheterization, should heparin need to be used would be cautious avoiding bolus if feasible, patient is also therapeutically anticoagulated at this time.    SBP goal: permissive HTN to gradual normotension as tolerated, neurologically tolerating SBP right now 120-160mmhg , gradual titration over the next week  avoiding rapid fluctuations and hypotension. Intermittent 2:1 AVB in setting #HFrEF and #LBBB (QRS>160)  - Avoid AVN blocking agents  - Current euvolemic and well compensated.   - Hold from starting lisinopril or aldactone at this time  - s/ p LHC   - had cardiac MRI  - s/p CRT-D device given EF 25% and QRS>160.   - EP to see pt for ICD site swelling ?hold eliquis    #Atrial fibrillation with #CVA   - CHADSVASC at least 4, A/C recommended, particularly with now documented CVA.   - Eliquis was restarted as per cardiology recs    dysphagia  - Passed dysphagia screening, tolerating diet.     Diet: Consistent carb no snacks    NANCY,   - creatinine 2.76 & K+ 7.5. Renal was consulted, no HD needed and was treated with a bicarb drip. NANCY and hyperkalemia resolved & patient off bicarb drip. Creatinine remains stable .  Will continue to monitor kidney function and electrolytes. Taylor removed.  Monitor for changes in UO.     Na Goal: Greater than 135     Taylor: n    HEMATOLOGY: H/H without acute change, Platelets 232 . No si/sx of bleeding. He should have all age and risk appropriate malignancy screenings.       DVT ppx: Heparin s.c [] LMWH [x]       DISPOSITION: PT/OT recommends AR,

## 2022-04-30 NOTE — H&P ADULT - NSHPSOCIALHISTORY_GEN_ALL_CORE
SOCIAL HISTORY  Smoking -   EtOH -   Marital Status -     FUNCTIONAL HISTORY  FUNCTIONAL HISTORY  Lives with son, 3rd floor walk up  Independent AMB and ADLs PTA     CURRENT FUNCTIONAL STATUS  4/25 SLP  grossly functional speech-language and cognitive skills    4/25 PT  transfers contact guard with RW  gait CG with RW x 40 feet   +LOB    4/25 OT  transfers CG SOCIAL HISTORY  Smoking -   EtOH -   Marital Status -     FUNCTIONAL HISTORY  Lives with son, 3rd floor walk up  Independent AMB and ADLs PTA     CURRENT FUNCTIONAL STATUS  4/25 SLP  grossly functional speech-language and cognitive skills    4/25 PT  transfers contact guard with RW  gait CG with RW x 40 feet   +LOB    4/25 OT  transfers CG SOCIAL HISTORY  Smoking - Denied  EtOH - Denied  Marital Status -     FUNCTIONAL HISTORY  Lives with son, 3rd floor walk up  Independent AMB and ADLs PTA     CURRENT FUNCTIONAL STATUS  4/25 SLP  grossly functional speech-language and cognitive skills    4/25 PT  transfers contact guard with RW  gait CG with RW x 40 feet   +LOB    4/25 OT  transfers CG

## 2022-04-30 NOTE — DISCHARGE NOTE NURSING/CASE MANAGEMENT/SOCIAL WORK - NSDCPEFALRISK_GEN_ALL_CORE
For information on Fall & Injury Prevention, visit: [email or web address] OR  [email or web address] OR  https://www.cdc.gov/steadi/patient.html

## 2022-04-30 NOTE — H&P ADULT - HISTORY OF PRESENT ILLNESS
61 year old Romanian speaking male with past medical history of DM2, HTN, dilated cardiomyopathy. Morning of presentation to [facility], he woke up this morning with left hemiparesis. NIHSS at [facility] 4 for left hemiparesis. CTA with right M1 occlusion. HR intermittently in the 40s, reportedly in second degree heart block (but no EKG on file), given atropine in route to the hospital.  Not a candidate for tPA as LKN > 4.5 hours.  He was transferred to [facility] as stroke rescue for possible thrombectomy.  OZZY aware. Neuroimaging repeated.  [place] staff contacted [facility] staff notified the patient was found to have acute renal failure Cr. 2.8, BUN 88, K 5.8 (untreated).  BNP increased.  Concern for digoxin toxicity, although digoxin level noted to be 1.6 at OSH.   In the ED, T96.4, HR 50, /65, on %. Slow afib with HR 30-50s. FSG 56, given amp D50. Given albuterol and Ca gluc 2g.   Admitted to MICU for CVA neuro checks and possible thrombectomy. 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 MRI brain performed 4/23 showing right MCA infarct with a right M1 occlusion. Initially started on ASA 81 mg and switched to Eliqius 5mg BID on 04/25. He is s/p diagnostic Cath-prox LAD 40%, dist RPDA 60%, Right radial access site on 4/27. He is s/p ICD implantation on 4/28. Evaluated by PT/OT and was recommended AR. Patient deemed stable for discharge to [facility] on 4/30/22.  61 year old Sami speaking male with past medical history of DM2, HTN, dilated cardiomyopathy. Morning of presentation to [facility], he woke up this morning with left hemiparesis. NIHSS at [facility] 4 for left hemiparesis. CTA with right M1 occlusion. HR intermittently in the 40s, reportedly in second degree heart block (but no EKG on file), given atropine in route to the hospital.  Not a candidate for tPA as LKN > 4.5 hours.  He was transferred to [facility] as stroke rescue for possible thrombectomy.  OZZY aware. Neuroimaging repeated.  [place] staff contacted [facility] staff notified the patient was found to have acute renal failure Cr. 2.8, BUN 88, K 5.8 (untreated).  BNP increased.  Concern for digoxin toxicity, although digoxin level noted to be 1.6 at OSH.   In the ED, T96.4, HR 50, /65, on %. Slow afib with HR 30-50s. FSG 56, given amp D50. Given albuterol and Ca gluc 2g.   Admitted to MICU for CVA neuro checks and possible thrombectomy. 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 MRI brain performed 4/23 showing right MCA infarct with a right M1 occlusion. Initially started on ASA 81 mg and switched to Eliqius 5mg BID on 04/25. He is s/p diagnostic Cath-prox LAD 40%, dist RPDA 60%, Right radial access site on 4/27. He is s/p ICD implantation on 4/28. Evaluated by PT/OT and was recommended AR. Patient deemed stable for discharge to [facility] on 4/30/22.  61 year old Frisian speaking male with past medical history of DM2, HTN, dilated cardiomyopathy. Morning of presentation to [facility] with left hemiparesis. NIHSS at [facility] 4. CTA with right M1 occlusion. HR intermittently in the 40s, reportedly in second degree heart block (but no EKG on file), given atropine in route to the hospital.  Not a candidate for tPA as LKN > 4.5 hours.  He was transferred to [facility] (4/21) as stroke rescue for possible thrombectomy.  OZZY aware. Neuroimaging repeated.  [place] staff contacted [facility] staff notified the patient was found to have acute renal failure Cr. 2.8, BUN 88, K 5.8 (untreated).  BNP increased.  Concern for digoxin toxicity, although digoxin level noted to be 1.6 at OSH.   In the ED, T96.4, HR 50, /65, on %. Slow afib with HR 30-50s. FSG 56, given amp D50. Given albuterol and Ca gluc 2g.   Admitted to MICU for CVA neuro checks and possible thrombectomy. 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 MRI brain performed 4/23 showing right MCA infarct with a right M1 occlusion. Initially started on ASA 81 mg and switched to Eliqius 5mg BID on 04/25. Echo EF 25%. He is s/p diagnostic Cath-prox LAD 40%, dist RPDA 60%, Right radial access site on 4/27. He is s/p CRT-D implantation on 4/28. Evaluated by PT/OT and was recommended AR. Patient deemed stable for discharge to [place] [facility] on 4/30/22.

## 2022-04-30 NOTE — DISCHARGE NOTE NURSING/CASE MANAGEMENT/SOCIAL WORK - PATIENT PORTAL LINK FT
You can access the FollowMyHealth Patient Portal offered by [facility] by registering at the following website: [email or web address]. By joining [facility]’s FollowMyHealth portal, you will also be able to view your health information using other applications (apps) compatible with our system.

## 2022-04-30 NOTE — PROGRESS NOTE ADULT - SUBJECTIVE AND OBJECTIVE BOX
Patient seen and examined at bedside.    Overnight Events:   no events o/n     REVIEW OF SYSTEMS:  Constitutional:     [x ] negative [ ] fevers [ ] chills [ ] weight loss [ ] weight gain  HEENT:                  [x ] negative [ ] dry eyes [ ] eye irritation [ ] postnasal drip [ ] nasal congestion  CV:                         [ x] negative  [ ] chest pain [ ] orthopnea [ ] palpitations [ ] murmur  Resp:                     [ x] negative [ ] cough [ ] shortness of breath [ ] dyspnea [ ] wheezing [ ] sputum [ ]hemoptysis  GI:                          [ x] negative [ ] nausea [ ] vomiting [ ] diarrhea [ ] constipation [ ] abd pain [ ] dysphagia   :                        [ x] negative [ ] dysuria [ ] nocturia [ ] hematuria [ ] increased urinary frequency  Musculoskeletal: [ x] negative [ ] back pain [ ] myalgias [ ] arthralgias [ ] fracture  Skin:                       [ x] negative [ ] rash [ ] itch  Neurological:        [x ] negative [ ] headache [ ] dizziness [ ] syncope [ ] weakness [ ] numbness  Psychiatric:           [ x] negative [ ] anxiety [ ] depression  Endocrine:            [ x] negative [ ] diabetes [ ] thyroid problem  Heme/Lymph:      [ x] negative [ ] anemia [ ] bleeding problem  Allergic/Immune: [ x] negative [ ] itchy eyes [ ] nasal discharge [ ] hives [ ] angioedema    [ x] All other systems negative  [ ] Unable to assess ROS due to    Current Meds:  acetaminophen     Tablet .. 650 milliGRAM(s) Oral every 6 hours PRN  apixaban 5 milliGRAM(s) Oral two times a day  atorvastatin 40 milliGRAM(s) Oral at bedtime  dextrose 5%. 1000 milliLiter(s) IV Continuous <Continuous>  dextrose 5%. 1000 milliLiter(s) IV Continuous <Continuous>  dextrose 50% Injectable 25 Gram(s) IV Push once  dextrose 50% Injectable 12.5 Gram(s) IV Push once  dextrose 50% Injectable 25 Gram(s) IV Push once  dextrose Oral Gel 15 Gram(s) Oral once PRN  glucagon  Injectable 1 milliGRAM(s) IntraMuscular once  hydrocortisone 1% Cream 1 Application(s) Topical two times a day  HYDROmorphone  Injectable 0.5 milliGRAM(s) IV Push every 4 hours PRN  insulin lispro (ADMELOG) corrective regimen sliding scale   SubCutaneous three times a day before meals  insulin lispro (ADMELOG) corrective regimen sliding scale   SubCutaneous at bedtime  lidocaine 2% Jelly 4 milliLiter(s) IntraUrethral once  lisinopril 5 milliGRAM(s) Oral daily  loratadine 10 milliGRAM(s) Oral daily  metoprolol succinate ER 25 milliGRAM(s) Oral daily  tamsulosin 0.4 milliGRAM(s) Oral at bedtime  traMADol 50 milliGRAM(s) Oral every 6 hours PRN      PAST MEDICAL & SURGICAL HISTORY:  HTN (hypertension)    HLD (hyperlipidemia)    No significant past surgical history        Vitals:  T(F): 97.9 (04-30), Max: 98.4 (04-29)  HR: 79 (04-30) (55 - 79)  BP: 148/89 (04-30) (148/89 - 175/68)  RR: 18 (04-30)  SpO2: 99% (04-30)  I&O's Summary    29 Apr 2022 07:01  -  30 Apr 2022 07:00  --------------------------------------------------------  IN: 0 mL / OUT: 500 mL / NET: -500 mL        Physical Exam:  General: No acute distress  HEENT: EOMI  Neck:  No JVD  Lungs: Clear to auscultation bilaterally; No rales or wheezing  Heart: Regular rate and rhythm; No murmurs, rubs, or gallops. Dressing over chest C/D/I  Abdomen: soft, non tender, non distended   Extremities: warm, no edema   Nervous system:  Alert & Oriented X3  Psychiatric: Normal affect                          10.7   10.00 )-----------( 177      ( 30 Apr 2022 07:29 )             31.0     04-29    138  |  105  |  23  ----------------------------<  269<H>  4.1   |  20<L>  |  1.12    Ca    9.0      29 Apr 2022 07:11      PT/INR - ( 29 Apr 2022 07:09 )   PT: 14.7 sec;   INR: 1.26 ratio         PTT - ( 29 Apr 2022 07:09 )  PTT:26.9 sec        ECHO: 4/23  "Conclusions:  Endocardial visualization enhanced with intravenous  injection of Ultrasonic Enhancing Agent (Lumason).  Severe left ventricular enlargement.  Estimated ejection fraction 25%. Diffuse hypokinesis, with  inferior and inferoseptal akinesis.  Agitated saline injection demonstrates no evidence of a  patent foramen ovale."    [facility]  "Diagnostic Conclusions:     Three vessel non-obstructive coronary artery disease   Mild left main coronary artery disease   Right dominant system   No aortic valve stenosis    LVEDP = 4mmHg     Recommendations:     Keep right arm straight for 4 hours following removal of sheath  (radial band, heparin administered)  Continue aggressive medical management of coronary artery disease and  associated risk factors"    cMRI 4/28/22  "IMPRESSION:    1.  The LV is dilated with decreased systolic function; LVEF: 32%.    2.  No late gadolinium enhancement to suggest ischemia, scar or fibrosis."

## 2022-04-30 NOTE — H&P ADULT - ASSESSMENT
61 year old Chinese speaking male with past medical history of DM2, HTN, dilated cardiomyopathy originally presented to [facility] w/ L hemiparesis found to have R M1 occlusion and then transferred to [facility] on 4/21, given atropine in route to the hospital for bradycardia. 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 Course complicated by NANCY/Acute renal failure (resolved), a fib placed on eliquis, s/p diagnostic cath and s/p ICD implantation 4/28. Patient admitted to [facility] 4/30/22 for functional deficits.    Admitted to [facility] on ___ for ADL, gait, and functional impairments.       - Comprehensive Multidisciplinary Rehab Program     3 hours a day, 5 days a week with PT/OT/SLP     P&O as needed    Pain Management:  - Tylenol PRN    GI/Bowel:  - Senna QHS, Miralax daily PRN  - GI ppx:    /Bladder:   - Encourage timed voids every 4 hours while awake       Skin/Pressure Injury:  - Skin assessment on admission admission: ***  - Monitor Incisions: ***  - Turn every 2 hours while in bed    Diet:   - Diet Consistency/Modifications: ***  - Aspiration Precautions  - SLP consult for swallow function evaluation and treatment    DVT ppx:  - ***  - SCDs  - Last Doppler on ***    Restrictions/Precautions:  - Weight bearing status: ****  - ROM restrictions: ***  - Precautions:    ---------------  Outpatient Follow-up:      --------------    Goals: Safe discharge to home  Estimated Length of Stay: 10-14 days  Rehab Potential: Good  Medical Prognosis: Good  Estimated Disposition: Home with Home Care  ---------------    PRESCREEN COMPARISON:  I have reviewed the prescreen information and I have found no relevant changes between the preadmission screening and my post admission evaluation.    RATIONALE FOR INPATIENT ADMISSION: Patient demonstrates the following:  [X] Medically appropriate for rehabilitation admission  [X] Has attainable rehab goals with an appropriate initial discharge plan  [X]Has rehabilitation potential (expected to make a significant improvement within a reasonable period of time)  [X] Requires close medical management by a rehab physician, rehab nursing care, Hospitalist and comprehensive interdisciplinary team (including PT, OT and/or SLP, Prosthetics and Orthotics)   61 year old Hungarian speaking male with past medical history of DM2, HTN, dilated cardiomyopathy originally presented to [facility] w/ L hemiparesis found to have R M1 occlusion and then transferred to [facility] on 4/21, given atropine in route to the hospital for bradycardia. 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 Course complicated by NANCY/Acute renal failure (resolved), a fib placed on eliquis, s/p diagnostic cath and s/p CRT-D implantation 4/28.     Admitted to [facility] on 4/30 for ADL, gait, and functional impairments.     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   - Will continue glyburide, metformin, and SSI  - HbA1c 7.6  - FS qAC qHS  - Hospitalist consult     Pain Management:  - Tylenol PRN, Tramadol PRN    GI/Bowel:  - Senna QHS, Miralax daily PRN  - GI ppx: n/a    /Bladder:   - tamsulosin  - Encourage timed voids every 4 hours while awake       Skin/Pressure Injury:  - Skin assessment on admission: ***  - Monitor Incisions: ***  - Turn every 2 hours while in bed    Diet:   - Diet Consistency/Modifications: DASH/CC  - Aspiration Precautions  - SLP consult for swallow function evaluation and treatment    DVT ppx:  - on eliquis  - Last Doppler on    Restrictions/Precautions:  - Precautions: Fall, Aspiration    ---------------  Outpatient Follow-up:    Osmar Song)  Neurology; Vascular Neurology  [address], [address]  [place] [address]  Phone: [phone]  Fax: [phone]  Follow Up Time: 2 weeks    Jeffrey Feng)  Internal Medicine  [address] [place] [address]  Phone: [phone]  Fax: [phone]  Follow Up Time: 2 weeks    Follow up,   After rehab, outpatient cardiology follow-up with at [facility] by calling [phone] or at [facility] by calling [phone].  Phone: (   )    -  Fax: (   )    -  Follow Up Time:      Outpatient cardiology follow-up within 2 weeks with at [facility] by calling [phone] or at [facility] by calling [phone].        --------------    Goals: Safe discharge to home  Estimated Length of Stay: 10-14 days  Rehab Potential: Good  Medical Prognosis: Good  Estimated Disposition: Home with Home Care  ---------------    PRESCREEN COMPARISON:  I have reviewed the prescreen information and I have found no relevant changes between the preadmission screening and my post admission evaluation.    RATIONALE FOR INPATIENT ADMISSION: Patient demonstrates the following:  [X] Medically appropriate for rehabilitation admission  [X] Has attainable rehab goals with an appropriate initial discharge plan  [X]Has rehabilitation potential (expected to make a significant improvement within a reasonable period of time)  [X] Requires close medical management by a rehab physician, rehab nursing care, Hospitalist and comprehensive interdisciplinary team (including PT, OT and/or SLP, Prosthetics and Orthotics)

## 2022-04-30 NOTE — PROGRESS NOTE ADULT - SUBJECTIVE AND OBJECTIVE BOX
DATE OF SERVICE: 04-30-22 @ 08:07    Patient is a 61y old  Male who presents with a chief complaint of CVA (29 Apr 2022 13:02)      SUBJECTIVE / OVERNIGHT EVENTS:  c/o pain at ICD site.  did not sleep for 3 nights.    MEDICATIONS  (STANDING):  apixaban 5 milliGRAM(s) Oral two times a day  atorvastatin 40 milliGRAM(s) Oral at bedtime  dextrose 5%. 1000 milliLiter(s) (50 mL/Hr) IV Continuous <Continuous>  dextrose 5%. 1000 milliLiter(s) (100 mL/Hr) IV Continuous <Continuous>  dextrose 50% Injectable 25 Gram(s) IV Push once  dextrose 50% Injectable 12.5 Gram(s) IV Push once  dextrose 50% Injectable 25 Gram(s) IV Push once  glucagon  Injectable 1 milliGRAM(s) IntraMuscular once  hydrocortisone 1% Cream 1 Application(s) Topical two times a day  insulin lispro (ADMELOG) corrective regimen sliding scale   SubCutaneous three times a day before meals  insulin lispro (ADMELOG) corrective regimen sliding scale   SubCutaneous at bedtime  lidocaine 2% Jelly 4 milliLiter(s) IntraUrethral once  lisinopril 5 milliGRAM(s) Oral daily  loratadine 10 milliGRAM(s) Oral daily  metoprolol succinate ER 25 milliGRAM(s) Oral daily  tamsulosin 0.4 milliGRAM(s) Oral at bedtime    MEDICATIONS  (PRN):  acetaminophen     Tablet .. 650 milliGRAM(s) Oral every 6 hours PRN Temp greater or equal to 38C (100.4F), Mild Pain (1 - 3)  dextrose Oral Gel 15 Gram(s) Oral once PRN Blood Glucose LESS THAN 70 milliGRAM(s)/deciliter  HYDROmorphone  Injectable 0.5 milliGRAM(s) IV Push every 4 hours PRN Severe Pain (7 - 10)  traMADol 50 milliGRAM(s) Oral every 6 hours PRN Moderate Pain (4 - 6)      Vital Signs Last 24 Hrs  T(C): 36.6 (30 Apr 2022 02:01), Max: 36.9 (29 Apr 2022 13:52)  T(F): 97.9 (30 Apr 2022 02:01), Max: 98.4 (29 Apr 2022 13:52)  HR: 79 (30 Apr 2022 02:01) (55 - 79)  BP: 148/89 (30 Apr 2022 02:01) (148/89 - 175/68)  BP(mean): --  RR: 18 (30 Apr 2022 02:01) (18 - 18)  SpO2: 99% (30 Apr 2022 02:01) (98% - 100%)  CAPILLARY BLOOD GLUCOSE      POCT Blood Glucose.: 242 mg/dL (30 Apr 2022 07:48)  POCT Blood Glucose.: 284 mg/dL (29 Apr 2022 21:06)  POCT Blood Glucose.: 386 mg/dL (29 Apr 2022 16:04)  POCT Blood Glucose.: 344 mg/dL (29 Apr 2022 11:52)    I&O's Summary    29 Apr 2022 07:01  -  30 Apr 2022 07:00  --------------------------------------------------------  IN: 0 mL / OUT: 500 mL / NET: -500 mL        PHYSICAL EXAM:  GENERAL: NAD, well-developed  HEAD:  Atraumatic, Normocephalic  EYES: EOMI, PERRLA, conjunctiva and sclera clear  NECK: Supple, No JVD  CHEST/LUNG: Clear to auscultation bilaterally; No wheeze. swelling at ICD site  HEART: Regular rate and rhythm; No murmurs, rubs, or gallops  ABDOMEN: Soft, Nontender, Nondistended; Bowel sounds present  EXTREMITIES:  2+ Peripheral Pulses, No clubbing, cyanosis, or edema  PSYCH: AAOx3  NEUROLOGY: non-focal  SKIN: No rashes or lesions    LABS:                        10.7   10.00 )-----------( 177      ( 30 Apr 2022 07:29 )             31.0     04-29    138  |  105  |  23  ----------------------------<  269<H>  4.1   |  20<L>  |  1.12    Ca    9.0      29 Apr 2022 07:11      PT/INR - ( 29 Apr 2022 07:09 )   PT: 14.7 sec;   INR: 1.26 ratio         PTT - ( 29 Apr 2022 07:09 )  PTT:26.9 sec          RADIOLOGY & ADDITIONAL TESTS:    Imaging Personally Reviewed:    Consultant(s) Notes Reviewed:      Care Discussed with Consultants/Other Providers:

## 2022-04-30 NOTE — H&P ADULT - NSHPLABSRESULTS_GEN_ALL_CORE
Anesthesia Pre Eval Note    Anesthesia ROS/Med Hx        Anesthetic Complication History:  Patient does not have a history of anesthetic complications      Pulmonary Review:  History of: no asthma -     Neuro/Psych Review:    Negative for psychiatric history    Cardiovascular Review:  Patient does not have a cardiovascular history       GI/HEPATIC/RENAL Review:  Patient does not have a GI/hepatic/renalhistory       End/Other Review:    Positive for anemia - Chronic  Additional Results:     ALLERGIES:  No Known Allergies       Lab Results       Component                Value               Date                       WBC                      7.0                 01/04/2021                 WBC                      4.4                 03/27/2019                 RBC                      4.39                01/04/2021                 RBC                      4.58                03/27/2019                 HGB                      9.7 (L)             01/04/2021                 HGB                      13.1                03/27/2019                 HCT                      34.2 (L)            01/04/2021                 HCT                      41.6                03/27/2019                 MCHC                     28.4 (L)            01/04/2021                 MCHC                     31.5 (L)            03/27/2019                 SODIUM                   141                 01/04/2021                 SODIUM                   141                 05/01/2019                 POTASSIUM                4.3                 01/04/2021                 POTASSIUM                4.0                 05/01/2019                 CHLORIDE                 105                 01/04/2021                 CHLORIDE                 106                 05/01/2019                 CO2                      29                  01/04/2021                 CO2                      29                  05/01/2019                 GLUCOSE                  93                   01/04/2021                 GLUCOSE                  83                  05/01/2019                 BUN                      33 (H)              01/04/2021                 BUN                      21 (H)              05/01/2019                 CREATININE               0.68                01/04/2021                 CREATININE               0.65                05/01/2019                 GFRESTIMATE              >90                 01/04/2021                 GFRA                     >90                 05/01/2019                 GFRNA                    >90                 05/01/2019                 CALCIUM                  9.0                 01/04/2021                 CALCIUM                  8.6                 05/01/2019                 PLT                      323                 01/04/2021                 PLT                      207                 03/27/2019             Past Medical History:  No date: Anemia  No date: Osteoporosis    Past Surgical History:  No date: Colonoscopy  No date: Ectopic pregnancy surgery; Left      Comment:  left oopherectomy       Prior to Admission medications :  Medication alendronate (FOSAMAX) 70 MG tablet, Sig TAKE 1 TABLET BY MOUTH ONE TIME PER WEEK, Start Date 4/19/21, End Date , Taking? Yes, Authorizing Provider Dora Perry, DO            Relevant Problems   No relevant active problems       Physical Exam     Airway   Mallampati: II  TM Distance: >3 FB  Neck ROM: Full  Neck: Non-tender and Able to place in sniff position  TMJ Mobility: Good    Cardiovascular  Cardiovascular exam normal  Cardio Rhythm: Regular  Cardio Rate: Normal    Head Assessment  Head assessment: Normocephalic and Atraumatic    General Assessment  General Assessment: Alert and oriented and No acute distress    Dental Exam  Dental exam normal    Pulmonary Exam  Pulmonary exam normal  Breath sounds clear to auscultation:  Yes    Abdominal Exam  Abdominal exam normal      Anesthesia  Plan:  Anesthesia Plan    ASA Status: 2    Anesthesia Type: General    Induction: Intravenous  Maintenance: TIVA  Premedication: None    Consent/Risks Discussed Statement:  The proposed anesthetic plan, including its risks and benefits, have been discussed with the Patient along with the risks and benefits of alternatives. Questions were encouraged and answered and the patient and/or representative understands and agrees to proceed.        I discussed with the patient (and/or patient's legal representative) the risks and benefits of the proposed anesthesia plan, General, which may include services performed by other anesthesia providers.    Alternative anesthesia plans, if available, were reviewed with the patient (and/or patient's legal representative). Discussion has been held with the patient (and/or patient's legal representative) regarding risks of anesthesia, which include Sore Throat and emergent situations that may require change in anesthesia plan.    The patient (and/or patient's legal representative) has indicated understanding, his/her questions have been answered, and he/she wishes to proceed with the planned anesthetic.  Blood Consent    Blood Products: Not Anticipated     10.7   10.00 )-----------( 177      ( 30 Apr 2022 07:29 )             31.0     04-29    138  |  105  |  23  ----------------------------<  269<H>  4.1   |  20<L>  |  1.12    Ca    9.0      29 Apr 2022 07:11      PT/INR - ( 29 Apr 2022 07:09 )   PT: 14.7 sec;   INR: 1.26 ratio      PTT - ( 29 Apr 2022 07:09 )  PTT:26.9 sec        CAPILLARY BLOOD GLUCOSE      POCT Blood Glucose.: 333 mg/dL (30 Apr 2022 11:37)      Culture Results:   No Growth Final (04-22 @ 08:40)  Culture Results:   No Growth Final (04-22 @ 08:40)  Culture Results:   No growth (04-21 @ 19:06) 10.7   10.00 )-----------( 177      ( 30 Apr 2022 07:29 )             31.0     04-29    138  |  105  |  23  ----------------------------<  269<H>  4.1   |  20<L>  |  1.12    Ca    9.0      29 Apr 2022 07:11      PT/INR - ( 29 Apr 2022 07:09 )   PT: 14.7 sec;   INR: 1.26 ratio      PTT - ( 29 Apr 2022 07:09 )  PTT:26.9 sec        CAPILLARY BLOOD GLUCOSE      POCT Blood Glucose.: 333 mg/dL (30 Apr 2022 11:37)      Culture Results:   No Growth Final (04-22 @ 08:40)  Culture Results:   No Growth Final (04-22 @ 08:40)  Culture Results:   No growth (04-21 @ 19:06)    < from: MR Head No Cont (04.23.22 @ 14:54) >    IMPRESSION:  Acute infarct in the right MCA territory involving the anterior temporal   segment extending intothe insula and right posterior frontal parietal   region. Some associated petechial hemorrhage suggested on the   susceptibility weighted imaging. Subtle mass effect on the right lateral   ventricle. Suspicion of a small amount of thrombus in the distal right M1   proximal M2 segment    < end of copied text >    < from: CT Head No Cont (04.22.22 @ 15:54) >    IMPRESSION: New right frontal temporal cortical lucency consistent with a   small infarct in the right middle cerebral artery distribution compared   with 4/21/2022.      < end of copied text >    < from: MR Cardiac w/wo IV Cont (04.28.22 @ 13:43) >      IMPRESSION:    1.  The LV is dilated with decreased systolic function; LVEF: 32%.    2.  No late gadolinium enhancement to suggest ischemia, scar or fibrosis.    < end of copied text >    < from: Xray Chest 2 Views PA/Lat (04.29.22 @ 10:45) >    IMPRESSION:    Status post placement of a left chest wall biventricular AICD with intact   leads in expected positions.  Clear lungs. No pneumothorax.    < end of copied text >    < from: CT Angio Neck w/ IV Cont (04.21.22 @ 14:16) >      IMPRESSION:    CT head:  -Loss of gray-white matter differentiation in the right lateral frontal   lobe and insula concerning for acute infarction.  -No acute intracranial hemorrhage.    CT angiogram neck:  -At least mild stenosis of the left vertebral artery origin.  -Atherosclerotic changes at the bilateral carotid bifurcations without   stenosis.    CT angiogram head:  -Acute occlusion of the right MCA distal M1 and proximal M2 divisions.    CT perfusion:  At risk ischemic tissue: 125 mL, right MCA territory.  Core infarct: 6 mL, right MCA territory.  Mismatch volume: 119 mL.  Mismatch ratio: 20.8.

## 2022-04-30 NOTE — PROGRESS NOTE ADULT - ASSESSMENT
61M history of HTN, HLD, DM, AF and severe LV dysfunction presented to OSH with left hemiparesis and was found to have acute CVA, now recovering and found to be in 2:1 heart block/mobitz1. [facility] with non obstructive CAD. cMRI without LGE.  Pt underwent CRT-D. This presentation of dilated CM needs to be further worked up.      - f/u HIV, TSH wnl   - cont toprol 25 m QD, uptitrate as outpatient   - cont lisinopril 5 m QD, transition to ARNI as outpatient    - cont statin   - cont AC    Upon discharge please arrange for the outpatient cardiology follow-up within 2 weeks with at [facility] by calling [phone] or at [facility] by calling [phone].

## 2022-05-01 ENCOUNTER — INPATIENT (INPATIENT)
Facility: HOSPITAL | Age: 62
LOS: 8 days | Discharge: HOME HEALTH | DRG: 949 | End: 2022-05-10
Attending: SPECIALIST | Admitting: SPECIALIST
Payer: MEDICAID

## 2022-05-01 VITALS
DIASTOLIC BLOOD PRESSURE: 62 MMHG | TEMPERATURE: 98 F | RESPIRATION RATE: 17 BRPM | OXYGEN SATURATION: 100 % | SYSTOLIC BLOOD PRESSURE: 128 MMHG | HEART RATE: 72 BPM

## 2022-05-01 VITALS
HEART RATE: 79 BPM | RESPIRATION RATE: 16 BRPM | WEIGHT: 130.73 LBS | SYSTOLIC BLOOD PRESSURE: 150 MMHG | DIASTOLIC BLOOD PRESSURE: 81 MMHG | TEMPERATURE: 98 F | HEIGHT: 70 IN | OXYGEN SATURATION: 100 %

## 2022-05-01 DIAGNOSIS — I63.9 CEREBRAL INFARCTION, UNSPECIFIED: ICD-10-CM

## 2022-05-01 LAB
GLUCOSE BLDC GLUCOMTR-MCNC: 241 MG/DL — HIGH (ref 70–99)
GLUCOSE BLDC GLUCOMTR-MCNC: 246 MG/DL — HIGH (ref 70–99)
GLUCOSE BLDC GLUCOMTR-MCNC: 253 MG/DL — HIGH (ref 70–99)
GLUCOSE BLDC GLUCOMTR-MCNC: 267 MG/DL — HIGH (ref 70–99)

## 2022-05-01 RX ORDER — TRAMADOL HYDROCHLORIDE 50 MG/1
50 TABLET ORAL EVERY 6 HOURS
Refills: 0 | Status: DISCONTINUED | OUTPATIENT
Start: 2022-05-01 | End: 2022-05-07

## 2022-05-01 RX ORDER — TRAMADOL HYDROCHLORIDE 50 MG/1
50 TABLET ORAL EVERY 6 HOURS
Refills: 0 | Status: DISCONTINUED | OUTPATIENT
Start: 2022-05-01 | End: 2022-05-01

## 2022-05-01 RX ORDER — TRAMADOL HYDROCHLORIDE 50 MG/1
25 TABLET ORAL EVERY 6 HOURS
Refills: 0 | Status: DISCONTINUED | OUTPATIENT
Start: 2022-05-01 | End: 2022-05-05

## 2022-05-01 RX ADMIN — SENNA PLUS 2 TABLET(S): 8.6 TABLET ORAL at 21:38

## 2022-05-01 RX ADMIN — TRAMADOL HYDROCHLORIDE 25 MILLIGRAM(S): 50 TABLET ORAL at 06:00

## 2022-05-01 RX ADMIN — Medication 1: at 22:03

## 2022-05-01 RX ADMIN — Medication 6: at 17:39

## 2022-05-01 RX ADMIN — TRAMADOL HYDROCHLORIDE 50 MILLIGRAM(S): 50 TABLET ORAL at 02:13

## 2022-05-01 RX ADMIN — Medication 2: at 07:42

## 2022-05-01 RX ADMIN — METFORMIN HYDROCHLORIDE 850 MILLIGRAM(S): 850 TABLET ORAL at 17:39

## 2022-05-01 RX ADMIN — LORATADINE 10 MILLIGRAM(S): 10 TABLET ORAL at 12:41

## 2022-05-01 RX ADMIN — Medication 25 MILLIGRAM(S): at 05:36

## 2022-05-01 RX ADMIN — APIXABAN 5 MILLIGRAM(S): 2.5 TABLET, FILM COATED ORAL at 05:36

## 2022-05-01 RX ADMIN — TRAMADOL HYDROCHLORIDE 50 MILLIGRAM(S): 50 TABLET ORAL at 01:43

## 2022-05-01 RX ADMIN — ATORVASTATIN CALCIUM 40 MILLIGRAM(S): 80 TABLET, FILM COATED ORAL at 21:38

## 2022-05-01 RX ADMIN — TAMSULOSIN HYDROCHLORIDE 0.4 MILLIGRAM(S): 0.4 CAPSULE ORAL at 21:38

## 2022-05-01 RX ADMIN — Medication 1 APPLICATION(S): at 05:37

## 2022-05-01 RX ADMIN — APIXABAN 5 MILLIGRAM(S): 2.5 TABLET, FILM COATED ORAL at 17:39

## 2022-05-01 RX ADMIN — LISINOPRIL 5 MILLIGRAM(S): 2.5 TABLET ORAL at 05:36

## 2022-05-01 NOTE — PROGRESS NOTE ADULT - REASON FOR ADMISSION
CVA

## 2022-05-01 NOTE — H&P ADULT - NSHPSOCIALHISTORY_GEN_ALL_CORE
SOCIAL HISTORY  Smoking - Denied  EtOH - Denied  Marital Status -     FUNCTIONAL HISTORY  Lives with son, 3rd floor walk up  Independent AMB and ADLs PTA     CURRENT FUNCTIONAL STATUS  4/25 SLP  grossly functional speech-language and cognitive skills    4/25 PT  transfers contact guard with RW  gait CG with RW x 40 feet   +LOB    4/25 OT  transfers CG SOCIAL HISTORY  Smoking - Denied  EtOH - Denied  Marital Status -  (spouse lives in [place])    FUNCTIONAL HISTORY  Lives with son, 3rd floor walk up  Independent AMB and ADLs PTA     CURRENT FUNCTIONAL STATUS  4/25 SLP  grossly functional speech-language and cognitive skills    4/25 PT  transfers contact guard with RW  gait CG with RW x 40 feet   +LOB    4/25 OT  transfers CG

## 2022-05-01 NOTE — H&P ADULT - NSICDXPASTMEDICALHX_GEN_ALL_CORE_FT
PAST MEDICAL HISTORY:  HLD (hyperlipidemia)     HTN (hypertension)     
PAST MEDICAL HISTORY:  HLD (hyperlipidemia)     HTN (hypertension)

## 2022-05-01 NOTE — H&P ADULT - ATTENDING COMMENTS
Patient seen at bedside   CNA assisted with translation   I have reviewed the Hand P and edited as needed  61 year old male with history as stated above, dilated cardiomyopathy admitted to rehab after recent Right MCA stroke- not a candidate for TPA of mechanical thrombectomy . Patient had CRT- D placed 4/28   Now admitted to rehab  Begin full rehab program   See daily progress note

## 2022-05-01 NOTE — PATIENT PROFILE ADULT - FALL HARM RISK - HARM RISK INTERVENTIONS

## 2022-05-01 NOTE — PROGRESS NOTE ADULT - SUBJECTIVE AND OBJECTIVE BOX
Neurology Progress note   HPI:  61 year old Malaysian speaking male with past medical history of DM, HTN, dilated cardiomyopathy. LKN midnight prior, witnessed by wife.  Morning of admission he woke up   with left hemiparesis. NIHSS at [facility] 4 for left hemiparesis. CTA with right M1 occlusion. HR intermittently in the 40s, reportedly in second degree heart block (but no EKG on file), given atropine in route to the hospital.  Not a candidate for tPA as LKN > 4.5 hours.  He was transferred to [facility] as stroke rescue for possible thrombectomy.    [place] staff contacted [facility] staff notified the patient was found to have acute renal failure Cr. 2.8, BUN 88, K 5.8 (untreated).  BNP increased.  Concern for digoxin toxicity, although digoxin level noted to be 1.6 at OSH. In the ED, T96.4, HR 50, /65, on %. Slow afib with HR 30-50s. FSG 56, given amp D50. Given albuterol and Ca gluc 2g.  Admitted to MICU for CVA neuro checks and possible thrombectomy.      SUBJECTIVE: No events overnight.  No new neurologic complaints.  ROS reported negative unless otherwise noted.     Medications:    MEDICATIONS  (STANDING):  apixaban 5 milliGRAM(s) Oral two times a day  atorvastatin 40 milliGRAM(s) Oral at bedtime  dextrose 5%. 1000 milliLiter(s) (100 mL/Hr) IV Continuous <Continuous>  dextrose 5%. 1000 milliLiter(s) (50 mL/Hr) IV Continuous <Continuous>  dextrose 50% Injectable 25 Gram(s) IV Push once  dextrose 50% Injectable 12.5 Gram(s) IV Push once  dextrose 50% Injectable 25 Gram(s) IV Push once  glucagon  Injectable 1 milliGRAM(s) IntraMuscular once  hydrocortisone 1% Cream 1 Application(s) Topical two times a day  insulin lispro (ADMELOG) corrective regimen sliding scale   SubCutaneous three times a day before meals  insulin lispro (ADMELOG) corrective regimen sliding scale   SubCutaneous at bedtime  lidocaine 2% Jelly 4 milliLiter(s) IntraUrethral once  lisinopril 5 milliGRAM(s) Oral daily  loratadine 10 milliGRAM(s) Oral daily  metoprolol succinate ER 25 milliGRAM(s) Oral daily  tamsulosin 0.4 milliGRAM(s) Oral at bedtime    MEDICATIONS  (PRN):  acetaminophen     Tablet .. 650 milliGRAM(s) Oral every 6 hours PRN Temp greater or equal to 38C (100.4F), Mild Pain (1 - 3)  dextrose Oral Gel 15 Gram(s) Oral once PRN Blood Glucose LESS THAN 70 milliGRAM(s)/deciliter  HYDROmorphone  Injectable 0.5 milliGRAM(s) IV Push every 4 hours PRN Severe Pain (7 - 10)  traMADol 50 milliGRAM(s) Oral every 6 hours PRN Moderate Pain (4 - 6)      PHYSICAL EXAM:     Vital Signs Last 24 Hrs  T(C): 36.7 (05-01-22 @ 11:39), Max: 37.3 (05-01-22 @ 01:20)  T(F): 98 (05-01-22 @ 11:39), Max: 99.2 (05-01-22 @ 01:20)  HR: 79 (05-01-22 @ 11:39) (72 - 88)  BP: 150/81 (05-01-22 @ 11:39) (128/62 - 169/84)  BP(mean): --  RR: 16 (05-01-22 @ 11:39) (16 - 18)  SpO2: 100% (05-01-22 @ 11:39) (96% - 100%)            General: No acute distress  HEENT: EOM intact, visual fields full  Abdomen: Soft, nontender, nondistended   Extremities: No edema    NEUROLOGICAL EXAM:  Mental status: Awake, alert, correctly states age, place and year, speech fluent, follows commands, mild left visual extinction and neglect   Cranial Nerves: no facial palsy,  improves on smiling, no nystagmus, mild dysarthria, no visual field cut.     Motor exam: Normal tone, Right side moves well against gravity, left hemiparesis: LUE pronator drift /LLE subtle drift- orbiting.   Sensation: Intact to light touch.    Coordination/ Gait: No dysmetria, gait not tested      LABS:  CBC Full  -  ( 30 Apr 2022 07:29 )  WBC Count : 10.00 K/uL  RBC Count : 3.46 M/uL  Hemoglobin : 10.7 g/dL  Hematocrit : 31.0 %  Platelet Count - Automated : 177 K/uL  Mean Cell Volume : 89.6 fl  Mean Cell Hemoglobin : 30.9 pg  Mean Cell Hemoglobin Concentration : 34.5 gm/dL  Auto Neutrophil # : x  Auto Lymphocyte # : x  Auto Monocyte # : x  Auto Eosinophil # : x  Auto Basophil # : x  Auto Neutrophil % : x  Auto Lymphocyte % : x  Auto Monocyte % : x  Auto Eosinophil % : x  Auto Basophil % : x              IMAGING: Reviewed by me.   CT Brain stroke protocol, CTA H&N, CTP 4/21  IMPRESSION:  CT head:  -Loss of gray-white matter differentiation in the right lateral frontal   lobe and insula concerning for acute infarction.  -No acute intracranial hemorrhage.  CT angiogram neck:  -At least mild stenosis of the left vertebral artery origin.  -Atherosclerotic changes at the bilateral carotid bifurcations without   stenosis.  CT angiogram head:  -Acute occlusion of the right MCA distal M1 and proximal M2 divisions.  CT perfusion:  At risk ischemic tissue: 125 mL, right MCA territory.  Core infarct: 6 mL, right MCA territory.  Mismatch volume: 119 mL.  Mismatch ratio: 20.8.    CTH non con 4/22  IMPRESSION: New right frontal temporal cortical lucency consistent with a   small infarct in the right middle cerebral artery distribution compared   with 4/21/2022.    MR Head non con 4/23  IMPRESSION:  Acute infarct in the right MCA territory involving the anterior temporal   segment extending intothe insula and right posterior frontal parietal   region. Some associated petechial hemorrhage suggested on the   susceptibility weighted imaging. Subtle mass effect on the right lateral   ventricle. Suspicion of a small amount of thrombus in the distal right M1   proximal M2 segment

## 2022-05-01 NOTE — PROGRESS NOTE ADULT - ASSESSMENT
ASSESSMENT: Patient is a 62yo RT handed Japanese speaking M with pmh of HTN, DM-II,  dilated cardiomyopathy presents to [facility] as a transfer for Rt M1 occlusion. Per chart review, patient's LWK was at midnight on 4/21. Patient had CT and CTA which was noted to show Rt M1 occlusion. Patient was not a TPa candidate due to out of time window. Patient was MT candidate at that time thus was agreed to transfer patient. Patient had initial NIHSS 4 whcih increased to NIHSS 9 then NIHSS 6 on repeat examination. Upon transfer patient deemed medically unstable for thrombectomy due to BRASH syndrome and admitted to MICU for further monitoring.     Impression. right MCA infarct with a right M1 occlusion, . Mechanism may be cardioembolism related to atrial fibrillation.    NEURO: Patient neurologically without acute change, Continue close monitoring for neurologic deterioration given petechial hemorrhagic transformation- overall clinically stable and has improved, permissive HTN to gradual normotension, holding home antihypertensives, avoid fluctuations in BP. LDL 44, titrate statin to LDL goal less than 70, MR Head non con as noted above. Agitation overall appears improved. . Physical therapy/OT: AR on re-eval.     ANTITHROMBOTIC THERAPY:  Apixaban 5mg BID from the neurological standpoint for secondary stroke prevention in atrial fibrillation- cardiology would like to hold for [facility], would restart when safe at soonest time possible, ASA/Plavix if absolutely indicated from cardiac standpoint should he require for CAD and/or stents.       PULMONARY: CXR clear, protecting airway, saturating well.    CARDIOVASCULAR: TTE: Severe left ventricular enlargement. Estimated ejection fraction 25%. Diffuse hypokinesis, with inferior and inferoseptal akinesis. On admission patient was found to be in Atrial fibrillation slow ventricular response and with IVCD/BBB, now sinus bradycardia with LBBB. Cardiology consult appreciated: hold lisinopril and aldactone for now as noted, AC for afib,  bradycardia initially afib with slow ventricular response, IVCD/BBB then sinus keiko with LBBB, dig toxicity ruled out, AVOID ANY AVN BLOCKING AGENTS, bp meds as  as per cardio, EP consulted:   1' AVB now, mary anne, hold BB for now, would benefit from CRT-D, LHC, from neurological standpoint no contraindication to cardiac catheterization, should heparin need to be used would be cautious avoiding bolus if feasible, patient is also therapeutically anticoagulated at this time.    SBP goal: permissive HTN to gradual normotension as tolerated, neurologically tolerating SBP right now 120-160mmhg , gradual titration over the next week  avoiding rapid fluctuations and hypotension.     GASTROINTESTINAL: Passed dysphagia screening, tolerating diet.     Diet: Consistent carb no snacks    RENAL: Patient initially with NANCY, creatinine 2.76 & K+ 7.5. Renal was consulted, no HD needed and was treated with a bicarb drip. NANCY and hyperkalemia resolved & patient off bicarb drip. Creatinine remains stable 1.12& K+ 3.9 Will continue to monitor kidney function and electrolytes. Taylor removed.  Monitor for changes in UO.     Na Goal: Greater than 135     Taylor: n    HEMATOLOGY: H/H without acute change, Platelets 232 . No si/sx of bleeding. He should have all age and risk appropriate malignancy screenings.       DVT ppx: Heparin s.c [] LMWH [x]     ID: afebrile, no leukocytosis     other: social work consulted. Continue monitoring for further glucose control.     DISPOSITION: PT/OT recommends [facility]. Please call with any questions or concerns. d/c plan    CORE MEASURES:        Admission NIHSS: 6     TPA: [] YES [x] NO      LDL/HDL: 44/41     Depression Screen: o- wants his long term partner to be able to come.      Statin Therapy: y     Dysphagia Screen: [x] PASS [] FAIL     Smoking [] YES [] NO      Afib [x] YES [] NO     Stroke Education [x] YES [] NO    Obtain screening lower extremity venous ultrasound in patients who meet 1 or more of the following criteria as patient is high risk for DVT/PE on admission:   [] History of DVT/PE  []Hypercoagulable states (Factor V Leiden, Cancer, OCP, etc. )  []Prolonged immobility (hemiplegia/hemiparesis/post operative or any other extended immobilization)  [] Transferred from outside facility (Rehab or Long term care)  [] Age </= to 50

## 2022-05-01 NOTE — H&P ADULT - NSHPPHYSICALEXAM_GEN_ALL_CORE
Constitutional - NAD, Comfortable  HEENT - NCAT, EOMI  Neck - Supple, No limited ROM  Chest - good chest expansion, good respiratory effort, CTAB   Cardio - warm and well perfused, RRR, no murmur  Abdomen -  Soft, NTND  Extremities - No peripheral edema, No calf tenderness   Neurologic Exam:                    Cognitive -             Orientation: Awake, Alert, AAO to self, place, date, year, situation            Attention:  Days of week, recall 3 objects without cuing            Memory: Recent/ remote memory intact            Thought: process, content appropriate     Speech - Fluent, Comprehensible, No dysarthria, No aphasia      Cranial Nerves - No facial asymmetry, Tongue midline, EOMI, Shoulder shrug intact     Motor -                      LEFT    UE - ShAB 5/5, EF 5/5, EE 5/5, WE 5/5,  WNL                    RIGHT UE - ShAB 5/5, EF 5/5, EE 5/5, WE 5/5,  WNL                    LEFT    LE - HF 5/5, KE 5/5, DF 5/5, PF 5/5                    RIGHT LE - HF 5/5, KE 5/5, DF 5/5, PF 5/5        Sensory - Intact to LT bilateral     Reflexes - DTR _ / 4 , neg Michael's b/l, neg Babinski's b/l     Coordination - FTN / HTS intact     OculoVestibular -  No nystagmus  Psychiatric - Mood stable, Affect WNL  Skin on admission: VITALS  Vital Signs Last 24 Hrs  T(C): 36.7 (01 May 2022 11:39), Max: 37.3 (01 May 2022 01:20)  T(F): 98 (01 May 2022 11:39), Max: 99.2 (01 May 2022 01:20)  HR: 79 (01 May 2022 11:39) (72 - 88)  BP: 150/81 (01 May 2022 11:39) (128/62 - 169/84)  RR: 16 (01 May 2022 11:39) (16 - 18)  SpO2: 100% (01 May 2022 11:39) (96% - 100%)    PHYSICAL EXAM  Constitutional - NAD, Comfortable  HEENT - NCAT, EOMI  Neck - Supple, No limited ROM  Chest - good chest expansion, good respiratory effort, CTAB   Cardio - warm and well perfused, RRR, no murmur, +CRT-D with surrounding area of superficial swelling and mild erythema near incision site but no calor  Abdomen -  Soft, NTND  Extremities - Mild superficial swelling anterior left axilla. Otherwise no peripheral edema, No calf tenderness   Neurologic Exam:                    Cognitive -             Orientation: Awake, Alert, AAO to self, place, date, year, situation            Attention:  Days of week, recall 3 objects without cuing            Memory: Recent/ remote memory intact            Thought: process, content appropriate     Speech - Fluent, Comprehensible, No dysarthria, No aphasia      Cranial Nerves - No facial asymmetry, Tongue midline, EOMI, Shoulder shrug intact     Motor -                      LEFT    UE - ShAB 1/5 (limited effort likely 2/2 pain), EF 5/5, EE 5/5, WE 5/5,  WNL                    RIGHT UE - ShAB 5/5, EF 5/5, EE 5/5, WE 5/5,  WNL                    LEFT    LE - HF 5/5, KE 5/5, DF 5/5, PF 5/5                    RIGHT LE - HF 5/5, KE 5/5, DF 5/5, PF 5/5        Sensory - Intact to LT bilateral     Reflexes - DTR 2 / 4 , neg Michael's b/l, neg Babinski's b/l     Coordination - FTN on right and bilateral HTS intact     OculoVestibular -  No nystagmus  Psychiatric - Mood stable, Affect WNL  Skin on admission: no pressure ulcers or skin tears noted

## 2022-05-01 NOTE — H&P ADULT - ASSESSMENT
61 year old Armenian speaking male with past medical history of DM2, HTN, dilated cardiomyopathy originally presented to [facility] w/ L hemiparesis found to have R M1 occlusion and then transferred to [facility] on 4/21, given atropine in route to the hospital for bradycardia. 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 Course complicated by NANCY/Acute renal failure (resolved), a fib placed on eliquis, s/p diagnostic cath and s/p CRT-D implantation 4/28.     Admitted to [place] acute inpatient rehab on 4/30 for ADL, gait, and functional impairments.     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   - Will continue glyburide, metformin, and SSI  - HbA1c 7.6  - FS qAC qHS  - Hospitalist consult     Pain Management:  - Tylenol PRN, Tramadol PRN    GI/Bowel:  - Senna QHS, Miralax daily PRN  - GI ppx: n/a    /Bladder:   - tamsulosin  - Encourage timed voids every 4 hours while awake       Skin/Pressure Injury:  - Skin assessment on admission: ***  - Monitor Incisions: ***  - Turn every 2 hours while in bed    Diet:   - Diet Consistency/Modifications: DASH/CC  - Aspiration Precautions  - SLP consult for swallow function evaluation and treatment    DVT ppx:  - on eliquis  - Last Doppler on    Restrictions/Precautions:  - Precautions: Fall, Aspiration    ---------------  Outpatient Follow-up:    Osmar Song)  Neurology; Vascular Neurology  [address], [address]  [place] [address]  Phone: [phone]  Fax: [phone]  Follow Up Time: 2 weeks    Jeffrey Feng)  Internal Medicine  [address] [place] [address]  Phone: [phone]  Fax: [phone]  Follow Up Time: 2 weeks    Follow up,   After rehab, outpatient cardiology follow-up with at [facility] by calling [phone] or at [facility] by calling [phone].  Phone: (   )    -  Fax: (   )    -  Follow Up Time:      Outpatient cardiology follow-up within 2 weeks with at [facility] by calling [phone] or at [facility] by calling [phone].        --------------    Goals: Safe discharge to home  Estimated Length of Stay: 10-14 days  Rehab Potential: Good  Medical Prognosis: Good  Estimated Disposition: Home with Home Care  ---------------    PRESCREEN COMPARISON:  I have reviewed the prescreen information and I have found no relevant changes between the preadmission screening and my post admission evaluation.    RATIONALE FOR INPATIENT ADMISSION: Patient demonstrates the following:  [X] Medically appropriate for rehabilitation admission  [X] Has attainable rehab goals with an appropriate initial discharge plan  [X]Has rehabilitation potential (expected to make a significant improvement within a reasonable period of time)  [X] Requires close medical management by a rehab physician, rehab nursing care, Hospitalist and comprehensive interdisciplinary team (including PT, OT and/or SLP, Prosthetics and Orthotics)     61 year old Frisian speaking male with past medical history of DM2, HTN, dilated cardiomyopathy originally presented to [facility] w/ L hemiparesis found to have R M1 occlusion and then transferred to [facility] on 4/21, given atropine in route to the hospital for bradycardia. 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 Course complicated by NANCY/Acute renal failure (resolved), afb now on eliquis, s/p diagnostic cath and s/p CRT-D implantation 4/28.  Admitted to [place] acute inpatient rehab on 4/30 for ADL, gait, and functional impairments.     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  - etiology likely cardioembolic 2/2 afib  - c/w statin  - Comprehensive Multidisciplinary Rehab Program     3 hours a day, 5 days a week with PT/OT/SLP     P&O as needed    #Dilated cardiomyopathy w/ HFrEF  #Non-obstructive CAD  #Atrial fibrillation  - Echo EF 25% on 4/23   - s/p diagnostic cath nonobstructive CAD  - s/p CRT-D implantation 4/28  - c/w lisinopril (transition to ARNI as outpt per cardio), metoprolol,  - c/w eliquis  - daily weights    #DM2, uncontrolled  - Home meds glyburide 5mg BID, metformin 850mg BID, and vildagliptin 50mg daily  - Will continue glyburide, metformin, and SSI  - HbA1c 7.6  - FS qAC qHS  - Hospitalist consult     Pain Management:  - Tylenol PRN, Tramadol PRN    GI/Bowel:  - Senna QHS, Miralax daily PRN  - GI ppx: n/a    /Bladder:   - tamsulosin  - Encourage timed voids every 4 hours while awake     Skin/Pressure Injury:  - Skin assessment on admission: no pressure ulcers  - Monitor Incisions: left chest wall CRT-D incision clean dry and intact with mild swelling  - Turn every 2 hours while in bed    Diet:   - Diet Consistency/Modifications: DASH/CC  - Aspiration Precautions  - SLP consult for swallow function evaluation and treatment    DVT ppx:  - on eliquis  - No recent Dopplers    Restrictions/Precautions:  - Precautions: Fall, Aspiration    ---------------  Outpatient Follow-up:    Osmar Song)  Neurology; Vascular Neurology  [address], [address]  [place] [address]  Phone: [phone]  Fax: [phone]  Follow Up Time: 2 weeks    Jeffrey Feng)  Internal Medicine  [address] [place] [address]  Phone: [phone]  Fax: [phone]  Follow Up Time: 2 weeks    Follow up,   After rehab, outpatient cardiology follow-up with at [facility] by calling [phone] or at [facility] by calling [phone].  Phone: (   )    -  Fax: (       -  Follow Up Time:      Outpatient cardiology follow-up within 2 weeks with at [facility] by calling [phone] or at [facility] by calling [phone].        --------------    Goals: Safe discharge to home  Estimated Length of Stay: 10-14 days  Rehab Potential: Good  Medical Prognosis: Good  Estimated Disposition: Home with Home Care  ---------------    PRESCREEN COMPARISON:  I have reviewed the prescreen information and I have found no relevant changes between the preadmission screening and my post admission evaluation.    RATIONALE FOR INPATIENT ADMISSION: Patient demonstrates the following:  [X] Medically appropriate for rehabilitation admission  [X] Has attainable rehab goals with an appropriate initial discharge plan  [X]Has rehabilitation potential (expected to make a significant improvement within a reasonable period of time)  [X] Requires close medical management by a rehab physician, rehab nursing care, Hospitalist and comprehensive interdisciplinary team (including PT, OT and/or SLP, Prosthetics and Orthotics)     61 year old Pashto speaking male with past medical history of DM2, HTN, dilated cardiomyopathy originally presented to [facility] w/ L hemiparesis found to have R M1 occlusion and then transferred to [facility] on 4/21, given atropine in route to the hospital for bradycardia. 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 Course complicated by NANCY/Acute renal failure (resolved), afb now on eliquis, s/p diagnostic cath and s/p CRT-D implantation 4/28.  Admitted to [place] acute inpatient rehab on 4/30 for ADL, gait, and functional impairments.     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  - etiology likely cardioembolic 2/2 afib  - c/w statin  - Comprehensive Multidisciplinary Rehab Program     3 hours a day, 5 days a week with PT/OT/SLP     P&O as needed    #Dilated cardiomyopathy w/ HFrEF  #Non-obstructive CAD  #Atrial fibrillation  - Echo EF 25% on 4/23   - s/p diagnostic cath nonobstructive CAD  - s/p CRT-D implantation 4/28  - c/w lisinopril (transition to ARNI as outpt per cardio), metoprolol,  - c/w eliquis  - daily weights    #DM2, uncontrolled  - Home meds glyburide 5mg BID, metformin 850mg BID, and vildagliptin 50mg daily  - Will continue glyburide, metformin, and SSI  - HbA1c 7.6  - FS qAC qHS  - Hospitalist consult     Pain Management:  - Tylenol PRN, Tramadol PRN    GI/Bowel:  - Senna QHS, Miralax daily PRN  - GI ppx: n/a    /Bladder:   - tamsulosin  - Encourage timed voids every 4 hours while awake     Skin/Pressure Injury:  - Skin assessment on admission: no pressure ulcers  - Monitor Incisions: left chest wall CRT-D incision clean dry and intact with mild swelling  - Turn every 2 hours while in bed    Diet:   - Diet Consistency/Modifications: DASH/CC  - Aspiration Precautions  - SLP consult for swallow function evaluation and treatment    DVT ppx:  - on eliquis  - No recent Dopplers    Restrictions/Precautions:  - Precautions: Fall, Aspiration    ---------------  Outpatient Follow-up:    Osmar Song)  Neurology; Vascular Neurology  [address]  [place] [address]  Phone: [phone]  Fax: [phone]  Follow Up Time: 2 weeks    Jeffrey Feng)  Internal Medicine  [address] [place] [address]  Phone: [phone]  Fax: [phone]  Follow Up Time: 2 weeks    Follow up,   After rehab, outpatient cardiology follow-up with at [facility] by calling [phone] or at [facility] by calling [phone].  Phone: (   )    -  Fax: (       -  Follow Up Time:      Outpatient cardiology follow-up within 2 weeks with at [facility] by calling [phone] or at [facility] by calling [phone].        --------------    Goals: Safe discharge to home  Estimated Length of Stay: 10-14 days  Rehab Potential: Good  Medical Prognosis: Fair   Estimated Disposition: Home with Home Care  ---------------    PRESCREEN COMPARISON:  I have reviewed the prescreen information and I have found no relevant changes between the preadmission screening and my post admission evaluation.    RATIONALE FOR INPATIENT ADMISSION: Patient demonstrates the following:  [X] Medically appropriate for rehabilitation admission  [X] Has attainable rehab goals with an appropriate initial discharge plan  [X]Has rehabilitation potential (expected to make a significant improvement within a reasonable period of time)  [X] Requires close medical management by a rehab physician, rehab nursing care, Hospitalist and comprehensive interdisciplinary team (including PT, OT and/or SLP, Prosthetics and Orthotics)

## 2022-05-01 NOTE — PROGRESS NOTE ADULT - PROVIDER SPECIALTY LIST ADULT
Cardiology
Neurology
Neurology
Cardiology
Electrophysiology
Internal Medicine
MICU
MICU
Neurology
Neurology
Cardiology
Internal Medicine
Neurology
Nephrology

## 2022-05-01 NOTE — H&P ADULT - HISTORY OF PRESENT ILLNESS
61 year old Sinhala speaking male with past medical history of DM2, HTN, dilated cardiomyopathy. Morning of presentation to [facility] with left hemiparesis. NIHSS at [facility] 4. CTA with right M1 occlusion. HR intermittently in the 40s, reportedly in second degree heart block (but no EKG on file), given atropine in route to the hospital.  Not a candidate for tPA as LKN > 4.5 hours.  He was transferred to [facility] (4/21) as stroke rescue for possible thrombectomy.  OZZY aware. Neuroimaging repeated.  [place] staff contacted [facility] staff notified the patient was found to have acute renal failure Cr. 2.8, BUN 88, K 5.8 (untreated).  BNP increased.  Concern for digoxin toxicity, although digoxin level noted to be 1.6 at OSH.   In the ED, T96.4, HR 50, /65, on %. Slow afib with HR 30-50s. FSG 56, given amp D50. Given albuterol and Ca gluc 2g.    Admitted to MICU for CVA neuro checks and possible thrombectomy. 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 MRI brain performed 4/23 showing right MCA infarct with a right M1 occlusion. Initially started on ASA 81 mg and switched to Eliqius 5mg BID on 04/25. Echo EF 25%. He is s/p diagnostic Cath-prox LAD 40%, dist RPDA 60%, Right radial access site on 4/27. He is s/p CRT-D implantation on 4/28. Evaluated by PT/OT and was recommended AR. Patient deemed stable for discharge to [place] [facility] on 4/30/22.   61 year old German speaking male with past medical history of DM2, HTN, dilated cardiomyopathy. Morning of presentation to [facility] with left hemiparesis. NIHSS at [facility] 4. CTA with right M1 occlusion. HR intermittently in the 40s, reportedly in second degree heart block (but no EKG on file), given atropine in route to the hospital.  Not a candidate for tPA as LKN > 4.5 hours.  He was transferred to [facility] (4/21) as stroke rescue for possible thrombectomy.  OZZY aware. Neuroimaging repeated.  [place] staff contacted [facility] staff notified the patient was found to have acute renal failure Cr. 2.8, BUN 88, K 5.8 (untreated).  BNP increased.  Concern for digoxin toxicity, although digoxin level noted to be 1.6 at OSH.   In the ED, T96.4, HR 50, /65, on %. Slow afib with HR 30-50s. FSG 56, given amp D50. Given albuterol and Ca gluc 2g.    Admitted to MICU for CVA neuro checks and possible thrombectomy. 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 MRI brain performed 4/23 showing right MCA infarct with a right M1 occlusion. Initially started on ASA 81 mg and switched to Eliqius 5mg BID on 04/25. Echo EF 25%. He is s/p diagnostic Cath-prox LAD 40%, dist RPDA 60%, Right radial access site on 4/27. He is s/p CRT-D implantation on 4/28. Pt noted to have left chest wall and axillary swelling on 4/29 which improved. Evaluated by PT/OT and was recommended AR. Patient deemed stable for discharge to [place] [facility] on 4/30/22.   61 year old Macedonian speaking male with past medical history of DM2, HTN, dilated cardiomyopathy. Morning of presentation to [facility] with left hemiparesis. NIHSS at [facility] 4. CTA with right M1 occlusion. HR intermittently in the 40s, reportedly in second degree heart block (but no EKG on file), given atropine in route to the hospital.  Not a candidate for tPA as LKN > 4.5 hours.  He was transferred to [facility] (4/21) as stroke rescue for possible thrombectomy.  ZOZY aware. Neuroimaging repeated.  [place] staff contacted [facility] staff notified the patient was found to have acute renal failure Cr. 2.8, BUN 88, K 5.8 (untreated).  BNP increased.  Concern for digoxin toxicity, although digoxin level noted to be 1.6 at OSH.   In the ED, T96.4, HR 50, /65, on %. Slow afib with HR 30-50s. FSG 56, given amp D50. Given albuterol and Ca gluc 2g.    Admitted to MICU for CVA neuro checks and possible thrombectomy. Patient deemed medically unstable for thrombectomy due to BRASH ( Bradycardia, renal failure, AV kevin blockade, shock, hyperkalemia ) syndrome. MRI brain performed 4/23 showing right MCA infarct with a right M1 occlusion. Initially started on ASA 81 mg and switched to Eliqius 5mg BID on 04/25. Echo EF 25%. He is s/p diagnostic Cath-prox LAD 40%, dist RPDA 60%, Right radial access site on 4/27.   He is s/p CRT-D( Cardiac Resynchronisation therapy- Defibrillator)  implantation on 4/28. Pt noted to have left chest wall and axillary swelling on 4/29 which improved. Evaluated by PT/OT and was recommended AR. Patient deemed stable for discharge to [place] [facility] on 4/30/22.

## 2022-05-01 NOTE — H&P ADULT - NSHPREVIEWOFSYSTEMS_GEN_ALL_CORE
REVIEW OF SYSTEMS  Constitutional: No fever, No Chills, No fatigue  HEENT: No eye pain, No visual disturbances, No difficulty hearing  Pulm: No cough,  No shortness of breath  Cardio: No chest pain, No palpitations  GI:  No abdominal pain, No nausea, No vomiting, No diarrhea, No constipation  : No dysuria, No frequency, No hematuria  Neuro: No headaches, No memory loss, No loss of strength, No numbness, No tremors  Skin: No itching, No rashes, No lesions   Endo: No temperature intolerance  MSK: No joint pain, No joint swelling, No muscle pain, No Neck or back pain  Psych:  No depression, No anxiety REVIEW OF SYSTEMS  Constitutional: No fever, No Chills, No fatigue  HEENT: No eye pain, No visual disturbances, No difficulty hearing  Pulm: No cough,  No shortness of breath  Cardio: No chest pain, No palpitations  GI:  No abdominal pain, No nausea, No vomiting, No diarrhea, + constipation (Last BM 4/29)  : No dysuria, No frequency, No hematuria  Neuro: No headaches, No memory loss, + loss of strength, No numbness, No tremors  Skin: No itching, No rashes, +left chest wall incision  Endo: No temperature intolerance  MSK: No joint pain, No joint swelling, +left axillary/chest wall/shoulder pain from pacemaker, No Neck or back pain  Psych:  No depression, No anxiety

## 2022-05-01 NOTE — PROGRESS NOTE ADULT - SUBJECTIVE AND OBJECTIVE BOX
DATE OF SERVICE: 05-01-22 @ 09:09    Patient is a 61y old  Male who presents with a chief complaint of CVA (30 Apr 2022 08:32)      SUBJECTIVE / OVERNIGHT EVENTS:  No chest pain. No shortness of breath. No complaints. No events overnight.     MEDICATIONS  (STANDING):  apixaban 5 milliGRAM(s) Oral two times a day  atorvastatin 40 milliGRAM(s) Oral at bedtime  dextrose 5%. 1000 milliLiter(s) (100 mL/Hr) IV Continuous <Continuous>  dextrose 5%. 1000 milliLiter(s) (50 mL/Hr) IV Continuous <Continuous>  dextrose 50% Injectable 25 Gram(s) IV Push once  dextrose 50% Injectable 12.5 Gram(s) IV Push once  dextrose 50% Injectable 25 Gram(s) IV Push once  glucagon  Injectable 1 milliGRAM(s) IntraMuscular once  hydrocortisone 1% Cream 1 Application(s) Topical two times a day  insulin lispro (ADMELOG) corrective regimen sliding scale   SubCutaneous three times a day before meals  insulin lispro (ADMELOG) corrective regimen sliding scale   SubCutaneous at bedtime  lidocaine 2% Jelly 4 milliLiter(s) IntraUrethral once  lisinopril 5 milliGRAM(s) Oral daily  loratadine 10 milliGRAM(s) Oral daily  metoprolol succinate ER 25 milliGRAM(s) Oral daily  tamsulosin 0.4 milliGRAM(s) Oral at bedtime    MEDICATIONS  (PRN):  acetaminophen     Tablet .. 650 milliGRAM(s) Oral every 6 hours PRN Temp greater or equal to 38C (100.4F), Mild Pain (1 - 3)  dextrose Oral Gel 15 Gram(s) Oral once PRN Blood Glucose LESS THAN 70 milliGRAM(s)/deciliter  HYDROmorphone  Injectable 0.5 milliGRAM(s) IV Push every 4 hours PRN Severe Pain (7 - 10)  traMADol 50 milliGRAM(s) Oral every 6 hours PRN Moderate Pain (4 - 6)      Vital Signs Last 24 Hrs  T(C): 37.2 (01 May 2022 05:25), Max: 37.3 (01 May 2022 01:20)  T(F): 99 (01 May 2022 05:25), Max: 99.2 (01 May 2022 01:20)  HR: 82 (01 May 2022 05:25) (74 - 88)  BP: 145/78 (01 May 2022 05:25) (123/64 - 169/84)  BP(mean): --  RR: 18 (01 May 2022 05:25) (17 - 20)  SpO2: 96% (01 May 2022 05:25) (96% - 99%)  CAPILLARY BLOOD GLUCOSE      POCT Blood Glucose.: 241 mg/dL (01 May 2022 07:31)  POCT Blood Glucose.: 263 mg/dL (30 Apr 2022 21:46)  POCT Blood Glucose.: 366 mg/dL (30 Apr 2022 16:46)  POCT Blood Glucose.: 333 mg/dL (30 Apr 2022 11:37)    I&O's Summary    30 Apr 2022 07:01  -  01 May 2022 07:00  --------------------------------------------------------  IN: 995 mL / OUT: 1150 mL / NET: -155 mL        PHYSICAL EXAM:  GENERAL: NAD, well-developed  HEAD:  Atraumatic, Normocephalic  EYES: EOMI, PERRLA, conjunctiva and sclera clear  NECK: Supple, No JVD  CHEST/LUNG: Clear to auscultation bilaterally; No wheeze, swelling at ICD site is unchanged  HEART: Regular rate and rhythm; No murmurs, rubs, or gallops  ABDOMEN: Soft, Nontender, Nondistended; Bowel sounds present  EXTREMITIES:  2+ Peripheral Pulses, No clubbing, cyanosis, or edema  PSYCH: AAOx3  NEUROLOGY: non-focal  SKIN: No rashes or lesions    LABS:                        10.7   10.00 )-----------( 177      ( 30 Apr 2022 07:29 )             31.0                     RADIOLOGY & ADDITIONAL TESTS:    Imaging Personally Reviewed:    Consultant(s) Notes Reviewed:      Care Discussed with Consultants/Other Providers:

## 2022-05-01 NOTE — PROGRESS NOTE ADULT - ASSESSMENT
60yo RT handed Sri Lankan speaking M with pmh of HTN, DM-II,  dilated cardiomyopathy presents to [facility] as a transfer for Rt M1 occlusion. Per chart review, patient's LWK was at midnight on 4/21. Patient had CT and CTA which was noted to show Rt M1 occlusion. Patient was not a TPa candidate due to out of time window. Patient was MT candidate at that time thus was agreed to transfer patient. Patient had initial NIHSS 4 whcih increased to NIHSS 9 then NIHSS 6 on repeat examination. Upon transfer patient deemed medically unstable for thrombectomy due to BRASH syndrome and admitted to MICU for further monitoring.    right MCA infarct with a right M1 occlusion. Mechanism may be cardioembolism related to atrial fibrillation.    CVA  -  Patient neurologically without acute change, Continue close monitoring for neurologic deterioration, permissive HTN to gradual normotension, holding home antihypertensives, avoid fluctuations in BP. LDL 44, titrate statin to LDL goal less than 70, MR Head non con as noted above. Agitation overall appears improved. .   - Physical therapy/OT: AR   -  Apixaban initiated, ASA if indicated from cardiac standpoint.       systolic heart failure  -  - s/ p LHC   - had cardiac MRI  - s/p CRT-D device given EF 25% and QRS>160.   - EP to see pt for ICD site swelling ?hold eliquis    #Atrial fibrillation with #CVA   - CHADSVASC at least 4, A/C recommended, particularly with now documented CVA.   - Eliquis was restarted as per cardiology recs    dysphagia  - Passed dysphagia screening, tolerating diet.     Diet: Consistent carb no snacks    NANCY,   - creatinine 2.76 & K+ 7.5. Renal was consulted, no HD needed and was treated with a bicarb drip. NANCY and hyperkalemia resolved & patient off bicarb drip. Creatinine remains stable .  Will continue to monitor kidney function and electrolytes. Taylor removed.  Monitor for changes in UO.     Na Goal: Greater than 135     Taylor: n    HEMATOLOGY: H/H without acute change, Platelets 232 . No si/sx of bleeding. He should have all age and risk appropriate malignancy screenings.       DVT ppx: Heparin s.c [] LMWH [x]       DISPOSITION: PT/OT recommends AR,

## 2022-05-02 ENCOUNTER — APPOINTMENT (OUTPATIENT)
Dept: ELECTROPHYSIOLOGY | Facility: CLINIC | Age: 62
End: 2022-05-02

## 2022-05-02 LAB
ALBUMIN SERPL ELPH-MCNC: 3.4 G/DL — SIGNIFICANT CHANGE UP (ref 3.3–5)
ALP SERPL-CCNC: 155 U/L — HIGH (ref 40–120)
ALT FLD-CCNC: 79 U/L — HIGH (ref 10–45)
ANION GAP SERPL CALC-SCNC: 10 MMOL/L — SIGNIFICANT CHANGE UP (ref 5–17)
AST SERPL-CCNC: 48 U/L — HIGH (ref 10–40)
BASOPHILS # BLD AUTO: 0.05 K/UL — SIGNIFICANT CHANGE UP (ref 0–0.2)
BASOPHILS NFR BLD AUTO: 0.5 % — SIGNIFICANT CHANGE UP (ref 0–2)
BILIRUB SERPL-MCNC: 0.6 MG/DL — SIGNIFICANT CHANGE UP (ref 0.2–1.2)
BUN SERPL-MCNC: 30 MG/DL — HIGH (ref 7–23)
CALCIUM SERPL-MCNC: 9.3 MG/DL — SIGNIFICANT CHANGE UP (ref 8.4–10.5)
CHLORIDE SERPL-SCNC: 103 MMOL/L — SIGNIFICANT CHANGE UP (ref 96–108)
CO2 SERPL-SCNC: 24 MMOL/L — SIGNIFICANT CHANGE UP (ref 22–31)
CREAT SERPL-MCNC: 1.22 MG/DL — SIGNIFICANT CHANGE UP (ref 0.5–1.3)
EGFR: 67 ML/MIN/1.73M2 — SIGNIFICANT CHANGE UP
EOSINOPHIL # BLD AUTO: 1.24 K/UL — HIGH (ref 0–0.5)
EOSINOPHIL NFR BLD AUTO: 11.7 % — HIGH (ref 0–6)
GLUCOSE BLDC GLUCOMTR-MCNC: 253 MG/DL — HIGH (ref 70–99)
GLUCOSE BLDC GLUCOMTR-MCNC: 263 MG/DL — HIGH (ref 70–99)
GLUCOSE BLDC GLUCOMTR-MCNC: 263 MG/DL — HIGH (ref 70–99)
GLUCOSE BLDC GLUCOMTR-MCNC: 284 MG/DL — HIGH (ref 70–99)
GLUCOSE SERPL-MCNC: 270 MG/DL — HIGH (ref 70–99)
HCT VFR BLD CALC: 29.3 % — LOW (ref 39–50)
HGB BLD-MCNC: 10.2 G/DL — LOW (ref 13–17)
IMM GRANULOCYTES NFR BLD AUTO: 0.9 % — SIGNIFICANT CHANGE UP (ref 0–1.5)
LYMPHOCYTES # BLD AUTO: 1.61 K/UL — SIGNIFICANT CHANGE UP (ref 1–3.3)
LYMPHOCYTES # BLD AUTO: 15.2 % — SIGNIFICANT CHANGE UP (ref 13–44)
MAGNESIUM SERPL-MCNC: 1.8 MG/DL — SIGNIFICANT CHANGE UP (ref 1.6–2.6)
MCHC RBC-ENTMCNC: 31.1 PG — SIGNIFICANT CHANGE UP (ref 27–34)
MCHC RBC-ENTMCNC: 34.8 GM/DL — SIGNIFICANT CHANGE UP (ref 32–36)
MCV RBC AUTO: 89.3 FL — SIGNIFICANT CHANGE UP (ref 80–100)
MONOCYTES # BLD AUTO: 1.02 K/UL — HIGH (ref 0–0.9)
MONOCYTES NFR BLD AUTO: 9.7 % — SIGNIFICANT CHANGE UP (ref 2–14)
NEUTROPHILS # BLD AUTO: 6.54 K/UL — SIGNIFICANT CHANGE UP (ref 1.8–7.4)
NEUTROPHILS NFR BLD AUTO: 62 % — SIGNIFICANT CHANGE UP (ref 43–77)
NRBC # BLD: 0 /100 WBCS — SIGNIFICANT CHANGE UP (ref 0–0)
PHOSPHATE SERPL-MCNC: 3 MG/DL — SIGNIFICANT CHANGE UP (ref 2.5–4.5)
PLATELET # BLD AUTO: 196 K/UL — SIGNIFICANT CHANGE UP (ref 150–400)
POTASSIUM SERPL-MCNC: 4.4 MMOL/L — SIGNIFICANT CHANGE UP (ref 3.5–5.3)
POTASSIUM SERPL-SCNC: 4.4 MMOL/L — SIGNIFICANT CHANGE UP (ref 3.5–5.3)
PROT SERPL-MCNC: 7.6 G/DL — SIGNIFICANT CHANGE UP (ref 6–8.3)
RBC # BLD: 3.28 M/UL — LOW (ref 4.2–5.8)
RBC # FLD: 13.2 % — SIGNIFICANT CHANGE UP (ref 10.3–14.5)
SODIUM SERPL-SCNC: 137 MMOL/L — SIGNIFICANT CHANGE UP (ref 135–145)
WBC # BLD: 10.56 K/UL — HIGH (ref 3.8–10.5)
WBC # FLD AUTO: 10.56 K/UL — HIGH (ref 3.8–10.5)

## 2022-05-02 PROCEDURE — 99223 1ST HOSP IP/OBS HIGH 75: CPT | Mod: GC

## 2022-05-02 PROCEDURE — 99255 IP/OBS CONSLTJ NEW/EST HI 80: CPT

## 2022-05-02 PROCEDURE — 99233 SBSQ HOSP IP/OBS HIGH 50: CPT

## 2022-05-02 RX ORDER — HYDROCORTISONE 1 %
1 OINTMENT (GRAM) TOPICAL
Refills: 0 | Status: COMPLETED | OUTPATIENT
Start: 2022-05-02 | End: 2022-05-09

## 2022-05-02 RX ORDER — INSULIN GLARGINE 100 [IU]/ML
5 INJECTION, SOLUTION SUBCUTANEOUS AT BEDTIME
Refills: 0 | Status: DISCONTINUED | OUTPATIENT
Start: 2022-05-02 | End: 2022-05-04

## 2022-05-02 RX ADMIN — LISINOPRIL 5 MILLIGRAM(S): 2.5 TABLET ORAL at 05:03

## 2022-05-02 RX ADMIN — TAMSULOSIN HYDROCHLORIDE 0.4 MILLIGRAM(S): 0.4 CAPSULE ORAL at 21:19

## 2022-05-02 RX ADMIN — ATORVASTATIN CALCIUM 40 MILLIGRAM(S): 80 TABLET, FILM COATED ORAL at 21:19

## 2022-05-02 RX ADMIN — METFORMIN HYDROCHLORIDE 850 MILLIGRAM(S): 850 TABLET ORAL at 18:00

## 2022-05-02 RX ADMIN — Medication 6: at 18:08

## 2022-05-02 RX ADMIN — Medication 650 MILLIGRAM(S): at 09:50

## 2022-05-02 RX ADMIN — APIXABAN 5 MILLIGRAM(S): 2.5 TABLET, FILM COATED ORAL at 18:00

## 2022-05-02 RX ADMIN — SENNA PLUS 2 TABLET(S): 8.6 TABLET ORAL at 21:19

## 2022-05-02 RX ADMIN — Medication 25 MILLIGRAM(S): at 05:03

## 2022-05-02 RX ADMIN — TRAMADOL HYDROCHLORIDE 25 MILLIGRAM(S): 50 TABLET ORAL at 05:19

## 2022-05-02 RX ADMIN — Medication 1: at 21:20

## 2022-05-02 RX ADMIN — Medication 6: at 07:56

## 2022-05-02 RX ADMIN — METFORMIN HYDROCHLORIDE 850 MILLIGRAM(S): 850 TABLET ORAL at 08:53

## 2022-05-02 RX ADMIN — Medication 1 APPLICATION(S): at 18:08

## 2022-05-02 RX ADMIN — INSULIN GLARGINE 5 UNIT(S): 100 INJECTION, SOLUTION SUBCUTANEOUS at 21:19

## 2022-05-02 RX ADMIN — POLYETHYLENE GLYCOL 3350 17 GRAM(S): 17 POWDER, FOR SOLUTION ORAL at 05:34

## 2022-05-02 RX ADMIN — APIXABAN 5 MILLIGRAM(S): 2.5 TABLET, FILM COATED ORAL at 05:03

## 2022-05-02 RX ADMIN — Medication 650 MILLIGRAM(S): at 08:56

## 2022-05-02 NOTE — DIETITIAN INITIAL EVALUATION ADULT - ADD RECOMMEND
1) Monitor Weights, Intake, Tolerance, Skin, POCT & Labwork  2) Education Provided on Need for Supplementation & Consistent Carbohydrate Diet  3) Recommend Change Diet to Consistent Carbohydrate Low Sodium Diet  4) Glucerna 8oz PO BID  5) Continue Nutrition Plan of Care

## 2022-05-02 NOTE — DIETITIAN NUTRITION RISK NOTIFICATION - TREATMENT: THE FOLLOWING DIET HAS BEEN RECOMMENDED
Recommend Change Diet to Consistent Carbohydrate Low Sodium Diet   Recommend Initiate Glucerna 8oz PO BID (Provides 440kcal-20grams of Protein)   Education Provided on Need for Supplementation

## 2022-05-02 NOTE — DIETITIAN INITIAL EVALUATION ADULT - PERTINENT LABORATORY DATA
05-02    137  |  103  |  30<H>  ----------------------------<  270<H>  4.4   |  24  |  1.22    Ca    9.3      02 May 2022 06:20  Phos  3.0     05-02  Mg     1.8     05-02    TPro  7.6  /  Alb  3.4  /  TBili  0.6  /  DBili  x   /  AST  48<H>  /  ALT  79<H>  /  AlkPhos  155<H>  05-02  POCT Blood Glucose.: 284 mg/dL (05-02-22 @ 11:59)  A1C with Estimated Average Glucose Result: 7.6 % (04-22-22 @ 06:22)

## 2022-05-02 NOTE — PROGRESS NOTE ADULT - ASSESSMENT
61 year old Danish speaking male with past medical history of DM2, HTN, dilated cardiomyopathy originally presented to [facility] w/ L hemiparesis found to have R M1 occlusion and then transferred to [facility] on 4/21, given atropine in route to the hospital for bradycardia. 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 Course complicated by NANCY/Acute renal failure (resolved), afb now on eliquis, s/p diagnostic cath and s/p CRT-D implantation 4/28.  Admitted to [place] [facility] inpatient rehab on 4/30 for ADL, gait, and functional impairments.     #R MCA CVA: - due to R m1 occlusion, was out of window for TPA and not candidate for thrombectomy  - etiology likely cardioembolic 2/2 afib  - c/w statin  - Comprehensive Multidisciplinary Rehab Program     3 hours a day, 5 days a week with PT/OT/SLP    #Dilated cardiomyopathy w/ HFrEF  #Atrial fibrillation  - Echo EF 25% on 4/23   - s/p CRT-D implantation 4/28  - c/w lisinopril (transition to ARNI as outpt per cardio), metoprolol,  - c/w eliquis  - daily weights    #DM2, uncontrolled  - (Home meds glyburide 5mg BID, metformin 850mg BID, and vildagliptin 50mg daily)   - Will continue  metformin, and SSI  - HbA1c 7.6  - FS qAC qHS  - Hospitalist consult     Pain Management:- Tylenol PRN, Tramadol PRN    GI/Bowel:  - Senna QHS, Miralax daily PRN  - GI ppx: n/a    /Bladder: - tamsulosin, toileting prn     Skin/Pressure Injury:  - Monitor Incisions: left chest wall CRT-D incision clean dry and intact with mild swelling  OOB as tolerated     Diet:   - Diet Consistency/Modifications: DASH/CC  - Aspiration Precautions  - SLP consult for swallow function evaluation and treatment    DVT ppx:  - on eliquis  - No recent Dopplers    Precautions: Fall, Aspiration, Defibrillator     Hospitalist consult for management of medical comorbidites     - 61 year old Maltese speaking male with past medical history of DM2, HTN, dilated cardiomyopathy originally presented to [facility] w/ L hemiparesis found to have R M1 occlusion and then transferred to [facility] on 4/21, given atropine in route to the hospital for bradycardia. 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 Course complicated by NANCY/Acute renal failure (resolved), afb now on eliquis, s/p diagnostic cath and s/p CRT-D implantation 4/28.  Admitted to [place] [facility] inpatient rehab on 4/30 for ADL, gait, and functional impairments.     #R MCA CVA: - due to R m1 occlusion, was out of window for TPA and not candidate for thrombectomy  - etiology likely cardioembolic 2/2 afib  - c/w statin  - Comprehensive Multidisciplinary Rehab Program     3 hours a day, 5 days a week with PT/OT/SLP    #Dilated cardiomyopathy w/ HFrEF  #Atrial fibrillation  - Echo EF 25% on 4/23   - s/p CRT-D implantation 4/28- Monitor site closely   - c/w lisinopril (transition to ARNI as outpt per cardio), metoprolol,  - c/w eliquis  - daily weights    #DM2, uncontrolled  - (Home meds glyburide 5mg BID, metformin 850mg BID, and vildagliptin 50mg daily)   - Will continue  metformin, and SSI  - HbA1c 7.6  - FS qAC qHS  - Hospitalist consult     Pain Management:- Tylenol PRN, Tramadol PRN    GI/Bowel:  - Senna QHS, Miralax daily PRN  - GI ppx: n/a    /Bladder: - tamsulosin, toileting prn     Skin/Pressure Injury:  - Monitor Incisions: left chest wall CRT-D incision clean dry and intact with mild swelling  OOB as tolerated     Diet:   - Diet Consistency/Modifications: DASH/CC  - Aspiration Precautions  - SLP consult for swallow function evaluation and treatment    DVT ppx:  - on eliquis  - No recent Dopplers    Precautions: Fall, Aspiration, Defibrillator     Labs - mild leucocytosis     Hospitalist consult for management of medical comorbidites     - 61 year old Hebrew speaking male with past medical history of DM2, HTN, dilated cardiomyopathy originally presented to [facility] w/ L hemiparesis found to have R M1 occlusion and then transferred to [facility] on 4/21, given atropine in route to the hospital for bradycardia. 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 Course complicated by NANCY/Acute renal failure (resolved), afb now on eliquis, s/p diagnostic cath and s/p CRT-D implantation 4/28.  Admitted to [place] acute inpatient rehab on 4/30 for ADL, gait, and functional impairments.     #R MCA CVA: - due to R m1 occlusion, was out of window for TPA and not candidate for thrombectomy  - etiology likely cardioembolic 2/2 afib  - c/w statin  - Comprehensive Multidisciplinary Rehab Program     3 hours a day, 5 days a week with PT/OT/SLP  Impulsivity noted - chair and bed alarm     #Dilated cardiomyopathy w/ HFrEF  #Atrial fibrillation  - Echo EF 25% on 4/23   - s/p CRT-D implantation 4/28- Monitor site closely   - c/w lisinopril (transition to ARNI as outpt per cardio), metoprolol,  - c/w eliquis  - daily weights    #DM2, uncontrolled  - (Home meds glyburide 5mg BID, metformin 850mg BID, and vildagliptin 50mg daily)   - Will continue  metformin, and SSI  - HbA1c 7.6  - FS qAC qHS  - Hospitalist consult     Pain Management:- Tylenol PRN, Tramadol PRN    GI/Bowel:  - Senna QHS, Miralax daily PRN  - GI ppx: n/a    /Bladder: - tamsulosin, toileting prn     Skin/Pressure Injury:  - Monitor Incisions: left chest wall CRT-D incision clean dry and intact with mild swelling  OOB as tolerated     Diet:   - Diet Consistency/Modifications: DASH/CC  - Aspiration Precautions  - SLP consult for swallow function evaluation and treatment    DVT ppx:  - on eliquis  - No recent Dopplers    Precautions: Fall, Aspiration, Defibrillator     Labs - mild leucocytosis     Hospitalist consult for management of medical comorbidites     -

## 2022-05-02 NOTE — DIETITIAN INITIAL EVALUATION ADULT - NS FNS DIET ORDER
on Consistent Carbohydrate DASH-TLC Diet w/ Thin Liquids (IDDSI Level 0)   Recommend Change Diet to Consistent Carbohydrate Low Sodium Diet   Recommend Initiate Glucerna 8oz PO BID (Provides 440kcal-20grams of Protein)  Education Provided on Need for Supplementation & Consistent Carbohydrate Diet

## 2022-05-02 NOTE — DIETITIAN INITIAL EVALUATION ADULT - OTHER INFO
Initial Nutrition Assessment   61yr Old Male   Denies Food Allergy/Intolerance  Tolerates Diet Well - No Chewing/Swallowing Complications (Per Patient)  Surgical Incision on Chest & No Pressure Ulcers (as Per Nursing Flow Sheets)  No Edema Noted (as Per Nursing Flow Sheets)  No Recent Nausea/Vomiting/Diarrhea & Some Recent Constipation (as Per Patient)

## 2022-05-02 NOTE — PROGRESS NOTE ADULT - SUBJECTIVE AND OBJECTIVE BOX
Patient is a 61y old  Male who presents with a chief complaint of CVA (02 May 2022 07:24)    HPI:  61 year old Georgian speaking male with past medical history of DM2, HTN, dilated cardiomyopathy. Morning of presentation to [facility] with left hemiparesis. NIHSS at [facility] 4. CTA with right M1 occlusion. HR intermittently in the 40s, reportedly in second degree heart block (but no EKG on file), given atropine in route to the hospital.  Not a candidate for tPA as LKN > 4.5 hours.  He was transferred to [facility] () as stroke rescue for possible thrombectomy.  OZZY aware. Neuroimaging repeated.  [place] staff contacted [facility] staff notified the patient was found to have acute renal failure Cr. 2.8, BUN 88, K 5.8 (untreated).  BNP increased.  Concern for digoxin toxicity, although digoxin level noted to be 1.6 at OSH.   Admitted to MICU for CVA- possible thrombectomy. Patient deemed medically unstable for thrombectomy due to BRASH ( Bradycardia, renal failure, AV kevin blockade, shock, hyperkalemia ) syndrome. MRI brain performed  showing right MCA infarct with a right M1 occlusion. Initially started on ASA 81 mg and switched to Eliqius 5mg BID on . Echo EF 25%. He is s/p diagnostic Cath-    He is s/p CRT-D( Cardiac Resynchronisation therapy- Defibrillator)  implantation on . Pt noted to have left chest wall and axillary swelling on  which improved. Evaluated by PT/OT and was recommended AR. Patient deemed stable for discharge to [place] [facility] on 22.      TODAY'S SUBJECTIVE & REVIEW OF SYMPTOMS  CNA assisted with Georgian translation  Patient states that he feels ok, reports itching for which he has been started on hydrocortisone cream     Denies HA/CP/palpitations  Denies abdominal pain or nausea  Denies constipation         PHYSICAL EXAM    Vital Signs Last 24 Hrs  T(C): 36.8 (02 May 2022 07:51), Max: 36.8 (02 May 2022 07:51)  T(F): 98.3 (02 May 2022 07:51), Max: 98.3 (02 May 2022 07:51)  HR: 80 (02 May 2022 07:51) (76 - 80)  BP: 123/60 (02 May 2022 07:51) (123/60 - 159/89)  BP(mean): --  RR: 16 (02 May 2022 07:51) (15 - 16)  SpO2: 100% (02 May 2022 07:51) (100% - 100%)  Daily Height in cm: 177.8 (01 May 2022 11:39)    Daily Weight in k.7 (02 May 2022 05:07)    Constitutional - NAD, Comfortable  Chest - CTAB  Cardiovascular - S1S2  Abdomen - BS+, Soft, NTND  Extremities - No C/C/E, No calf tenderness   Neurologic Exam -                    Cognitive - Awake, Alert, oriented      Communication - Fluent, No dysarthria     Motor - motor 5/5 except left SA limited due to pain from recent procedure     Sensory - Intact to LT    Psychiatric - Mood stable, Affect WNL  Skin: left chest wall defirillator site significant swelling and some ecchymosis      MEDICATIONS  (STANDING):  apixaban 5 milliGRAM(s) Oral two times a day  atorvastatin 40 milliGRAM(s) Oral at bedtime  dextrose 5%. 1000 milliLiter(s) (50 mL/Hr) IV Continuous <Continuous>  dextrose 5%. 1000 milliLiter(s) (100 mL/Hr) IV Continuous <Continuous>  dextrose 50% Injectable 25 Gram(s) IV Push once  dextrose 50% Injectable 12.5 Gram(s) IV Push once  dextrose 50% Injectable 25 Gram(s) IV Push once  glucagon  Injectable 1 milliGRAM(s) IntraMuscular once  hydrocortisone 1% Cream 1 Application(s) Topical two times a day  insulin glargine Injectable (LANTUS) 5 Unit(s) SubCutaneous at bedtime  insulin lispro (ADMELOG) corrective regimen sliding scale   SubCutaneous three times a day before meals  insulin lispro (ADMELOG) corrective regimen sliding scale   SubCutaneous at bedtime  lisinopril 5 milliGRAM(s) Oral daily  loratadine 10 milliGRAM(s) Oral daily  metFORMIN 850 milliGRAM(s) Oral two times a day  metoprolol succinate ER 25 milliGRAM(s) Oral daily  senna 2 Tablet(s) Oral at bedtime  tamsulosin 0.4 milliGRAM(s) Oral at bedtime    MEDICATIONS  (PRN):  acetaminophen     Tablet .. 650 milliGRAM(s) Oral every 6 hours PRN Temp greater or equal to 38C (100.4F), Mild Pain (1 - 3)  dextrose Oral Gel 15 Gram(s) Oral once PRN Blood Glucose LESS THAN 70 milliGRAM(s)/deciliter  polyethylene glycol 3350 17 Gram(s) Oral daily PRN Constipation  traMADol 50 milliGRAM(s) Oral every 6 hours PRN Severe Pain (7 - 10)  traMADol 25 milliGRAM(s) Oral every 6 hours PRN Moderate Pain (4 - 6)        RECENT LABS/IMAGING                        10.2   10.56 )-----------( 196      ( 02 May 2022 06:20 )             29.3     05-02    137  |  103  |  30<H>  ----------------------------<  270<H>  4.4   |  24  |  1.22    Ca    9.3      02 May 2022 06:20  Phos  3.0     05-02  Mg     1.8         TPro  7.6  /  Alb  3.4  /  TBili  0.6  /  DBili  x   /  AST  48<H>  /  ALT  79<H>  /  AlkPhos  155<H>  -02

## 2022-05-02 NOTE — DIETITIAN INITIAL EVALUATION ADULT - REASON INDICATOR FOR ASSESSMENT
Initial Assessment - RD Screen - Low BMI     Patient Speaks Puerto Rican, RD is Fluent in [place] Rican

## 2022-05-02 NOTE — DIETITIAN INITIAL EVALUATION ADULT - ORAL INTAKE PTA/DIET HISTORY
Patient Does Not Follow Low Fat/Low Sodium/Low Sugar Diet @Home  Consumes 3 Meals a Day w/ a Snack  Family Usually Cooks For Patient   Doesn't Take Vitamin/Supplements @Home

## 2022-05-02 NOTE — CONSULT NOTE ADULT - SUBJECTIVE AND OBJECTIVE BOX
Patient is a 61y old  Male who presents with a chief complaint of CVA (01 May 2022 11:35)    HPI:  62 y/o Frisian speaking male with past medical history of DM2, HTN, dilated cardiomyopathy. Presented to [facility] with left hemiparesis. NIHSS at [facility] 4. CTA with right M1 occlusion. HR intermittently in the 40s, reportedly in second degree heart block (but no EKG on file), given atropine in route to the hospital. Not a candidate for tPA as LKN > 4.5 hours.  He was transferred to [facility] (4/21) as stroke rescue for possible thrombectomy. OZZY aware. Neuroimaging repeated.  [facility] staff contacted [facility] staff notified the patient was found to have acute renal failure Cr. 2.8, BUN 88, K 5.8 (untreated). BNP increased. Concern for digoxin toxicity, although digoxin level noted to be 1.6 at OSH. In the ED, T96.4, HR 50, /65, on %. Slow afib with HR 30-50s. FSG 56, given amp D50. Given albuterol and Ca gluc 2g.    Admitted to MICU for CVA neuro checks and possible thrombectomy. 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 MRI brain performed 4/23 showing right MCA infarct with a right M1 occlusion. Initially started on ASA 81 mg and switched to Eliqius 5mg BID on 04/25. Echo EF 25%. He is s/p diagnostic Cath-prox LAD 40%, dist RPDA 60%, Right radial access site on 4/27. He is s/p CRT-D implantation on 4/28. Pt noted to have left chest wall and axillary swelling on 4/29 which improved. Evaluated by PT/OT and was recommended AR. Patient deemed stable for discharge to [facility] on 4/30/22.  (01 May 2022 11:35)    Patient seen and examined at bedside, stable, NAD.    PAST MEDICAL & SURGICAL HISTORY:  HTN (hypertension)  HLD (hyperlipidemia)    No significant past surgical history    SOCIAL HISTORY: Denies tobacco, EtOH, or illicit drug use. Spouse resides in . Lives with son, PTA independent in ADLs, IADLS, and ambulation.     FAMILY HISTORY: Denies pertinent family hx in first degree relatives    ALLERGIES:  No Known Allergies    MEDICATIONS  (STANDING):  apixaban 5 milliGRAM(s) Oral two times a day  atorvastatin 40 milliGRAM(s) Oral at bedtime  dextrose 5%. 1000 milliLiter(s) (50 mL/Hr) IV Continuous <Continuous>  dextrose 5%. 1000 milliLiter(s) (100 mL/Hr) IV Continuous <Continuous>  dextrose 50% Injectable 25 Gram(s) IV Push once  dextrose 50% Injectable 12.5 Gram(s) IV Push once  dextrose 50% Injectable 25 Gram(s) IV Push once  glucagon  Injectable 1 milliGRAM(s) IntraMuscular once  hydrocortisone 1% Cream 1 Application(s) Topical two times a day  insulin lispro (ADMELOG) corrective regimen sliding scale   SubCutaneous three times a day before meals  insulin lispro (ADMELOG) corrective regimen sliding scale   SubCutaneous at bedtime  lisinopril 5 milliGRAM(s) Oral daily  loratadine 10 milliGRAM(s) Oral daily  metFORMIN 850 milliGRAM(s) Oral two times a day  metoprolol succinate ER 25 milliGRAM(s) Oral daily  senna 2 Tablet(s) Oral at bedtime  tamsulosin 0.4 milliGRAM(s) Oral at bedtime    MEDICATIONS  (PRN):  acetaminophen     Tablet .. 650 milliGRAM(s) Oral every 6 hours PRN Temp greater or equal to 38C (100.4F), Mild Pain (1 - 3)  dextrose Oral Gel 15 Gram(s) Oral once PRN Blood Glucose LESS THAN 70 milliGRAM(s)/deciliter  polyethylene glycol 3350 17 Gram(s) Oral daily PRN Constipation  traMADol 50 milliGRAM(s) Oral every 6 hours PRN Severe Pain (7 - 10)  traMADol 25 milliGRAM(s) Oral every 6 hours PRN Moderate Pain (4 - 6)    Review of Systems: Refer to HPI for pertinent positives and negatives. All other ROS reviewed and negative except as otherwise stated above.    Vital Signs Last 24 Hrs  T(F): 98 (02 May 2022 05:07), Max: 98 (01 May 2022 09:38)  HR: 77 (02 May 2022 05:07) (72 - 79)  BP: 129/73 (02 May 2022 05:07) (128/62 - 159/89)  RR: 15 (02 May 2022 05:07) (15 - 17)  SpO2: 100% (02 May 2022 05:07) (100% - 100%)  I&O's Summary    PHYSICAL EXAM:  GENERAL: NAD, well-groomed, well-developed  HEAD:  Atraumatic, Normocephalic  EYES: EOMI, PERRL, conjunctiva and sclera clear  ENMT: Moist mucous membranes, Good dentition  NECK: Supple, No JVD  CHEST/LUNG: Clear to auscultation bilaterally, non-labored breathing, good air entry  HEART: RRR; S1/S2  ABDOMEN: Soft, Nontender, Nondistended; Bowel sounds present  VASCULAR: Normal pulses, Normal capillary refill  EXTREMITIES: No cyanosis, No edema  LYMPH: No lymphadenopathy noted  SKIN: Warm, Intact  PSYCH: Normal mood and affect  NERVOUS SYSTEM:  A/O x3, Fair concentration  MSK: LUE strength 2/5, RUE 5/5. b/l LE 5/5    LABS:                        10.2   10.56 )-----------( 196      ( 02 May 2022 06:20 )             29.3     POCT Blood Glucose.: 267 mg/dL (01 May 2022 21:46)  POCT Blood Glucose.: 253 mg/dL (01 May 2022 16:44)  POCT Blood Glucose.: 246 mg/dL (01 May 2022 11:49)  POCT Blood Glucose.: 241 mg/dL (01 May 2022 07:31)    COVID-19 PCR: NotDetec (04-29-22 @ 15:54)  COVID-19 PCR: NotDetec (04-25-22 @ 11:00)  COVID-19 PCR: NotDetec (04-21-22 @ 13:15)    RADIOLOGY & ADDITIONAL TESTS: reviewed    Care Discussed with Consultants/Other Providers: yes Patient is a 61y old  Male who presents with a chief complaint of CVA (01 May 2022 11:35)    HPI:  60 y/o Icelandic speaking male with past medical history of DM2, HTN, dilated cardiomyopathy. Presented to [facility] with left hemiparesis. NIHSS at [facility] 4. CTA with right M1 occlusion. HR intermittently in the 40s, reportedly in second degree heart block (but no EKG on file), given atropine in route to the hospital. Not a candidate for tPA as LKN > 4.5 hours.  He was transferred to [facility] (4/21) as stroke rescue for possible thrombectomy. OZZY aware. Neuroimaging repeated.  [facility] staff contacted [facility] staff notified the patient was found to have acute renal failure Cr. 2.8, BUN 88, K 5.8 (untreated). BNP increased. Concern for digoxin toxicity, although digoxin level noted to be 1.6 at OSH. In the ED, T96.4, HR 50, /65, on %. Slow afib with HR 30-50s. FSG 56, given amp D50. Given albuterol and Ca gluc 2g.    Admitted to MICU for CVA neuro checks and possible thrombectomy. 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 MRI brain performed 4/23 showing right MCA infarct with a right M1 occlusion. Initially started on ASA 81 mg and switched to Eliqius 5mg BID on 04/25. Echo EF 25%. He is s/p diagnostic Cath-prox LAD 40%, dist RPDA 60%, Right radial access site on 4/27. He is s/p CRT-D implantation on 4/28. Pt noted to have left chest wall and axillary swelling on 4/29 which improved. Evaluated by PT/OT and was recommended AR. Patient deemed stable for discharge to [facility] on 4/30/22.  (01 May 2022 11:35)    Patient seen and examined at bedside, stable, NAD. denies headache, fever, chills, cp, sob, n/v, abd pain.      PAST MEDICAL & SURGICAL HISTORY:  HTN (hypertension)  HLD (hyperlipidemia)    No significant past surgical history    SOCIAL HISTORY: Denies tobacco, EtOH, or illicit drug use. Spouse resides in [place]. Lives with son, PTA independent in ADLs, IADLS, and ambulation.     FAMILY HISTORY: Denies pertinent family hx in first degree relatives    ALLERGIES:  No Known Allergies    MEDICATIONS  (STANDING):  apixaban 5 milliGRAM(s) Oral two times a day  atorvastatin 40 milliGRAM(s) Oral at bedtime  dextrose 5%. 1000 milliLiter(s) (50 mL/Hr) IV Continuous <Continuous>  dextrose 5%. 1000 milliLiter(s) (100 mL/Hr) IV Continuous <Continuous>  dextrose 50% Injectable 25 Gram(s) IV Push once  dextrose 50% Injectable 12.5 Gram(s) IV Push once  dextrose 50% Injectable 25 Gram(s) IV Push once  glucagon  Injectable 1 milliGRAM(s) IntraMuscular once  hydrocortisone 1% Cream 1 Application(s) Topical two times a day  insulin lispro (ADMELOG) corrective regimen sliding scale   SubCutaneous three times a day before meals  insulin lispro (ADMELOG) corrective regimen sliding scale   SubCutaneous at bedtime  lisinopril 5 milliGRAM(s) Oral daily  loratadine 10 milliGRAM(s) Oral daily  metFORMIN 850 milliGRAM(s) Oral two times a day  metoprolol succinate ER 25 milliGRAM(s) Oral daily  senna 2 Tablet(s) Oral at bedtime  tamsulosin 0.4 milliGRAM(s) Oral at bedtime    MEDICATIONS  (PRN):  acetaminophen     Tablet .. 650 milliGRAM(s) Oral every 6 hours PRN Temp greater or equal to 38C (100.4F), Mild Pain (1 - 3)  dextrose Oral Gel 15 Gram(s) Oral once PRN Blood Glucose LESS THAN 70 milliGRAM(s)/deciliter  polyethylene glycol 3350 17 Gram(s) Oral daily PRN Constipation  traMADol 50 milliGRAM(s) Oral every 6 hours PRN Severe Pain (7 - 10)  traMADol 25 milliGRAM(s) Oral every 6 hours PRN Moderate Pain (4 - 6)    Review of Systems: Refer to HPI for pertinent positives and negatives. All other ROS reviewed and negative except as otherwise stated above.    Vital Signs Last 24 Hrs  T(F): 98 (02 May 2022 05:07), Max: 98 (01 May 2022 09:38)  HR: 77 (02 May 2022 05:07) (72 - 79)  BP: 129/73 (02 May 2022 05:07) (128/62 - 159/89)  RR: 15 (02 May 2022 05:07) (15 - 17)  SpO2: 100% (02 May 2022 05:07) (100% - 100%)  I&O's Summary    PHYSICAL EXAM:  GENERAL: NAD, well-groomed, well-developed  HEAD:  Atraumatic, Normocephalic  EYES: EOMI, PERRL, conjunctiva and sclera clear  ENMT: Moist mucous membranes, Good dentition  NECK: Supple, No JVD  CHEST/LUNG: Respirations unlabored. Clear to auscultation bilaterally, non-labored breathing, good air entry  HEART: RRR; S1/S2. +CRT-D with edema, mild erythema  ABDOMEN: Soft, Nontender, Nondistended; Bowel sounds present  VASCULAR: Normal pulses, Normal capillary refill  EXTREMITIES: No cyanosis, No edema  LYMPH: No lymphadenopathy noted  SKIN: Warm, Intact. +mild swelling   PSYCH: Normal mood and affect  NERVOUS SYSTEM:  A/O x3, Fair concentration  MSK: LUE strength 2/5, RUE 5/5. b/l LE 5/5    LABS:                        10.2   10.56 )-----------( 196      ( 02 May 2022 06:20 )             29.3     POCT Blood Glucose.: 267 mg/dL (01 May 2022 21:46)  POCT Blood Glucose.: 253 mg/dL (01 May 2022 16:44)  POCT Blood Glucose.: 246 mg/dL (01 May 2022 11:49)  POCT Blood Glucose.: 241 mg/dL (01 May 2022 07:31)    COVID-19 PCR: NotDetec (04-29-22 @ 15:54)  COVID-19 PCR: NotDetec (04-25-22 @ 11:00)  COVID-19 PCR: NotDetec (04-21-22 @ 13:15)    RADIOLOGY & ADDITIONAL TESTS: reviewed  < from: Xray Chest 2 Views PA/Lat (04.29.22 @ 10:45) >  IMPRESSION:    Status post placement of a left chest wall biventricular AICD with intact   leads in expected positions.  Clear lungs. No pneumothorax.    --- End of Report ---    < end of copied text >      Care Discussed with Consultants/Other Providers: yes

## 2022-05-02 NOTE — DIETITIAN INITIAL EVALUATION ADULT - PERTINENT MEDS FT
MEDICATIONS  (STANDING):  apixaban 5 milliGRAM(s) Oral two times a day  atorvastatin 40 milliGRAM(s) Oral at bedtime  dextrose 5%. 1000 milliLiter(s) (50 mL/Hr) IV Continuous <Continuous>  dextrose 5%. 1000 milliLiter(s) (100 mL/Hr) IV Continuous <Continuous>  dextrose 50% Injectable 25 Gram(s) IV Push once  dextrose 50% Injectable 12.5 Gram(s) IV Push once  dextrose 50% Injectable 25 Gram(s) IV Push once  glucagon  Injectable 1 milliGRAM(s) IntraMuscular once  hydrocortisone 1% Cream 1 Application(s) Topical two times a day  insulin glargine Injectable (LANTUS) 5 Unit(s) SubCutaneous at bedtime  insulin lispro (ADMELOG) corrective regimen sliding scale   SubCutaneous three times a day before meals  insulin lispro (ADMELOG) corrective regimen sliding scale   SubCutaneous at bedtime  lisinopril 5 milliGRAM(s) Oral daily  loratadine 10 milliGRAM(s) Oral daily  metFORMIN 850 milliGRAM(s) Oral two times a day  metoprolol succinate ER 25 milliGRAM(s) Oral daily  senna 2 Tablet(s) Oral at bedtime  tamsulosin 0.4 milliGRAM(s) Oral at bedtime

## 2022-05-02 NOTE — CONSULT NOTE ADULT - ASSESSMENT
62 y/o M with past medical history of DM2, HTN, dilated cardiomyopathy originally presented to [facility] w/ L hemiparesis found to have R M1 occlusion and then transferred to [facility] on 4/21, given atropine in route to the hospital for bradycardia. 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 Course complicated by NANCY/Acute renal failure (resolved), afib now on eliquis, s/p diagnostic cath and s/p CRT-D implantation 4/28.  Admitted to [place] acute inpatient rehab on 4/30 for ADL, gait, and functional impairments.     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  -Etiology likely cardioembolic 2/2 afib  -Continue statin  -Start comprehensive rehab program - PT/OT/SLP per rehab team  -Pain management, bowel regimen per rehab     #Dilated cardiomyopathy w/ HFrEF  #Non-obstructive CAD  #Atrial fibrillation  -Echo EF 25% on 4/23; CMRI 4/28 - LV dilated with LVEF: 32%  -s/p diagnostic cath nonobstructive CAD, CRT-D implantation 4/28  -Continue lisinopril 5mg qd, (transition to ARNI as outpt per cardio), Toprol 25mg qd, with holding parameters   -Continue eliquis for AC  -Daily weights, I/Os    #DM2, uncontrolled  - HbA1c 7.6 (4/22/22)  - Home meds glyburide 5mg BID, metformin 850mg BID, and vildagliptin 50mg daily  - Continue metformin 850mg BID, start lantus 5un qhs, continue ISS    Elevated LFTs  -Encourage gentle PO hydration  -Monitor, if worsening, decrease statin     BPH  - Tamsulosin    DVT ppx - Eliquis 60 y/o M with past medical history of DM2, HTN, dilated cardiomyopathy originally presented to [facility] w/ L hemiparesis found to have R M1 occlusion and then transferred to [facility] on 4/21, given atropine in route to the hospital for bradycardia. 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 Course complicated by NANCY/Acute renal failure (resolved), afib now on eliquis, s/p diagnostic cath and s/p CRT-D implantation 4/28.  Admitted to [place] acute inpatient rehab on 4/30 for ADL, gait, and functional impairments.     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  -Etiology likely cardioembolic 2/2 afib  -Continue statin  -Start comprehensive rehab program - PT/OT/SLP per rehab team  -Pain management, bowel regimen per rehab     #Dilated cardiomyopathy w/ HFrEF  #Non-obstructive CAD  #Atrial fibrillation  -Echo EF 25% on 4/23; CMRI 4/28 - LV dilated with LVEF: 32%  -s/p diagnostic cath nonobstructive CAD, CRT-D implantation 4/28  -Continue lisinopril 5mg qd, (transition to ARNI as outpt per cardio), Toprol 25mg qd, with holding parameters   -Continue eliquis for AC  -Daily weights, I/Os    #DM2, uncontrolled  - HbA1c 7.6 (4/22/22)  - Home meds glyburide 5mg BID, metformin 850mg BID, and vildagliptin 50mg daily  - Continue metformin 850mg BID, start lantus 5un qhs, continue ISS    Elevated LFTs  -Encourage gentle PO hydration  -Monitor, if worsening, decrease statin, check RUQ sono    BPH  - Tamsulosin    DVT ppx - Eliquis

## 2022-05-02 NOTE — DIETITIAN INITIAL EVALUATION ADULT - CONTINUE CURRENT NUTRITION CARE PLAN
Recommend Initiate Nutrition Supplement   Recommend Change Diet   Education Provided on Need for Supplementation & Consistent Carbohydrate Diet

## 2022-05-03 LAB
GLUCOSE BLDC GLUCOMTR-MCNC: 178 MG/DL — HIGH (ref 70–99)
GLUCOSE BLDC GLUCOMTR-MCNC: 240 MG/DL — HIGH (ref 70–99)
GLUCOSE BLDC GLUCOMTR-MCNC: 249 MG/DL — HIGH (ref 70–99)
GLUCOSE BLDC GLUCOMTR-MCNC: 336 MG/DL — HIGH (ref 70–99)

## 2022-05-03 PROCEDURE — 99232 SBSQ HOSP IP/OBS MODERATE 35: CPT | Mod: GC

## 2022-05-03 RX ORDER — LANOLIN ALCOHOL/MO/W.PET/CERES
3 CREAM (GRAM) TOPICAL AT BEDTIME
Refills: 0 | Status: DISCONTINUED | OUTPATIENT
Start: 2022-05-03 | End: 2022-05-05

## 2022-05-03 RX ORDER — POLYETHYLENE GLYCOL 3350 17 G/17G
17 POWDER, FOR SOLUTION ORAL
Refills: 0 | Status: DISCONTINUED | OUTPATIENT
Start: 2022-05-03 | End: 2022-05-10

## 2022-05-03 RX ADMIN — Medication 8: at 12:01

## 2022-05-03 RX ADMIN — INSULIN GLARGINE 5 UNIT(S): 100 INJECTION, SOLUTION SUBCUTANEOUS at 21:14

## 2022-05-03 RX ADMIN — METFORMIN HYDROCHLORIDE 850 MILLIGRAM(S): 850 TABLET ORAL at 08:12

## 2022-05-03 RX ADMIN — Medication 1 APPLICATION(S): at 17:21

## 2022-05-03 RX ADMIN — TAMSULOSIN HYDROCHLORIDE 0.4 MILLIGRAM(S): 0.4 CAPSULE ORAL at 21:13

## 2022-05-03 RX ADMIN — Medication 25 MILLIGRAM(S): at 06:14

## 2022-05-03 RX ADMIN — Medication 2: at 08:12

## 2022-05-03 RX ADMIN — LORATADINE 10 MILLIGRAM(S): 10 TABLET ORAL at 11:57

## 2022-05-03 RX ADMIN — Medication 4: at 17:21

## 2022-05-03 RX ADMIN — METFORMIN HYDROCHLORIDE 850 MILLIGRAM(S): 850 TABLET ORAL at 17:21

## 2022-05-03 RX ADMIN — TRAMADOL HYDROCHLORIDE 50 MILLIGRAM(S): 50 TABLET ORAL at 08:28

## 2022-05-03 RX ADMIN — APIXABAN 5 MILLIGRAM(S): 2.5 TABLET, FILM COATED ORAL at 06:14

## 2022-05-03 RX ADMIN — Medication 3 MILLIGRAM(S): at 21:15

## 2022-05-03 RX ADMIN — TRAMADOL HYDROCHLORIDE 50 MILLIGRAM(S): 50 TABLET ORAL at 02:15

## 2022-05-03 RX ADMIN — ATORVASTATIN CALCIUM 40 MILLIGRAM(S): 80 TABLET, FILM COATED ORAL at 21:13

## 2022-05-03 RX ADMIN — LISINOPRIL 5 MILLIGRAM(S): 2.5 TABLET ORAL at 06:14

## 2022-05-03 RX ADMIN — APIXABAN 5 MILLIGRAM(S): 2.5 TABLET, FILM COATED ORAL at 17:20

## 2022-05-03 RX ADMIN — TRAMADOL HYDROCHLORIDE 50 MILLIGRAM(S): 50 TABLET ORAL at 09:25

## 2022-05-03 RX ADMIN — Medication 1 APPLICATION(S): at 06:14

## 2022-05-03 RX ADMIN — TRAMADOL HYDROCHLORIDE 50 MILLIGRAM(S): 50 TABLET ORAL at 01:45

## 2022-05-03 NOTE — PROGRESS NOTE ADULT - SUBJECTIVE AND OBJECTIVE BOX
Subjective:  Patient was seen and examined at the bedside this AM. No acute overnight events.   [employer] interpreters provided [place] translations. Patient reports difficulty sleeping; thinks it is due to insulin. Reports he was not taking insulin at home. Patient also continues to experience itchiness to his back, despite topical hydrocortisone cream. Otherwise, does not report any other issues. States he had no problem with bowel movements yesterday or today.     ROS:  Did not report fever, chills, nausea, vomiting, CP, palpitations, coughing, wheezing, SOB, or abdominal pain. Reports BM today; last documented in charts is 4/29.      Physical Exam:  Vital Signs Last 24 Hrs  T(C): 36.2 (03 May 2022 08:32), Max: 37.2 (02 May 2022 20:01)  T(F): 97.2 (03 May 2022 08:32), Max: 98.9 (02 May 2022 20:01)  HR: 83 (03 May 2022 08:32) (75 - 84)  BP: 124/64 (03 May 2022 08:32) (124/64 - 150/79)  BP(mean): --  RR: 16 (03 May 2022 08:32) (16 - 16)  SpO2: 98% (03 May 2022 08:32) (98% - 100%)      Constitutional - NAD, Comfortable  Chest - CTAB  Cardiovascular - S1S2  Abdomen - BS+, Soft, NTND  Extremities - No C/C/E, No calf tenderness   Neurologic Exam -                    Cognitive - Awake, Alert, oriented      Communication - Fluent, No dysarthria     Motor - motor 5/5 except left SA limited due to pain from recent procedure     Sensory - Intact to LT    Psychiatric - Mood stable, Affect WNL  Skin: left chest wall defirillator site significant swelling and some ecchymosis      Recent Labs/Imaging:                        10.2   10.56 )-----------( 196      ( 02 May 2022 06:20 )             29.3     05-02    137  |  103  |  30<H>  ----------------------------<  270<H>  4.4   |  24  |  1.22    Ca    9.3      02 May 2022 06:20  Phos  3.0     05-02  Mg     1.8     05-02    TPro  7.6  /  Alb  3.4  /  TBili  0.6  /  DBili  x   /  AST  48<H>  /  ALT  79<H>  /  AlkPhos  155<H>  05-02        POCT Blood Glucose.: 178 mg/dL (05-03-22 @ 08:09)  POCT Blood Glucose.: 253 mg/dL (05-02-22 @ 21:18)  POCT Blood Glucose.: 263 mg/dL (05-02-22 @ 17:21)  POCT Blood Glucose.: 284 mg/dL (05-02-22 @ 11:59)      Medications:  acetaminophen     Tablet .. 650 milliGRAM(s) Oral every 6 hours PRN  apixaban 5 milliGRAM(s) Oral two times a day  atorvastatin 40 milliGRAM(s) Oral at bedtime  dextrose 5%. 1000 milliLiter(s) IV Continuous <Continuous>  dextrose 5%. 1000 milliLiter(s) IV Continuous <Continuous>  dextrose 50% Injectable 25 Gram(s) IV Push once  dextrose 50% Injectable 12.5 Gram(s) IV Push once  dextrose 50% Injectable 25 Gram(s) IV Push once  dextrose Oral Gel 15 Gram(s) Oral once PRN  glucagon  Injectable 1 milliGRAM(s) IntraMuscular once  hydrocortisone 1% Cream 1 Application(s) Topical two times a day  insulin glargine Injectable (LANTUS) 5 Unit(s) SubCutaneous at bedtime  insulin lispro (ADMELOG) corrective regimen sliding scale   SubCutaneous three times a day before meals  insulin lispro (ADMELOG) corrective regimen sliding scale   SubCutaneous at bedtime  lisinopril 5 milliGRAM(s) Oral daily  loratadine 10 milliGRAM(s) Oral daily  metFORMIN 850 milliGRAM(s) Oral two times a day  metoprolol succinate ER 25 milliGRAM(s) Oral daily  polyethylene glycol 3350 17 Gram(s) Oral two times a day  senna 2 Tablet(s) Oral at bedtime  tamsulosin 0.4 milliGRAM(s) Oral at bedtime  traMADol 50 milliGRAM(s) Oral every 6 hours PRN  traMADol 25 milliGRAM(s) Oral every 6 hours PRN   Subjective:  Patient was seen and examined at the bedside this AM. No acute overnight events.   [employer] interpreters provided [place]ronesian translations. Patient reports difficulty sleeping; thinks it is due to insulin. Reports he was not taking insulin at home. Patient also continues to experience itchiness to his back, despite topical hydrocortisone cream. Otherwise, does not report any other issues. States he had no problem with bowel movements yesterday or today.     ROS:  Did not report fever, chills, nausea, vomiting, CP, palpitations, coughing, wheezing, SOB, or abdominal pain. Reports BM today; last documented in charts is 4/29.      Physical Exam:  Vital Signs Last 24 Hrs  T(C): 36.2 (03 May 2022 08:32), Max: 37.2 (02 May 2022 20:01)  T(F): 97.2 (03 May 2022 08:32), Max: 98.9 (02 May 2022 20:01)  HR: 83 (03 May 2022 08:32) (75 - 84)  BP: 124/64 (03 May 2022 08:32) (124/64 - 150/79)  BP(mean): --  RR: 16 (03 May 2022 08:32) (16 - 16)  SpO2: 98% (03 May 2022 08:32) (98% - 100%)      Physical Exam:  Constitutional - NAD, Comfortable  Chest - CTAB  Cardiovascular - S1S2  Abdomen - BS+, Soft, NTND  Extremities - No C/C/E, No calf tenderness   Neurologic Exam -                    Cognitive - Awake, Alert, oriented      Communication - Fluent, No dysarthria     Motor - motor 5/5 except left SA limited due to pain from recent procedure     Sensory - Intact to LT    Psychiatric - Mood stable, Affect WNL  Skin: left chest wall defirillator site significant swelling and some ecchymosis    Functional Status:  - OT: CG-sup eating/grooming/UBD/LBD/toileting  - PT: CG ' ambulation; 12 steps CG 1 rail    Recent Labs/Imaging:                        10.2   10.56 )-----------( 196      ( 02 May 2022 06:20 )             29.3     05-02    137  |  103  |  30<H>  ----------------------------<  270<H>  4.4   |  24  |  1.22    Ca    9.3      02 May 2022 06:20  Phos  3.0     05-02  Mg     1.8     05-02    TPro  7.6  /  Alb  3.4  /  TBili  0.6  /  DBili  x   /  AST  48<H>  /  ALT  79<H>  /  AlkPhos  155<H>  05-02        POCT Blood Glucose.: 178 mg/dL (05-03-22 @ 08:09)  POCT Blood Glucose.: 253 mg/dL (05-02-22 @ 21:18)  POCT Blood Glucose.: 263 mg/dL (05-02-22 @ 17:21)  POCT Blood Glucose.: 284 mg/dL (05-02-22 @ 11:59)      Medications:  acetaminophen     Tablet .. 650 milliGRAM(s) Oral every 6 hours PRN  apixaban 5 milliGRAM(s) Oral two times a day  atorvastatin 40 milliGRAM(s) Oral at bedtime  dextrose 5%. 1000 milliLiter(s) IV Continuous <Continuous>  dextrose 5%. 1000 milliLiter(s) IV Continuous <Continuous>  dextrose 50% Injectable 25 Gram(s) IV Push once  dextrose 50% Injectable 12.5 Gram(s) IV Push once  dextrose 50% Injectable 25 Gram(s) IV Push once  dextrose Oral Gel 15 Gram(s) Oral once PRN  glucagon  Injectable 1 milliGRAM(s) IntraMuscular once  hydrocortisone 1% Cream 1 Application(s) Topical two times a day  insulin glargine Injectable (LANTUS) 5 Unit(s) SubCutaneous at bedtime  insulin lispro (ADMELOG) corrective regimen sliding scale   SubCutaneous three times a day before meals  insulin lispro (ADMELOG) corrective regimen sliding scale   SubCutaneous at bedtime  lisinopril 5 milliGRAM(s) Oral daily  loratadine 10 milliGRAM(s) Oral daily  metFORMIN 850 milliGRAM(s) Oral two times a day  metoprolol succinate ER 25 milliGRAM(s) Oral daily  polyethylene glycol 3350 17 Gram(s) Oral two times a day  senna 2 Tablet(s) Oral at bedtime  tamsulosin 0.4 milliGRAM(s) Oral at bedtime  traMADol 50 milliGRAM(s) Oral every 6 hours PRN  traMADol 25 milliGRAM(s) Oral every 6 hours PRN

## 2022-05-03 NOTE — PROGRESS NOTE ADULT - ASSESSMENT
61 year old Romanian speaking male with past medical history of DM2, HTN, dilated cardiomyopathy originally presented to [facility] w/ L hemiparesis found to have R M1 occlusion and then transferred to [facility] on 4/21, given atropine in route to the hospital for bradycardia. 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 Course complicated by NANCY/Acute renal failure (resolved), afb now on eliquis, s/p diagnostic cath and s/p CRT-D implantation 4/28.  Admitted to [place] acute inpatient rehab on 4/30 for ADL, gait, and functional impairments.     #R MCA CVA: - due to R m1 occlusion, was out of window for TPA and not candidate for thrombectomy  - etiology likely cardioembolic 2/2 afib  - c/w statin  - Comprehensive Multidisciplinary Rehab Program     3 hours a day, 5 days a week with PT/OT/SLP  Impulsivity noted - chair and bed alarm     #Dilated cardiomyopathy w/ HFrEF  #Atrial fibrillation  - Echo EF 25% on 4/23   - s/p CRT-D implantation 4/28- Monitor site closely   - c/w lisinopril (transition to ARNI as outpt per cardio), metoprolol,  - c/w eliquis  - daily weights    #DM2, uncontrolled  - (Home meds glyburide 5mg BID, metformin 850mg BID, and vildagliptin 50mg daily)   - Will continue  metformin, and SSI  - HbA1c 7.6  - FS qAC qHS  - Hospitalist consult     Pain Management:- Tylenol PRN, Tramadol PRN    GI/Bowel:  - Senna QHS, Miralax daily PRN  - GI ppx: n/a    /Bladder: - tamsulosin, toileting prn     Skin/Pressure Injury:  - Monitor Incisions: left chest wall CRT-D incision clean dry and intact with mild swelling  OOB as tolerated     Diet:   - Diet Consistency/Modifications: DASH/CC  - Aspiration Precautions  - SLP consult for swallow function evaluation and treatment    DVT ppx:  - on eliquis  - No recent Dopplers    Precautions: Fall, Aspiration, Defibrillator     Labs - mild leucocytosis     Hospitalist consult for management of medical comorbidites     - 61 year old Lao speaking male with past medical history of DM2, HTN, dilated cardiomyopathy originally presented to [facility] w/ L hemiparesis found to have R M1 occlusion and then transferred to [facility] on 4/21, given atropine in route to the hospital for bradycardia. 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 Course complicated by NANCY/Acute renal failure (resolved), afb now on eliquis, s/p diagnostic cath and s/p CRT-D implantation 4/28.  Admitted to [place] acute inpatient rehab on 4/30 for ADL, gait, and functional impairments.     #R MCA CVA: - due to R m1 occlusion, was out of window for TPA and not candidate for thrombectomy  - etiology likely cardioembolic 2/2 afib  - c/w statin  - Comprehensive Multidisciplinary Rehab Program     3 hours a day, 5 days a week with PT/OT/SLP  Impulsivity noted - chair and bed alarm     #Dilated cardiomyopathy w/ HFrEF  #Atrial fibrillation  - Echo EF 25% on 4/23   - s/p CRT-D implantation 4/28- Monitor site closely   - c/w lisinopril (transition to ARNI as outpt per cardio), metoprolol,  - c/w eliquis  - daily weights    #DM2, uncontrolled  - (Home meds glyburide 5mg BID, metformin 850mg BID, and vildagliptin 50mg daily)   - Will continue  metformin, and SSI  - HbA1c 7.6  - FS qAC qHS  - Hospitalist consult     Pain Management:- Tylenol PRN, Tramadol PRN    GI/Bowel:  - Senna QHS, Miralax daily PRN  - GI ppx: n/a    /Bladder: - tamsulosin, toileting prn     Skin/Pressure Injury:  - Monitor Incisions: left chest wall CRT-D incision clean dry and intact with mild swelling  OOB as tolerated     Diet:   - Diet Consistency/Modifications: DASH/CC  - Aspiration Precautions  - SLP consult for swallow function evaluation and treatment    DVT ppx:  - on eliquis  - No recent Dopplers    Precautions: Fall, Aspiration, Defibrillator     #Dispo: IDR 05/03/2022  - OT: CG-sup eating/grooming/UBD/LBD/toileting  - PT: CG ' ambulation; 12 steps CG 1 rail  - SLP: regular with thins  - Goals: [place] functional txs/stairs; close sup shower transfer  - Barriers: impulsive, decreased safety awareness  - TDD: 5/10; caregiver training prior to discharge  - DME: commode, RW, and shower chair 61 year old Upper sor[place] speaking male with past medical history of DM2, HTN, dilated cardiomyopathy originally presented to [facility] w/ L hemiparesis found to have R M1 occlusion and then transferred to [facility] on 4/21, given atropine in route to the hospital for bradycardia. 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 Course complicated by NANCY/Acute renal failure (resolved), afb now on eliquis, s/p diagnostic cath and s/p CRT-D implantation 4/28.  Admitted to [place] acute inpatient rehab on 4/30 for ADL, gait, and functional impairments.     #R MCA CVA: - due to R m1 occlusion, was out of window for TPA and not candidate for thrombectomy  - etiology likely cardioembolic 2/2 afib  - c/w statin  - Comprehensive Multidisciplinary Rehab Program     3 hours a day, 5 days a week with PT/OT/SLP  Impulsivity noted - chair and bed alarm     #Dilated cardiomyopathy w/ HFrEF  #Atrial fibrillation  - Echo EF 25% on 4/23   - s/p CRT-D implantation 4/28- Monitor site closely   - c/w lisinopril (transition to ARNI as outpt per cardio), metoprolol,  - c/w eliquis  - daily weights    #DM2, uncontrolled  - (Home meds glyburide 5mg BID, metformin 850mg BID, and vildagliptin 50mg daily)   - Will continue  metformin, and SSI  - HbA1c 7.6  - FS qAC qHS  - Hospitalist consult     #Insomnia  - standing melatonin 3mg qhs    Pain Management:- Tylenol PRN, Tramadol PRN    GI/Bowel:  - Senna QHS, Miralax daily PRN  - GI ppx: n/a    /Bladder: - tamsulosin, toileting prn     Skin/Pressure Injury:  - Monitor Incisions: left chest wall CRT-D incision clean dry and intact with mild swelling  OOB as tolerated   - Pruritis to back: increase topical hydrocortisone to TID    Diet:   - Diet Consistency/Modifications: DASH/CC  - Aspiration Precautions    DVT ppx:  - on eliquis  - No recent Dopplers    Precautions: Fall, Aspiration, Defibrillator     #Dispo: IDR 05/03/2022  - OT: CG-sup eating/grooming/UBD/LBD/toileting  - PT: CG ' ambulation; 12 steps CG 1 rail  - SLP: regular with thins  - Goals: [place] functional txs/stairs; close sup shower transfer  - Barriers: impulsive, decreased safety awareness  - TDD: 5/10; caregiver training prior to discharge  - DME: commode, RW, and shower chair

## 2022-05-03 NOTE — PROGRESS NOTE ADULT - ATTENDING COMMENTS
Patient seen at bedside this morning   used  Patient reports poor sleep, but attributes it to "insulin that he is given as [place] and [place]".   Continues to have pruritis without any noticeable rash  Left  chest defibrillator site - with swelling and ecchymosis- no drainage noted. no local warmth     2. Team conference today   Progress reviewed  Goals of modified independent except shower   TDD 5/10/22

## 2022-05-04 LAB
GLUCOSE BLDC GLUCOMTR-MCNC: 209 MG/DL — HIGH (ref 70–99)
GLUCOSE BLDC GLUCOMTR-MCNC: 213 MG/DL — HIGH (ref 70–99)
GLUCOSE BLDC GLUCOMTR-MCNC: 227 MG/DL — HIGH (ref 70–99)
GLUCOSE BLDC GLUCOMTR-MCNC: 282 MG/DL — HIGH (ref 70–99)

## 2022-05-04 PROCEDURE — 99232 SBSQ HOSP IP/OBS MODERATE 35: CPT

## 2022-05-04 RX ORDER — INSULIN GLARGINE 100 [IU]/ML
10 INJECTION, SOLUTION SUBCUTANEOUS AT BEDTIME
Refills: 0 | Status: DISCONTINUED | OUTPATIENT
Start: 2022-05-04 | End: 2022-05-05

## 2022-05-04 RX ADMIN — Medication 4: at 17:11

## 2022-05-04 RX ADMIN — TAMSULOSIN HYDROCHLORIDE 0.4 MILLIGRAM(S): 0.4 CAPSULE ORAL at 21:43

## 2022-05-04 RX ADMIN — SENNA PLUS 2 TABLET(S): 8.6 TABLET ORAL at 21:43

## 2022-05-04 RX ADMIN — APIXABAN 5 MILLIGRAM(S): 2.5 TABLET, FILM COATED ORAL at 06:29

## 2022-05-04 RX ADMIN — INSULIN GLARGINE 10 UNIT(S): 100 INJECTION, SOLUTION SUBCUTANEOUS at 21:43

## 2022-05-04 RX ADMIN — METFORMIN HYDROCHLORIDE 850 MILLIGRAM(S): 850 TABLET ORAL at 06:29

## 2022-05-04 RX ADMIN — ATORVASTATIN CALCIUM 40 MILLIGRAM(S): 80 TABLET, FILM COATED ORAL at 21:43

## 2022-05-04 RX ADMIN — Medication 4: at 08:11

## 2022-05-04 RX ADMIN — Medication 650 MILLIGRAM(S): at 17:19

## 2022-05-04 RX ADMIN — Medication 6: at 12:31

## 2022-05-04 RX ADMIN — APIXABAN 5 MILLIGRAM(S): 2.5 TABLET, FILM COATED ORAL at 17:14

## 2022-05-04 RX ADMIN — METFORMIN HYDROCHLORIDE 850 MILLIGRAM(S): 850 TABLET ORAL at 17:14

## 2022-05-04 RX ADMIN — Medication 1 APPLICATION(S): at 17:14

## 2022-05-04 RX ADMIN — Medication 25 MILLIGRAM(S): at 06:29

## 2022-05-04 RX ADMIN — Medication 1 APPLICATION(S): at 06:31

## 2022-05-04 RX ADMIN — LISINOPRIL 5 MILLIGRAM(S): 2.5 TABLET ORAL at 06:28

## 2022-05-04 RX ADMIN — Medication 3 MILLIGRAM(S): at 21:43

## 2022-05-04 NOTE — PROGRESS NOTE ADULT - SUBJECTIVE AND OBJECTIVE BOX
Subjective:  Patient was seen and examined at the bedside this AM. No acute overnight events.   CNA assisted with translation   Itching minimally better   Left chest wall incision with pain - mainly with use of the arm . denies pain at rest     ROS:  Denies HA/palpitations  Denies abdominal pain or nausea  Denies dysuria       Vital Signs Last 24 Hrs  T(C): 36.9 (03 May 2022 20:39), Max: 36.9 (03 May 2022 20:39)  T(F): 98.5 (03 May 2022 20:39), Max: 98.5 (03 May 2022 20:39)  HR: 86 (04 May 2022 06:34) (72 - 86)  BP: 168/89 (04 May 2022 06:34) (148/70 - 168/89)  BP(mean): --  RR: 16 (03 May 2022 20:39) (16 - 16)  SpO2: 99% (03 May 2022 20:39) (99% - 99%)  Daily     Daily Weight in k.4 (04 May 2022 06:34)      Physical Exam:  Constitutional - NAD, Comfortable  Chest - Clear   Cardiovascular - S1S2  Abdomen - BS+, Soft, NTND  Extremities - No LE edema or calf tenderness   Neurologic Exam -stable                 Psychiatric - Mood stable, Affect WNL  Skin: left chest wall defibrillator site significant swelling and some ecchymosis    Functional Status:  - OT: CG-sup eating/grooming/UBD/LBD/toileting  - PT: CG ' ambulation; 12 steps CG 1 rail    MEDICATIONS  (STANDING):  apixaban 5 milliGRAM(s) Oral two times a day  atorvastatin 40 milliGRAM(s) Oral at bedtime  dextrose 5%. 1000 milliLiter(s) (50 mL/Hr) IV Continuous <Continuous>  dextrose 5%. 1000 milliLiter(s) (100 mL/Hr) IV Continuous <Continuous>  dextrose 50% Injectable 25 Gram(s) IV Push once  dextrose 50% Injectable 12.5 Gram(s) IV Push once  dextrose 50% Injectable 25 Gram(s) IV Push once  glucagon  Injectable 1 milliGRAM(s) IntraMuscular once  hydrocortisone 1% Cream 1 Application(s) Topical two times a day  insulin glargine Injectable (LANTUS) 10 Unit(s) SubCutaneous at bedtime  insulin lispro (ADMELOG) corrective regimen sliding scale   SubCutaneous three times a day before meals  insulin lispro (ADMELOG) corrective regimen sliding scale   SubCutaneous at bedtime  lisinopril 5 milliGRAM(s) Oral daily  loratadine 10 milliGRAM(s) Oral daily  melatonin 3 milliGRAM(s) Oral at bedtime  metFORMIN 850 milliGRAM(s) Oral two times a day  metoprolol succinate ER 25 milliGRAM(s) Oral daily  polyethylene glycol 3350 17 Gram(s) Oral two times a day  senna 2 Tablet(s) Oral at bedtime  tamsulosin 0.4 milliGRAM(s) Oral at bedtime    MEDICATIONS  (PRN):  acetaminophen     Tablet .. 650 milliGRAM(s) Oral every 6 hours PRN Temp greater or equal to 38C (100.4F), Mild Pain (1 - 3)  dextrose Oral Gel 15 Gram(s) Oral once PRN Blood Glucose LESS THAN 70 milliGRAM(s)/deciliter  traMADol 50 milliGRAM(s) Oral every 6 hours PRN Severe Pain (7 - 10)  traMADol 25 milliGRAM(s) Oral every 6 hours PRN Moderate Pain (4 - 6)        CAPILLARY BLOOD GLUCOSE      POCT Blood Glucose.: 213 mg/dL (04 May 2022 07:37)  POCT Blood Glucose.: 249 mg/dL (03 May 2022 20:56)  POCT Blood Glucose.: 240 mg/dL (03 May 2022 17:17)  POCT Blood Glucose.: 336 mg/dL (03 May 2022 11:59)        Recent Labs/Imaging:                        10.2   10.56 )-----------( 196      ( 02 May 2022 06:20 )             29.3         137  |  103  |  30<H>  ----------------------------<  270<H>  4.4   |  24  |  1.22    Ca    9.3      02 May 2022 06:20  Phos  3.0     05-  Mg     1.8         TPro  7.6  /  Alb  3.4  /  TBili  0.6  /  DBili  x   /  AST  48<H>  /  ALT  79<H>  /  AlkPhos  155<H>

## 2022-05-04 NOTE — PROGRESS NOTE ADULT - ASSESSMENT
62 y/o M with past medical history of DM2, HTN, dilated cardiomyopathy originally presented to [facility] w/ L hemiparesis found to have R M1 occlusion and then transferred to [facility] on 4/21, given atropine in route to the hospital for bradycardia. 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 Course complicated by NANCY/Acute renal failure (resolved), afib now on eliquis, s/p diagnostic cath and s/p CRT-D implantation 4/28.  Admitted to [place] acute inpatient rehab on 4/30 for ADL, gait, and functional impairments.     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  -Etiology likely cardioembolic 2/2 afib  -Continue statin  -PT/OT/ST per PMR  -Pain management, bowel regimen per rehab     #Dilated cardiomyopathy w/ HFrEF  #Non-obstructive CAD  #Atrial fibrillation  -Echo EF 25% on 4/23; CMRI 4/28 - LV dilated with LVEF: 32%  -s/p diagnostic cath nonobstructive CAD, CRT-D implantation 4/28  -Continue lisinopril 5mg qd, (transition to ARNI as outpt per cardio), Toprol 25mg qd, with holding parameters   -Continue eliquis for AC  -Daily weights, I/Os    #DM2, uncontrolled  - HbA1c 7.6 (4/22/22)  - Home meds glyburide 5mg BID, metformin 850mg BID, and vildagliptin 50mg daily  - BS above goal.   - Continue metformin 850mg BID, increasing lantus to 10un qhs, continue ISS    Elevated LFTs  -Encourage gentle PO hydration  -Monitor, if worsening, decrease statin, check RUQ sono    BPH  - Tamsulosin    DVT ppx - Eliquis

## 2022-05-04 NOTE — PROGRESS NOTE ADULT - ASSESSMENT
61 year old Divehi speaking male with past medical history of DM2, HTN, dilated cardiomyopathy originally presented to [facility] w/ L hemiparesis found to have R M1 occlusion and then transferred to [facility] on 4/21, given atropine in route to the hospital for bradycardia. 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 Course complicated by NANCY/Acute renal failure (resolved), afb now on eliquis, s/p diagnostic cath and s/p CRT-D implantation 4/28.  Admitted to [facility] acute inpatient rehab on 4/30       #R MCA CVA: - due to R m1 occlusion, was out of window for TPA and not candidate for thrombectomy  - etiology likely cardioembolic 2/2 afib  - c/w statin, eliquis   - Comprehensive Multidisciplinary Rehab Program:   3 hours a day, 5 days a week with PT/OT/SLP  Impulsivity noted at times  - chair and bed alarm     #Dilated cardiomyopathy w/ HFrEF, #Atrial fibrillation  - Echo EF 25% on 4/23   - s/p CRT-D implantation 4/28  - c/w lisinopril (transition to [facility] as outpt per cardio), metoprolol,  - c/w eliquis  - daily weights    #DM2, uncontrolled :  hyperglycemia :  in rehab on Lantus 10 UNITS, metformin 850mg BID, SSI , CC diet   - (Home meds glyburide 5mg BID, metformin 850mg BID, and vildagliptin 50mg daily)   - - HbA1c 7.6    Pain Management:- Tylenol PRN, Tramadol PRN    GI/Bowel:- Senna QHS, Miralax daily  - GI ppx: n/a    /Bladder: - tamsulosin, toileting prn     Skin/Pressure Injury:  - Monitor Incisions: left chest wall CRT-D incision clean dry and intact with mild swelling  OOB as tolerated   - Pruritis to back: increase topical hydrocortisone to TID, hypoallergenic sheets     Sleep : improved with melatonin     Diet:  DASH/CC    DVT ppx:  - on eliquis    Precautions: Fall,  Defibrillator     #Dispo:   - TDD: 5/10; caregiver training prior to discharge

## 2022-05-04 NOTE — PROGRESS NOTE ADULT - SUBJECTIVE AND OBJECTIVE BOX
Patient is a 61y old  Male who presents with a chief complaint of CVA (03 May 2022 11:25)    [place] telephone  Dona ID#[id number]  Patient seen and examined at bedside. No acute overnight events. Complains of pain at pacemaker site. Denies fever, chills, chest pain, shortness of breath.     ALLERGIES:  No Known Allergies    MEDICATIONS  (STANDING):  apixaban 5 milliGRAM(s) Oral two times a day  atorvastatin 40 milliGRAM(s) Oral at bedtime  dextrose 5%. 1000 milliLiter(s) (50 mL/Hr) IV Continuous <Continuous>  dextrose 5%. 1000 milliLiter(s) (100 mL/Hr) IV Continuous <Continuous>  dextrose 50% Injectable 25 Gram(s) IV Push once  dextrose 50% Injectable 12.5 Gram(s) IV Push once  dextrose 50% Injectable 25 Gram(s) IV Push once  glucagon  Injectable 1 milliGRAM(s) IntraMuscular once  hydrocortisone 1% Cream 1 Application(s) Topical two times a day  insulin glargine Injectable (LANTUS) 5 Unit(s) SubCutaneous at bedtime  insulin lispro (ADMELOG) corrective regimen sliding scale   SubCutaneous three times a day before meals  insulin lispro (ADMELOG) corrective regimen sliding scale   SubCutaneous at bedtime  lisinopril 5 milliGRAM(s) Oral daily  loratadine 10 milliGRAM(s) Oral daily  melatonin 3 milliGRAM(s) Oral at bedtime  metFORMIN 850 milliGRAM(s) Oral two times a day  metoprolol succinate ER 25 milliGRAM(s) Oral daily  polyethylene glycol 3350 17 Gram(s) Oral two times a day  senna 2 Tablet(s) Oral at bedtime  tamsulosin 0.4 milliGRAM(s) Oral at bedtime    MEDICATIONS  (PRN):  acetaminophen     Tablet .. 650 milliGRAM(s) Oral every 6 hours PRN Temp greater or equal to 38C (100.4F), Mild Pain (1 - 3)  dextrose Oral Gel 15 Gram(s) Oral once PRN Blood Glucose LESS THAN 70 milliGRAM(s)/deciliter  traMADol 50 milliGRAM(s) Oral every 6 hours PRN Severe Pain (7 - 10)  traMADol 25 milliGRAM(s) Oral every 6 hours PRN Moderate Pain (4 - 6)    Vital Signs Last 24 Hrs  T(F): 98.5 (03 May 2022 20:39), Max: 98.5 (03 May 2022 20:39)  HR: 86 (04 May 2022 06:34) (72 - 86)  BP: 168/89 (04 May 2022 06:34) (148/70 - 168/89)  RR: 16 (03 May 2022 20:39) (16 - 16)  SpO2: 99% (03 May 2022 20:39) (99% - 99%)  I&O's Summary    BMI (kg/m2): 18.8 (05-01-22 @ 11:39)    PHYSICAL EXAM:  General: NAD, A/O x 3  ENT: MMM, no tonsilar exudate  Neck: Supple, No JVD  Lungs: Clear to auscultation bilaterally, no wheezes. Good air entry bilaterally   Cardio: RRR, S1/S2, No murmurs. +PPM over left chest wall   Abdomen: Soft, Nontender, Nondistended; Bowel sounds present  Extremities: No calf tenderness, No pitting edema    LABS:                        10.2   10.56 )-----------( 196      ( 02 May 2022 06:20 )             29.3       05-02    137  |  103  |  30  ----------------------------<  270  4.4   |  24  |  1.22    Ca    9.3      02 May 2022 06:20  Phos  3.0     05-02  Mg     1.8     05-02    TPro  7.6  /  Alb  3.4  /  TBili  0.6  /  DBili  x   /  AST  48  /  ALT  79  /  AlkPhos  155  05-02 04-22 Chol 103 mg/dL LDL -- HDL 42 mg/dL Trig 84 mg/dL    POCT Blood Glucose.: 213 mg/dL (04 May 2022 07:37)  POCT Blood Glucose.: 249 mg/dL (03 May 2022 20:56)  POCT Blood Glucose.: 240 mg/dL (03 May 2022 17:17)  POCT Blood Glucose.: 336 mg/dL (03 May 2022 11:59)    COVID-19 PCR: NotDetec (04-29-22 @ 15:54)  COVID-19 PCR: NotDetec (04-25-22 @ 11:00)  COVID-19 PCR: NotDeteebenezer (04-21-22 @ 13:15)    RADIOLOGY & ADDITIONAL TESTS:     Care Discussed with Consultants/Other Providers:

## 2022-05-05 LAB
ALBUMIN SERPL ELPH-MCNC: 3.2 G/DL — LOW (ref 3.3–5)
ALP SERPL-CCNC: 200 U/L — HIGH (ref 40–120)
ALT FLD-CCNC: 87 U/L — HIGH (ref 10–45)
ANION GAP SERPL CALC-SCNC: 10 MMOL/L — SIGNIFICANT CHANGE UP (ref 5–17)
AST SERPL-CCNC: 57 U/L — HIGH (ref 10–40)
BILIRUB SERPL-MCNC: 0.7 MG/DL — SIGNIFICANT CHANGE UP (ref 0.2–1.2)
BUN SERPL-MCNC: 36 MG/DL — HIGH (ref 7–23)
CALCIUM SERPL-MCNC: 9.7 MG/DL — SIGNIFICANT CHANGE UP (ref 8.4–10.5)
CHLORIDE SERPL-SCNC: 101 MMOL/L — SIGNIFICANT CHANGE UP (ref 96–108)
CO2 SERPL-SCNC: 25 MMOL/L — SIGNIFICANT CHANGE UP (ref 22–31)
CREAT SERPL-MCNC: 1.19 MG/DL — SIGNIFICANT CHANGE UP (ref 0.5–1.3)
EGFR: 69 ML/MIN/1.73M2 — SIGNIFICANT CHANGE UP
GLUCOSE BLDC GLUCOMTR-MCNC: 185 MG/DL — HIGH (ref 70–99)
GLUCOSE BLDC GLUCOMTR-MCNC: 207 MG/DL — HIGH (ref 70–99)
GLUCOSE BLDC GLUCOMTR-MCNC: 226 MG/DL — HIGH (ref 70–99)
GLUCOSE BLDC GLUCOMTR-MCNC: 298 MG/DL — HIGH (ref 70–99)
GLUCOSE SERPL-MCNC: 188 MG/DL — HIGH (ref 70–99)
HCT VFR BLD CALC: 27.1 % — LOW (ref 39–50)
HGB BLD-MCNC: 9.5 G/DL — LOW (ref 13–17)
MAGNESIUM SERPL-MCNC: 1.7 MG/DL — SIGNIFICANT CHANGE UP (ref 1.6–2.6)
MCHC RBC-ENTMCNC: 30.7 PG — SIGNIFICANT CHANGE UP (ref 27–34)
MCHC RBC-ENTMCNC: 35.1 GM/DL — SIGNIFICANT CHANGE UP (ref 32–36)
MCV RBC AUTO: 87.7 FL — SIGNIFICANT CHANGE UP (ref 80–100)
NRBC # BLD: 0 /100 WBCS — SIGNIFICANT CHANGE UP (ref 0–0)
PLATELET # BLD AUTO: 187 K/UL — SIGNIFICANT CHANGE UP (ref 150–400)
POTASSIUM SERPL-MCNC: 4.8 MMOL/L — SIGNIFICANT CHANGE UP (ref 3.5–5.3)
POTASSIUM SERPL-SCNC: 4.8 MMOL/L — SIGNIFICANT CHANGE UP (ref 3.5–5.3)
PROT SERPL-MCNC: 7.2 G/DL — SIGNIFICANT CHANGE UP (ref 6–8.3)
RBC # BLD: 3.09 M/UL — LOW (ref 4.2–5.8)
RBC # FLD: 13.9 % — SIGNIFICANT CHANGE UP (ref 10.3–14.5)
SODIUM SERPL-SCNC: 136 MMOL/L — SIGNIFICANT CHANGE UP (ref 135–145)
WBC # BLD: 9.57 K/UL — SIGNIFICANT CHANGE UP (ref 3.8–10.5)
WBC # FLD AUTO: 9.57 K/UL — SIGNIFICANT CHANGE UP (ref 3.8–10.5)

## 2022-05-05 PROCEDURE — 99233 SBSQ HOSP IP/OBS HIGH 50: CPT

## 2022-05-05 PROCEDURE — 99232 SBSQ HOSP IP/OBS MODERATE 35: CPT | Mod: GC

## 2022-05-05 RX ORDER — INSULIN LISPRO 100/ML
4 VIAL (ML) SUBCUTANEOUS
Refills: 0 | Status: DISCONTINUED | OUTPATIENT
Start: 2022-05-05 | End: 2022-05-10

## 2022-05-05 RX ORDER — METFORMIN HYDROCHLORIDE 850 MG/1
1000 TABLET ORAL
Refills: 0 | Status: DISCONTINUED | OUTPATIENT
Start: 2022-05-05 | End: 2022-05-10

## 2022-05-05 RX ORDER — INSULIN GLARGINE 100 [IU]/ML
12 INJECTION, SOLUTION SUBCUTANEOUS AT BEDTIME
Refills: 0 | Status: DISCONTINUED | OUTPATIENT
Start: 2022-05-05 | End: 2022-05-10

## 2022-05-05 RX ORDER — LANOLIN ALCOHOL/MO/W.PET/CERES
6 CREAM (GRAM) TOPICAL AT BEDTIME
Refills: 0 | Status: DISCONTINUED | OUTPATIENT
Start: 2022-05-05 | End: 2022-05-10

## 2022-05-05 RX ORDER — LANOLIN ALCOHOL/MO/W.PET/CERES
3 CREAM (GRAM) TOPICAL ONCE
Refills: 0 | Status: COMPLETED | OUTPATIENT
Start: 2022-05-05 | End: 2022-05-05

## 2022-05-05 RX ADMIN — METFORMIN HYDROCHLORIDE 1000 MILLIGRAM(S): 850 TABLET ORAL at 17:01

## 2022-05-05 RX ADMIN — Medication 650 MILLIGRAM(S): at 09:30

## 2022-05-05 RX ADMIN — SENNA PLUS 2 TABLET(S): 8.6 TABLET ORAL at 21:10

## 2022-05-05 RX ADMIN — POLYETHYLENE GLYCOL 3350 17 GRAM(S): 17 POWDER, FOR SOLUTION ORAL at 05:30

## 2022-05-05 RX ADMIN — INSULIN GLARGINE 12 UNIT(S): 100 INJECTION, SOLUTION SUBCUTANEOUS at 21:11

## 2022-05-05 RX ADMIN — TRAMADOL HYDROCHLORIDE 25 MILLIGRAM(S): 50 TABLET ORAL at 22:10

## 2022-05-05 RX ADMIN — METFORMIN HYDROCHLORIDE 850 MILLIGRAM(S): 850 TABLET ORAL at 05:29

## 2022-05-05 RX ADMIN — Medication 6 MILLIGRAM(S): at 21:10

## 2022-05-05 RX ADMIN — ATORVASTATIN CALCIUM 40 MILLIGRAM(S): 80 TABLET, FILM COATED ORAL at 21:10

## 2022-05-05 RX ADMIN — Medication 25 MILLIGRAM(S): at 05:29

## 2022-05-05 RX ADMIN — Medication 4 UNIT(S): at 17:01

## 2022-05-05 RX ADMIN — Medication 4: at 07:35

## 2022-05-05 RX ADMIN — LISINOPRIL 5 MILLIGRAM(S): 2.5 TABLET ORAL at 05:29

## 2022-05-05 RX ADMIN — Medication 650 MILLIGRAM(S): at 02:27

## 2022-05-05 RX ADMIN — Medication 4 UNIT(S): at 11:04

## 2022-05-05 RX ADMIN — Medication 3 MILLIGRAM(S): at 00:21

## 2022-05-05 RX ADMIN — TAMSULOSIN HYDROCHLORIDE 0.4 MILLIGRAM(S): 0.4 CAPSULE ORAL at 21:11

## 2022-05-05 RX ADMIN — Medication 650 MILLIGRAM(S): at 17:04

## 2022-05-05 RX ADMIN — Medication 6: at 11:04

## 2022-05-05 RX ADMIN — Medication 4: at 17:01

## 2022-05-05 RX ADMIN — Medication 650 MILLIGRAM(S): at 03:30

## 2022-05-05 RX ADMIN — APIXABAN 5 MILLIGRAM(S): 2.5 TABLET, FILM COATED ORAL at 05:29

## 2022-05-05 RX ADMIN — Medication 1 APPLICATION(S): at 05:30

## 2022-05-05 RX ADMIN — APIXABAN 5 MILLIGRAM(S): 2.5 TABLET, FILM COATED ORAL at 17:01

## 2022-05-05 RX ADMIN — TRAMADOL HYDROCHLORIDE 25 MILLIGRAM(S): 50 TABLET ORAL at 21:12

## 2022-05-05 NOTE — PROGRESS NOTE ADULT - SUBJECTIVE AND OBJECTIVE BOX
Subjective:  Patient was seen and examined during OT session this AM. No acute overnight events.   [employer], [employer] ID [id number] provided Occitan translations. Endorses left shoulder/chest wall pain at site of ICD incision; pain limiting therapy progress. Otherwise, does not report other issues. BM this AM    ROS:  Did not report fever, chills, nausea, vomiting, CP, palpitations, coughing, wheezing, SOB, or abdominal pain. Last BM was on       Physical Exam:  Vital Signs Last 24 Hrs  T(C): 36.7 (05 May 2022 08:16), Max: 36.7 (04 May 2022 12:11)  T(F): 98.1 (05 May 2022 08:16), Max: 98.1 (04 May 2022 12:11)  HR: 71 (05 May 2022 08:16) (71 - 80)  BP: 151/81 (05 May 2022 08:16) (151/80 - 166/81)  BP(mean): --  RR: 16 (05 May 2022 08:16) (15 - 17)  SpO2: 100% (05 May 2022 08:16) (98% - 100%)      Physical Exam:  Constitutional - NAD, Comfortable  Chest - Breathing comfortably on RA  Cardiovascular - warm and well perfused; no LE edema  Abdomen - BS+, Soft, NTND  Extremities - No calf tenderness. Pain with passive ROM of left shoulder  Neurologic Exam -stable                 Psychiatric - Mood stable, Affect WNL  Skin: left chest wall defibrillator site significant swelling and some ecchymosis; nontender over ICD site, but tender over incision.     Functional Status:  - OT: CG-sup eating/grooming/UBD/LBD/toileting  - PT: CG ' ambulation; 12 steps CG 1 rail      Recent Labs/Imagin.5    9.57  )-----------( 187      ( 05 May 2022 06:00 )             27.1         136  |  101  |  36<H>  ----------------------------<  188<H>  4.8   |  25  |  1.19    Ca    9.7      05 May 2022 06:00  Mg     1.7       TPro  7.2  /  Alb  3.2<L>  /  TBili  0.7  /  DBili  x   /  AST  57<H>  /  ALT  87<H>  /  AlkPhos  200<H>      POCT Blood Glucose.: 298 mg/dL (22 @ 11:03)  POCT Blood Glucose.: 207 mg/dL (22 @ 07:33)  POCT Blood Glucose.: 209 mg/dL (22 @ 21:20)  POCT Blood Glucose.: 227 mg/dL (22 @ 16:39)  POCT Blood Glucose.: 282 mg/dL (22 @ 12:08)      Medications:  acetaminophen     Tablet .. 650 milliGRAM(s) Oral every 6 hours PRN  apixaban 5 milliGRAM(s) Oral two times a day  atorvastatin 40 milliGRAM(s) Oral at bedtime  dextrose 5%. 1000 milliLiter(s) IV Continuous <Continuous>  dextrose 5%. 1000 milliLiter(s) IV Continuous <Continuous>  dextrose 50% Injectable 25 Gram(s) IV Push once  dextrose 50% Injectable 12.5 Gram(s) IV Push once  dextrose 50% Injectable 25 Gram(s) IV Push once  dextrose Oral Gel 15 Gram(s) Oral once PRN  glucagon  Injectable 1 milliGRAM(s) IntraMuscular once  hydrocortisone 1% Cream 1 Application(s) Topical two times a day  insulin glargine Injectable (LANTUS) 12 Unit(s) SubCutaneous at bedtime  insulin lispro (ADMELOG) corrective regimen sliding scale   SubCutaneous three times a day before meals  insulin lispro (ADMELOG) corrective regimen sliding scale   SubCutaneous at bedtime  insulin lispro Injectable (ADMELOG) 4 Unit(s) SubCutaneous three times a day before meals  lisinopril 5 milliGRAM(s) Oral daily  melatonin 6 milliGRAM(s) Oral at bedtime  metFORMIN 1000 milliGRAM(s) Oral two times a day  metoprolol succinate ER 25 milliGRAM(s) Oral daily  polyethylene glycol 3350 17 Gram(s) Oral two times a day  senna 2 Tablet(s) Oral at bedtime  tamsulosin 0.4 milliGRAM(s) Oral at bedtime  traMADol 50 milliGRAM(s) Oral every 6 hours PRN  traMADol 25 milliGRAM(s) Oral every 6 hours PRN

## 2022-05-05 NOTE — PROGRESS NOTE ADULT - ATTENDING COMMENTS
Patient seen at bedside  Feels well except for left sided incisional pain   Poor sleep overnight- To increase melatonin dose    2. US to r/o collection at Defibrillator site Chart reviewed. Case discussed with resident Dr. Jarek Muniz  Feels well except for left sided incisional pain   Poor sleep overnight- To increase melatonin dose    2. Elevated LFTs - US abdomen ordered  US left upper chest as swelling around incision not improving

## 2022-05-05 NOTE — CHART NOTE - NSCHARTNOTEFT_GEN_A_CORE
REHABILITATION DIAGNOSIS/IMPAIRMENT GROUP CODE:  Right MCA stroke    COMORBIDITIES/COMPLICATING CONDITIONS IMPACTING REHABILITATION:  HEALTH ISSUES - PROBLEM Dx:  Dilated cardiomyopathy  CRT- defibrillator       PAST MEDICAL & SURGICAL HISTORY:  HTN (hypertension)    HLD (hyperlipidemia)    No significant past surgical history        Based upon consideration of the patient's impairments, functional status, complicating conditions and any other contributing factors and after information garnered from the assessments of all therapy disciplines involved in treating the patient and other pertinent clinicians:    INTERDISCIPLINARY REHABILITATION INTERVENTIONS:    [ x  ] Transfer Training  [ x  ] Bed Mobility  [ x  ] Therapeutic Exercise  [ x  ] Balance/Coordination Exercises  [ x  ] Locomotion retraining  [ x  ] Stairs  [ x  ] Functional Transfer Training  [ x  ] Bowel/Bladder program  [ x  ] Pain Management  [ x  ] Skin/Wound Care  [ x  ] Visual/Perceptual Training  [   ] Therapeutic Recreation Activities  [ x  ] Neuromuscular Re-education  [ x  ] Activities of Daily Living  [ x  ] Speech Exercise  [   ] Swallowing Exercises  [   ] Vital Stim  [   ] Dietary Supplements  [   ] Calorie Count  [ x  ] Cognitive Exercises  [x   ] Cognitive/Linguistic Treatment  [   ] Behavior Program  [   ] Neuropsych Therapy  [   ] Patient/Family Counseling  [ x  ] Family Training  [   ] Community Re-entry  [   ] Orthotic Evaluation  [   ] Prosthetic Eval/Training    MEDICAL PROGNOSIS:  fair     REHAB POTENTIAL:  fair   EXPECTED DAILY THERAPY:         PT:90 minutes/day       OT:90minutes/day        ST:eval - completed        P&O:na     EXPECTED INTENSITY OF PROGRAM:  3 hrs/day    EXPECTED FREQUENCY OF PROGRAM:  5 days/week     ESTIMATED LOS:  10-12 days     ESTIMATED DISPOSITION:  home     INTERDISCIPLINARY FUNCTIONAL OUTCOMES/ GOALS:         Gait/Mobility:Independent       Transfers:Independent       ADLs:Independent       Functional Transfers:Independent       Medication Management:Independent       Communication:na       Cognitive:Independent       Dysphagia:na       Bladder:Independent       Bowel:Independent
Nutrition Follow Up Note  Hospital Course   (Per Electronic Medical Record)    Source:  Patient [X]   Medical Record [X]      Diet:   Consistent Carbohydrate, Low Sodium Diet w/ Thin Liquids (IDDSI Level 0)  Tolerates Diet Consistency Well  No Chewing/Swallowing Difficulties  No Recent Nausea, Vomiting, Diarrhea or Constipation (as Per Patient)  Consumes % of Meals (as Per Documentation) - States Good PO Intake/Appetite   on Glucerna 8oz PO BID (Provides 440kcal-20grams of Protein)   Patient Takes Nutrition Supplement Well  Obtained Food Preferences from Patient    Enteral/Parenteral Nutrition: Not Applicable    Current Weight: 122lb on 5/4  Obtain Weights Daily  Weights Currently Stable @This Time     Pertinent Medications: MEDICATIONS  (STANDING):  apixaban 5 milliGRAM(s) Oral two times a day  atorvastatin 40 milliGRAM(s) Oral at bedtime  dextrose 5%. 1000 milliLiter(s) (50 mL/Hr) IV Continuous <Continuous>  dextrose 5%. 1000 milliLiter(s) (100 mL/Hr) IV Continuous <Continuous>  dextrose 50% Injectable 25 Gram(s) IV Push once  dextrose 50% Injectable 12.5 Gram(s) IV Push once  dextrose 50% Injectable 25 Gram(s) IV Push once  glucagon  Injectable 1 milliGRAM(s) IntraMuscular once  hydrocortisone 1% Cream 1 Application(s) Topical two times a day  insulin glargine Injectable (LANTUS) 10 Unit(s) SubCutaneous at bedtime  insulin lispro (ADMELOG) corrective regimen sliding scale   SubCutaneous three times a day before meals  insulin lispro (ADMELOG) corrective regimen sliding scale   SubCutaneous at bedtime  lisinopril 5 milliGRAM(s) Oral daily  melatonin 3 milliGRAM(s) Oral at bedtime  metFORMIN 850 milliGRAM(s) Oral two times a day  metoprolol succinate ER 25 milliGRAM(s) Oral daily  polyethylene glycol 3350 17 Gram(s) Oral two times a day  senna 2 Tablet(s) Oral at bedtime  tamsulosin 0.4 milliGRAM(s) Oral at bedtime    MEDICATIONS  (PRN):  acetaminophen     Tablet .. 650 milliGRAM(s) Oral every 6 hours PRN Temp greater or equal to 38C (100.4F), Mild Pain (1 - 3)  dextrose Oral Gel 15 Gram(s) Oral once PRN Blood Glucose LESS THAN 70 milliGRAM(s)/deciliter  traMADol 50 milliGRAM(s) Oral every 6 hours PRN Severe Pain (7 - 10)  traMADol 25 milliGRAM(s) Oral every 6 hours PRN Moderate Pain (4 - 6)    Pertinent Labs:  05-05 Na136 mmol/L Glu 188 mg/dL<H> K+ 4.8 mmol/L Cr  1.19 mg/dL BUN 36 mg/dL<H> 05-02 Phos 3.0 mg/dL 05-05 Alb 3.2 g/dL<L> 04-22 Chol 103 mg/dL LDL --    HDL 42 mg/dL Trig 84 mg/dL    POCT (over Last 3 Days) - Ranging from 178-336    Skin: No Pressure Ulcers  Surgical Incision on Chest  (as Per Nursing Flow Sheet)     Edema: +2 Edema Noted to Chest  (as Per Documentation)     Last Bowel Movement: on 5/3    Estimated Needs:   [X] No Change Since Previous Assessment    Previous Nutrition Diagnosis:   Severe Malnutrition     Nutrition Diagnosis is [X] Ongoing - Continues on Nutrition Supplement & Patient Takes Nutrition Supplement Well    New Nutrition Diagnosis: [X] Not Applicable    Interventions:   1. Recommend Continue Nutrition Plan of Care     Monitoring & Evaluation:   [X] Weights   [X] PO Intake   [X] Skin Integrity   [X] Follow Up (Per Protocol)  [X] Tolerance to Diet Prescription   [X] Other: Labs & POCT    Registered Dietitian/Nutritionist Remains Available.  Farhat Batista RDN    Pager #[phone]  Phone# [phone]

## 2022-05-05 NOTE — PROGRESS NOTE ADULT - ASSESSMENT
61 year old Faroese speaking male with past medical history of DM2, HTN, dilated cardiomyopathy originally presented to [facility] w/ L hemiparesis found to have R M1 occlusion and then transferred to [facility] on 4/21, given atropine in route to the hospital for bradycardia. 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 Course complicated by NANCY/Acute renal failure (resolved), afb now on eliquis, s/p diagnostic cath and s/p CRT-D implantation 4/28.  Admitted to [facility] acute inpatient rehab on 4/30       #R MCA CVA: - due to R m1 occlusion, was out of window for TPA and not candidate for thrombectomy  - etiology likely cardioembolic 2/2 afib  - c/w statin, eliquis   - Comprehensive Multidisciplinary Rehab Program:   3 hours a day, 5 days a week with PT/OT/SLP  Impulsivity noted at times  - chair and bed alarm     #Dilated cardiomyopathy w/ HFrEF, #Atrial fibrillation  - Echo EF 25% on 4/23   - s/p CRT-D implantation 4/28  - c/w lisinopril (transition to [facility] as outpt per cardio), metoprolol,  - c/w eliquis  - daily weights  - US left chest wall  - cold compress and tylenol, as needed for pain    #DM2, uncontrolled :  hyperglycemia :  in rehab on Lantus 10 UNITS, metformin 850mg BID, SSI , CC diet   - (Home meds glyburide 5mg BID, metformin 850mg BID, and vildagliptin 50mg daily)   - - HbA1c 7.6    Pain Management:- Tylenol PRN, Tramadol PRN    GI/Bowel:- Senna QHS, Miralax daily  - GI ppx: n/a    /Bladder: - tamsulosin, toileting prn     Skin/Pressure Injury:  - Monitor Incisions: left chest wall CRT-D incision clean dry and intact with mild swelling  OOB as tolerated   - Pruritis to back: increase topical hydrocortisone to TID, hypoallergenic sheets     Sleep : improved with melatonin     Diet:  DASH/CC    DVT ppx:  - on eliquis    Precautions: Fall,  Defibrillator     #Dispo:   - TDD: 5/10; caregiver training prior to discharge

## 2022-05-05 NOTE — PROGRESS NOTE ADULT - SUBJECTIVE AND OBJECTIVE BOX
Patient is a 61y old  Male who presents with a chief complaint of CVA (05 May 2022 11:21)      Patient seen and examined at bedside. No overnight events.    REVIEW OF SYSTEMS:  CONSTITUTIONAL: No fever or chills  HEENT:  No headache, no sore throat  RESPIRATORY: No cough, wheezing, or shortness of breath  CARDIOVASCULAR: No chest pain, palpitations  GASTROINTESTINAL: No abd pain, nausea, vomiting, or diarrhea  GENITOURINARY: No dysuria, frequency, or hematuria  NEUROLOGICAL: no focal weakness or dizziness  MUSCULOSKELETAL: no myalgias     ALLERGIES:  No Known Allergies    MEDICATIONS  (STANDING):  apixaban 5 milliGRAM(s) Oral two times a day  atorvastatin 40 milliGRAM(s) Oral at bedtime  dextrose 5%. 1000 milliLiter(s) (50 mL/Hr) IV Continuous <Continuous>  dextrose 5%. 1000 milliLiter(s) (100 mL/Hr) IV Continuous <Continuous>  dextrose 50% Injectable 25 Gram(s) IV Push once  dextrose 50% Injectable 12.5 Gram(s) IV Push once  dextrose 50% Injectable 25 Gram(s) IV Push once  glucagon  Injectable 1 milliGRAM(s) IntraMuscular once  hydrocortisone 1% Cream 1 Application(s) Topical two times a day  insulin glargine Injectable (LANTUS) 12 Unit(s) SubCutaneous at bedtime  insulin lispro (ADMELOG) corrective regimen sliding scale   SubCutaneous three times a day before meals  insulin lispro (ADMELOG) corrective regimen sliding scale   SubCutaneous at bedtime  insulin lispro Injectable (ADMELOG) 4 Unit(s) SubCutaneous three times a day before meals  lisinopril 5 milliGRAM(s) Oral daily  melatonin 6 milliGRAM(s) Oral at bedtime  metFORMIN 1000 milliGRAM(s) Oral two times a day  metoprolol succinate ER 25 milliGRAM(s) Oral daily  polyethylene glycol 3350 17 Gram(s) Oral two times a day  senna 2 Tablet(s) Oral at bedtime  tamsulosin 0.4 milliGRAM(s) Oral at bedtime    MEDICATIONS  (PRN):  acetaminophen     Tablet .. 650 milliGRAM(s) Oral every 6 hours PRN Temp greater or equal to 38C (100.4F), Mild Pain (1 - 3)  dextrose Oral Gel 15 Gram(s) Oral once PRN Blood Glucose LESS THAN 70 milliGRAM(s)/deciliter  traMADol 50 milliGRAM(s) Oral every 6 hours PRN Severe Pain (7 - 10)  traMADol 25 milliGRAM(s) Oral every 6 hours PRN Moderate Pain (4 - 6)    Vital Signs Last 24 Hrs  T(F): 98.1 (05 May 2022 08:16), Max: 98.1 (05 May 2022 08:16)  HR: 71 (05 May 2022 08:16) (71 - 80)  BP: 151/81 (05 May 2022 08:16) (151/80 - 153/82)  RR: 16 (05 May 2022 08:16) (16 - 17)  SpO2: 100% (05 May 2022 08:16) (98% - 100%)  I&O's Summary    BMI (kg/m2): 18.8 (05-01-22 @ 11:39)    PHYSICAL EXAM:  GENERAL: NAD, well-developed, well-groomed  HEENT:  AT/NC, anicteric, moist mucous membranes, EOMI, PERRL, no lid-lag, conjunctiva and sclera clear  CHEST/LUNG:  CTA b/l, no rales, wheezes, or rhonchi,  normal respiratory effort, no intercostal retractions  HEART:  RRR, S1, S2, no murmurs; no pitting edema  ABDOMEN:  BS+, soft, nontender, nondistended; No HSM  MSK/EXTREMITIES: 2+ peripheral pulses, no clubbing or cyanosis  NERVOUS SYSTEM: answers questions and follows commands appropriately  PSYCH: Appropriate affect, Alert & Awake; Good judgement    LABS: Personally reviewed                        9.5    9.57  )-----------( 187      ( 05 May 2022 06:00 )             27.1       05-05    136  |  101  |  36  ----------------------------<  188  4.8   |  25  |  1.19    Ca    9.7      05 May 2022 06:00  Mg     1.7     05-05    TPro  7.2  /  Alb  3.2  /  TBili  0.7  /  DBili  x   /  AST  57  /  ALT  87  /  AlkPhos  200  05-05 04-22 Chol 103 mg/dL LDL -- HDL 42 mg/dL Trig 84 mg/dL              POCT Blood Glucose.: 298 mg/dL (05 May 2022 11:03)  POCT Blood Glucose.: 207 mg/dL (05 May 2022 07:33)  POCT Blood Glucose.: 209 mg/dL (04 May 2022 21:20)  POCT Blood Glucose.: 227 mg/dL (04 May 2022 16:39)          COVID-19 PCR: NotDetec (04-29-22 @ 15:54)  COVID-19 PCR: NotDetec (04-25-22 @ 11:00)  COVID-19 PCR: NotDetec (04-21-22 @ 13:15)      RADIOLOGY & ADDITIONAL TESTS: Personally reviewed    Medical management discussed with Dr. Jacob (physiatry), insulin adjusted as blood glucose uncontrolled could contribute to poor wound healing Patient is a 61y old  Male who presents with a chief complaint of CVA (05 May 2022 11:21)      Patient seen and examined at bedside. No overnight events.    REVIEW OF SYSTEMS:  CONSTITUTIONAL: No fever or chills  HEENT:  No headache, no sore throat  RESPIRATORY: No cough, wheezing, or shortness of breath  CARDIOVASCULAR: No chest pain, palpitations    ALLERGIES:  No Known Allergies    MEDICATIONS  (STANDING):  apixaban 5 milliGRAM(s) Oral two times a day  atorvastatin 40 milliGRAM(s) Oral at bedtime  dextrose 5%. 1000 milliLiter(s) (50 mL/Hr) IV Continuous <Continuous>  dextrose 5%. 1000 milliLiter(s) (100 mL/Hr) IV Continuous <Continuous>  dextrose 50% Injectable 25 Gram(s) IV Push once  dextrose 50% Injectable 12.5 Gram(s) IV Push once  dextrose 50% Injectable 25 Gram(s) IV Push once  glucagon  Injectable 1 milliGRAM(s) IntraMuscular once  hydrocortisone 1% Cream 1 Application(s) Topical two times a day  insulin glargine Injectable (LANTUS) 12 Unit(s) SubCutaneous at bedtime  insulin lispro (ADMELOG) corrective regimen sliding scale   SubCutaneous three times a day before meals  insulin lispro (ADMELOG) corrective regimen sliding scale   SubCutaneous at bedtime  insulin lispro Injectable (ADMELOG) 4 Unit(s) SubCutaneous three times a day before meals  lisinopril 5 milliGRAM(s) Oral daily  melatonin 6 milliGRAM(s) Oral at bedtime  metFORMIN 1000 milliGRAM(s) Oral two times a day  metoprolol succinate ER 25 milliGRAM(s) Oral daily  polyethylene glycol 3350 17 Gram(s) Oral two times a day  senna 2 Tablet(s) Oral at bedtime  tamsulosin 0.4 milliGRAM(s) Oral at bedtime    MEDICATIONS  (PRN):  acetaminophen     Tablet .. 650 milliGRAM(s) Oral every 6 hours PRN Temp greater or equal to 38C (100.4F), Mild Pain (1 - 3)  dextrose Oral Gel 15 Gram(s) Oral once PRN Blood Glucose LESS THAN 70 milliGRAM(s)/deciliter  traMADol 50 milliGRAM(s) Oral every 6 hours PRN Severe Pain (7 - 10)  traMADol 25 milliGRAM(s) Oral every 6 hours PRN Moderate Pain (4 - 6)    Vital Signs Last 24 Hrs  T(F): 98.1 (05 May 2022 08:16), Max: 98.1 (05 May 2022 08:16)  HR: 71 (05 May 2022 08:16) (71 - 80)  BP: 151/81 (05 May 2022 08:16) (151/80 - 153/82)  RR: 16 (05 May 2022 08:16) (16 - 17)  SpO2: 100% (05 May 2022 08:16) (98% - 100%)  I&O's Summary    BMI (kg/m2): 18.8 (05-01-22 @ 11:39)    PHYSICAL EXAM:  GENERAL: NAD  HEENT:  AT/NC, anicteric, moist mucous membranes, EOMI, PERRL, no lid-lag, conjunctiva and sclera clear  CHEST/LUNG:  CTA b/l, no rales, wheezes, or rhonchi,  normal respiratory effort, no intercostal retractions  HEART:  RRR, S1, S2, no murmurs; no pitting edema  ABDOMEN:  BS+, soft, nontender, nondistended; No HSM  MSK/EXTREMITIES: 2+ peripheral pulses, no clubbing or cyanosis  NERVOUS SYSTEM: answers questions and follows commands appropriately  PSYCH: Appropriate affect, Alert & Awake; Good judgement    LABS: Personally reviewed                        9.5    9.57  )-----------( 187      ( 05 May 2022 06:00 )             27.1       05-05    136  |  101  |  36  ----------------------------<  188  4.8   |  25  |  1.19    Ca    9.7      05 May 2022 06:00  Mg     1.7     05-05    TPro  7.2  /  Alb  3.2  /  TBili  0.7  /  DBili  x   /  AST  57  /  ALT  87  /  AlkPhos  200  05-05 04-22 Chol 103 mg/dL LDL -- HDL 42 mg/dL Trig 84 mg/dL              POCT Blood Glucose.: 298 mg/dL (05 May 2022 11:03)  POCT Blood Glucose.: 207 mg/dL (05 May 2022 07:33)  POCT Blood Glucose.: 209 mg/dL (04 May 2022 21:20)  POCT Blood Glucose.: 227 mg/dL (04 May 2022 16:39)          COVID-19 PCR: NotDetec (04-29-22 @ 15:54)  COVID-19 PCR: NotDetec (04-25-22 @ 11:00)  COVID-19 PCR: NotDetec (04-21-22 @ 13:15)      RADIOLOGY & ADDITIONAL TESTS: Personally reviewed    Medical management discussed with Dr. Jacob (physiatry), insulin adjusted as blood glucose uncontrolled could contribute to poor wound healing

## 2022-05-05 NOTE — PROGRESS NOTE ADULT - ASSESSMENT
61M with PMH DM2, HTN, dilated cardiomyopathy presented to [facility] w/ L hemiparesis found to have R M1 occlusion and then transferred to [facility] on 4/21, given atropine in route to the hospital for bradycardia. 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 Course complicated by NANCY, afib now on eliquis, s/p diagnostic cath and s/p CRT-D implantation 4/28.  Now admitted to [facility] for initiation of multidisciplinary rehab program.     #R MCA CVA  likely cardioembolic 2/2 afib. Out of window for TPA  -Continue comprehensive rehab program  -Continue Eliquis for AC  -Continue Lipitor 80mg daily    #paroxysmal a fib  -Continue Eliquis for AC  -Continue rate control with Toprol XL    #Dilated cardiomyopathy w/ HFrEF  -Echo EF 25% on 4/23; CMRI 4/28 - LV dilated with LVEF: 32%  -Continue Lisinopril, Toprol  -Daily weights, strict I&Os. Appears euvolemic     #Non-obstructive CAD  -s/p diagnostic cath nonobstructive CAD, CRT-D implantation 4/28  -Continue Lisinopril, Toprol    #DM2  HbA1c 7.6 (4/22/22)  -Increase Metformin 1000mg BID  -Increase Lantus to 12 units qHS  -Start Admelog 4 units pre-meal  -Continue moderate dose insulin sliding scale  -Hypoglycemia protocol, accu-checks  -Blood glucose goal 100-180 in hospital setting - uncontrolled     #Transaminitis  -PO hydration encouraged   -Monitor for now, if persists or worsens plan check abd sono    #BPH  -Chronic, continue Flomax    #Prophylactic Measure  -DVT ppx: Eliquis    61M with PMH DM2, HTN, dilated cardiomyopathy presented to [facility] w/ L hemiparesis found to have R M1 occlusion and then transferred to [facility] on 4/21, given atropine in route to the hospital for bradycardia. 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 Course complicated by NANCY, afib now on eliquis, s/p diagnostic cath and s/p CRT-D implantation 4/28.  Now admitted to [facility] for initiation of multidisciplinary rehab program.     #R MCA CVA  likely cardioembolic 2/2 afib. Out of window for TPA  -Continue comprehensive rehab program  -Continue Eliquis for AC  -Continue Lipitor 80mg daily    #paroxysmal a fib  -Continue Eliquis for AC  -Continue rate control with Toprol XL    #Dilated cardiomyopathy w/ HFrEF  -Echo EF 25% on 4/23; CMRI 4/28 - LV dilated with LVEF: 32%  -Continue Lisinopril, Toprol  -Daily weights, strict I&Os. Appears euvolemic   -Check US left chest wall    #Non-obstructive CAD  -s/p diagnostic cath nonobstructive CAD, CRT-D implantation 4/28  -Continue Lisinopril, Toprol  -Monitor vitals    #DM Type 2   HbA1c 7.6 (4/22/22)  -Increase Metformin 1000mg BID  -Increase Lantus to 12 units qHS  -Start Admelog 4 units pre-meal  -Continue moderate dose insulin sliding scale  -Hypoglycemia protocol, accu-checks  -Blood glucose goal 100-180 in hospital setting - uncontrolled     #Transaminitis  -PO hydration encouraged   -Monitor for now, if persists or worsens plan check abd sono    #BPH  -Chronic, continue Flomax    #Prophylactic Measure  -DVT ppx: Eliquis

## 2022-05-06 ENCOUNTER — TRANSCRIPTION ENCOUNTER (OUTPATIENT)
Age: 62
End: 2022-05-06

## 2022-05-06 ENCOUNTER — NON-APPOINTMENT (OUTPATIENT)
Age: 62
End: 2022-05-06

## 2022-05-06 LAB
GLUCOSE BLDC GLUCOMTR-MCNC: 138 MG/DL — HIGH (ref 70–99)
GLUCOSE BLDC GLUCOMTR-MCNC: 180 MG/DL — HIGH (ref 70–99)
GLUCOSE BLDC GLUCOMTR-MCNC: 212 MG/DL — HIGH (ref 70–99)
GLUCOSE BLDC GLUCOMTR-MCNC: 86 MG/DL — SIGNIFICANT CHANGE UP (ref 70–99)

## 2022-05-06 PROCEDURE — 76700 US EXAM ABDOM COMPLETE: CPT | Mod: 26

## 2022-05-06 PROCEDURE — 99232 SBSQ HOSP IP/OBS MODERATE 35: CPT

## 2022-05-06 PROCEDURE — 99232 SBSQ HOSP IP/OBS MODERATE 35: CPT | Mod: GC

## 2022-05-06 PROCEDURE — 76604 US EXAM CHEST: CPT | Mod: 26

## 2022-05-06 RX ADMIN — Medication 4 UNIT(S): at 12:22

## 2022-05-06 RX ADMIN — LISINOPRIL 5 MILLIGRAM(S): 2.5 TABLET ORAL at 05:24

## 2022-05-06 RX ADMIN — Medication 4: at 12:22

## 2022-05-06 RX ADMIN — Medication 1 APPLICATION(S): at 05:28

## 2022-05-06 RX ADMIN — METFORMIN HYDROCHLORIDE 1000 MILLIGRAM(S): 850 TABLET ORAL at 17:33

## 2022-05-06 RX ADMIN — POLYETHYLENE GLYCOL 3350 17 GRAM(S): 17 POWDER, FOR SOLUTION ORAL at 05:24

## 2022-05-06 RX ADMIN — Medication 650 MILLIGRAM(S): at 14:29

## 2022-05-06 RX ADMIN — Medication 4 UNIT(S): at 08:04

## 2022-05-06 RX ADMIN — Medication 1 APPLICATION(S): at 17:34

## 2022-05-06 RX ADMIN — Medication 650 MILLIGRAM(S): at 15:05

## 2022-05-06 RX ADMIN — TAMSULOSIN HYDROCHLORIDE 0.4 MILLIGRAM(S): 0.4 CAPSULE ORAL at 22:21

## 2022-05-06 RX ADMIN — Medication 6 MILLIGRAM(S): at 22:21

## 2022-05-06 RX ADMIN — METFORMIN HYDROCHLORIDE 1000 MILLIGRAM(S): 850 TABLET ORAL at 05:24

## 2022-05-06 RX ADMIN — ATORVASTATIN CALCIUM 40 MILLIGRAM(S): 80 TABLET, FILM COATED ORAL at 22:21

## 2022-05-06 RX ADMIN — Medication 25 MILLIGRAM(S): at 05:23

## 2022-05-06 RX ADMIN — Medication 2: at 08:04

## 2022-05-06 RX ADMIN — INSULIN GLARGINE 12 UNIT(S): 100 INJECTION, SOLUTION SUBCUTANEOUS at 23:08

## 2022-05-06 RX ADMIN — APIXABAN 5 MILLIGRAM(S): 2.5 TABLET, FILM COATED ORAL at 05:23

## 2022-05-06 RX ADMIN — SENNA PLUS 2 TABLET(S): 8.6 TABLET ORAL at 22:21

## 2022-05-06 RX ADMIN — APIXABAN 5 MILLIGRAM(S): 2.5 TABLET, FILM COATED ORAL at 17:33

## 2022-05-06 NOTE — DISCHARGE NOTE NURSING/CASE MANAGEMENT/SOCIAL WORK - NSDCFUADDAPPT_GEN_ALL_CORE_FT
[facility] Medicine/[facility]  Day of Discharge: 5/10/22  1:00PM to see PCP for home care orders.  Pt will be seeing resident Dr. Biggs, Dr. Latisha Jones to sign off on home care order

## 2022-05-06 NOTE — REASON FOR VISIT
[FreeTextEntry3] : NICM\par patient seen initally inpatient\par chart note for patient care in outpt visits

## 2022-05-06 NOTE — PROGRESS NOTE ADULT - SUBJECTIVE AND OBJECTIVE BOX
Subjective:  Patient was seen and examined at the bedside this AM. No acute overnight events.     [employer] ID [id number] provided [place] Islander translations. Had trouble sleeping last night; reports feeling "hot" throughout body and urge to move. Also endorses left shoulder/chest wall pain at site of incision for PPM. US left chest wall and RUQ pending this afternoon.     ROS:  Did not report nausea, vomiting, CP, palpitations, SOB, or abdominal pain. Last BM was on       Physical Exam:  Vital Signs Last 24 Hrs  T(C): 36.8 (06 May 2022 08:07), Max: 36.9 (05 May 2022 20:00)  T(F): 98.2 (06 May 2022 08:07), Max: 98.4 (05 May 2022 20:00)  HR: 82 (06 May 2022 08:07) (79 - 84)  BP: 125/76 (06 May 2022 08:07) (125/76 - 155/81)  BP(mean): --  RR: 15 (06 May 2022 08:07) (15 - 17)  SpO2: 99% (06 May 2022 08:07) (98% - 99%)      Physical Exam:  Constitutional - NAD, Comfortable  Chest - Breathing comfortably on RA  Cardiovascular - warm and well perfused; no LE edema  Abdomen - BS+, Soft, NTND  Extremities - No calf tenderness. Pain with passive ROM of left shoulder  Neurologic Exam -stable                 Psychiatric - Mood stable, Affect WNL  Skin: left chest wall defibrillator site significant swelling and some ecchymosis; nontender over ICD site, but tender over incision.     Functional Status:  - OT: CG-sup eating/grooming/UBD/LBD/toileting  - PT: CG ' ambulation; 12 steps CG 1 rail      Recent Labs/Imagin.5    9.57  )-----------( 187      ( 05 May 2022 06:00 )             27.1     05-    136  |  101  |  36<H>  ----------------------------<  188<H>  4.8   |  25  |  1.19    Ca    9.7      05 May 2022 06:00  Mg     1.7         TPro  7.2  /  Alb  3.2<L>  /  TBili  0.7  /  DBili  x   /  AST  57<H>  /  ALT  87<H>  /  AlkPhos  200<H>      POCT Blood Glucose.: 180 mg/dL (22 @ 07:35)  POCT Blood Glucose.: 185 mg/dL (22 @ 20:59)  POCT Blood Glucose.: 226 mg/dL (22 @ 16:57)      Medications:  acetaminophen     Tablet .. 650 milliGRAM(s) Oral every 6 hours PRN  apixaban 5 milliGRAM(s) Oral two times a day  atorvastatin 40 milliGRAM(s) Oral at bedtime  dextrose 5%. 1000 milliLiter(s) IV Continuous <Continuous>  dextrose 5%. 1000 milliLiter(s) IV Continuous <Continuous>  dextrose 50% Injectable 25 Gram(s) IV Push once  dextrose 50% Injectable 12.5 Gram(s) IV Push once  dextrose 50% Injectable 25 Gram(s) IV Push once  dextrose Oral Gel 15 Gram(s) Oral once PRN  glucagon  Injectable 1 milliGRAM(s) IntraMuscular once  hydrocortisone 1% Cream 1 Application(s) Topical two times a day  insulin glargine Injectable (LANTUS) 12 Unit(s) SubCutaneous at bedtime  insulin lispro (ADMELOG) corrective regimen sliding scale   SubCutaneous three times a day before meals  insulin lispro (ADMELOG) corrective regimen sliding scale   SubCutaneous at bedtime  insulin lispro Injectable (ADMELOG) 4 Unit(s) SubCutaneous three times a day before meals  lisinopril 5 milliGRAM(s) Oral daily  melatonin 6 milliGRAM(s) Oral at bedtime  metFORMIN 1000 milliGRAM(s) Oral two times a day  metoprolol succinate ER 25 milliGRAM(s) Oral daily  polyethylene glycol 3350 17 Gram(s) Oral two times a day  senna 2 Tablet(s) Oral at bedtime  tamsulosin 0.4 milliGRAM(s) Oral at bedtime  traMADol 50 milliGRAM(s) Oral every 6 hours PRN  traMADol 25 milliGRAM(s) Oral every 6 hours PRN

## 2022-05-06 NOTE — PROGRESS NOTE ADULT - ATTENDING COMMENTS
Phone  used   Patient reports poor sleep - due to feeling " hot " and having pain at left chest surgical site  Defibrillator site with  persistent area of swelling - no drainage-   Awaiting US of area and of upper abdomen     2. Per PT is showing good progress with ambulation. Trial without device  dc planning home 5/10

## 2022-05-06 NOTE — PROGRESS NOTE ADULT - ASSESSMENT
61 year old Welsh speaking male with past medical history of DM2, HTN, dilated cardiomyopathy originally presented to [facility] w/ L hemiparesis found to have R M1 occlusion and then transferred to [facility] on 4/21, given atropine in route to the hospital for bradycardia. 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 Course complicated by NANCY/Acute renal failure (resolved), afb now on eliquis, s/p diagnostic cath and s/p CRT-D implantation 4/28.  Admitted to [facility] acute inpatient rehab on 4/30       #R MCA CVA: - due to R m1 occlusion, was out of window for TPA and not candidate for thrombectomy  - etiology likely cardioembolic 2/2 afib  - c/w statin, eliquis   - Comprehensive Multidisciplinary Rehab Program:   3 hours a day, 5 days a week with PT/OT/SLP  Impulsivity noted at times  - chair and bed alarm     #Dilated cardiomyopathy w/ HFrEF, #Atrial fibrillation  - Echo EF 25% on 4/23   - s/p CRT-D implantation 4/28  - c/w lisinopril (transition to [facility] as outpt per cardio), metoprolol,  - c/w eliquis  - daily weights  - US left chest wall  - cold compress and tylenol, as needed for pain    #Transaminitis  - rising LFTs  - RUQ US pending  - hospitalist recs noted; will monitor and encourage PO hydration for now    #DM2, uncontrolled :  hyperglycemia :  in rehab on Lantus 10 UNITS, metformin 850mg BID, SSI , CC diet   - (Home meds glyburide 5mg BID, metformin 850mg BID, and vildagliptin 50mg daily)   - - HbA1c 7.6    Pain Management:- Tylenol PRN, Tramadol PRN    GI/Bowel:- Senna QHS, Miralax daily  - GI ppx: n/a    /Bladder: - tamsulosin, toileting prn     Skin/Pressure Injury:  - Monitor Incisions: left chest wall CRT-D incision clean dry and intact with mild swelling  OOB as tolerated   - Pruritis to back: increase topical hydrocortisone to TID, hypoallergenic sheets     Sleep : improved with melatonin     Diet:  DASH/CC    DVT ppx:  - on eliquis    Precautions: Fall,  Defibrillator     #Dispo:   - TDD: 5/10; caregiver training prior to discharge

## 2022-05-06 NOTE — PROGRESS NOTE ADULT - SUBJECTIVE AND OBJECTIVE BOX
Patient is a 61y old  Male who presents with a chief complaint of CVA (06 May 2022 11:09)      Patient seen and examined at bedside. Admits to poor sleep last night, which he contributes to insulin.   Admits to pain in left chest wall at incision site.     REVIEW OF SYSTEMS:  CONSTITUTIONAL: No fever or chills  HEENT:  No headache, no sore throat  RESPIRATORY: No cough, wheezing, or shortness of breath  CARDIOVASCULAR: No chest pain, palpitations    ALLERGIES:  No Known Allergies    MEDICATIONS  (STANDING):  apixaban 5 milliGRAM(s) Oral two times a day  atorvastatin 40 milliGRAM(s) Oral at bedtime  dextrose 5%. 1000 milliLiter(s) (50 mL/Hr) IV Continuous <Continuous>  dextrose 5%. 1000 milliLiter(s) (100 mL/Hr) IV Continuous <Continuous>  dextrose 50% Injectable 25 Gram(s) IV Push once  dextrose 50% Injectable 12.5 Gram(s) IV Push once  dextrose 50% Injectable 25 Gram(s) IV Push once  glucagon  Injectable 1 milliGRAM(s) IntraMuscular once  hydrocortisone 1% Cream 1 Application(s) Topical two times a day  insulin glargine Injectable (LANTUS) 12 Unit(s) SubCutaneous at bedtime  insulin lispro (ADMELOG) corrective regimen sliding scale   SubCutaneous three times a day before meals  insulin lispro (ADMELOG) corrective regimen sliding scale   SubCutaneous at bedtime  insulin lispro Injectable (ADMELOG) 4 Unit(s) SubCutaneous three times a day before meals  lisinopril 5 milliGRAM(s) Oral daily  melatonin 6 milliGRAM(s) Oral at bedtime  metFORMIN 1000 milliGRAM(s) Oral two times a day  metoprolol succinate ER 25 milliGRAM(s) Oral daily  polyethylene glycol 3350 17 Gram(s) Oral two times a day  senna 2 Tablet(s) Oral at bedtime  tamsulosin 0.4 milliGRAM(s) Oral at bedtime    MEDICATIONS  (PRN):  acetaminophen     Tablet .. 650 milliGRAM(s) Oral every 6 hours PRN Temp greater or equal to 38C (100.4F), Mild Pain (1 - 3)  dextrose Oral Gel 15 Gram(s) Oral once PRN Blood Glucose LESS THAN 70 milliGRAM(s)/deciliter  traMADol 50 milliGRAM(s) Oral every 6 hours PRN Severe Pain (7 - 10)  traMADol 25 milliGRAM(s) Oral every 6 hours PRN Moderate Pain (4 - 6)    Vital Signs Last 24 Hrs  T(F): 98.2 (06 May 2022 08:07), Max: 98.4 (05 May 2022 20:00)  HR: 82 (06 May 2022 08:07) (79 - 84)  BP: 125/76 (06 May 2022 08:07) (125/76 - 155/81)  RR: 15 (06 May 2022 08:07) (15 - 17)  SpO2: 99% (06 May 2022 08:07) (98% - 99%)  I&O's Summary    BMI (kg/m2): 18.8 (05-01-22 @ 11:39)    PHYSICAL EXAM:  GENERAL: NAD  HEENT:  AT/NC, anicteric, moist mucous membranes, EOMI, PERRL, no lid-lag, conjunctiva and sclera clear  CHEST/LUNG:  CTA b/l, no rales, wheezes, or rhonchi,  normal respiratory effort, no intercostal retractions  HEART:  RRR, S1, S2, no murmurs; no pitting edema; left chest wall defibrillator swelling and ecchymosis   ABDOMEN:  BS+, soft, nontender, nondistended  MSK/EXTREMITIES: 2+ peripheral pulses, no clubbing or cyanosis  NERVOUS SYSTEM: answers questions and follows commands appropriately  PSYCH: Appropriate affect, Alert & Awake; Good judgement    LABS: Personally reviewed                        9.5    9.57  )-----------( 187      ( 05 May 2022 06:00 )             27.1       05-05    136  |  101  |  36  ----------------------------<  188  4.8   |  25  |  1.19    Ca    9.7      05 May 2022 06:00  Mg     1.7     05-05    TPro  7.2  /  Alb  3.2  /  TBili  0.7  /  DBili  x   /  AST  57  /  ALT  87  /  AlkPhos  200  05-05                04-22 Chol 103 mg/dL LDL -- HDL 42 mg/dL Trig 84 mg/dL              POCT Blood Glucose.: 180 mg/dL (06 May 2022 07:35)  POCT Blood Glucose.: 185 mg/dL (05 May 2022 20:59)  POCT Blood Glucose.: 226 mg/dL (05 May 2022 16:57)          COVID-19 PCR: NotDetec (04-29-22 @ 15:54)  COVID-19 PCR: NotDetec (04-25-22 @ 11:00)  COVID-19 PCR: NotDetec (04-21-22 @ 13:15)      RADIOLOGY & ADDITIONAL TESTS: Personally reviewed    Medical management discussed with Dr. Cardona (physiatry), follow up MAMIE alicea for transaminitis, plan to hold Metformin if LFTs remain elevated. Will continue to adjust insulin based on blood glucose readings

## 2022-05-06 NOTE — FAMILY HISTORY
[FreeTextEntry1] : FamilyHistory_20_twCiteListControlStart FamilyHistory_20_twCiteListControlEnd [id number] \par A four-generation family history was constructed and scanned into [employer]. \par Family history is significant for: \par siblings\par sister 61 yo Hx DM\par sister 55 yo hx DM\par \par Mother dec 70 hx DM\par multiple aunts and uncles with DM \par Maternal aunts\par Maternal uncles\par Maternal Grandmother dec med hx unk\par Maternal Grandfather dec med hx unk\par \par Father dec 76 CVA\par multiple aunts and uncles med hx unk\par Paternal aunts\par Paternal uncles\par Paternal Grandfather dec med hx unk\par Paternal Grandmother dec med hx unk\par \par children:\par 34 yo son helathy\par 28 yo son healthy with 10m son\par \par his maternal families originate from [place] and paternal families originate from [place] \par No Ashkenazi Nondenominational ancestry. \par Family history was negative for consanguinity  \par No family history of SIDS\par  \par \par  [FreeTextEntry2] : [facility] [place]  [FreeTextEntry3] : [facility] [place]

## 2022-05-06 NOTE — DISCHARGE NOTE NURSING/CASE MANAGEMENT/SOCIAL WORK - NSDCPEFALRISK_GEN_ALL_CORE
For information on Fall & Injury Prevention, visit: [email or web address] OR  [email or web address] OR  https://www.cdc.gov/steadi/patient.html

## 2022-05-06 NOTE — HISTORY OF PRESENT ILLNESS
[FreeTextEntry1] : OLEGARIO PRADHAN is a 62 yo M  who initially presented to [facility] ([facility]) with left hemiparesis found to have an occlusion of the right M1, was transferred to [facility] for thrombectomy as was out of the TPA window\par \par PMH NICM, afib, DM, HTN\par \par no prior genetic testing \par \par \par \par  [id number]

## 2022-05-06 NOTE — PROGRESS NOTE ADULT - ASSESSMENT
61M with PMH DM2, HTN, dilated cardiomyopathy presented to [facility] w/ L hemiparesis found to have R M1 occlusion and then transferred to [facility] on 4/21, given atropine in route to the hospital for bradycardia. 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 Course complicated by NANCY, afib now on eliquis, s/p diagnostic cath and s/p CRT-D implantation 4/28.  Now admitted to [facility] for initiation of multidisciplinary rehab program.     #R MCA CVA  likely cardioembolic 2/2 afib. Out of window for TPA  -Continue comprehensive rehab program  -Continue Eliquis for AC  -Continue Lipitor    #paroxysmal a fib  -Continue Eliquis for AC  -Continue rate control with Toprol XL    #Dilated cardiomyopathy w/ HFrEF  -Echo EF 25% on 4/23; CMRI 4/28 - LV dilated with LVEF: 32%  -Continue Lisinopril, Toprol  -Daily weights, strict I&Os. Appears euvolemic   -Follow up US left chest wall    #Non-obstructive CAD  -s/p diagnostic cath nonobstructive CAD, CRT-D implantation 4/28  -Continue Lisinopril, Toprol  -Monitor vitals    #DM Type 2   HbA1c 7.6 (4/22/22)  -Continue Metformin 1000mg BID, Lantus 12 units qHS, Admelog 4 units pre-meal, moderate dose insulin sliding scale  -Hypoglycemia protocol, accu-checks  -Blood glucose goal 100-180 in hospital setting - uncontrolled     #Transaminitis  -PO hydration encouraged   -Follow up RUQ ultrasound  -If persists plan to hold Metformin and follow up repeat CMP    #BPH  -Chronic, continue Flomax    #Prophylactic Measure  -DVT ppx: Eliquis

## 2022-05-07 LAB
ALBUMIN SERPL ELPH-MCNC: 3.6 G/DL — SIGNIFICANT CHANGE UP (ref 3.3–5)
ALP SERPL-CCNC: 178 U/L — HIGH (ref 40–120)
ALT FLD-CCNC: 74 U/L — HIGH (ref 10–45)
AST SERPL-CCNC: 46 U/L — HIGH (ref 10–40)
BILIRUB DIRECT SERPL-MCNC: 0.3 MG/DL — SIGNIFICANT CHANGE UP (ref 0–0.3)
BILIRUB INDIRECT FLD-MCNC: 0.6 MG/DL — SIGNIFICANT CHANGE UP (ref 0.2–1)
BILIRUB SERPL-MCNC: 0.9 MG/DL — SIGNIFICANT CHANGE UP (ref 0.2–1.2)
GLUCOSE BLDC GLUCOMTR-MCNC: 114 MG/DL — HIGH (ref 70–99)
GLUCOSE BLDC GLUCOMTR-MCNC: 122 MG/DL — HIGH (ref 70–99)
GLUCOSE BLDC GLUCOMTR-MCNC: 132 MG/DL — HIGH (ref 70–99)
GLUCOSE BLDC GLUCOMTR-MCNC: 191 MG/DL — HIGH (ref 70–99)
PROT SERPL-MCNC: 7.7 G/DL — SIGNIFICANT CHANGE UP (ref 6–8.3)

## 2022-05-07 PROCEDURE — 99232 SBSQ HOSP IP/OBS MODERATE 35: CPT

## 2022-05-07 RX ADMIN — Medication 6 MILLIGRAM(S): at 21:07

## 2022-05-07 RX ADMIN — METFORMIN HYDROCHLORIDE 1000 MILLIGRAM(S): 850 TABLET ORAL at 08:29

## 2022-05-07 RX ADMIN — APIXABAN 5 MILLIGRAM(S): 2.5 TABLET, FILM COATED ORAL at 17:18

## 2022-05-07 RX ADMIN — POLYETHYLENE GLYCOL 3350 17 GRAM(S): 17 POWDER, FOR SOLUTION ORAL at 05:24

## 2022-05-07 RX ADMIN — TAMSULOSIN HYDROCHLORIDE 0.4 MILLIGRAM(S): 0.4 CAPSULE ORAL at 21:07

## 2022-05-07 RX ADMIN — LISINOPRIL 5 MILLIGRAM(S): 2.5 TABLET ORAL at 05:24

## 2022-05-07 RX ADMIN — TRAMADOL HYDROCHLORIDE 50 MILLIGRAM(S): 50 TABLET ORAL at 07:29

## 2022-05-07 RX ADMIN — INSULIN GLARGINE 12 UNIT(S): 100 INJECTION, SOLUTION SUBCUTANEOUS at 21:08

## 2022-05-07 RX ADMIN — POLYETHYLENE GLYCOL 3350 17 GRAM(S): 17 POWDER, FOR SOLUTION ORAL at 17:18

## 2022-05-07 RX ADMIN — ATORVASTATIN CALCIUM 40 MILLIGRAM(S): 80 TABLET, FILM COATED ORAL at 21:07

## 2022-05-07 RX ADMIN — Medication 25 MILLIGRAM(S): at 05:24

## 2022-05-07 RX ADMIN — Medication 2: at 12:01

## 2022-05-07 RX ADMIN — Medication 4 UNIT(S): at 12:00

## 2022-05-07 RX ADMIN — METFORMIN HYDROCHLORIDE 1000 MILLIGRAM(S): 850 TABLET ORAL at 17:18

## 2022-05-07 RX ADMIN — TRAMADOL HYDROCHLORIDE 50 MILLIGRAM(S): 50 TABLET ORAL at 07:58

## 2022-05-07 RX ADMIN — SENNA PLUS 2 TABLET(S): 8.6 TABLET ORAL at 21:07

## 2022-05-07 RX ADMIN — APIXABAN 5 MILLIGRAM(S): 2.5 TABLET, FILM COATED ORAL at 05:24

## 2022-05-07 RX ADMIN — Medication 1 APPLICATION(S): at 05:24

## 2022-05-07 RX ADMIN — Medication 4 UNIT(S): at 17:41

## 2022-05-07 NOTE — PROGRESS NOTE ADULT - SUBJECTIVE AND OBJECTIVE BOX
HPI:  61 year old [place] Lankan speaking male with past medical history of DM2, HTN, dilated cardiomyopathy. Morning of presentation to [facility] with left hemiparesis. NIHSS at [facility] 4. CTA with right M1 occlusion. HR intermittently in the 40s, reportedly in second degree heart block (but no EKG on file), given atropine in route to the hospital.  Not a candidate for tPA as LKN > 4.5 hours.  He was transferred to [facility] (4/21) as stroke rescue for possible thrombectomy.  OZZY aware. Neuroimaging repeated.  [place] staff contacted [facility] staff notified the patient was found to have acute renal failure Cr. 2.8, BUN 88, K 5.8 (untreated).  BNP increased.  Concern for digoxin toxicity, although digoxin level noted to be 1.6 at OSH.   In the ED, T96.4, HR 50, /65, on %. Slow afib with HR 30-50s. FSG 56, given amp D50. Given albuterol and Ca gluc 2g.    Admitted to MICU for CVA neuro checks and possible thrombectomy. Patient deemed medically unstable for thrombectomy due to BRASH ( Bradycardia, renal failure, AV kevin blockade, shock, hyperkalemia ) syndrome. MRI brain performed 4/23 showing right MCA infarct with a right M1 occlusion. Initially started on ASA 81 mg and switched to Eliqius 5mg BID on 04/25. Echo EF 25%. He is s/p diagnostic Cath-prox LAD 40%, dist RPDA 60%, Right radial access site on 4/27.   He is s/p CRT-D( Cardiac Resynchronization therapy- Defibrillator)  implantation on 4/28. Pt noted to have left chest wall and axillary swelling on 4/29 which improved. Evaluated by PT/OT and was recommended AR. Patient deemed stable for discharge to [facility] rehabilitation on 4/30/22.   (01 May 2022 11:35)      Subjective    no events overnight        PAST MEDICAL & SURGICAL HISTORY:  HTN (hypertension)    HLD (hyperlipidemia)    No significant past surgical history        MedsMEDICATIONS  (STANDING):  apixaban 5 milliGRAM(s) Oral two times a day  atorvastatin 40 milliGRAM(s) Oral at bedtime  dextrose 5%. 1000 milliLiter(s) (100 mL/Hr) IV Continuous <Continuous>  dextrose 5%. 1000 milliLiter(s) (50 mL/Hr) IV Continuous <Continuous>  dextrose 50% Injectable 25 Gram(s) IV Push once  dextrose 50% Injectable 12.5 Gram(s) IV Push once  dextrose 50% Injectable 25 Gram(s) IV Push once  glucagon  Injectable 1 milliGRAM(s) IntraMuscular once  hydrocortisone 1% Cream 1 Application(s) Topical two times a day  insulin glargine Injectable (LANTUS) 12 Unit(s) SubCutaneous at bedtime  insulin lispro (ADMELOG) corrective regimen sliding scale   SubCutaneous three times a day before meals  insulin lispro (ADMELOG) corrective regimen sliding scale   SubCutaneous at bedtime  insulin lispro Injectable (ADMELOG) 4 Unit(s) SubCutaneous three times a day before meals  lisinopril 5 milliGRAM(s) Oral daily  melatonin 6 milliGRAM(s) Oral at bedtime  metFORMIN 1000 milliGRAM(s) Oral two times a day  metoprolol succinate ER 25 milliGRAM(s) Oral daily  polyethylene glycol 3350 17 Gram(s) Oral two times a day  senna 2 Tablet(s) Oral at bedtime  tamsulosin 0.4 milliGRAM(s) Oral at bedtime    MEDICATIONS  (PRN):  acetaminophen     Tablet .. 650 milliGRAM(s) Oral every 6 hours PRN Temp greater or equal to 38C (100.4F), Mild Pain (1 - 3)  dextrose Oral Gel 15 Gram(s) Oral once PRN Blood Glucose LESS THAN 70 milliGRAM(s)/deciliter  traMADol 50 milliGRAM(s) Oral every 6 hours PRN Severe Pain (7 - 10)  traMADol 25 milliGRAM(s) Oral every 6 hours PRN Moderate Pain (4 - 6)      Vital Signs Last 24 Hrs  T(C): 36.4 (07 May 2022 08:34), Max: 37 (06 May 2022 22:43)  T(F): 97.5 (07 May 2022 08:34), Max: 98.6 (06 May 2022 22:43)  HR: 81 (07 May 2022 08:34) (81 - 95)  BP: 137/76 (07 May 2022 08:34) (123/71 - 144/77)  BP(mean): --  RR: 16 (07 May 2022 08:34) (16 - 16)  SpO2: 98% (07 May 2022 08:34) (98% - 100%)  I&O's Summary      PHYSICAL EXAM:  GENERAL: NAD  NECK: Supple  NERVOUS SYSTEM:  awake and alert  HEART: S1s2 NL , RRR  CHEST/LUNG: Clear to percussion bilaterally  ABDOMEN: Soft, Nontender, Nondistended; Bowel sounds present  EXTREMITIES:  No edema      LABS:      TPro  7.7  /  Alb  3.6  /  TBili  0.9  /  DBili  0.3  /  AST  46<H>  /  ALT  74<H>  /  AlkPhos  178<H>  05-07          RVP:          Tox:           CAPILLARY BLOOD GLUCOSE      POCT Blood Glucose.: 191 mg/dL (07 May 2022 11:57)  POCT Blood Glucose.: 114 mg/dL (07 May 2022 08:05)  POCT Blood Glucose.: 138 mg/dL (06 May 2022 22:18)  POCT Blood Glucose.: 86 mg/dL (06 May 2022 17:21)      Imaging Personally Reviewed:  [ ] YES  [ ] NO        Care Discussed with Consultants/Other Providers [ x] YES  [ ] NO

## 2022-05-07 NOTE — PROGRESS NOTE ADULT - ASSESSMENT
CVA  PT/OT per rehab  Eliquis/Statin    PAF  Eliquis, Metoprolol    cardiomyopathy w/ HFrEF, s/p CRT-D  Lisinopril, Metoprolol    Non-obstructive CAD  ASA/statin/BB    DM Type 2, A1c 7.6  Metformin, Lantus, Lispro    Elev LFT's  RUQ us-neg  monitor for now    BPH  Flomax

## 2022-05-07 NOTE — PROGRESS NOTE ADULT - SUBJECTIVE AND OBJECTIVE BOX
Pt. seen and examined at bedside.  No overnight events.      REVIEW OF SYSTEMS  Constitutional - No fever,  No fatigue  Neurological - No headaches, ++ loss of strength  Musculoskeletal - No joint pain, No joint swelling, No muscle pain    VITALS  T(C): 36.4 (05-07-22 @ 08:34), Max: 37 (05-06-22 @ 22:43)  HR: 81 (05-07-22 @ 08:34) (81 - 95)  BP: 137/76 (05-07-22 @ 08:34) (123/71 - 144/77)  RR: 16 (05-07-22 @ 08:34) (16 - 16)  SpO2: 98% (05-07-22 @ 08:34) (98% - 100%)  Wt(kg): --       MEDICATIONS   acetaminophen     Tablet .. 650 milliGRAM(s) every 6 hours PRN  apixaban 5 milliGRAM(s) two times a day  atorvastatin 40 milliGRAM(s) at bedtime  dextrose 5%. 1000 milliLiter(s) <Continuous>  dextrose 5%. 1000 milliLiter(s) <Continuous>  dextrose 50% Injectable 25 Gram(s) once  dextrose 50% Injectable 12.5 Gram(s) once  dextrose 50% Injectable 25 Gram(s) once  dextrose Oral Gel 15 Gram(s) once PRN  glucagon  Injectable 1 milliGRAM(s) once  hydrocortisone 1% Cream 1 Application(s) two times a day  insulin glargine Injectable (LANTUS) 12 Unit(s) at bedtime  insulin lispro (ADMELOG) corrective regimen sliding scale   three times a day before meals  insulin lispro (ADMELOG) corrective regimen sliding scale   at bedtime  insulin lispro Injectable (ADMELOG) 4 Unit(s) three times a day before meals  lisinopril 5 milliGRAM(s) daily  melatonin 6 milliGRAM(s) at bedtime  metFORMIN 1000 milliGRAM(s) two times a day  metoprolol succinate ER 25 milliGRAM(s) daily  polyethylene glycol 3350 17 Gram(s) two times a day  senna 2 Tablet(s) at bedtime  tamsulosin 0.4 milliGRAM(s) at bedtime  traMADol 50 milliGRAM(s) every 6 hours PRN  traMADol 25 milliGRAM(s) every 6 hours PRN      RECENT LABS/IMAGING        TPro  7.7  /  Alb  3.6  /  TBili  0.9  /  DBili  0.3  /  AST  46<H>  /  ALT  74<H>  /  AlkPhos  178<H>  05-07      US LIVER - WNL  US CHEST - NO FLUID OR BLOOD COLLECTION AROUND ICD          POCT Blood Glucose.: 114 mg/dL (05-07-22 @ 08:05)  POCT Blood Glucose.: 138 mg/dL (05-06-22 @ 22:18)  POCT Blood Glucose.: 86 mg/dL (05-06-22 @ 17:21)  POCT Blood Glucose.: 212 mg/dL (05-06-22 @ 12:21)    ---------  PHYSICAL EXAM  Constitutional - NAD, Comfortable  Pulm - Breathing comfortably, No wheezing  CHEST - LEFT ICD SITE SWELLING  Abd - Soft, NTND  Extremities - No edema, No calf tenderness  Neurologic Exam -                    Cognitive - Awake, Alert     Communication - Fluent     Motor - LEFT HEMIPARESIS     Sensory - Intact to LT  Psychiatric - Mood WNL, Affect WNL    ASSESSMENT/PLAN  61y Male with functional deficits 2' RIGHT MCA CVA -> LEFT HP + S/P ICD.  Continue current medical management  Pain - Tylenol PRN; TRAMADOL PRN  DVT PPX - apixaban 5 milliGRAM(s) two times a day  Active issues - ICD SITE SWELLING  Continue 3hrs a day of comprehensive rehab program.

## 2022-05-08 LAB
GLUCOSE BLDC GLUCOMTR-MCNC: 111 MG/DL — HIGH (ref 70–99)
GLUCOSE BLDC GLUCOMTR-MCNC: 148 MG/DL — HIGH (ref 70–99)
GLUCOSE BLDC GLUCOMTR-MCNC: 173 MG/DL — HIGH (ref 70–99)
GLUCOSE BLDC GLUCOMTR-MCNC: 95 MG/DL — SIGNIFICANT CHANGE UP (ref 70–99)

## 2022-05-08 PROCEDURE — 99232 SBSQ HOSP IP/OBS MODERATE 35: CPT

## 2022-05-08 RX ORDER — DIPHENHYDRAMINE HCL 50 MG
25 CAPSULE ORAL ONCE
Refills: 0 | Status: COMPLETED | OUTPATIENT
Start: 2022-05-08 | End: 2022-05-08

## 2022-05-08 RX ADMIN — APIXABAN 5 MILLIGRAM(S): 2.5 TABLET, FILM COATED ORAL at 17:28

## 2022-05-08 RX ADMIN — Medication 1 APPLICATION(S): at 17:28

## 2022-05-08 RX ADMIN — ATORVASTATIN CALCIUM 40 MILLIGRAM(S): 80 TABLET, FILM COATED ORAL at 21:31

## 2022-05-08 RX ADMIN — Medication 4 UNIT(S): at 17:28

## 2022-05-08 RX ADMIN — LISINOPRIL 5 MILLIGRAM(S): 2.5 TABLET ORAL at 04:48

## 2022-05-08 RX ADMIN — Medication 650 MILLIGRAM(S): at 09:00

## 2022-05-08 RX ADMIN — INSULIN GLARGINE 12 UNIT(S): 100 INJECTION, SOLUTION SUBCUTANEOUS at 21:35

## 2022-05-08 RX ADMIN — Medication 650 MILLIGRAM(S): at 08:04

## 2022-05-08 RX ADMIN — METFORMIN HYDROCHLORIDE 1000 MILLIGRAM(S): 850 TABLET ORAL at 17:28

## 2022-05-08 RX ADMIN — Medication 25 MILLIGRAM(S): at 04:47

## 2022-05-08 RX ADMIN — Medication 25 MILLIGRAM(S): at 21:35

## 2022-05-08 RX ADMIN — Medication 4 UNIT(S): at 12:04

## 2022-05-08 RX ADMIN — TAMSULOSIN HYDROCHLORIDE 0.4 MILLIGRAM(S): 0.4 CAPSULE ORAL at 21:31

## 2022-05-08 RX ADMIN — POLYETHYLENE GLYCOL 3350 17 GRAM(S): 17 POWDER, FOR SOLUTION ORAL at 04:48

## 2022-05-08 RX ADMIN — APIXABAN 5 MILLIGRAM(S): 2.5 TABLET, FILM COATED ORAL at 04:48

## 2022-05-08 RX ADMIN — Medication 6 MILLIGRAM(S): at 21:31

## 2022-05-08 RX ADMIN — METFORMIN HYDROCHLORIDE 1000 MILLIGRAM(S): 850 TABLET ORAL at 08:04

## 2022-05-08 NOTE — PROGRESS NOTE ADULT - ASSESSMENT
CVA  PT/OT per rehab  Eliquis/Statin    PAF  Eliquis, Metoprolol    cardiomyopathy w/ HFrEF, s/p CRT-D  Lisinopril, Metoprolol    Non-obstructive CAD  statin/BB    DM Type 2, A1c 7.6  Metformin, Lantus, Lispro    Elev LFT's  RUQ us-neg  monitor for now    BPH  Flomax

## 2022-05-08 NOTE — PROGRESS NOTE ADULT - SUBJECTIVE AND OBJECTIVE BOX
Pt. seen and examined at bedside.  No overnight events.      REVIEW OF SYSTEMS  Constitutional - No fever,  No fatigue  Neurological - No headaches, No loss of strength  Musculoskeletal - ++ joint pain, No joint swelling, No muscle pain    VITALS  T(C): 36.8 (05-08-22 @ 08:09), Max: 36.9 (05-07-22 @ 20:57)  HR: 79 (05-08-22 @ 08:09) (75 - 79)  BP: 143/80 (05-08-22 @ 08:09) (139/72 - 144/80)  RR: 16 (05-08-22 @ 08:09) (16 - 16)  SpO2: 100% (05-08-22 @ 08:09) (98% - 100%)  Wt(kg): --       MEDICATIONS   acetaminophen     Tablet .. 650 milliGRAM(s) every 6 hours PRN  apixaban 5 milliGRAM(s) two times a day  atorvastatin 40 milliGRAM(s) at bedtime  dextrose 5%. 1000 milliLiter(s) <Continuous>  dextrose 5%. 1000 milliLiter(s) <Continuous>  dextrose 50% Injectable 25 Gram(s) once  dextrose 50% Injectable 12.5 Gram(s) once  dextrose 50% Injectable 25 Gram(s) once  dextrose Oral Gel 15 Gram(s) once PRN  glucagon  Injectable 1 milliGRAM(s) once  hydrocortisone 1% Cream 1 Application(s) two times a day  insulin glargine Injectable (LANTUS) 12 Unit(s) at bedtime  insulin lispro (ADMELOG) corrective regimen sliding scale   three times a day before meals  insulin lispro (ADMELOG) corrective regimen sliding scale   at bedtime  insulin lispro Injectable (ADMELOG) 4 Unit(s) three times a day before meals  lisinopril 5 milliGRAM(s) daily  melatonin 6 milliGRAM(s) at bedtime  metFORMIN 1000 milliGRAM(s) two times a day  metoprolol succinate ER 25 milliGRAM(s) daily  polyethylene glycol 3350 17 Gram(s) two times a day  senna 2 Tablet(s) at bedtime  tamsulosin 0.4 milliGRAM(s) at bedtime  traMADol 25 milliGRAM(s) every 6 hours PRN  traMADol 50 milliGRAM(s) every 6 hours PRN      RECENT LABS/IMAGING        TPro  7.7  /  Alb  3.6  /  TBili  0.9  /  DBili  0.3  /  AST  46<H>  /  ALT  74<H>  /  AlkPhos  178<H>  05-07                POCT Blood Glucose.: 95 mg/dL (05-08-22 @ 08:02)  POCT Blood Glucose.: 122 mg/dL (05-07-22 @ 21:03)  POCT Blood Glucose.: 132 mg/dL (05-07-22 @ 17:17)  POCT Blood Glucose.: 191 mg/dL (05-07-22 @ 11:57)    ---------  PHYSICAL EXAM  Constitutional - NAD, Comfortable  Pulm - Breathing comfortably, No wheezing  CHEST WALL - ICD INSERTION SITE SWELLING STABLE  Abd - Soft, NTND  Extremities - No edema, No calf tenderness  Neurologic Exam -                    Cognitive - Awake, Alert     Communication - Fluent     Motor - LEFT HEMIPARESIS     Sensory - Intact to LT  Psychiatric - Mood WNL, Affect WNL    ASSESSMENT/PLAN  61y Male with functional deficits 2' RIGHT MCA CVA -> LEFT HP S/P ICD IMPLANTATION.  Continue current medical management  Pain - Tylenol PRN; TRAMADOL  DVT PPX - apixaban 5 milliGRAM(s) two times a day  Active issues - LABS NOTED; SWELLING OVER ICD INSERTION SITE LEFT ACW  Continue 3hrs a day of comprehensive rehab program.

## 2022-05-08 NOTE — PROGRESS NOTE ADULT - SUBJECTIVE AND OBJECTIVE BOX
HPI:  61 year old Welsh speaking male with past medical history of DM2, HTN, dilated cardiomyopathy. Morning of presentation to [facility] with left hemiparesis. NIHSS at [facility] 4. CTA with right M1 occlusion. HR intermittently in the 40s, reportedly in second degree heart block (but no EKG on file), given atropine in route to the hospital.  Not a candidate for tPA as LKN > 4.5 hours.  He was transferred to [facility] (4/21) as stroke rescue for possible thrombectomy.  OZZY aware. Neuroimaging repeated.  [facility] staff contacted [facility] staff notified the patient was found to have acute renal failure Cr. 2.8, BUN 88, K 5.8 (untreated).  BNP increased.  Concern for digoxin toxicity, although digoxin level noted to be 1.6 at OSH.   In the ED, T96.4, HR 50, /65, on %. Slow afib with HR 30-50s. FSG 56, given amp D50. Given albuterol and Ca gluc 2g.    Admitted to MICU for CVA neuro checks and possible thrombectomy. Patient deemed medically unstable for thrombectomy due to BRASH ( Bradycardia, renal failure, AV kevin blockade, shock, hyperkalemia ) syndrome. MRI brain performed 4/23 showing right MCA infarct with a right M1 occlusion. Initially started on ASA 81 mg and switched to Eliqius 5mg BID on 04/25. Echo EF 25%. He is s/p diagnostic Cath-prox LAD 40%, dist RPDA 60%, Right radial access site on 4/27.   He is s/p CRT-D( Cardiac Resynchronisation therapy- Defibrillator)  implantation on 4/28. Pt noted to have left chest wall and axillary swelling on 4/29 which improved. Evaluated by PT/OT and was recommended AR. Patient deemed stable for discharge to [facility] rehabilitation on 4/30/22.   (01 May 2022 11:35)      Subjective    no new complaints      PAST MEDICAL & SURGICAL HISTORY:  HTN (hypertension)    HLD (hyperlipidemia)    No significant past surgical history        MedsMEDICATIONS  (STANDING):  apixaban 5 milliGRAM(s) Oral two times a day  atorvastatin 40 milliGRAM(s) Oral at bedtime  dextrose 5%. 1000 milliLiter(s) (100 mL/Hr) IV Continuous <Continuous>  dextrose 5%. 1000 milliLiter(s) (50 mL/Hr) IV Continuous <Continuous>  dextrose 50% Injectable 25 Gram(s) IV Push once  dextrose 50% Injectable 12.5 Gram(s) IV Push once  dextrose 50% Injectable 25 Gram(s) IV Push once  glucagon  Injectable 1 milliGRAM(s) IntraMuscular once  hydrocortisone 1% Cream 1 Application(s) Topical two times a day  insulin glargine Injectable (LANTUS) 12 Unit(s) SubCutaneous at bedtime  insulin lispro (ADMELOG) corrective regimen sliding scale   SubCutaneous three times a day before meals  insulin lispro (ADMELOG) corrective regimen sliding scale   SubCutaneous at bedtime  insulin lispro Injectable (ADMELOG) 4 Unit(s) SubCutaneous three times a day before meals  lisinopril 5 milliGRAM(s) Oral daily  melatonin 6 milliGRAM(s) Oral at bedtime  metFORMIN 1000 milliGRAM(s) Oral two times a day  metoprolol succinate ER 25 milliGRAM(s) Oral daily  polyethylene glycol 3350 17 Gram(s) Oral two times a day  senna 2 Tablet(s) Oral at bedtime  tamsulosin 0.4 milliGRAM(s) Oral at bedtime    MEDICATIONS  (PRN):  acetaminophen     Tablet .. 650 milliGRAM(s) Oral every 6 hours PRN Temp greater or equal to 38C (100.4F), Mild Pain (1 - 3)  dextrose Oral Gel 15 Gram(s) Oral once PRN Blood Glucose LESS THAN 70 milliGRAM(s)/deciliter  traMADol 50 milliGRAM(s) Oral every 6 hours PRN Severe Pain (7 - 10)  traMADol 25 milliGRAM(s) Oral every 6 hours PRN Moderate Pain (4 - 6)      Vital Signs Last 24 Hrs  T(C): 36.8 (08 May 2022 08:09), Max: 36.9 (07 May 2022 20:57)  T(F): 98.3 (08 May 2022 08:09), Max: 98.4 (07 May 2022 20:57)  HR: 79 (08 May 2022 08:09) (75 - 79)  BP: 143/80 (08 May 2022 08:09) (139/72 - 144/80)  BP(mean): --  RR: 16 (08 May 2022 08:09) (16 - 16)  SpO2: 100% (08 May 2022 08:09) (98% - 100%)  I&O's Summary      PHYSICAL EXAM:  GENERAL: NAD  NECK: Supple  NERVOUS SYSTEM:  awake  HEART: S1s2 NL , RRR  CHEST/LUNG: Clear to percussion bilaterally  ABDOMEN: Soft, Nontender, Nondistended; Bowel sounds present  EXTREMITIES:  No edema      LABS:      TPro  7.7  /  Alb  3.6  /  TBili  0.9  /  DBili  0.3  /  AST  46<H>  /  ALT  74<H>  /  AlkPhos  178<H>  05-07          RVP:          Tox:           CAPILLARY BLOOD GLUCOSE      POCT Blood Glucose.: 148 mg/dL (08 May 2022 12:02)  POCT Blood Glucose.: 95 mg/dL (08 May 2022 08:02)  POCT Blood Glucose.: 122 mg/dL (07 May 2022 21:03)  POCT Blood Glucose.: 132 mg/dL (07 May 2022 17:17)      Imaging Personally Reviewed:  [ ] YES  [ ] NO        Care Discussed with Consultants/Other Providers [ x] YES  [ ] NO

## 2022-05-09 ENCOUNTER — TRANSCRIPTION ENCOUNTER (OUTPATIENT)
Age: 62
End: 2022-05-09

## 2022-05-09 LAB
ALBUMIN SERPL ELPH-MCNC: 3.6 G/DL — SIGNIFICANT CHANGE UP (ref 3.3–5)
ALP SERPL-CCNC: 199 U/L — HIGH (ref 40–120)
ALT FLD-CCNC: 70 U/L — HIGH (ref 10–45)
ANION GAP SERPL CALC-SCNC: 10 MMOL/L — SIGNIFICANT CHANGE UP (ref 5–17)
AST SERPL-CCNC: 44 U/L — HIGH (ref 10–40)
BILIRUB SERPL-MCNC: 0.7 MG/DL — SIGNIFICANT CHANGE UP (ref 0.2–1.2)
BUN SERPL-MCNC: 41 MG/DL — HIGH (ref 7–23)
CALCIUM SERPL-MCNC: 9.6 MG/DL — SIGNIFICANT CHANGE UP (ref 8.4–10.5)
CHLORIDE SERPL-SCNC: 102 MMOL/L — SIGNIFICANT CHANGE UP (ref 96–108)
CO2 SERPL-SCNC: 26 MMOL/L — SIGNIFICANT CHANGE UP (ref 22–31)
CREAT SERPL-MCNC: 1.18 MG/DL — SIGNIFICANT CHANGE UP (ref 0.5–1.3)
EGFR: 71 ML/MIN/1.73M2 — SIGNIFICANT CHANGE UP
GLUCOSE BLDC GLUCOMTR-MCNC: 106 MG/DL — HIGH (ref 70–99)
GLUCOSE BLDC GLUCOMTR-MCNC: 131 MG/DL — HIGH (ref 70–99)
GLUCOSE BLDC GLUCOMTR-MCNC: 166 MG/DL — HIGH (ref 70–99)
GLUCOSE BLDC GLUCOMTR-MCNC: 185 MG/DL — HIGH (ref 70–99)
GLUCOSE SERPL-MCNC: 133 MG/DL — HIGH (ref 70–99)
HCT VFR BLD CALC: 28.9 % — LOW (ref 39–50)
HGB BLD-MCNC: 10 G/DL — LOW (ref 13–17)
MAGNESIUM SERPL-MCNC: 2 MG/DL — SIGNIFICANT CHANGE UP (ref 1.6–2.6)
MCHC RBC-ENTMCNC: 31.3 PG — SIGNIFICANT CHANGE UP (ref 27–34)
MCHC RBC-ENTMCNC: 34.6 GM/DL — SIGNIFICANT CHANGE UP (ref 32–36)
MCV RBC AUTO: 90.3 FL — SIGNIFICANT CHANGE UP (ref 80–100)
NRBC # BLD: 0 /100 WBCS — SIGNIFICANT CHANGE UP (ref 0–0)
PLATELET # BLD AUTO: 209 K/UL — SIGNIFICANT CHANGE UP (ref 150–400)
POTASSIUM SERPL-MCNC: 4.6 MMOL/L — SIGNIFICANT CHANGE UP (ref 3.5–5.3)
POTASSIUM SERPL-SCNC: 4.6 MMOL/L — SIGNIFICANT CHANGE UP (ref 3.5–5.3)
PROT SERPL-MCNC: 7.7 G/DL — SIGNIFICANT CHANGE UP (ref 6–8.3)
RBC # BLD: 3.2 M/UL — LOW (ref 4.2–5.8)
RBC # FLD: 14.3 % — SIGNIFICANT CHANGE UP (ref 10.3–14.5)
SODIUM SERPL-SCNC: 138 MMOL/L — SIGNIFICANT CHANGE UP (ref 135–145)
WBC # BLD: 9.21 K/UL — SIGNIFICANT CHANGE UP (ref 3.8–10.5)
WBC # FLD AUTO: 9.21 K/UL — SIGNIFICANT CHANGE UP (ref 3.8–10.5)

## 2022-05-09 PROCEDURE — 99232 SBSQ HOSP IP/OBS MODERATE 35: CPT | Mod: GC

## 2022-05-09 PROCEDURE — 99232 SBSQ HOSP IP/OBS MODERATE 35: CPT

## 2022-05-09 RX ORDER — INSULIN GLARGINE 100 [IU]/ML
12 INJECTION, SOLUTION SUBCUTANEOUS
Qty: 4 | Refills: 0
Start: 2022-05-09 | End: 2022-06-07

## 2022-05-09 RX ORDER — METFORMIN HYDROCHLORIDE 850 MG/1
1 TABLET ORAL
Qty: 0 | Refills: 0 | DISCHARGE

## 2022-05-09 RX ORDER — LISINOPRIL 2.5 MG/1
1 TABLET ORAL
Qty: 30 | Refills: 0
Start: 2022-05-09 | End: 2022-06-07

## 2022-05-09 RX ORDER — METFORMIN HYDROCHLORIDE 850 MG/1
1 TABLET ORAL
Qty: 60 | Refills: 0
Start: 2022-05-09 | End: 2022-06-07

## 2022-05-09 RX ORDER — GLYBURIDE 5 MG
1 TABLET ORAL
Qty: 0 | Refills: 0 | DISCHARGE

## 2022-05-09 RX ORDER — APIXABAN 2.5 MG/1
1 TABLET, FILM COATED ORAL
Qty: 60 | Refills: 0
Start: 2022-05-09 | End: 2022-06-07

## 2022-05-09 RX ORDER — ATORVASTATIN CALCIUM 80 MG/1
20 TABLET, FILM COATED ORAL AT BEDTIME
Refills: 0 | Status: DISCONTINUED | OUTPATIENT
Start: 2022-05-09 | End: 2022-05-10

## 2022-05-09 RX ORDER — INSULIN LISPRO 100/ML
4 VIAL (ML) SUBCUTANEOUS
Qty: 4 | Refills: 0
Start: 2022-05-09 | End: 2022-06-07

## 2022-05-09 RX ORDER — TAMSULOSIN HYDROCHLORIDE 0.4 MG/1
1 CAPSULE ORAL
Qty: 30 | Refills: 0
Start: 2022-05-09 | End: 2022-06-07

## 2022-05-09 RX ORDER — ATORVASTATIN CALCIUM 80 MG/1
1 TABLET, FILM COATED ORAL
Qty: 30 | Refills: 0
Start: 2022-05-09 | End: 2022-06-07

## 2022-05-09 RX ORDER — METOPROLOL TARTRATE 50 MG
1 TABLET ORAL
Qty: 30 | Refills: 0
Start: 2022-05-09 | End: 2022-06-07

## 2022-05-09 RX ADMIN — LISINOPRIL 5 MILLIGRAM(S): 2.5 TABLET ORAL at 05:01

## 2022-05-09 RX ADMIN — APIXABAN 5 MILLIGRAM(S): 2.5 TABLET, FILM COATED ORAL at 05:01

## 2022-05-09 RX ADMIN — Medication 4 UNIT(S): at 11:53

## 2022-05-09 RX ADMIN — Medication 4 UNIT(S): at 17:04

## 2022-05-09 RX ADMIN — METFORMIN HYDROCHLORIDE 1000 MILLIGRAM(S): 850 TABLET ORAL at 17:03

## 2022-05-09 RX ADMIN — Medication 650 MILLIGRAM(S): at 22:49

## 2022-05-09 RX ADMIN — Medication 650 MILLIGRAM(S): at 07:59

## 2022-05-09 RX ADMIN — Medication 6 MILLIGRAM(S): at 22:43

## 2022-05-09 RX ADMIN — METFORMIN HYDROCHLORIDE 1000 MILLIGRAM(S): 850 TABLET ORAL at 07:59

## 2022-05-09 RX ADMIN — Medication 650 MILLIGRAM(S): at 08:37

## 2022-05-09 RX ADMIN — ATORVASTATIN CALCIUM 20 MILLIGRAM(S): 80 TABLET, FILM COATED ORAL at 22:42

## 2022-05-09 RX ADMIN — INSULIN GLARGINE 12 UNIT(S): 100 INJECTION, SOLUTION SUBCUTANEOUS at 22:43

## 2022-05-09 RX ADMIN — TAMSULOSIN HYDROCHLORIDE 0.4 MILLIGRAM(S): 0.4 CAPSULE ORAL at 22:45

## 2022-05-09 RX ADMIN — Medication 650 MILLIGRAM(S): at 23:31

## 2022-05-09 RX ADMIN — Medication 4 UNIT(S): at 07:58

## 2022-05-09 RX ADMIN — APIXABAN 5 MILLIGRAM(S): 2.5 TABLET, FILM COATED ORAL at 17:03

## 2022-05-09 RX ADMIN — Medication 2: at 11:54

## 2022-05-09 RX ADMIN — Medication 25 MILLIGRAM(S): at 05:01

## 2022-05-09 NOTE — DISCHARGE NOTE PROVIDER - CARE PROVIDERS DIRECT ADDRESSES
,DirectAddress_Unknown,[email or web address],DirectAddress_Unknown ,DirectAddress_Unknown,[email or web address],DirectAddress_Unknown,[email or web address] ,[email or web address],[email or web address],DirectAddress_Unknown,[email or web address]

## 2022-05-09 NOTE — DISCHARGE NOTE PROVIDER - NPI NUMBER (FOR SYSADMIN USE ONLY) :
[[id number]],[[id number]],[UNKNOWN] [[id number]],[[id number]],[UNKNOWN],[[id number]] [[id number]],[[id number]],[[id number]],[UNKNOWN],[[id number]]

## 2022-05-09 NOTE — DISCHARGE NOTE PROVIDER - CARE PROVIDER_API CALL
Osmar Song)  Neurology; Vascular Neurology  [address] [facility] [address] [facility] [address] [address]  [place] [address]  Phone: [phone]  Fax: [phone]  Follow Up Time: 2 weeks    Jeffrey Feng)  Internal Medicine  [address] [place] [address]  Phone: [phone]  Fax: [phone]  Follow Up Time: 2 weeks    Follow up,   After rehab, outpatient cardiology follow-up with at [facility] by calling [phone] or at [facility] by calling [phone].  Phone: (   )    -  Fax: (   )    -  Follow Up Time:    Osmar Song)  Neurology; Vascular Neurology  [address] [facility] [address], [address]  [place] [address]  Phone: [phone]  Fax: [phone]  Follow Up Time: 2 weeks    Jeffrey Feng)  Internal Medicine  [address] [place] [address]  Phone: [phone]  Fax: [phone]  Follow Up Time: 2 weeks    Follow up,   After rehab, outpatient cardiology follow-up with at [facility] by calling [phone] or at [facility] by calling [phone].  Phone: (   )    -  Fax: (   )    -  Follow Up Time:     Elroy Chapin)  Cardiac Electrophysiology; Cardiology  [address] [place] [address]  Phone: [phone]  Fax: ()-  Follow Up Time:    Osmar Song)  Neurology; Vascular Neurology  [address] [facility] [address]  [place] [address]  Phone: [phone]  Fax: [phone]  Follow Up Time: 2 weeks    Jeffrey Feng)  Internal Medicine  [address] [place] [address]  Phone: [phone]  Fax: [phone]  Follow Up Time: 2 weeks    Elroy Chapin)  Cardiac Electrophysiology; Cardiology  [address] [place] [address]  Phone: [phone]  Fax: ()-  Follow Up Time:     Follow up,   After rehab, outpatient cardiology follow-up with at [facility] by calling [phone] or at [facility] by calling [phone].  Phone: (   )    -  Fax: (   )    -  Follow Up Time:     Nidia Cardona)  Brain Injury Medicine; PhysicalRehab Medicine  [address] [place] [address]  Phone: [phone]  Fax: [phone]  Follow Up Time: 1 month

## 2022-05-09 NOTE — DISCHARGE NOTE PROVIDER - HOSPITAL COURSE
61 year old Uzbek speaking male with past medical history of DM2, HTN, dilated cardiomyopathy. Morning of presentation to [facility] with left hemiparesis. NIHSS at [facility] 4. CTA with right M1 occlusion. HR intermittently in the 40s, reportedly in second degree heart block (but no EKG on file), given atropine in route to the hospital.  Not a candidate for tPA as LKN > 4.5 hours.  He was transferred to [facility] (4/21) as stroke rescue for possible thrombectomy.  OZZY aware. Neuroimaging repeated.  [facility] staff contacted [facility] staff notified the patient was found to have acute renal failure Cr. 2.8, BUN 88, K 5.8 (untreated).  BNP increased.  Concern for digoxin toxicity, although digoxin level noted to be 1.6 at OSH.   In the ED, T96.4, HR 50, /65, on %. Slow afib with HR 30-50s. FSG 56, given amp D50. Given albuterol and Ca gluc 2g.    Admitted to MICU for CVA neuro checks and possible thrombectomy. Patient deemed medically unstable for thrombectomy due to BRASH ( Bradycardia, renal failure, AV kevin blockade, shock, hyperkalemia ) syndrome. MRI brain performed 4/23 showing right MCA infarct with a right M1 occlusion. Initially started on ASA 81 mg and switched to Eliqius 5mg BID on 04/25. Echo EF 25%. He is s/p diagnostic Cath-prox LAD 40%, dist RPDA 60%, Right radial access site on 4/27.   He is s/p CRT-D( Cardiac Resynchronisation therapy- Defibrillator)  implantation on 4/28. Pt noted to have left chest wall and axillary swelling on 4/29 which improved. Evaluated by PT/OT and was recommended AR. Patient deemed stable for discharge to [facility] on 4/30/22.    Patient was stable upon rehab admission to  Inpatient Rehabilitation Facility. Admitted with gait instabilty, ADL, and functional impairments.     Rehab Course significant for intermittent pruritis of upper back likely due to irritation from hospital bedding; had received hydrocortisone cream and Benadryl. In addition, LFTs were elevated, but stable throughout his stay; RUQ US unremarkable. Patient also continued to endorse left chest wall/shoulder pain at ICD incision site with localized swelling at the site of ICD. Right chest wall was negative for hematoma or focal fluid collection. All other medical co-morbidities were stable. Patient tolerated course of inpatient PT/OT/SLP rehab with significant functional improvements and met rehab goals prior to discharge. Patient was medically cleared on 05/10/2022 for discharged to home.    Patient's functional status as of last IDT rounds prior to discharge is, as follows:  #Dispo: IDR 05/03/2022  - OT: LIA-sup eating/grooming/UBD/LBD/toileting  - PT: LIA ' ambulation; 12 steps CG 1 rail  - SLP: regular with thins      Patient will follow up with the following:   Neurologist  Cardiologist - EP  Physiatrist 61 year old Divehi speaking male with past medical history of DM2, HTN, dilated cardiomyopathy. Morning of presentation to [facility] with left hemiparesis. NIHSS at [facility] 4. CTA with right M1 occlusion. HR intermittently in the 40s, reportedly in second degree heart block (but no EKG on file), given atropine in route to the hospital.  Not a candidate for tPA as LKN > 4.5 hours.  He was transferred to [facility] (4/21) as stroke rescue for possible thrombectomy.  OZZY aware. Neuroimaging repeated.  [place] staff contacted [facility] staff notified the patient was found to have acute renal failure Cr. 2.8, BUN 88, K 5.8 (untreated).  BNP increased.  Concern for digoxin toxicity, although digoxin level noted to be 1.6 at OSH.   In the ED, T96.4, HR 50, /65, on %. Slow afib with HR 30-50s. FSG 56, given amp D50. Given albuterol and Ca gluc 2g.    Admitted to MICU for CVA neuro checks and possible thrombectomy. Patient deemed medically unstable for thrombectomy due to BRASH ( Bradycardia, renal failure, AV kevin blockade, shock, hyperkalemia ) syndrome. MRI brain performed 4/23 showing right MCA infarct with a right M1 occlusion. Initially started on ASA 81 mg and switched to Eliqius 5mg BID on 04/25. Echo EF 25%. He is s/p diagnostic Cath-prox LAD 40%, dist RPDA 60%, Right radial access site on 4/27.   He is s/p CRT-D( Cardiac Resynchronisation therapy- Defibrillator)  implantation on 4/28. Pt noted to have left chest wall and axillary swelling on 4/29 which improved. Evaluated by PT/OT and was recommended AR. Patient deemed stable for discharge to [facility] on 4/30/22.    Patient was stable upon rehab admission to  Inpatient Rehabilitation Facility. Admitted with gait instabilty, ADL, and functional impairments.     Rehab Course significant for intermittent pruritis of upper back likely due to irritation from hospital bedding; had received hydrocortisone cream and Benadryl. In addition, LFTs were elevated, but stable throughout his stay; RUQ US unremarkable. Patient also continued to endorse left chest wall/shoulder pain at ICD incision site with localized swelling at the site of ICD. Right chest wall was negative for hematoma or focal fluid collection. All other medical co-morbidities were stable. Patient tolerated course of inpatient PT/OT/SLP rehab with significant functional improvements and met rehab goals prior to discharge. Patient was medically cleared on 05/10/2022 for discharged to home.    Patient's functional status as of last IDT rounds prior to discharge is, as follows:  #Dispo: IDR 05/03/2022  - OT: LIA-sup eating/grooming/UBD/LBD/toileting  - PT: LIA ' ambulation; 12 steps CG 1 rail  - SLP: regular with thins      Patient will follow up with the following:   Neurologist  Cardiologist  Cardiologist - EP  PCP  Physiatrist 61 year old Maltese speaking male with past medical history of DM2, HTN, dilated cardiomyopathy. Morning of presentation to [facility] with left hemiparesis. NIHSS at [facility] 4. CTA with right M1 occlusion. HR intermittently in the 40s, reportedly in second degree heart block (but no EKG on file), given atropine in route to the hospital.  Not a candidate for tPA as LKN > 4.5 hours.  He was transferred to [facility] (4/21) as stroke rescue for possible thrombectomy.  OZZY aware. Neuroimaging repeated.  [place] staff contacted [facility] staff notified the patient was found to have acute renal failure Cr. 2.8, BUN 88, K 5.8 (untreated).  BNP increased.  Concern for digoxin toxicity, although digoxin level noted to be 1.6 at OSH.   In the ED, T96.4, HR 50, /65, on %. Slow afib with HR 30-50s. FSG 56, given amp D50. Given albuterol and Ca gluc 2g.    Admitted to MICU for CVA neuro checks and possible thrombectomy. Patient deemed medically unstable for thrombectomy due to BRASH ( Bradycardia, renal failure, AV kevin blockade, shock, hyperkalemia ) syndrome. MRI brain performed 4/23 showing right MCA infarct with a right M1 occlusion. Initially started on ASA 81 mg and switched to Eliqius 5mg BID on 04/25. Echo EF 25%. He is s/p diagnostic Cath-prox LAD 40%, dist RPDA 60%, Right radial access site on 4/27.   He is s/p CRT-D( Cardiac Resynchronisation therapy- Defibrillator)  implantation on 4/28. Pt noted to have left chest wall and axillary swelling on 4/29 which improved. Evaluated by PT/OT and was recommended AR. Patient deemed stable for discharge to [facility] on 4/30/22.    Patient was stable upon rehab admission to  Inpatient Rehabilitation Facility. Admitted with gait instabilty, ADL, and functional impairments.     Rehab Course significant for intermittent pruritis of upper back likely due to irritation from hospital bedding; had received hydrocortisone cream and Benadryl. In addition, LFTs were elevated, but stable throughout his stay; RUQ US unremarkable. Patient also continued to endorse left chest wall/shoulder pain at ICD incision site with localized swelling at the site of ICD. Right chest wall was negative for hematoma or focal fluid collection. All other medical co-morbidities were stable. Patient tolerated course of inpatient PT/OT/SLP rehab with significant functional improvements and met rehab goals prior to discharge. Patient was medically cleared on 05/10/2022 for discharged to home.      Patient will follow up with the following:   Neurologist  Cardiologist  Cardiologist - EP  PCP  Physiatrist

## 2022-05-09 NOTE — DISCHARGE NOTE PROVIDER - NSDCFUADDAPPT_GEN_ALL_CORE_FT
[facility] Medicine/[facility]  Day of Discharge: 5/10/22  1:00PM to see PCP for home care orders.  Pt will be seeing resident Dr. Biggs, Dr. Latisha Jones to sign off on home care order

## 2022-05-09 NOTE — DISCHARGE NOTE PROVIDER - NSDCCPCAREPLAN_GEN_ALL_CORE_FT
PRINCIPAL DISCHARGE DIAGNOSIS  Diagnosis: CVA (cerebrovascular accident)  Assessment and Plan of Treatment: You were admitted to [facility] for acute rehabilitation after having a stroke. After being discharged, please have close follow up with the neurologist, your PCP, and rehab doctors for further management.      SECONDARY DISCHARGE DIAGNOSES  Diagnosis: S/P ICD (internal cardiac defibrillator) procedure  Assessment and Plan of Treatment: Prior to your admission to [facility] you received an ICD implantation to your left chest wall to monitor your heart rhythms. Afterwards, you experienced pain and swelling at the area of the ICD. An ultrasound of this region did not show any evidence of a hematoma or other fluid collection. After being discharged, please have close follow up with your EP doctor, the cardiologist that placed the ICD.    Diagnosis: Transaminitis  Assessment and Plan of Treatment: Your liver enzymes were elevated during your stay at [facility]. After discharge, please follow up with your PCP for further monitoring.    Diagnosis: Atrial fibrillation  Assessment and Plan of Treatment: You were found to have atrial fibrillation at [facility] and was started on a blood thinner, called Eliquis. After being discharged, please continue to take this medication, as prescribed, and have close follow up with your PCP and cardiologist.    Diagnosis: Type 2 diabetes mellitus  Assessment and Plan of Treatment: Your diabetes medications were adjusted during your admission to [facility]. Please continue to take these medications, as prescribed. Please follow up with your PCP after being discharged for further management.

## 2022-05-09 NOTE — PROGRESS NOTE ADULT - SUBJECTIVE AND OBJECTIVE BOX
Patient is a 61y old  Male who presents with a chief complaint of CVA (08 May 2022 12:38). Patient is on metformin and on insulin for his diabetes mellitus. His hypertension is controlled on his current regimen. He takes tamsulosin for his BPH.      Patient seen and examined at bedside.  No overnight events  No complaints this morning    ROS:  CONSTITUTIONAL: No fever, weight loss, or fatigue  CARDIOVASCULAR: No chest pain, palpitations, dizziness, or leg swelling      ALLERGIES:  No Known Allergies    MEDICATIONS  (STANDING):  apixaban 5 milliGRAM(s) Oral two times a day  atorvastatin 40 milliGRAM(s) Oral at bedtime  dextrose 5%. 1000 milliLiter(s) (50 mL/Hr) IV Continuous <Continuous>  dextrose 5%. 1000 milliLiter(s) (100 mL/Hr) IV Continuous <Continuous>  dextrose 50% Injectable 25 Gram(s) IV Push once  dextrose 50% Injectable 12.5 Gram(s) IV Push once  dextrose 50% Injectable 25 Gram(s) IV Push once  glucagon  Injectable 1 milliGRAM(s) IntraMuscular once  insulin glargine Injectable (LANTUS) 12 Unit(s) SubCutaneous at bedtime  insulin lispro (ADMELOG) corrective regimen sliding scale   SubCutaneous three times a day before meals  insulin lispro (ADMELOG) corrective regimen sliding scale   SubCutaneous at bedtime  insulin lispro Injectable (ADMELOG) 4 Unit(s) SubCutaneous three times a day before meals  lisinopril 5 milliGRAM(s) Oral daily  melatonin 6 milliGRAM(s) Oral at bedtime  metFORMIN 1000 milliGRAM(s) Oral two times a day  metoprolol succinate ER 25 milliGRAM(s) Oral daily  polyethylene glycol 3350 17 Gram(s) Oral two times a day  senna 2 Tablet(s) Oral at bedtime  tamsulosin 0.4 milliGRAM(s) Oral at bedtime    MEDICATIONS  (PRN):  acetaminophen     Tablet .. 650 milliGRAM(s) Oral every 6 hours PRN Temp greater or equal to 38C (100.4F), Mild Pain (1 - 3)  dextrose Oral Gel 15 Gram(s) Oral once PRN Blood Glucose LESS THAN 70 milliGRAM(s)/deciliter    Vital Signs Last 24 Hrs  T(F): 98.7 (09 May 2022 08:31), Max: 98.7 (09 May 2022 08:31)  HR: 79 (09 May 2022 08:31) (79 - 85)  BP: 108/60 (09 May 2022 08:31) (106/69 - 142/70)  RR: 16 (09 May 2022 08:31) (16 - 16)  SpO2: 98% (09 May 2022 08:31) (98% - 99%)  I&O's Summary    PHYSICAL EXAM:  GENERAL: NAD  HENT:  Atraumatic, Normocephalic; No tonsillar erythema, exudates, or enlargement; Moist mucous membranes;   EYES: EOMI, PERRLA, conjunctiva and sclera clear, no lid-lag  NECK: Supple, No JVD, Normal thyroid  CHEST/LUNG: Clear to percussion bilaterally; No rales, rhonchi, wheezing, or rubs; normal respiratory effort, no intercostal retractions  HEART: Regular rate and rhythm; No murmurs, rubs, or gallops; No pitting edema  ABDOMEN: Soft, Nontender, Nondistended; Bowel sounds present; No HSM  MUSCULOSKELETAL/EXTREMITIES:  2+ Peripheral Pulses, No clubbing or digital cyanosis  PSYCH: Appropriate affect, Alert & Awake; Good judgement    LABS:                        10.0   9.21  )-----------( 209      ( 09 May 2022 06:20 )             28.9       05-09    138  |  102  |  41  ----------------------------<  133  4.6   |  26  |  1.18    Ca    9.6      09 May 2022 06:20  Mg     2.0     05-09    TPro  7.7  /  Alb  3.6  /  TBili  0.7  /  DBili  x   /  AST  44  /  ALT  70  /  AlkPhos  199  05-09       04-22 Chol 103 mg/dL LDL -- HDL 42 mg/dL Trig 84 mg/dL      POCT Blood Glucose.: 166 mg/dL (09 May 2022 11:52)  POCT Blood Glucose.: 131 mg/dL (09 May 2022 07:36)  POCT Blood Glucose.: 173 mg/dL (08 May 2022 21:30)  POCT Blood Glucose.: 111 mg/dL (08 May 2022 17:26)      COVID-19 PCR: NotDetec (04-29-22 @ 15:54)  COVID-19 PCR: NotDetec (04-25-22 @ 11:00)  COVID-19 PCR: NotDetec (04-21-22 @ 13:15)      Care Discussed with Rehab Attending and Other Providers

## 2022-05-09 NOTE — PROGRESS NOTE ADULT - ASSESSMENT
61M with PMH DM2, HTN, dilated cardiomyopathy presented to [facility] w/ L hemiparesis found to have R M1 occlusion and then transferred to [facility] on 4/21, given atropine in route to the hospital for bradycardia. 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 Course complicated by NANCY, afib now on eliquis, s/p diagnostic cath and s/p CRT-D implantation 4/28.  Now admitted to [facility] for initiation of multidisciplinary rehab program.     #R MCA CVA  likely cardioembolic 2/2 afib. Out of window for TPA  -Continue comprehensive rehab program  -Continue Eliquis for AC  -Continue Lipitor    #paroxysmal a fib  -Continue Eliquis for AC  -Continue rate control with Toprol XL    #Dilated cardiomyopathy w/ HFrEF  -Echo EF 25% on 4/23; CMRI 4/28 - LV dilated with LVEF: 32%  -Continue Lisinopril, Toprol  -Daily weights, strict I&Os. Appears euvolemic   -Follow up US left chest wall    #Non-obstructive CAD  -s/p diagnostic cath nonobstructive CAD, CRT-D implantation 4/28  -Continue Lisinopril, Toprol  -Monitor vitals    #DM Type 2   HbA1c 7.6 (4/22/22)  -Continue Metformin 1000mg BID, Lantus 12 units qHS, Admelog 4 units pre-meal, moderate dose insulin sliding scale  -Hypoglycemia protocol, accu-checks  -Blood glucose goal 100-180 in hospital setting - uncontrolled     #Transaminitis  -ABD US (5/6/22) - Bile ducts: Normal caliber. Common bile duct measures 5 mm. Gallbladder: Cholecystectomy.  -PO hydration encouraged   -Outpatient follow up for further management    #BPH  -Chronic, continue Flomax    #Prophylactic Measure  -DVT ppx: Eliquis

## 2022-05-09 NOTE — PROGRESS NOTE ADULT - ASSESSMENT
61 year old Divehi speaking male with past medical history of DM2, HTN, dilated cardiomyopathy originally presented to [facility] w/ L hemiparesis found to have R M1 occlusion and then transferred to [facility] on 4/21, given atropine in route to the hospital for bradycardia. 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 Course complicated by NANCY/Acute renal failure (resolved), afb now on eliquis, s/p diagnostic cath and s/p CRT-D implantation 4/28.  Admitted to [facility] on 4/30       #R MCA CVA: - due to R m1 occlusion, was out of window for TPA and not candidate for thrombectomy  - etiology likely cardioembolic 2/2 afib  - c/w statin, eliquis   - Comprehensive Multidisciplinary Rehab Program:   3 hours a day, 5 days a week with PT/OT/SLP  Impulsivity noted at times  - chair and bed alarm     #Dilated cardiomyopathy w/ HFrEF, #Atrial fibrillation  - Echo EF 25% on 4/23   - s/p CRT-D implantation 4/28  - c/w lisinopril (transition to [facility] as outpt per cardio), metoprolol,  - c/w eliquis  - daily weights  - US left chest wall did not show collections or hematomas; f/u with EP as outpatient  - cold compress and tylenol, as needed for pain    #Transaminitis  - LFTs stable through the weekend  - RUQ US unremarkable  - plan to decrease Atorvastatin to 20mg qhs; f/u as outpatient with PCP  - lipid profile with LDL 44, cholesterol 103, and TGC 84    #DM2, uncontrolled :  hyperglycemia :  in rehab on Lantus 10 UNITS, metformin 850mg BID, SSI , CC diet   - (Home meds glyburide 5mg BID, metformin 850mg BID, and vildagliptin 50mg daily)   - - HbA1c 7.6    Pain Management:- Tylenol PRN, Tramadol PRN    GI/Bowel:- Senna QHS, Miralax daily  - GI ppx: n/a    /Bladder: - tamsulosin, toileting prn     Skin/Pressure Injury:  - Monitor Incisions: left chest wall CRT-D incision clean dry and intact with mild swelling  OOB as tolerated   - Pruritis to back: increase topical hydrocortisone to TID, hypoallergenic sheets     Sleep : improved with melatonin     Diet:  DASH/CC    DVT ppx:  - on eliquis    Precautions: Fall,  Defibrillator     #Dispo:   - TDD: 5/10; caregiver training prior to discharge

## 2022-05-09 NOTE — PROGRESS NOTE ADULT - SUBJECTIVE AND OBJECTIVE BOX
Subjective:  Patient was seen and examined during PT session this AM. No acute overnight events. No acute medical issues over the weekend. Endorsed itching last night, which resolved with PO Benadryl. LFTs relatively stable.    Liberian-speaking therapist provided Liberian translations at the bedside. Reports he feels "fine" and slept well overnight. Did not report any acute medical issues this AM.       ROS:  Did not report CP, SOB, or abdominal pain. Last BM was on 5/9      Physical Exam:  Vital Signs Last 24 Hrs  T(C): 37.1 (09 May 2022 08:31), Max: 37.1 (09 May 2022 08:31)  T(F): 98.7 (09 May 2022 08:31), Max: 98.7 (09 May 2022 08:31)  HR: 79 (09 May 2022 08:31) (79 - 85)  BP: 108/60 (09 May 2022 08:31) (106/69 - 142/70)  BP(mean): --  RR: 16 (09 May 2022 08:31) (16 - 16)  SpO2: 98% (09 May 2022 08:31) (98% - 99%)      Physical Exam:  Constitutional - NAD, Comfortable  Chest - Breathing comfortably on RA  Cardiovascular - warm and well perfused; no LE edema  Abdomen - BS+, Soft, NTND  Extremities - No calf tenderness. Pain with passive ROM of left shoulder  Neurologic Exam - stable                 Psychiatric - Mood stable, Affect WNL  Skin: left chest wall defibrillator site significant swelling and some ecchymosis; nontender over ICD site, but tender over incision.     Functional Status:  - OT: CG-sup eating/grooming/UBD/LBD/toileting  - PT: CG ' ambulation; 12 steps CG 1 rail      Recent Labs/Imaging:                        10.0   9.21  )-----------( 209      ( 09 May 2022 06:20 )             28.9     05-09    138  |  102  |  41<H>  ----------------------------<  133<H>  4.6   |  26  |  1.18    Ca    9.6      09 May 2022 06:20  Mg     2.0     05-09  TPro  7.7  /  Alb  3.6  /  TBili  0.7  /  DBili  x   /  AST  44<H>  /  ALT  70<H>  /  AlkPhos  199<H>  05-09      POCT Blood Glucose.: 166 mg/dL (05-09-22 @ 11:52)  POCT Blood Glucose.: 131 mg/dL (05-09-22 @ 07:36)  POCT Blood Glucose.: 173 mg/dL (05-08-22 @ 21:30)  POCT Blood Glucose.: 111 mg/dL (05-08-22 @ 17:26)      Medications:  acetaminophen     Tablet .. 650 milliGRAM(s) Oral every 6 hours PRN  apixaban 5 milliGRAM(s) Oral two times a day  atorvastatin 40 milliGRAM(s) Oral at bedtime  dextrose 5%. 1000 milliLiter(s) IV Continuous <Continuous>  dextrose 5%. 1000 milliLiter(s) IV Continuous <Continuous>  dextrose 50% Injectable 25 Gram(s) IV Push once  dextrose 50% Injectable 12.5 Gram(s) IV Push once  dextrose 50% Injectable 25 Gram(s) IV Push once  dextrose Oral Gel 15 Gram(s) Oral once PRN  glucagon  Injectable 1 milliGRAM(s) IntraMuscular once  insulin glargine Injectable (LANTUS) 12 Unit(s) SubCutaneous at bedtime  insulin lispro (ADMELOG) corrective regimen sliding scale   SubCutaneous three times a day before meals  insulin lispro (ADMELOG) corrective regimen sliding scale   SubCutaneous at bedtime  insulin lispro Injectable (ADMELOG) 4 Unit(s) SubCutaneous three times a day before meals  lisinopril 5 milliGRAM(s) Oral daily  melatonin 6 milliGRAM(s) Oral at bedtime  metFORMIN 1000 milliGRAM(s) Oral two times a day  metoprolol succinate ER 25 milliGRAM(s) Oral daily  polyethylene glycol 3350 17 Gram(s) Oral two times a day  senna 2 Tablet(s) Oral at bedtime  tamsulosin 0.4 milliGRAM(s) Oral at bedtime

## 2022-05-09 NOTE — DISCHARGE NOTE PROVIDER - NSDCFUSCHEDAPPT_GEN_ALL_CORE_FT
[facility]  [facility]  Scheduled Appointment: 05/10/2022

## 2022-05-09 NOTE — DISCHARGE NOTE PROVIDER - NSDCMRMEDTOKEN_GEN_ALL_CORE_FT
apixaban 5 mg oral tablet: 1 tab(s) orally 2 times a day  atorvastatin 40 mg oral tablet: 1 tab(s) orally once a day (at bedtime)  glyBURIDE 5 mg oral tablet: 1 tab(s) orally 2 times a day  hydrocortisone 1% topical cream: 1 application topically 2 times a day  lisinopril 5 mg oral tablet: 1 tab(s) orally once a day  loratadine 10 mg oral tablet: 1 tab(s) orally once a day  metFORMIN 850 mg oral tablet: 1 tab(s) orally 2 times a day  metoprolol succinate 25 mg oral tablet, extended release: 1 tab(s) orally once a day  tamsulosin 0.4 mg oral capsule: 1 cap(s) orally once a day (at bedtime)  traMADol 50 mg oral tablet: 1 tab(s) orally every 6 hours, As needed, Moderate Pain (4 - 6)  vildagliptin: 50 milligram(s) orally once a day   apixaban 5 mg oral tablet: 1 tab(s) orally 2 times a day  atorvastatin 40 mg oral tablet: 1 tab(s) orally once a day (at bedtime)  glyBURIDE 5 mg oral tablet: 1 tab(s) orally 2 times a day  hydrocortisone 1% topical cream: 1 application topically 2 times a day  loratadine 10 mg oral tablet: 1 tab(s) orally once a day  metFORMIN 850 mg oral tablet: 1 tab(s) orally 2 times a day  metoprolol succinate 25 mg oral tablet, extended release: 1 tab(s) orally once a day  traMADol 50 mg oral tablet: 1 tab(s) orally every 6 hours, As needed, Moderate Pain (4 - 6)  vildagliptin: 50 milligram(s) orally once a day   apixaban 5 mg oral tablet: 1 tab(s) orally 2 times a day  atorvastatin 20 mg oral tablet: 1 tab(s) orally once a day (at bedtime)  lisinopril 5 mg oral tablet: 1 tab(s) orally once a day  metFORMIN 1000 mg oral tablet: 1 tab(s) orally 2 times a day  metoprolol succinate 25 mg oral tablet, extended release: 1 tab(s) orally once a day  tamsulosin 0.4 mg oral capsule: 1 cap(s) orally once a day (at bedtime)  vildagliptin: 50 milligram(s) orally once a day

## 2022-05-09 NOTE — PROGRESS NOTE ADULT - ATTENDING COMMENTS
Patient seen this am in PT gym  PT assisted with translation.   Patient states that he feels ok  denies any HA/CP/palpitations    Left upper chest Defibrillator site -with swelling - not worse compared to last week.  US left upper chest - no findings    2. Labs stable     3. Continue rehab program.  dc home in am

## 2022-05-09 NOTE — DISCHARGE NOTE PROVIDER - PROVIDER TOKENS
PROVIDER:[TOKEN:[[id number]],FOLLOWUP:[2 weeks]],PROVIDER:[TOKEN:[[id number]],FOLLOWUP:[2 weeks]],FREE:[LAST:[Follow up],PHONE:[(   )    -],FAX:[(   )    -],ADDRESS:[After rehab, outpatient cardiology follow-up with at [facility] by calling [phone] or at [facility] by calling [phone].]] PROVIDER:[TOKEN:[[id number]],FOLLOWUP:[2 weeks]],PROVIDER:[TOKEN:[[id number]],FOLLOWUP:[2 weeks]],FREE:[LAST:[Follow up],PHONE:[(   )    -],FAX:[(   )    -],ADDRESS:[After rehab, outpatient cardiology follow-up with at [facility] by calling [phone] or at [facility] by calling [phone].]],PROVIDER:[TOKEN:[[id number]]] PROVIDER:[TOKEN:[[id number]],FOLLOWUP:[2 weeks]],PROVIDER:[TOKEN:[[id number]],FOLLOWUP:[2 weeks]],PROVIDER:[TOKEN:[[id number]]],FREE:[LAST:[Follow up],PHONE:[(   )    -],FAX:[(   )    -],ADDRESS:[After rehab, outpatient cardiology follow-up with at [facility] by calling [phone] or at [facility] by calling [phone].]],PROVIDER:[TOKEN:[[id number]],FOLLOWUP:[1 month]]

## 2022-05-09 NOTE — DISCHARGE NOTE PROVIDER - NSDCFUADDINST_GEN_ALL_CORE_FT
Do NOT LIFT left arm above shoulder level for atleast another 6 weeks until clear by your cardiologist  Do NOT LIFT left arm above shoulder level for atleast another 6 weeks until clear by your cardiologist     If swelling on left chest increases, please call Cardiology at [phone] or call Family Practice clinic Do NOT LIFT left arm above shoulder level for atleast another 6 weeks until clear by your cardiologist     If swelling on left chest increases, please call Cardiology at [phone] or call Family Practice clinic    Please have your PCP follow up on diabetic management and Liver enzymes

## 2022-05-10 ENCOUNTER — APPOINTMENT (OUTPATIENT)
Dept: FAMILY MEDICINE | Facility: HOSPITAL | Age: 62
End: 2022-05-10

## 2022-05-10 ENCOUNTER — OUTPATIENT (OUTPATIENT)
Dept: OUTPATIENT SERVICES | Facility: HOSPITAL | Age: 62
LOS: 1 days | End: 2022-05-10
Payer: MEDICAID

## 2022-05-10 VITALS
TEMPERATURE: 98 F | OXYGEN SATURATION: 100 % | SYSTOLIC BLOOD PRESSURE: 146 MMHG | HEART RATE: 78 BPM | DIASTOLIC BLOOD PRESSURE: 70 MMHG | RESPIRATION RATE: 15 BRPM

## 2022-05-10 VITALS
TEMPERATURE: 97.1 F | BODY MASS INDEX: 20.09 KG/M2 | OXYGEN SATURATION: 100 % | HEIGHT: 66 IN | DIASTOLIC BLOOD PRESSURE: 76 MMHG | HEART RATE: 85 BPM | RESPIRATION RATE: 12 BRPM | SYSTOLIC BLOOD PRESSURE: 149 MMHG | WEIGHT: 125 LBS

## 2022-05-10 DIAGNOSIS — Z78.9 OTHER SPECIFIED HEALTH STATUS: ICD-10-CM

## 2022-05-10 DIAGNOSIS — Z82.49 FAMILY HISTORY OF ISCHEMIC HEART DISEASE AND OTHER DISEASES OF THE CIRCULATORY SYSTEM: ICD-10-CM

## 2022-05-10 DIAGNOSIS — Z00.00 ENCOUNTER FOR GENERAL ADULT MEDICAL EXAMINATION WITHOUT ABNORMAL FINDINGS: ICD-10-CM

## 2022-05-10 LAB
GLUCOSE BLDC GLUCOMTR-MCNC: 227 MG/DL — HIGH (ref 70–99)
GLUCOSE BLDC GLUCOMTR-MCNC: 78 MG/DL — SIGNIFICANT CHANGE UP (ref 70–99)

## 2022-05-10 PROCEDURE — 99232 SBSQ HOSP IP/OBS MODERATE 35: CPT

## 2022-05-10 PROCEDURE — 82274 ASSAY TEST FOR BLOOD FECAL: CPT

## 2022-05-10 PROCEDURE — 99238 HOSP IP/OBS DSCHRG MGMT 30/<: CPT

## 2022-05-10 PROCEDURE — G0463: CPT

## 2022-05-10 RX ADMIN — METFORMIN HYDROCHLORIDE 1000 MILLIGRAM(S): 850 TABLET ORAL at 06:53

## 2022-05-10 RX ADMIN — Medication 4: at 12:01

## 2022-05-10 RX ADMIN — APIXABAN 5 MILLIGRAM(S): 2.5 TABLET, FILM COATED ORAL at 06:52

## 2022-05-10 RX ADMIN — LISINOPRIL 5 MILLIGRAM(S): 2.5 TABLET ORAL at 06:52

## 2022-05-10 RX ADMIN — Medication 25 MILLIGRAM(S): at 06:53

## 2022-05-10 NOTE — PROGRESS NOTE ADULT - SUBJECTIVE AND OBJECTIVE BOX
61y old  Male who presents with a chief complaint of CVA (08 May 2022 12:38). Patient is on metformin and on insulin for his diabetes mellitus. His hypertension is controlled on his current regimen. He takes tamsulosin for his BPH.      Patient seen and examined at bedside.  No overnight events  No complaints this morning    ROS:  CONSTITUTIONAL: No fever, weight loss, or fatigue  CARDIOVASCULAR: No chest pain, palpitations, dizziness, or leg swelling    ALLERGIES:  No Known Allergies    MEDICATIONS  (STANDING):  apixaban 5 milliGRAM(s) Oral two times a day  atorvastatin 20 milliGRAM(s) Oral at bedtime  dextrose 5%. 1000 milliLiter(s) (50 mL/Hr) IV Continuous <Continuous>  dextrose 5%. 1000 milliLiter(s) (100 mL/Hr) IV Continuous <Continuous>  dextrose 50% Injectable 25 Gram(s) IV Push once  dextrose 50% Injectable 12.5 Gram(s) IV Push once  dextrose 50% Injectable 25 Gram(s) IV Push once  glucagon  Injectable 1 milliGRAM(s) IntraMuscular once  insulin glargine Injectable (LANTUS) 12 Unit(s) SubCutaneous at bedtime  insulin lispro (ADMELOG) corrective regimen sliding scale   SubCutaneous three times a day before meals  insulin lispro (ADMELOG) corrective regimen sliding scale   SubCutaneous at bedtime  insulin lispro Injectable (ADMELOG) 4 Unit(s) SubCutaneous three times a day before meals  lisinopril 5 milliGRAM(s) Oral daily  melatonin 6 milliGRAM(s) Oral at bedtime  metFORMIN 1000 milliGRAM(s) Oral two times a day  metoprolol succinate ER 25 milliGRAM(s) Oral daily  polyethylene glycol 3350 17 Gram(s) Oral two times a day  senna 2 Tablet(s) Oral at bedtime  tamsulosin 0.4 milliGRAM(s) Oral at bedtime    MEDICATIONS  (PRN):  acetaminophen     Tablet .. 650 milliGRAM(s) Oral every 6 hours PRN Temp greater or equal to 38C (100.4F), Mild Pain (1 - 3)  dextrose Oral Gel 15 Gram(s) Oral once PRN Blood Glucose LESS THAN 70 milliGRAM(s)/deciliter    Vital Signs Last 24 Hrs  T(F): 97.7 (10 May 2022 08:34), Max: 97.8 (09 May 2022 22:34)  HR: 78 (10 May 2022 08:34) (78 - 83)  BP: 146/70 (10 May 2022 08:34) (131/76 - 146/70)  RR: 15 (10 May 2022 08:34) (15 - 16)  SpO2: 100% (10 May 2022 08:34) (100% - 100%)  I&O's Summary    PHYSICAL EXAM:  GENERAL: NAD  HENT:  Atraumatic, Normocephalic; No tonsillar erythema, exudates, or enlargement; Moist mucous membranes;   EYES: EOMI, PERRLA, conjunctiva and sclera clear, no lid-lag  NECK: Supple, No JVD, Normal thyroid  CHEST/LUNG: Clear to percussion bilaterally; No rales, rhonchi, wheezing, or rubs; normal respiratory effort, no intercostal retractions  HEART: Regular rate and rhythm; No murmurs, rubs, or gallops; No pitting edema  ABDOMEN: Soft, Nontender, Nondistended; Bowel sounds present; No HSM  MUSCULOSKELETAL/EXTREMITIES:  2+ Peripheral Pulses, No clubbing or digital cyanosis  PSYCH: Appropriate affect, Alert & Awake; Good judgement    LABS:                        10.0   9.21  )-----------( 209      ( 09 May 2022 06:20 )             28.9       05-09    138  |  102  |  41  ----------------------------<  133  4.6   |  26  |  1.18    Ca    9.6      09 May 2022 06:20  Mg     2.0     05-09    TPro  7.7  /  Alb  3.6  /  TBili  0.7  /  DBili  x   /  AST  44  /  ALT  70  /  AlkPhos  199  05-09     04-22 Chol 103 mg/dL LDL -- HDL 42 mg/dL Trig 84 mg/dL      POCT Blood Glucose.: 78 mg/dL (10 May 2022 07:30)  POCT Blood Glucose.: 185 mg/dL (09 May 2022 22:38)  POCT Blood Glucose.: 106 mg/dL (09 May 2022 17:03)  POCT Blood Glucose.: 166 mg/dL (09 May 2022 11:52)    COVID-19 PCR: NotDetec (04-29-22 @ 15:54)  COVID-19 PCR: NotDetec (04-25-22 @ 11:00)  COVID-19 PCR: NotDetec (04-21-22 @ 13:15)      Care Discussed with Rehab Attending and Other Providers

## 2022-05-10 NOTE — PROGRESS NOTE ADULT - PROVIDER SPECIALTY LIST ADULT
Hospitalist
Rehab Medicine
Hospitalist
Rehab Medicine
Hospitalist
Hospitalist
Rehab Medicine
Hospitalist

## 2022-05-10 NOTE — PROGRESS NOTE ADULT - ASSESSMENT
61M with PMH DM2, HTN, dilated cardiomyopathy presented to [facility] w/ L hemiparesis found to have R M1 occlusion and then transferred to [facility] on 4/21, given atropine in route to the hospital for bradycardia. 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 Course complicated by NANCY, afib now on eliquis, s/p diagnostic cath and s/p CRT-D implantation 4/28.  Now admitted to [facility] for initiation of multidisciplinary rehab program.     #R MCA CVA  likely cardioembolic 2/2 afib. Out of window for TPA  -Continue comprehensive rehab program  -Continue Eliquis for AC  -Continue Lipitor    #paroxysmal a fib  -Continue Eliquis for AC  -Continue rate control with Toprol XL    #Dilated cardiomyopathy w/ HFrEF  -Echo EF 25% on 4/23; CMRI 4/28 - LV dilated with LVEF: 32%  -Continue Lisinopril, Toprol  -Daily weights, strict I&Os. Appears euvolemic   -Follow up US left chest wall    #Non-obstructive CAD  -s/p diagnostic cath nonobstructive CAD, CRT-D implantation 4/28  -Continue Lisinopril, Toprol  -Monitor vitals    #DM Type 2   HbA1c 7.6 (4/22/22)  -Continue Metformin 1000mg BID, Lantus 12 units qHS, Discontinue Admelog 4 units pre-meal, insulin sliding scale  -Hypoglycemia protocol, accu-checks  -Blood glucose goal 100-180 in hospital setting  -Discharge patient home on his home meds of glyburide 5mg BID, vildagliptin 50mg daily, and metformin but increase metformin to 1000mg BID    #Transaminitis  -ABD US (5/6/22) - Bile ducts: Normal caliber. Common bile duct measures 5 mm. Gallbladder: Cholecystectomy.  -PO hydration encouraged   -Outpatient follow up for further management    #BPH  -Chronic, continue Flomax    #Prophylactic Measure  -DVT ppx: Eliquis

## 2022-05-10 NOTE — PROGRESS NOTE ADULT - NUTRITIONAL ASSESSMENT
This patient has been assessed with a concern for Malnutrition and has been determined to have a diagnosis/diagnoses of Severe protein-calorie malnutrition.    This patient is being managed with:   Diet Consistent Carbohydrate/No Snacks-  Low Sodium  Supplement Feeding Modality:  Oral  Glucerna Shake Cans or Servings Per Day:  1       Frequency:  Two Times a day  Entered: May  6 2022  9:21AM    
This patient has been assessed with a concern for Malnutrition and has been determined to have a diagnosis/diagnoses of Severe protein-calorie malnutrition.    This patient is being managed with:   Diet Consistent Carbohydrate/No Snacks-  Low Sodium  Supplement Feeding Modality:  Oral  Glucerna Shake Cans or Servings Per Day:  1       Frequency:  Two Times a day  Entered: May  2 2022 11:06AM    
This patient has been assessed with a concern for Malnutrition and has been determined to have a diagnosis/diagnoses of Severe protein-calorie malnutrition.    This patient is being managed with:   Diet Consistent Carbohydrate/No Snacks-  Low Sodium  Supplement Feeding Modality:  Oral  Glucerna Shake Cans or Servings Per Day:  1       Frequency:  Two Times a day  Entered: May  6 2022  9:21AM    

## 2022-05-10 NOTE — PROGRESS NOTE ADULT - SUBJECTIVE AND OBJECTIVE BOX
Subjective:  Patient was seen and examined at bedside Georgian translation- Pacific interpretor  used Jacquie #[id number]  He states that he slept better and is doing well       ROS:  Did not report CP, SOB, or abdominal pain.       Physical Exam:    Vital Signs Last 24 Hrs  T(C): 36.5 (10 May 2022 08:34), Max: 36.6 (09 May 2022 22:34)  T(F): 97.7 (10 May 2022 08:34), Max: 97.8 (09 May 2022 22:34)  HR: 78 (10 May 2022 08:34) (78 - 83)  BP: 146/70 (10 May 2022 08:34) (131/76 - 146/70)  BP(mean): --  RR: 15 (10 May 2022 08:34) (15 - 16)  SpO2: 100% (10 May 2022 08:34) (100% - 100%)    Physical Exam:  Constitutional - NAD, Comfortable  Chest - CLEAR   Cardiovascular - S1S2  Abdomen - BS+, Soft, NTND  Extremities - No calf tenderness.  Neurologic Exam - stable                 Psychiatric - Mood stable, Affect WNL  Skin: left chest wall defibrillator site significant swelling and some ecchymosis; nontender over ICD site,      Functional Status:  Distant supervision with ADls, gait     Recent Labs/Imaging:                        10.0   9.21  )-----------( 209      ( 09 May 2022 06:20 )             28.9     05-09    138  |  102  |  41<H>  ----------------------------<  133<H>  4.6   |  26  |  1.18    Ca    9.6      09 May 2022 06:20  Mg     2.0     05-09  TPro  7.7  /  Alb  3.6  /  TBili  0.7  /  DBili  x   /  AST  44<H>  /  ALT  70<H>  /  AlkPhos  199<H>  05-09      MEDICATIONS  (STANDING):  apixaban 5 milliGRAM(s) Oral two times a day  atorvastatin 20 milliGRAM(s) Oral at bedtime  dextrose 5%. 1000 milliLiter(s) (50 mL/Hr) IV Continuous <Continuous>  dextrose 5%. 1000 milliLiter(s) (100 mL/Hr) IV Continuous <Continuous>  dextrose 50% Injectable 25 Gram(s) IV Push once  dextrose 50% Injectable 12.5 Gram(s) IV Push once  dextrose 50% Injectable 25 Gram(s) IV Push once  glucagon  Injectable 1 milliGRAM(s) IntraMuscular once  insulin glargine Injectable (LANTUS) 12 Unit(s) SubCutaneous at bedtime  insulin lispro (ADMELOG) corrective regimen sliding scale   SubCutaneous three times a day before meals  insulin lispro (ADMELOG) corrective regimen sliding scale   SubCutaneous at bedtime  lisinopril 5 milliGRAM(s) Oral daily  melatonin 6 milliGRAM(s) Oral at bedtime  metFORMIN 1000 milliGRAM(s) Oral two times a day  metoprolol succinate ER 25 milliGRAM(s) Oral daily  polyethylene glycol 3350 17 Gram(s) Oral two times a day  senna 2 Tablet(s) Oral at bedtime  tamsulosin 0.4 milliGRAM(s) Oral at bedtime    MEDICATIONS  (PRN):  acetaminophen     Tablet .. 650 milliGRAM(s) Oral every 6 hours PRN Temp greater or equal to 38C (100.4F), Mild Pain (1 - 3)  dextrose Oral Gel 15 Gram(s) Oral once PRN Blood Glucose LESS THAN 70 milliGRAM(s)/deciliter      CAPILLARY BLOOD GLUCOSE      POCT Blood Glucose.: 78 mg/dL (10 May 2022 07:30)  POCT Blood Glucose.: 185 mg/dL (09 May 2022 22:38)  POCT Blood Glucose.: 106 mg/dL (09 May 2022 17:03)  POCT Blood Glucose.: 166 mg/dL (09 May 2022 11:52)

## 2022-05-10 NOTE — PROGRESS NOTE ADULT - ASSESSMENT
61 year old Albanian speaking male with past medical history of DM2, HTN, dilated cardiomyopathy originally presented to [facility] w/ L hemiparesis found to have R M1 occlusion and then transferred to [facility] on 4/21, given atropine in route to the hospital for bradycardia. 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 Course complicated by NANCY/Acute renal failure (resolved), afb now on eliquis, s/p diagnostic cath and s/p CRT-D implantation 4/28.  Admitted to [facility] inpatient re[facility] on 4/30       #R MCA CVA: - due to R m1 occlusion, was out of window for TPA and not candidate for thrombectomy  - etiology likely cardioembolic 2/2 afib  - c/w statin, eliquis     #Dilated cardiomyopathy w/ HFrEF, #Atrial fibrillation  - Echo EF 25% on 4/23   - s/p CRT-D implantation 4/28  - c/w lisinopril (transition to [facility] as outpt per cardio), metoprolol,  - c/w eliquis  - daily weights  - US left chest wall did not show collections or hematomas; f/u with EP as outpatient    #Transaminitis: improving labs on 5/9  - LFTs stable through the weekend  Statin dose reduced     DM2, : blood sugars improved. dc home on oral agents - metformin 850mg BID, and vildagliptin 50mg daily)   - - HbA1c 7.6    Pain Management:- Tylenol PRN, Tramadol PRN    GI/Bowel:- Senna QHS, Miralax daily  - GI ppx: n/a    /Bladder: - tamsulosin, toileting prn     Sleep : improved with melatonin     Diet:  DASH/CC    DVT ppx:- on eliquis    Precautions: Fall,  Defibrillator     Disp:   dc home today   Follow up discussed   Patient has a follow up appointment with FP today   fu with cardiology, neurology in 1-2 weeks and PMR in 4 weeks

## 2022-05-11 PROBLEM — Z82.49 FAMILY HISTORY OF HYPERTENSION: Status: ACTIVE | Noted: 2022-05-11

## 2022-05-12 DIAGNOSIS — Z78.9 OTHER SPECIFIED HEALTH STATUS: ICD-10-CM

## 2022-05-12 DIAGNOSIS — Z82.49 FAMILY HISTORY OF ISCHEMIC HEART DISEASE AND OTHER DISEASES OF THE CIRCULATORY SYSTEM: ICD-10-CM

## 2022-05-12 DIAGNOSIS — I50.32 CHRONIC DIASTOLIC (CONGESTIVE) HEART FAILURE: ICD-10-CM

## 2022-05-12 DIAGNOSIS — Z86.73 PERSONAL HISTORY OF TRANSIENT ISCHEMIC ATTACK (TIA), AND CEREBRAL INFARCTION WITHOUT RESIDUAL DEFICITS: ICD-10-CM

## 2022-05-12 DIAGNOSIS — E11.9 TYPE 2 DIABETES MELLITUS WITHOUT COMPLICATIONS: ICD-10-CM

## 2022-05-12 DIAGNOSIS — Z12.11 ENCOUNTER FOR SCREENING FOR MALIGNANT NEOPLASM OF COLON: ICD-10-CM

## 2022-05-17 ENCOUNTER — APPOINTMENT (OUTPATIENT)
Dept: FAMILY MEDICINE | Facility: HOSPITAL | Age: 62
End: 2022-05-17

## 2022-05-17 ENCOUNTER — OUTPATIENT (OUTPATIENT)
Dept: OUTPATIENT SERVICES | Facility: HOSPITAL | Age: 62
LOS: 1 days | End: 2022-05-17
Payer: MEDICAID

## 2022-05-17 ENCOUNTER — APPOINTMENT (OUTPATIENT)
Dept: ELECTROPHYSIOLOGY | Facility: CLINIC | Age: 62
End: 2022-05-17

## 2022-05-17 VITALS — DIASTOLIC BLOOD PRESSURE: 77 MMHG | SYSTOLIC BLOOD PRESSURE: 151 MMHG

## 2022-05-17 VITALS
RESPIRATION RATE: 12 BRPM | WEIGHT: 128 LBS | HEART RATE: 87 BPM | SYSTOLIC BLOOD PRESSURE: 161 MMHG | BODY MASS INDEX: 20.66 KG/M2 | DIASTOLIC BLOOD PRESSURE: 81 MMHG | OXYGEN SATURATION: 100 % | TEMPERATURE: 97.5 F

## 2022-05-17 DIAGNOSIS — Z00.00 ENCOUNTER FOR GENERAL ADULT MEDICAL EXAMINATION WITHOUT ABNORMAL FINDINGS: ICD-10-CM

## 2022-05-17 PROCEDURE — G0463: CPT

## 2022-05-17 PROCEDURE — 80053 COMPREHEN METABOLIC PANEL: CPT

## 2022-05-17 RX ORDER — PNV NO.95/FERROUS FUM/FOLIC AC 28MG-0.8MG
100 TABLET ORAL DAILY
Qty: 300 | Refills: 0 | Status: ACTIVE | COMMUNITY
Start: 2022-05-17 | End: 1900-01-01

## 2022-05-18 NOTE — REVIEW OF SYSTEMS
[Fever] : no fever [Chills] : no chills [Fatigue] : no fatigue [Recent Change In Weight] : ~T no recent weight change [Chest Pain] : no chest pain [Palpitations] : no palpitations [Claudication] : no  leg claudication [Orthopena] : no orthopnea [Shortness Of Breath] : no shortness of breath [Wheezing] : no wheezing [Cough] : no cough [Dyspnea on Exertion] : not dyspnea on exertion [Abdominal Pain] : no abdominal pain [Nausea] : no nausea [Vomiting] : no vomiting [Headache] : no headache [Dizziness] : no dizziness [Fainting] : no fainting [Confusion] : no confusion [Unsteady Walk] : no ataxia [Memory Loss] : no memory loss

## 2022-05-18 NOTE — HISTORY OF PRESENT ILLNESS
[FreeTextEntry1] : F/U  HTN [de-identified] : Patient reports no acute complaints today. Denies and residual weakness he developed from the Stroke he suffered. Denies SOB at rest and exertion. Denies Orthopnea, palpitations, fatigue, peripheral edema, polyuria.  Patient  reports he had to rechedule with electrophysiology cardiology to 1-2 weeks due to schedule conflict.

## 2022-05-18 NOTE — PLAN
[FreeTextEntry1] : \par \par #Normocytic Anemia\par -Prior iron studies consitent with anemia of chronic disease\par -No hx of colonoscopy\par -Advised to send fit test and make GI refferal for colonoscopy \par \par #CVA\par -No residual neurological defecits\par -COnt eliquis BID\par \par #CHF Ref\par #HTN\par -EF 25%\par -Cont Metoprolol 25Mg QD\par -Cont entrestro  49-51mg BID\par -Pt scheduled to follow EP cardiologist and Cardiologist outside of [facility] \par \par #DM2\par -A1c 7.6\par -On Metformin 1000mg BID\par -Consider Adding Jardiance for the increase benefits of reducing cardiovascular mortality in patient's with CHF and DM2 \par -Ordered Diabetic testing supplies \par \par RTC in 2 weeks

## 2022-05-18 NOTE — PHYSICAL EXAM
[No Acute Distress] : no acute distress [Well Nourished] : well nourished [Well Developed] : well developed [No Respiratory Distress] : no respiratory distress  [No Accessory Muscle Use] : no accessory muscle use [Clear to Auscultation] : lungs were clear to auscultation bilaterally [Normal Rate] : normal rate  [Regular Rhythm] : with a regular rhythm [Normal S1, S2] : normal S1 and S2 [No Carotid Bruits] : no carotid bruits [Soft] : abdomen soft [Non Tender] : non-tender [Non-distended] : non-distended [No HSM] : no HSM [Normal Bowel Sounds] : normal bowel sounds [Grossly Normal Strength/Tone] : grossly normal strength/tone [Coordination Grossly Intact] : coordination grossly intact [No Focal Deficits] : no focal deficits [Normal Gait] : normal gait [Normal Affect] : the affect was normal [Normal Insight/Judgement] : insight and judgment were intact [de-identified] : AICD and post surgical scar visualized on Left Upper chest with surrounded edema. NTTP  [de-identified] : Full Strength 5/5 in all Extremities.

## 2022-05-19 LAB
ALBUMIN SERPL ELPH-MCNC: 4.5 G/DL
ALP BLD-CCNC: 178 U/L
ALT SERPL-CCNC: 43 U/L
ANION GAP SERPL CALC-SCNC: 15 MMOL/L
AST SERPL-CCNC: 36 U/L
BILIRUB SERPL-MCNC: 0.5 MG/DL
BUN SERPL-MCNC: 42 MG/DL
CALCIUM SERPL-MCNC: 9.9 MG/DL
CHLORIDE SERPL-SCNC: 97 MMOL/L
CO2 SERPL-SCNC: 22 MMOL/L
CREAT SERPL-MCNC: 1.1 MG/DL
EGFR: 76 ML/MIN/1.73M2
GLUCOSE SERPL-MCNC: 271 MG/DL
HEMOCCULT STL QL IA: NEGATIVE
POTASSIUM SERPL-SCNC: 5.4 MMOL/L
PROT SERPL-MCNC: 7.5 G/DL
SODIUM SERPL-SCNC: 134 MMOL/L

## 2022-05-20 DIAGNOSIS — E53.9 VITAMIN B DEFICIENCY, UNSPECIFIED: ICD-10-CM

## 2022-05-20 DIAGNOSIS — I50.32 CHRONIC DIASTOLIC (CONGESTIVE) HEART FAILURE: ICD-10-CM

## 2022-05-20 DIAGNOSIS — L29.9 PRURITUS, UNSPECIFIED: ICD-10-CM

## 2022-05-27 NOTE — PHYSICAL EXAM
[No Acute Distress] : no acute distress [Well Nourished] : well nourished [Well Developed] : well developed [No Respiratory Distress] : no respiratory distress  [No Accessory Muscle Use] : no accessory muscle use [Clear to Auscultation] : lungs were clear to auscultation bilaterally [Normal Rate] : normal rate  [Regular Rhythm] : with a regular rhythm [Normal S1, S2] : normal S1 and S2 [No Carotid Bruits] : no carotid bruits [Soft] : abdomen soft [Non Tender] : non-tender [Non-distended] : non-distended [No HSM] : no HSM [Normal Bowel Sounds] : normal bowel sounds [Grossly Normal Strength/Tone] : grossly normal strength/tone [Coordination Grossly Intact] : coordination grossly intact [No Focal Deficits] : no focal deficits [Normal Gait] : normal gait [Normal Affect] : the affect was normal [Normal Insight/Judgement] : insight and judgment were intact [de-identified] : AICD and post surgical scar visualized on Left Upper chest.  [de-identified] : Full Strength 5/5 in all Extremities.

## 2022-05-27 NOTE — HISTORY OF PRESENT ILLNESS
[Post-hospitalization from ___ Hospital] : Post-hospitalization from [unfilled] Hospital [Admitted on: ___] : The patient was admitted on [unfilled] [Discharged on ___] : discharged on [unfilled] [Discharge Summary] : discharge summary [Pertinent Labs] : pertinent labs [Radiology Findings] : radiology findings [Discharge Med List] : discharge medication list [Med Reconciliation] : medication reconciliation has been completed [FreeTextEntry2] : 61 year old Croatian speaking male with past medical history of DM2, HTN,\par dilated cardiomyopathy. Morning of presentation to [facility] with left\par hemiparesis. NIHSS at [facility] 4. CTA with right M1 occlusion. HR\par intermittently in the 40s, reportedly in second degree heart block (but no EKG\par on file), given atropine in route to the hospital. Not a candidate for tPA as\par LKN > 4.5 hours. He was transferred to [facility] (4/21) as stroke rescue for\par possible thrombectomy. OZZY aware. Neuroimaging repeated.\par [facility] staff contacted [facility] staff notified the patient was found to have\par acute renal failure Cr. 2.8, BUN 88, K 5.8 (untreated). BNP increased.\par Concern for digoxin toxicity, although digoxin level noted to be 1.6 at OSH.\par In the ED, T96.4, HR 50, /65, on %. Slow afib with HR 30-50s. FSG\par 56, given amp D50. Given albuterol and Ca gluc 2g.\par \par Admitted to MICU for CVA neuro checks and possible thrombectomy. Patient deemed\par medically unstable for thrombectomy due to BRASH ( Bradycardia, renal failure,\par AV kevin blockade, shock, hyperkalemia ) syndrome. MRI brain performed 4/23\par showing right MCA infarct with a right M1 occlusion. Initially started on ASA\par 81 mg and switched to Eliqius 5mg BID on 04/25. Echo EF 25%. He is s/p\par diagnostic Cath-prox LAD 40%, dist RPDA 60%, Right radial access site on 4/27.\par He is s/p CRT-D( Cardiac Resynchronisation therapy- Defibrillator)\par implantation on 4/28. Pt noted to have left chest wall and axillary swelling on\par 4/29 which improved. Evaluated by PT/OT and was recommended AR. Patient deemed\par stable for discharge to [facility] on 4/30/22.\par \par Patient was stable upon rehab admission to  Inpatient Rehabilitation\par Facility. Admitted with gait instabilty, ADL, and functional impairments.\par \par Rehab Course significant for intermittent pruritis of upper back likely due to\par irritation from hospital bedding; had received hydrocortisone cream and\par Benadryl. In addition, LFTs were elevated, but stable throughout his stay; RUQ\par US unremarkable. Patient also continued to endorse left chest wall/shoulder\par pain at ICD incision site with localized swelling at the site of ICD. Right\par chest wall was negative for hematoma or focal fluid collection. All other\par medical co-morbidities were stable. Patient tolerated course of inpatient\par PT/OT/SLP rehab with significant functional improvements and met rehab goals\par prior to discharge. Patient was medically cleared on 05/10/2022 for discharged\par to home.\par \par Patient reports no acute complaints today. Denies and residual weakness he developed from the Stroke he suffered. Denies SOB at rest and exertion. Denies Orthopnea, palpitations, fatigue, polyuria.  Does report he intermittently would have b/l leg swelling that resolved on its own.

## 2022-05-27 NOTE — PLAN
[FreeTextEntry1] : \par \par #Normocytic Anemia\par -Prior iron studies consitent with anemia of chronic disease\par -No hx of colonoscopy\par -Ordered fit and GI refferal for colonoscopy \par \par #CVA\par -No residual neurological defecits\par -COnt eliquis BID\par \par #CHF Ref\par #HTN\par -EF 25%\par -Cont Metoprolol 25Mg QD\par -On Lisinopril 5mg ---> COnsider switching to entrestro in near future if BP can tolerate it \par -Pt scheduled to follow EP cardiologist and Cardiologist outside of [facility] \par \par #DM2\par -A1c 7.6\par -On Metformin 1000mg BID\par -Consider Adding Jardiance for the increase benefits of reducing cardiovascular mortality in patient's with CHF and DM2 \par -Ordered Diabetic testing supplies \par \par RTC in 1 week

## 2022-05-30 PROCEDURE — 96375 TX/PRO/DX INJ NEW DRUG ADDON: CPT

## 2022-05-30 PROCEDURE — 97168 OT RE-EVAL EST PLAN CARE: CPT

## 2022-05-30 PROCEDURE — 92523 SPEECH SOUND LANG COMPREHEN: CPT

## 2022-05-30 PROCEDURE — 85027 COMPLETE CBC AUTOMATED: CPT

## 2022-05-30 PROCEDURE — 84300 ASSAY OF URINE SODIUM: CPT

## 2022-05-30 PROCEDURE — 33249 INSJ/RPLCMT DEFIB W/LEAD(S): CPT

## 2022-05-30 PROCEDURE — 83540 ASSAY OF IRON: CPT

## 2022-05-30 PROCEDURE — 86803 HEPATITIS C AB TEST: CPT

## 2022-05-30 PROCEDURE — 97129 THER IVNTJ 1ST 15 MIN: CPT

## 2022-05-30 PROCEDURE — C1730: CPT

## 2022-05-30 PROCEDURE — 92610 EVALUATE SWALLOWING FUNCTION: CPT

## 2022-05-30 PROCEDURE — 76700 US EXAM ABDOM COMPLETE: CPT

## 2022-05-30 PROCEDURE — 80076 HEPATIC FUNCTION PANEL: CPT

## 2022-05-30 PROCEDURE — 83735 ASSAY OF MAGNESIUM: CPT

## 2022-05-30 PROCEDURE — 85730 THROMBOPLASTIN TIME PARTIAL: CPT

## 2022-05-30 PROCEDURE — 0225U NFCT DS DNA&RNA 21 SARSCOV2: CPT

## 2022-05-30 PROCEDURE — C1900: CPT

## 2022-05-30 PROCEDURE — 97535 SELF CARE MNGMENT TRAINING: CPT

## 2022-05-30 PROCEDURE — 86900 BLOOD TYPING SEROLOGIC ABO: CPT

## 2022-05-30 PROCEDURE — 86901 BLOOD TYPING SEROLOGIC RH(D): CPT

## 2022-05-30 PROCEDURE — 82570 ASSAY OF URINE CREATININE: CPT

## 2022-05-30 PROCEDURE — 82607 VITAMIN B-12: CPT

## 2022-05-30 PROCEDURE — 87040 BLOOD CULTURE FOR BACTERIA: CPT

## 2022-05-30 PROCEDURE — 87086 URINE CULTURE/COLONY COUNT: CPT

## 2022-05-30 PROCEDURE — 93306 TTE W/DOPPLER COMPLETE: CPT

## 2022-05-30 PROCEDURE — 84443 ASSAY THYROID STIM HORMONE: CPT

## 2022-05-30 PROCEDURE — 96374 THER/PROPH/DIAG INJ IV PUSH: CPT

## 2022-05-30 PROCEDURE — 82435 ASSAY OF BLOOD CHLORIDE: CPT

## 2022-05-30 PROCEDURE — 71046 X-RAY EXAM CHEST 2 VIEWS: CPT

## 2022-05-30 PROCEDURE — 71045 X-RAY EXAM CHEST 1 VIEW: CPT

## 2022-05-30 PROCEDURE — 83036 HEMOGLOBIN GLYCOSYLATED A1C: CPT

## 2022-05-30 PROCEDURE — C1769: CPT

## 2022-05-30 PROCEDURE — 85014 HEMATOCRIT: CPT

## 2022-05-30 PROCEDURE — 97165 OT EVAL LOW COMPLEX 30 MIN: CPT

## 2022-05-30 PROCEDURE — 97110 THERAPEUTIC EXERCISES: CPT

## 2022-05-30 PROCEDURE — 99285 EMERGENCY DEPT VISIT HI MDM: CPT | Mod: 25

## 2022-05-30 PROCEDURE — 0042T: CPT

## 2022-05-30 PROCEDURE — 82746 ASSAY OF FOLIC ACID SERUM: CPT

## 2022-05-30 PROCEDURE — 70450 CT HEAD/BRAIN W/O DYE: CPT

## 2022-05-30 PROCEDURE — 36415 COLL VENOUS BLD VENIPUNCTURE: CPT

## 2022-05-30 PROCEDURE — U0005: CPT

## 2022-05-30 PROCEDURE — 80048 BASIC METABOLIC PNL TOTAL CA: CPT

## 2022-05-30 PROCEDURE — 85025 COMPLETE CBC W/AUTO DIFF WBC: CPT

## 2022-05-30 PROCEDURE — 97167 OT EVAL HIGH COMPLEX 60 MIN: CPT

## 2022-05-30 PROCEDURE — 83550 IRON BINDING TEST: CPT

## 2022-05-30 PROCEDURE — U0003: CPT

## 2022-05-30 PROCEDURE — 93458 L HRT ARTERY/VENTRICLE ANGIO: CPT

## 2022-05-30 PROCEDURE — C1889: CPT

## 2022-05-30 PROCEDURE — C1892: CPT

## 2022-05-30 PROCEDURE — 83605 ASSAY OF LACTIC ACID: CPT

## 2022-05-30 PROCEDURE — C1898: CPT

## 2022-05-30 PROCEDURE — 80053 COMPREHEN METABOLIC PANEL: CPT

## 2022-05-30 PROCEDURE — 81001 URINALYSIS AUTO W/SCOPE: CPT

## 2022-05-30 PROCEDURE — 97530 THERAPEUTIC ACTIVITIES: CPT

## 2022-05-30 PROCEDURE — 70496 CT ANGIOGRAPHY HEAD: CPT | Mod: MA

## 2022-05-30 PROCEDURE — C1887: CPT

## 2022-05-30 PROCEDURE — 93005 ELECTROCARDIOGRAM TRACING: CPT

## 2022-05-30 PROCEDURE — 94640 AIRWAY INHALATION TREATMENT: CPT

## 2022-05-30 PROCEDURE — 76604 US EXAM CHEST: CPT

## 2022-05-30 PROCEDURE — 82947 ASSAY GLUCOSE BLOOD QUANT: CPT

## 2022-05-30 PROCEDURE — 97116 GAIT TRAINING THERAPY: CPT

## 2022-05-30 PROCEDURE — 97161 PT EVAL LOW COMPLEX 20 MIN: CPT

## 2022-05-30 PROCEDURE — 75561 CARDIAC MRI FOR MORPH W/DYE: CPT

## 2022-05-30 PROCEDURE — C1777: CPT

## 2022-05-30 PROCEDURE — A9585: CPT

## 2022-05-30 PROCEDURE — 86850 RBC ANTIBODY SCREEN: CPT

## 2022-05-30 PROCEDURE — C1882: CPT

## 2022-05-30 PROCEDURE — 83615 LACTATE (LD) (LDH) ENZYME: CPT

## 2022-05-30 PROCEDURE — 80061 LIPID PANEL: CPT

## 2022-05-30 PROCEDURE — 86140 C-REACTIVE PROTEIN: CPT

## 2022-05-30 PROCEDURE — 99152 MOD SED SAME PHYS/QHP 5/>YRS: CPT

## 2022-05-30 PROCEDURE — 97112 NEUROMUSCULAR REEDUCATION: CPT

## 2022-05-30 PROCEDURE — 84100 ASSAY OF PHOSPHORUS: CPT

## 2022-05-30 PROCEDURE — C1894: CPT

## 2022-05-30 PROCEDURE — 83880 ASSAY OF NATRIURETIC PEPTIDE: CPT

## 2022-05-30 PROCEDURE — 80162 ASSAY OF DIGOXIN TOTAL: CPT

## 2022-05-30 PROCEDURE — 82140 ASSAY OF AMMONIA: CPT

## 2022-05-30 PROCEDURE — 85018 HEMOGLOBIN: CPT

## 2022-05-30 PROCEDURE — 70498 CT ANGIOGRAPHY NECK: CPT | Mod: MA

## 2022-05-30 PROCEDURE — 97140 MANUAL THERAPY 1/> REGIONS: CPT

## 2022-05-30 PROCEDURE — 84295 ASSAY OF SERUM SODIUM: CPT

## 2022-05-30 PROCEDURE — 33225 L VENTRIC PACING LEAD ADD-ON: CPT

## 2022-05-30 PROCEDURE — 97164 PT RE-EVAL EST PLAN CARE: CPT

## 2022-05-30 PROCEDURE — 97163 PT EVAL HIGH COMPLEX 45 MIN: CPT

## 2022-05-30 PROCEDURE — 82962 GLUCOSE BLOOD TEST: CPT

## 2022-05-30 PROCEDURE — 84484 ASSAY OF TROPONIN QUANT: CPT

## 2022-05-30 PROCEDURE — 82330 ASSAY OF CALCIUM: CPT

## 2022-05-30 PROCEDURE — 84145 PROCALCITONIN (PCT): CPT

## 2022-05-30 PROCEDURE — 82803 BLOOD GASES ANY COMBINATION: CPT

## 2022-05-30 PROCEDURE — 70551 MRI BRAIN STEM W/O DYE: CPT

## 2022-05-30 PROCEDURE — 83010 ASSAY OF HAPTOGLOBIN QUANT: CPT

## 2022-05-30 PROCEDURE — 84132 ASSAY OF SERUM POTASSIUM: CPT

## 2022-05-30 PROCEDURE — 84156 ASSAY OF PROTEIN URINE: CPT

## 2022-05-30 PROCEDURE — 85610 PROTHROMBIN TIME: CPT

## 2022-05-30 PROCEDURE — 82728 ASSAY OF FERRITIN: CPT

## 2022-06-25 ENCOUNTER — NON-APPOINTMENT (OUTPATIENT)
Age: 62
End: 2022-06-25

## 2022-06-27 ENCOUNTER — NON-APPOINTMENT (OUTPATIENT)
Age: 62
End: 2022-06-27

## 2022-06-27 ENCOUNTER — APPOINTMENT (OUTPATIENT)
Dept: FAMILY MEDICINE | Facility: HOSPITAL | Age: 62
End: 2022-06-27

## 2022-06-27 ENCOUNTER — APPOINTMENT (OUTPATIENT)
Dept: PHYSICAL MEDICINE AND REHAB | Facility: CLINIC | Age: 62
End: 2022-06-27

## 2022-06-27 ENCOUNTER — OUTPATIENT (OUTPATIENT)
Dept: OUTPATIENT SERVICES | Facility: HOSPITAL | Age: 62
LOS: 1 days | End: 2022-06-27
Payer: MEDICAID

## 2022-06-27 VITALS — SYSTOLIC BLOOD PRESSURE: 168 MMHG | DIASTOLIC BLOOD PRESSURE: 93 MMHG

## 2022-06-27 VITALS
DIASTOLIC BLOOD PRESSURE: 88 MMHG | TEMPERATURE: 98.2 F | HEART RATE: 73 BPM | HEIGHT: 66 IN | WEIGHT: 128 LBS | SYSTOLIC BLOOD PRESSURE: 167 MMHG | OXYGEN SATURATION: 100 % | BODY MASS INDEX: 20.57 KG/M2

## 2022-06-27 VITALS
SYSTOLIC BLOOD PRESSURE: 165 MMHG | TEMPERATURE: 97.6 F | DIASTOLIC BLOOD PRESSURE: 77 MMHG | RESPIRATION RATE: 12 BRPM | HEIGHT: 66 IN | HEART RATE: 78 BPM | BODY MASS INDEX: 20.41 KG/M2 | WEIGHT: 127 LBS | OXYGEN SATURATION: 98 %

## 2022-06-27 DIAGNOSIS — Z00.00 ENCOUNTER FOR GENERAL ADULT MEDICAL EXAMINATION WITHOUT ABNORMAL FINDINGS: ICD-10-CM

## 2022-06-27 PROCEDURE — 99213 OFFICE O/P EST LOW 20 MIN: CPT

## 2022-06-27 PROCEDURE — G0463: CPT

## 2022-06-27 NOTE — ASSESSMENT
[FreeTextEntry1] : Mr. Bey is a 61 year old male with history as stated above seen in follow up after discharge from rehabilitation where he was admitted for impairments secondary to right MCA infarct. \par He has made good functional recovery \par He does not need any additional PT at this time. \par \par Follow up with Primary care for regular medical care and with cardiology and neurology \par fu with PMR as needed.

## 2022-06-27 NOTE — PHYSICAL EXAM
[Normal] : Alert and in no acute distress [de-identified] : E [de-identified] : breathing comfortably  [de-identified] : Well perfused. No edema in lower extremeties [de-identified] : left upper chest swelling/ecchymosis noted during rehab stay has resolved. Left upper chest  localised protrusion from crdiac  device. [de-identified] : aaox3, no aphasia or dysarthria. EOMI, facial sensation intact. No facial weakness, Shoulder shrug intact, tongue midline. Motor 5/5, Sensory intact to light touch. No pronator drift, Fine motor coordination intact. Reflexes 1+ bilateral biceps, knee , No dysmetria on finger nose finger testing. Gait- normal Able to stand on toes and knees, Tandem stance needed min assist initially

## 2022-06-27 NOTE — HISTORY OF PRESENT ILLNESS
[FreeTextEntry1] : Patient's son assisted with Slovenian translation as needed\par \par Mr. Bey ia 61 year old male seen in follow up today after hospitalisation and acute rehabilitation stay for right MCA stroke with MI occlusion. He was not a candidate for TPA ( beyond time) or thrombectomy . Reason as below in discharge summary. He is also s/p CRT-D (cardiac resynhronisation therapy - Defibrillator) \par IRF stay 5/1/22-5/10/22. \par \par Since discharge from rehab he states that he has been doing well. He denies any new medical issues. Home care services with PT was for a short duration. \par He is seen in [facility] clinic for continued  medical care and has neurology follow up today. Cardiology follow up is in August 2022, but son would like an earlier appointment as they are planning to travel to [place] in the near future. \par He denies any falls\par Functionally he is at an independent level with all ADLs and ambulates without a device \par He walks daily the length of several blocks and denies any dyspnea or trouble walking. \par \par \par \par \par \par \par \par Brief rehab discharge summary:\par \par 61 year old Slovenian speaking male with past medical history of DM2, HTN, \par dilated cardiomyopathy. Morning of presentation to [facility] with left \par hemiparesis. NIHSS at [facility] 4. CTA with right M1 occlusion. HR \par intermittently in the 40s, reportedly in second degree heart block (but no EKG \par on file), given atropine in route to the hospital.  Not a candidate for tPA as \par LKN > 4.5 hours.  He was transferred to [facility] (4/21) as stroke rescue for \par possible thrombectomy.  OZZY aware. Neuroimaging repeated. \par [place] staff contacted [facility] staff notified the patient was found to have \par acute renal failure Cr. 2.8, BUN 88, K 5.8 (untreated).  BNP increased. \par Concern for digoxin toxicity, although digoxin level noted to be 1.6 at OSH. \par In the ED, T96.4, HR 50, /65, on %. Slow afib with HR 30-50s. FSG \par 56, given amp D50. Given albuterol and Ca gluc 2g. \par \par Admitted to MICU for CVA neuro checks and possible thrombectomy. Patient deemed \par medically unstable for thrombectomy due to BRASH ( Bradycardia, renal failure, \par AV kevin blockade, shock, hyperkalemia ) syndrome. MRI brain performed 4/23 \par showing right MCA infarct with a right M1 occlusion. Initially started on ASA \par 81 mg and switched to Eliqius 5mg BID on 04/25. Echo EF 25%. He is s/p \par diagnostic Cath-prox LAD 40%, dist RPDA 60%, Right radial access site on 4/27. \par He is s/p CRT-D( Cardiac Resynchronisation therapy- Defibrillator) \par implantation on 4/28. Pt noted to have left chest wall and axillary swelling on \par 4/29 which improved. Evaluated by PT/OT and was recommended AR. Patient deemed \par stable for discharge to [facility] on 4/30/22. \par \par Patient was stable upon rehab admission to  Inpatient Rehabilitation \par Facility. Admitted with gait instabilty, ADL, and functional impairments. \par \par Rehab Course significant for intermittent pruritis of upper back likely due to \par irritation from hospital bedding; had received hydrocortisone cream and \par Benadryl. In addition, LFTs were elevated, but stable throughout his stay; RUQ \par US unremarkable. Patient also continued to endorse left chest wall/shoulder \par pain at ICD incision site with localized swelling at the site of ICD. Right \par chest wall was negative for hematoma or focal fluid collection. All other \par medical co-morbidities were stable. Patient tolerated course of inpatient \par PT/OT/SLP rehab with significant functional improvements and met rehab goals \par prior to discharge. Patient was medically cleared on 05/10/2022 for discharged \par to home. \par \par \par \par tamsulosin 0.4 mg oral capsule: 1 cap(s) orally once a day (at bedtime) \par

## 2022-06-27 NOTE — PHYSICAL EXAM
[No Acute Distress] : no acute distress [Well Nourished] : well nourished [Well-Appearing] : well-appearing [Normal Sclera/Conjunctiva] : normal sclera/conjunctiva [EOMI] : extraocular movements intact [Normal Outer Ear/Nose] : the outer ears and nose were normal in appearance [Normal] : no joint swelling and grossly normal strength and tone [Coordination Grossly Intact] : coordination grossly intact [No Focal Deficits] : no focal deficits [Normal Gait] : normal gait [Deep Tendon Reflexes (DTR)] : deep tendon reflexes were 2+ and symmetric [Normal Affect] : the affect was normal [Normal Insight/Judgement] : insight and judgment were intact [de-identified] : Thin, well-appearing

## 2022-06-27 NOTE — HISTORY OF PRESENT ILLNESS
[FreeTextEntry1] : f/u CVA  [de-identified] : 61M h/o DM2 HTN DM2 recently diagnosed CVA, Afib on eliquis, HFrEF 25% EF, BRASH syndrome in May 2022 s/p acute rehab at  here for f/u on CVA. Patient his appointment with Neurology today, requesting printout of neuro referral. He has scheduled Cardiology appointment in August. Today he is accompanied by his son who he lives with. He is doing very well, has no neurological deficits from CVA. He is taking all medications as prescribed, denies side effects, tolerating well. He is walking every day and reports no episodes of unsteady gait, no falls, no weakness. He reports he is taking his glucose levels at home but did not write them down, does not remember range of readings. He has no complaints today.

## 2022-07-04 DIAGNOSIS — E78.5 HYPERLIPIDEMIA, UNSPECIFIED: ICD-10-CM

## 2022-07-04 DIAGNOSIS — I10 ESSENTIAL (PRIMARY) HYPERTENSION: ICD-10-CM

## 2022-07-04 DIAGNOSIS — E11.9 TYPE 2 DIABETES MELLITUS WITHOUT COMPLICATIONS: ICD-10-CM

## 2022-07-04 DIAGNOSIS — I50.32 CHRONIC DIASTOLIC (CONGESTIVE) HEART FAILURE: ICD-10-CM

## 2022-07-04 DIAGNOSIS — Z86.73 PERSONAL HISTORY OF TRANSIENT ISCHEMIC ATTACK (TIA), AND CEREBRAL INFARCTION WITHOUT RESIDUAL DEFICITS: ICD-10-CM

## 2022-07-15 ENCOUNTER — OUTPATIENT (OUTPATIENT)
Dept: OUTPATIENT SERVICES | Facility: HOSPITAL | Age: 62
LOS: 1 days | End: 2022-07-15
Payer: MEDICAID

## 2022-07-15 ENCOUNTER — APPOINTMENT (OUTPATIENT)
Dept: FAMILY MEDICINE | Facility: HOSPITAL | Age: 62
End: 2022-07-15

## 2022-07-15 ENCOUNTER — RESULT CHARGE (OUTPATIENT)
Age: 62
End: 2022-07-15

## 2022-07-15 ENCOUNTER — MED ADMIN CHARGE (OUTPATIENT)
Age: 62
End: 2022-07-15

## 2022-07-15 VITALS
RESPIRATION RATE: 16 BRPM | WEIGHT: 126 LBS | BODY MASS INDEX: 20.34 KG/M2 | SYSTOLIC BLOOD PRESSURE: 185 MMHG | OXYGEN SATURATION: 99 % | DIASTOLIC BLOOD PRESSURE: 91 MMHG | HEART RATE: 80 BPM | TEMPERATURE: 98 F

## 2022-07-15 VITALS — DIASTOLIC BLOOD PRESSURE: 71 MMHG | SYSTOLIC BLOOD PRESSURE: 131 MMHG

## 2022-07-15 VITALS — SYSTOLIC BLOOD PRESSURE: 131 MMHG | DIASTOLIC BLOOD PRESSURE: 71 MMHG

## 2022-07-15 DIAGNOSIS — Z00.00 ENCOUNTER FOR GENERAL ADULT MEDICAL EXAMINATION WITHOUT ABNORMAL FINDINGS: ICD-10-CM

## 2022-07-15 LAB — HBA1C MFR BLD HPLC: 9.6

## 2022-07-15 PROCEDURE — 80053 COMPREHEN METABOLIC PANEL: CPT

## 2022-07-15 PROCEDURE — G0463: CPT

## 2022-07-15 PROCEDURE — 85018 HEMOGLOBIN: CPT

## 2022-07-16 DIAGNOSIS — Z23 ENCOUNTER FOR IMMUNIZATION: ICD-10-CM

## 2022-07-16 DIAGNOSIS — Z86.73 PERSONAL HISTORY OF TRANSIENT ISCHEMIC ATTACK (TIA), AND CEREBRAL INFARCTION WITHOUT RESIDUAL DEFICITS: ICD-10-CM

## 2022-07-16 DIAGNOSIS — I10 ESSENTIAL (PRIMARY) HYPERTENSION: ICD-10-CM

## 2022-07-16 DIAGNOSIS — E11.9 TYPE 2 DIABETES MELLITUS WITHOUT COMPLICATIONS: ICD-10-CM

## 2022-07-16 DIAGNOSIS — I50.32 CHRONIC DIASTOLIC (CONGESTIVE) HEART FAILURE: ICD-10-CM

## 2022-07-16 DIAGNOSIS — D64.9 ANEMIA, UNSPECIFIED: ICD-10-CM

## 2022-07-16 NOTE — HISTORY OF PRESENT ILLNESS
[Family Member] : family member [Patient Declined  Services] : - None: Patient declined  services [FreeTextEntry1] : F/u DM2 [FreeTextEntry3] : shared language [TWNoteComboBox1] : Portuguese [de-identified] : 61M h/o DM2 HTN recently diagnosed CVA, Afib on eliquis, HFrEF 25% EF, BRASH syndrome in May 2022 s/p acute rehab at  here for f/u on DM2. Today the patient feels well, accompanied by his son. He walked from the train station to the clinic without stopping and without shortness of breath. The patient reports that he is leaving for the [place] next week and will be gone for 3 months. I advised the patient not to leave since he has to continue follow up, and because of his scheduled cardiology appointment next month. Patient stated this trip is very important to him because he is visiting his wife. The patient's son stated he has been calling the Cardiologist's office to attempt to reschedule appointment, he will continue calling. Patient states that he was not taking Jardiance because he did not know what it was for, but he is compliant with the rest of his prescribed medications. He has no further questions or concerns a this time. Denies SOB on exertion, chest pain, palpitations.

## 2022-07-16 NOTE — PHYSICAL EXAM
[No Acute Distress] : no acute distress [Well Nourished] : well nourished [Well-Appearing] : well-appearing [Normal Sclera/Conjunctiva] : normal sclera/conjunctiva [EOMI] : extraocular movements intact [Normal Outer Ear/Nose] : the outer ears and nose were normal in appearance [Normal] : no joint swelling and grossly normal strength and tone [Normal Gait] : normal gait [Normal Affect] : the affect was normal [Normal Insight/Judgement] : insight and judgment were intact [de-identified] : Thin, well-appearing

## 2022-07-17 LAB
ALBUMIN SERPL ELPH-MCNC: 4.7 G/DL
ALP BLD-CCNC: 105 U/L
ALT SERPL-CCNC: 32 U/L
ANION GAP SERPL CALC-SCNC: 13 MMOL/L
AST SERPL-CCNC: 23 U/L
BILIRUB SERPL-MCNC: 0.3 MG/DL
BUN SERPL-MCNC: 35 MG/DL
CALCIUM SERPL-MCNC: 10.3 MG/DL
CHLORIDE SERPL-SCNC: 100 MMOL/L
CO2 SERPL-SCNC: 23 MMOL/L
CREAT SERPL-MCNC: 1.19 MG/DL
EGFR: 70 ML/MIN/1.73M2
GLUCOSE SERPL-MCNC: 268 MG/DL
HCT VFR BLD CALC: 41.8 %
HGB BLD-MCNC: 13 G/DL
POTASSIUM SERPL-SCNC: 5 MMOL/L
PROT SERPL-MCNC: 7.5 G/DL
SODIUM SERPL-SCNC: 136 MMOL/L

## 2022-08-08 NOTE — DISCHARGE NOTE NURSING/CASE MANAGEMENT/SOCIAL WORK - PATIENT PORTAL LINK FT
Reviewed note and agree with assessment and POC.     Cornelius Franks DPT You can access the FollowMyHealth Patient Portal offered by [facility] by registering at the following website: [email or web address]. By joining [facility]’s FollowMyHealth portal, you will also be able to view your health information using other applications (apps) compatible with our system.

## 2022-08-19 ENCOUNTER — APPOINTMENT (OUTPATIENT)
Dept: CARDIOLOGY | Facility: CLINIC | Age: 62
End: 2022-08-19

## 2022-11-21 ENCOUNTER — APPOINTMENT (OUTPATIENT)
Dept: CARDIOLOGY | Facility: CLINIC | Age: 62
End: 2022-11-21

## 2023-01-10 ENCOUNTER — APPOINTMENT (OUTPATIENT)
Dept: FAMILY MEDICINE | Facility: HOSPITAL | Age: 63
End: 2023-01-10

## 2023-01-10 ENCOUNTER — OUTPATIENT (OUTPATIENT)
Dept: OUTPATIENT SERVICES | Facility: HOSPITAL | Age: 63
LOS: 1 days | End: 2023-01-10
Payer: MEDICAID

## 2023-01-10 ENCOUNTER — RESULT CHARGE (OUTPATIENT)
Age: 63
End: 2023-01-10

## 2023-01-10 VITALS
WEIGHT: 126 LBS | DIASTOLIC BLOOD PRESSURE: 76 MMHG | HEART RATE: 77 BPM | SYSTOLIC BLOOD PRESSURE: 130 MMHG | TEMPERATURE: 97.5 F | RESPIRATION RATE: 16 BRPM | BODY MASS INDEX: 20.34 KG/M2 | OXYGEN SATURATION: 99 %

## 2023-01-10 DIAGNOSIS — Z00.00 ENCOUNTER FOR GENERAL ADULT MEDICAL EXAMINATION WITHOUT ABNORMAL FINDINGS: ICD-10-CM

## 2023-01-10 DIAGNOSIS — E78.5 HYPERLIPIDEMIA, UNSPECIFIED: ICD-10-CM

## 2023-01-10 LAB — HBA1C MFR BLD HPLC: 7.5

## 2023-01-10 PROCEDURE — 84443 ASSAY THYROID STIM HORMONE: CPT

## 2023-01-10 PROCEDURE — 80053 COMPREHEN METABOLIC PANEL: CPT

## 2023-01-10 PROCEDURE — 85025 COMPLETE CBC W/AUTO DIFF WBC: CPT

## 2023-01-10 PROCEDURE — 80061 LIPID PANEL: CPT

## 2023-01-10 PROCEDURE — G0463: CPT

## 2023-01-10 PROCEDURE — 82043 UR ALBUMIN QUANTITATIVE: CPT

## 2023-01-10 NOTE — PHYSICAL EXAM
[No Varicosities] : no varicosities [No Edema] : there was no peripheral edema [No Extremity Clubbing/Cyanosis] : no extremity clubbing/cyanosis [Normal] : soft, non-tender, non-distended, no masses palpated, no HSM and normal bowel sounds [No CVA Tenderness] : no CVA  tenderness [Normal Gait] : normal gait [Normal Affect] : the affect was normal

## 2023-01-11 DIAGNOSIS — E11.9 TYPE 2 DIABETES MELLITUS WITHOUT COMPLICATIONS: ICD-10-CM

## 2023-01-11 DIAGNOSIS — I10 ESSENTIAL (PRIMARY) HYPERTENSION: ICD-10-CM

## 2023-01-11 DIAGNOSIS — E78.5 HYPERLIPIDEMIA, UNSPECIFIED: ICD-10-CM

## 2023-01-11 DIAGNOSIS — I48.91 UNSPECIFIED ATRIAL FIBRILLATION: ICD-10-CM

## 2023-01-12 LAB
ALBUMIN SERPL ELPH-MCNC: 4.7 G/DL
ALP BLD-CCNC: 71 U/L
ALT SERPL-CCNC: 36 U/L
ANION GAP SERPL CALC-SCNC: 14 MMOL/L
AST SERPL-CCNC: 24 U/L
BASOPHILS # BLD AUTO: 0.03 K/UL
BASOPHILS NFR BLD AUTO: 0.5 %
BILIRUB SERPL-MCNC: 0.5 MG/DL
BUN SERPL-MCNC: 36 MG/DL
CALCIUM SERPL-MCNC: 9.5 MG/DL
CHLORIDE SERPL-SCNC: 103 MMOL/L
CHOLEST SERPL-MCNC: 103 MG/DL
CO2 SERPL-SCNC: 23 MMOL/L
CREAT SERPL-MCNC: 1.68 MG/DL
CREAT SPEC-SCNC: 55 MG/DL
EGFR: 46 ML/MIN/1.73M2
EOSINOPHIL # BLD AUTO: 0.08 K/UL
EOSINOPHIL NFR BLD AUTO: 1.2 %
GLUCOSE SERPL-MCNC: 120 MG/DL
HCT VFR BLD CALC: 41.2 %
HDLC SERPL-MCNC: 49 MG/DL
HGB BLD-MCNC: 13.9 G/DL
IMM GRANULOCYTES NFR BLD AUTO: 0.6 %
LDLC SERPL CALC-MCNC: 37 MG/DL
LYMPHOCYTES # BLD AUTO: 1.12 K/UL
LYMPHOCYTES NFR BLD AUTO: 16.9 %
MAN DIFF?: NORMAL
MCHC RBC-ENTMCNC: 31.1 PG
MCHC RBC-ENTMCNC: 33.7 GM/DL
MCV RBC AUTO: 92.2 FL
MICROALBUMIN 24H UR DL<=1MG/L-MCNC: <1.2 MG/DL
MICROALBUMIN/CREAT 24H UR-RTO: NORMAL MG/G
MONOCYTES # BLD AUTO: 0.52 K/UL
MONOCYTES NFR BLD AUTO: 7.8 %
NEUTROPHILS # BLD AUTO: 4.85 K/UL
NEUTROPHILS NFR BLD AUTO: 73 %
NONHDLC SERPL-MCNC: 54 MG/DL
PLATELET # BLD AUTO: 205 K/UL
POTASSIUM SERPL-SCNC: 4.6 MMOL/L
PROT SERPL-MCNC: 7.2 G/DL
RBC # BLD: 4.47 M/UL
RBC # FLD: 13 %
SODIUM SERPL-SCNC: 140 MMOL/L
TRIGL SERPL-MCNC: 84 MG/DL
TSH SERPL-ACNC: 0.6 UIU/ML
WBC # FLD AUTO: 6.64 K/UL

## 2023-01-12 NOTE — HISTORY OF PRESENT ILLNESS
[Congestive Heart Failure] : Congestive Heart Failure [Diabetes Mellitus] : Diabetes Mellitus [No episodes] : No hypoglycemic episodes since the last visit. [Check glucose ___ x/day] : Patient checks  blood glucose [unfilled]  times a day [Review glucose log over ___ months] : Glucose logs reviewed over the past [unfilled] months reveal: [Understanding of foot care] : Patient expressed understanding of foot care [Most Recent A1C: ___] : Most recent A1C was [unfilled] [Moderate Intensity] : Patient is currently on moderate intensity statin  therapy [<130/90] : Target blood pressure is  <130/90 [Target goal met] : BP target goal met [Moderate Intensity Therapy] : Patient is currently on moderate intensity statin  therapy [Systolic] : systolic [None] : Patient does not have any symptoms [No SOB] : No shortness of breath  [Stable] : The condition is stable [de-identified] : Patient is on Metformin 1g BID, Jardiance 10mg QD. His A1c today is 7.5, down from 9.6 on 07/15.  [de-identified] : Will c/w Entresto 49-51 BID and Metoprolol ER 25 QD.  [Weight Gain] : no weight gain [FreeTextEntry1] : Patient is on GDMT with Metoprolol, Entresto, and Jardiance. Last known Echo in April 2022 shows an EF of 25%.

## 2023-01-12 NOTE — REVIEW OF SYSTEMS
[Negative] : Gastrointestinal [Joint Pain] : no joint pain [Back Pain] : no back pain [Headache] : no headache [Dizziness] : no dizziness

## 2023-01-25 ENCOUNTER — APPOINTMENT (OUTPATIENT)
Dept: FAMILY MEDICINE | Facility: HOSPITAL | Age: 63
End: 2023-01-25

## 2023-02-03 ENCOUNTER — APPOINTMENT (OUTPATIENT)
Dept: CARDIOLOGY | Facility: HOSPITAL | Age: 63
End: 2023-02-03

## 2023-03-02 NOTE — REVIEW OF SYSTEMS
Physical Therapy Visit    Referred by: Jacquelyn Armas PA-C; Medical Diagnosis (from order):    Diagnosis Information      Diagnosis    V58.89, 840.4 (ICD-9-CM) - S46.012D (ICD-10-CM) - Traumatic complete tear of left rotator cuff, subsequent encounter    726.2 (ICD-9-CM) - M75.42 (ICD-10-CM) - Impingement syndrome of left shoulder    716.91 (ICD-9-CM) - M19.012 (ICD-10-CM) - Arthritis of left acromioclavicular joint              Visit: 56    Visit Type: Progress Note    SUBJECTIVE                                                                                                               Reports general soreness which he usually has. Does feel he has made some improvements in the last couple of weeks with his overall shoulder motion. Moving the arm laterally is really the only truly painful direction; other directions are mainly soreness/discomfort.   Functional Change: Improved reaching in all directions  Current Functional Limitations: Reaching and lifting laterally (in frontal plane)  Heavy lifting  Sleeping on left side  Pain / Symptoms:  Pain/symptom is: intermittent  Location: Left anterolateral shoulder    OBJECTIVE                                                                                                                     Range of Motion (ROM)   (degrees unless noted; active unless noted; norms in ( ); negative=lacking to 0, positive=beyond 0)   WNL: right shoulder  Shoulder:     - Flexion (180):        • Left: 145 pain     - Extension (50):        • Left: 45     - Abduction (180):        • Left: 134 pain     - Internal Rotation at 90° (70-90):         • Left: 60    - External Rotation at 90° (90):         • Left: 65    Strength  (out of 5 unless noted, standard test position unless noted, lbs tested with hand held dynamometer)   5/5: right shoulder  Shoulder:     - Flexion:         • Left: 4+, pain     - Extension:           • Left: 5    - Abduction:        • Left: 4-, pain    - Internal  Rotation:          • Left (at 0°): 5    - External Rotation:         • Left (at 0°): 4+, pain      Palpation:   Left Upper Extremity: anterior shoulder tenderness, lateral shoulder tenderness, proximal bicep tendon tenderness, infraspinatus tenderness, supraspinatus tenderness     Comments / Details: Hypertonic left latissimus dorsi    Joint Play:   Shoulder:     - Anterior capsule: Left: WFL;      - Posterior capsule: Left: hypomobile and pain;      - Inferior capsule: Left: hypomobile and pain;       Outcome Measures:   Quick Disabilities of the Arm, Shoulder and Hand: QuickDash Total Score (Score will not calculate if more then 2 questions are left blank): 25  (scored 0-100; a higher score indicates greater disability) see flowsheet for additional documentation   TREATMENT                                                                                                                  Therapeutic Exercise:  *Progress summary performed today. Time spent taking objective measurements, reviewing goals and progress.   Passive left shoulder range of motion in supine and sidelying - flexion/scaption, abduction, external and internal rotation as tolerated  Pulleys into flexion x 20    Manual Therapy:  Soft tissue mobilization to left shoulder/upper quarter in supine and sidelying - pecs, posterior scapula, traps, deltoid, supraspinatus  Grade 3-4 posterior-anterior, inferior mobilizations to the humeral head  Long axis traction to glenohumeral joint    Neuromuscular Re-Education:  TRX flexion stretch 10\" x 10 - defer  Shoulder abduction isometric in doorway 10\" hold x 10   - followed by standing snow angels x 10  sidelying on incline: shoulder abduction 2 x 10  Tall kneel shoulder abduction stretch with speed pulleys 6 x 20-30\"  Kneeling bilateral slider flexion stretch to stomach x 5  Standard push ups with 20# slam ball roll 2 x 5  Side plank with thread the needle 2 x 10  Side plank with row 3 plates x 10  Ab wheel  rollout on knees x 5    Deferred:  Bicep curls 10# x 15  Upright rows 10# x 10  Xs 2 plates x 10  Seated supported 90-90 external rotation 3# 3 x 10    Skilled input: verbal instruction/cues, tactile instruction/cues, posture correction, facilitation and as detailed above    Writer verbally educated and received verbal consent for hand placement, positioning of patient, and techniques to be performed today from patient for hand placement and palpation for techniques, clothing adjustments for techniques and therapist position for techniques as described above and how they are pertinent to the patient's plan of care.    Home Exercise Program/Education Materials: Access Code: [email or web address]  URL: [email or web address]  Date: 03/02/2023  Prepared by: Kemar Pineda    Exercises  ? Supine Shoulder External Rotation in 45 Degrees Abduction AAROM with Dowel - 1-2 x daily - 7 x weekly - 1-2 sets - 10 reps - 10-30 sec hold  ? Sleeper Stretch - 1 x daily - 7 x weekly - 1 sets - 3-6 reps - 20-30 sec hold  ? Kneeling Thoracic Extension Stretch with Swiss Ball - 1-2 x daily - 7 x weekly - 1 sets - 5 reps - 20-30 sec hold  ? Shoulder External Rotation with Anchored Resistance - 1 x daily - 3-4 x weekly - 2-3 sets - 8-12 reps  ? Shoulder Internal Rotation with Resistance - 1 x daily - 3-4 x weekly - 2-3 sets - 8-12 reps - 5 hold  ? Standing Shoulder Abduction Full Range - 1 x daily - 7 x weekly - 1-2 sets - 10 reps - 3 sec hold  ? Standing Shoulder Flexion Full Range - 1 x daily - 7 x weekly - 1-2 sets - 10 reps - 3 sec hold  ? Prone Shoulder Horizontal Abduction - 1 x daily - 3-4 x weekly - 2-3 sets - 10 reps  ? Child's Pose Stretch - 1-2 x daily - 7 x weekly - 1 sets - 5-10 reps - 30 sec hold  ? Standing Shoulder Abduction Slides at Wall - 1-2 x daily - 7 x weekly - 1-2 sets - 10 reps - 5-10 sec hold  ? Modified Push Up on Knees - 1 x daily - 3-5 x weekly - 2-3 sets - 8-12 reps  ? Shoulder Overhead Press in  Abduction with Dumbbells - 1 x daily - 3-5 x weekly - 2-3 sets - 8-12 reps  ? Horizontal Wall Walk with Resistance - 1 x daily - 3-5 x weekly - 2-3 sets - 5-10 reps  ? Standing Shoulder External Rotation at 90 Degrees Abduction with Dumbbell - 1 x daily - 3-5 x weekly - 2-3 sets - 8-12 reps  ? Sidelying Shoulder Abduction Palm Forward - 1 x daily - 3-5 x weekly - 2-3 sets - 8-12 reps  ? Standing Shoulder Flexion with Resistance - 1 x daily - 3-5 x weekly - 2-3 sets - 8-12 reps  ? Standing Row with Anchored Resistance - 1 x daily - 3-5 x weekly - 2-3 sets - 8-12 reps  ? Prone Chest Stretch on Chair - 1 x daily - 3-5 x weekly - 1 sets - 5 reps - 30 hold  ? Standing Isometric Shoulder Abduction with Doorway - 1 x daily - 3-5 x weekly - 1 sets - 10 reps - 10 hold     ASSESSMENT                                                                                                             Patient demonstrates higher quality shoulder abduction motion with reduction of scapular/glenohumeral elevation and remains more so in the true frontal plane. This was even more apparent today as he was able to complete standing shoulder abduction motion without mirror feedback and maintain higher quality movement. His strength is slightly improved from prior testing. He reports improved functionality during reaching and hygiene tasks. His biggest limitation is frontal plane abduction motion with pain and limitation in mobility. He found today's stretches and exercises beneficial and continues to remain compliant with his home program. Patient continues to demonstrate progress with his shoulder functioning and will continue to benefit from PT intervention to promote further gains with range of motion and strength with reduction in pain.  To date the patient has made gains as expected as reported. Patient continues to have impairments and functional deficits as noted.  Patient will continue to benefit from skilled care as outlined.  Patient  Education:   - Results of above outlined education: Verbalizes understanding and Demonstrates understanding      PLAN                                                                                                                           Updates to plan of care: continue current plan of care    Suggestions for next session as indicated: Progress per plan of care      GOALS                                                                                                                           Patient to achieve a minimum of 90% range of motion of non-affected arm at the glenohumeral joint in all planes.  Patient to reduce pain to 2/10 or less.  Patient to achieve symmetrical manual muscle test in all planes of the glenohumeral joint.   The above improvements in impairments to assist in obtaining goals listed below  Long Term Goals: to be met by end of plan of care  1. Patient will complete independent upper body dressing and bathing with the affected limb at the same capacity as the non affected limb. Status: progressing/ongoing Nearly back to \"normal\" per patient  2. Patient will reach above head with proper scapulohumeral rhythm and without reported difficulty for completion of household tasks such as cleaning/dusting or storing light items overhead such as dishes. Status: progressing/ongoingAble to complete higher quality motion with shoulder abduction which is where he is most limited  3. Patient will be able to lift 15 pounds from waist to eye level with proper body mechanics to assist with lifting household items overhead for home maintenance projects.   Status: progressing/ongoing  4. Patient will complete 10 table or standard push ups without pain to demonstrate enhanced weightbearing capacity and strength to assist with bed mobility.  Status: met  Table push ups completed  5. Patient will be independent and compliant with a comprehensive and progressive home exercise program. Status: met       Therapy  procedure time and total treatment time can be found documented on the Time Entry flowsheet   [Fever] : no fever [Fatigue] : no fatigue [Eye Pain] : no eye pain [Chest Pain] : no chest pain [Palpitations] : no palpitations [Shortness Of Breath] : no shortness of breath [Constipation] : no constipation [Diarrhea] : no diarrhea [Incontinence] : no incontinence [Skin Rash] : no skin rash [Headache] : no headaches [Dizziness] : no dizziness

## 2023-04-21 ENCOUNTER — NON-APPOINTMENT (OUTPATIENT)
Age: 63
End: 2023-04-21

## 2023-04-21 ENCOUNTER — APPOINTMENT (OUTPATIENT)
Dept: ELECTROPHYSIOLOGY | Facility: CLINIC | Age: 63
End: 2023-04-21
Payer: MEDICAID

## 2023-04-21 VITALS
DIASTOLIC BLOOD PRESSURE: 89 MMHG | SYSTOLIC BLOOD PRESSURE: 170 MMHG | OXYGEN SATURATION: 98 % | BODY MASS INDEX: 19.77 KG/M2 | WEIGHT: 123 LBS | HEART RATE: 80 BPM | HEIGHT: 66 IN

## 2023-04-21 PROCEDURE — 93284 PRGRMG EVAL IMPLANTABLE DFB: CPT

## 2023-04-21 PROCEDURE — 93000 ELECTROCARDIOGRAM COMPLETE: CPT | Mod: 59

## 2023-06-20 ENCOUNTER — APPOINTMENT (OUTPATIENT)
Dept: FAMILY MEDICINE | Facility: HOSPITAL | Age: 63
End: 2023-06-20

## 2023-07-10 DIAGNOSIS — Z86.2 PERSONAL HISTORY OF DISEASES OF THE BLOOD AND BLOOD-FORMING ORGANS AND CERTAIN DISORDERS INVOLVING THE IMMUNE MECHANISM: ICD-10-CM

## 2023-07-10 DIAGNOSIS — E53.8 DEFICIENCY OF OTHER SPECIFIED B GROUP VITAMINS: ICD-10-CM

## 2023-07-11 ENCOUNTER — APPOINTMENT (OUTPATIENT)
Dept: FAMILY MEDICINE | Facility: HOSPITAL | Age: 63
End: 2023-07-11

## 2023-07-11 ENCOUNTER — OUTPATIENT (OUTPATIENT)
Dept: OUTPATIENT SERVICES | Facility: HOSPITAL | Age: 63
LOS: 1 days | End: 2023-07-11
Payer: MEDICAID

## 2023-07-11 ENCOUNTER — RESULT CHARGE (OUTPATIENT)
Age: 63
End: 2023-07-11

## 2023-07-11 VITALS
OXYGEN SATURATION: 99 % | TEMPERATURE: 97.9 F | SYSTOLIC BLOOD PRESSURE: 156 MMHG | WEIGHT: 128 LBS | HEART RATE: 74 BPM | DIASTOLIC BLOOD PRESSURE: 85 MMHG | BODY MASS INDEX: 20.66 KG/M2 | RESPIRATION RATE: 16 BRPM

## 2023-07-11 VITALS — SYSTOLIC BLOOD PRESSURE: 162 MMHG | DIASTOLIC BLOOD PRESSURE: 79 MMHG

## 2023-07-11 DIAGNOSIS — Z86.73 PERSONAL HISTORY OF TRANSIENT ISCHEMIC ATTACK (TIA), AND CEREBRAL INFARCTION WITHOUT RESIDUAL DEFICITS: ICD-10-CM

## 2023-07-11 DIAGNOSIS — E11.9 TYPE 2 DIABETES MELLITUS WITHOUT COMPLICATIONS: ICD-10-CM

## 2023-07-11 DIAGNOSIS — Z86.73 PERSONAL HISTORY OF TRANSIENT ISCHEMIC ATTACK (TIA), AND CEREBRAL INFARCTION W/OUT RESIDUAL DEFICITS: ICD-10-CM

## 2023-07-11 DIAGNOSIS — Z00.00 ENCOUNTER FOR GENERAL ADULT MEDICAL EXAMINATION W/OUT ABNORMAL FINDINGS: ICD-10-CM

## 2023-07-11 DIAGNOSIS — Z00.00 ENCOUNTER FOR GENERAL ADULT MEDICAL EXAMINATION WITHOUT ABNORMAL FINDINGS: ICD-10-CM

## 2023-07-11 DIAGNOSIS — N17.9 ACUTE KIDNEY FAILURE, UNSPECIFIED: ICD-10-CM

## 2023-07-11 DIAGNOSIS — I10 ESSENTIAL (PRIMARY) HYPERTENSION: ICD-10-CM

## 2023-07-11 DIAGNOSIS — Z12.11 ENCOUNTER FOR SCREENING FOR MALIGNANT NEOPLASM OF COLON: ICD-10-CM

## 2023-07-11 DIAGNOSIS — Z87.2 PERSONAL HISTORY OF DISEASES OF THE SKIN AND SUBCUTANEOUS TISSUE: ICD-10-CM

## 2023-07-11 LAB — HBA1C MFR BLD HPLC: 8.1

## 2023-07-11 PROCEDURE — G0463: CPT

## 2023-07-11 PROCEDURE — 82274 ASSAY TEST FOR BLOOD FECAL: CPT

## 2023-07-11 RX ORDER — BLOOD-GLUCOSE METER
W/DEVICE EACH MISCELLANEOUS
Qty: 1 | Refills: 0 | Status: DISCONTINUED | COMMUNITY
Start: 2022-05-18 | End: 2023-07-11

## 2023-07-11 RX ORDER — BLOOD SUGAR DIAGNOSTIC
STRIP MISCELLANEOUS
Qty: 1 | Refills: 0 | Status: DISCONTINUED | COMMUNITY
Start: 2022-05-18 | End: 2023-07-11

## 2023-07-11 RX ORDER — LANCETS 33 GAUGE
EACH MISCELLANEOUS
Qty: 1 | Refills: 0 | Status: DISCONTINUED | COMMUNITY
Start: 2022-05-18 | End: 2023-07-11

## 2023-07-11 RX ORDER — TAMSULOSIN HYDROCHLORIDE 0.4 MG/1
0.4 CAPSULE ORAL
Qty: 30 | Refills: 0 | Status: DISCONTINUED | COMMUNITY
Start: 2022-05-09 | End: 2023-07-11

## 2023-07-11 RX ORDER — ISOPROPYL ALCOHOL 0.7 ML/ML
SWAB TOPICAL
Qty: 2 | Refills: 0 | Status: DISCONTINUED | COMMUNITY
Start: 2022-05-18 | End: 2023-07-11

## 2023-07-11 NOTE — PHYSICAL EXAM
[No Acute Distress] : no acute distress [Well Nourished] : well nourished [Well-Appearing] : well-appearing [Normal Sclera/Conjunctiva] : normal sclera/conjunctiva [EOMI] : extraocular movements intact [Normal Outer Ear/Nose] : the outer ears and nose were normal in appearance [Normal] : no joint swelling and grossly normal strength and tone [Normal Gait] : normal gait [Normal Affect] : the affect was normal [Normal Insight/Judgement] : insight and judgment were intact

## 2023-07-21 NOTE — HISTORY OF PRESENT ILLNESS
[FreeTextEntry1] : med refills, HTN [de-identified] : 62M h/o DM2, HTN, CVA, Afib on eliquis, HFrEF 25% EF, BRASH syndrome in May 2022, here for f/u on DM2 and CPE. He was unable to get refills on some of his medications because he was told to come to clinic before he could get referrals. He is out of entresto and eliquis. He denies new issues or symptoms. He is following with out of system Cardio, Neuro, and EP. At last visit in January it was noted his pacemaker was beeping in the morning, battery was replaced with EP and interrogation was normal. No new complaints. He frequently travels between here and his country, will be leaving for 5-6 months again in the next 1-2 months but no definitive flight planned yet.

## 2023-07-21 NOTE — HEALTH RISK ASSESSMENT
[Very Good] : ~his/her~ current health as very good [No] : No [1 or 2 (0 pts)] : 1 or 2 (0 points) [Never (0 pts)] : Never (0 points) [No falls in past year] : Patient reported no falls in the past year [0] : 1) Little interest or pleasure doing things: Not at all (0) [PHQ-2 Negative - No further assessment needed] : PHQ-2 Negative - No further assessment needed [Cultural] : cultural [Financial] : financial [With Family] : lives with family [Retired] : retired [Fully functional (bathing, dressing, toileting, transferring, walking, feeding)] : Fully functional (bathing, dressing, toileting, transferring, walking, feeding) [Sexually Active] : sexually active [Fully functional (using the telephone, shopping, preparing meals, housekeeping, doing laundry, using] : Fully functional and needs no help or supervision to perform IADLs (using the telephone, shopping, preparing meals, housekeeping, doing laundry, using transportation, managing medications and managing finances) [Never] : Never [0-4] : 0-4 [Audit-CScore] : 0 [[id number]] : 0 [Language] : denies difficulty with language [Behavior] : denies difficulty with behavior [Handling Complex Tasks] : denies difficulty handling complex tasks [Reasoning] : denies difficulty with reasoning

## 2023-07-24 ENCOUNTER — NON-APPOINTMENT (OUTPATIENT)
Age: 63
End: 2023-07-24

## 2023-07-24 LAB — HEMOCCULT STL QL IA: NEGATIVE

## 2023-07-25 ENCOUNTER — APPOINTMENT (OUTPATIENT)
Dept: FAMILY MEDICINE | Facility: HOSPITAL | Age: 63
End: 2023-07-25

## 2023-07-26 ENCOUNTER — FORM ENCOUNTER (OUTPATIENT)
Age: 63
End: 2023-07-26

## 2023-08-03 NOTE — ED ADULT TRIAGE NOTE - DIRECT TO ROOM CARE INITIATED:
[FreeTextEntry1] : 63 year-old male with long standing RA - not treated for at least 3 years with signs of progressive disease with likely wrist fusion Lumbar and cervical fusion in 2015 - hx. of stenosis - pending new surgery sometime in august 2022  1. RA -  stable on kevzara  and prednisone 5 mg -  stable on kevzara - 95% improvement - continue with current regiemn 2. Hepatitis B and C - on tenofovir  hep C viral load at 0 - needs to make an appt - has not been seen since 2019 - needs f/u 3. spinal fusion - x-rays with no C1-2 widening - worsening thoracic pain - not planning any more surgery - bilateral leg and hand cramping - bilateral feet paresthesia's  s/p new fusion - walking cane 5. Knee pain and swelling MRI with severe loss of cartilage and multiple meniscal tear and cyst - no pain today, no swelling.  6.neuropathy with loss of balance - on pregabalin 75 mg bid 7. weight loss - has stopped - weight is stable 8. abdominal tenderness - ct scan was normal ,likely related to nerve damage from surgery - could increase lyrica to see if symptoms resolve   I reviewed previous labs results with patients. labs today Diagnosis and Prognosis discussed. Continue with current medications. F/u  3 months 21-Apr-2022 12:15

## 2023-08-04 ENCOUNTER — APPOINTMENT (OUTPATIENT)
Dept: ELECTROPHYSIOLOGY | Facility: CLINIC | Age: 63
End: 2023-08-04

## 2023-08-11 NOTE — DISCHARGE NOTE NURSING/CASE MANAGEMENT/SOCIAL WORK - NSSCCONTNUM_GEN_ALL_CORE
Intubation    Date/Time: 8/11/2023 3:36 PM    Performed by: Austin Mullen CRNA  Authorized by: Da Dunlap Chi, MD    Intubation:     Induction:  Intravenous    Intubated:  Postinduction    Mask Ventilation:  N/a    Attempts:  1    Attempted By:  CRNA    Method of Intubation:  Video laryngoscopy    Blade:  Barajas 3    Laryngeal View Grade: Grade I - full view of cords      Difficult Airway Encountered?: No      Complications:  None    Airway Device:  Oral endotracheal tube    Airway Device Size:  6.5    Style/Cuff Inflation:  Cuffed    Inflation Amount (mL):  5    Tube secured:  21    Secured at:  The lips    Placement Verified By:  Capnometry    Complicating Factors:  None    Findings Post-Intubation:  BS equal bilateral       [phone]

## 2023-09-05 ENCOUNTER — APPOINTMENT (OUTPATIENT)
Dept: ELECTROPHYSIOLOGY | Facility: CLINIC | Age: 63
End: 2023-09-05
Payer: MEDICAID

## 2023-09-05 ENCOUNTER — NON-APPOINTMENT (OUTPATIENT)
Age: 63
End: 2023-09-05

## 2023-09-05 PROCEDURE — 93296 REM INTERROG EVL PM/IDS: CPT

## 2023-09-05 PROCEDURE — 93295 DEV INTERROG REMOTE 1/2/MLT: CPT

## 2023-09-13 ENCOUNTER — APPOINTMENT (OUTPATIENT)
Dept: FAMILY MEDICINE | Facility: HOSPITAL | Age: 63
End: 2023-09-13

## 2023-09-20 ENCOUNTER — APPOINTMENT (OUTPATIENT)
Dept: FAMILY MEDICINE | Facility: HOSPITAL | Age: 63
End: 2023-09-20

## 2023-10-24 NOTE — PHYSICAL THERAPY INITIAL EVALUATION ADULT - DID THE PATIENT HAVE SURGERY?
Pharmacy requested refills that are already active on file. Refused request to pharmacy.  
a/w CVA residual LUE weakness/n/a
n/a

## 2023-12-05 ENCOUNTER — APPOINTMENT (OUTPATIENT)
Dept: ELECTROPHYSIOLOGY | Facility: CLINIC | Age: 63
End: 2023-12-05
Payer: MEDICAID

## 2023-12-05 ENCOUNTER — NON-APPOINTMENT (OUTPATIENT)
Age: 63
End: 2023-12-05

## 2023-12-05 PROCEDURE — 93295 DEV INTERROG REMOTE 1/2/MLT: CPT

## 2023-12-05 PROCEDURE — 93296 REM INTERROG EVL PM/IDS: CPT | Mod: NC

## 2024-04-08 ENCOUNTER — APPOINTMENT (OUTPATIENT)
Dept: ELECTROPHYSIOLOGY | Facility: CLINIC | Age: 64
End: 2024-04-08
Payer: COMMERCIAL

## 2024-04-08 ENCOUNTER — NON-APPOINTMENT (OUTPATIENT)
Age: 64
End: 2024-04-08

## 2024-04-08 PROCEDURE — 93296 REM INTERROG EVL PM/IDS: CPT | Mod: NC

## 2024-04-08 PROCEDURE — 93295 DEV INTERROG REMOTE 1/2/MLT: CPT

## 2024-04-09 ENCOUNTER — OUTPATIENT (OUTPATIENT)
Dept: OUTPATIENT SERVICES | Facility: HOSPITAL | Age: 64
LOS: 1 days | End: 2024-04-09
Payer: COMMERCIAL

## 2024-04-09 ENCOUNTER — APPOINTMENT (OUTPATIENT)
Dept: FAMILY MEDICINE | Facility: HOSPITAL | Age: 64
End: 2024-04-09

## 2024-04-09 VITALS
OXYGEN SATURATION: 98 % | BODY MASS INDEX: 20.5 KG/M2 | RESPIRATION RATE: 16 BRPM | DIASTOLIC BLOOD PRESSURE: 72 MMHG | TEMPERATURE: 97.2 F | WEIGHT: 127 LBS | SYSTOLIC BLOOD PRESSURE: 165 MMHG | HEART RATE: 83 BPM

## 2024-04-09 DIAGNOSIS — I48.91 UNSPECIFIED ATRIAL FIBRILLATION: ICD-10-CM

## 2024-04-09 DIAGNOSIS — R60.9 EDEMA, UNSPECIFIED: ICD-10-CM

## 2024-04-09 DIAGNOSIS — N52.9 MALE ERECTILE DYSFUNCTION, UNSPECIFIED: ICD-10-CM

## 2024-04-09 DIAGNOSIS — I50.32 CHRONIC DIASTOLIC (CONGESTIVE) HEART FAILURE: ICD-10-CM

## 2024-04-09 DIAGNOSIS — E11.9 TYPE 2 DIABETES MELLITUS WITHOUT COMPLICATIONS: ICD-10-CM

## 2024-04-09 DIAGNOSIS — Z00.00 ENCOUNTER FOR GENERAL ADULT MEDICAL EXAMINATION WITHOUT ABNORMAL FINDINGS: ICD-10-CM

## 2024-04-12 PROBLEM — R60.9 SOFT TISSUE SWELLING OF BACK: Status: ACTIVE | Noted: 2023-07-11

## 2024-04-12 LAB
CREAT SPEC-SCNC: 53 MG/DL
HBA1C MFR BLD HPLC: 8.6
MICROALBUMIN 24H UR DL<=1MG/L-MCNC: 4.5 MG/DL
MICROALBUMIN/CREAT 24H UR-RTO: 85 MG/G

## 2024-04-18 NOTE — PHYSICAL EXAM
[No Acute Distress] : no acute distress [Well Nourished] : well nourished [Well Developed] : well developed [Well-Appearing] : well-appearing [Normal Sclera/Conjunctiva] : normal sclera/conjunctiva [EOMI] : extraocular movements intact [Normal Outer Ear/Nose] : the outer ears and nose were normal in appearance [Normal Oropharynx] : the oropharynx was normal [Supple] : supple [No Respiratory Distress] : no respiratory distress  [No Accessory Muscle Use] : no accessory muscle use [Clear to Auscultation] : lungs were clear to auscultation bilaterally [Normal Rate] : normal rate  [Regular Rhythm] : with a regular rhythm [Normal S1, S2] : normal S1 and S2 [No Murmur] : no murmur heard [No Edema] : there was no peripheral edema [Soft] : abdomen soft [Non Tender] : non-tender [Non-distended] : non-distended [Normal Bowel Sounds] : normal bowel sounds [No Joint Swelling] : no joint swelling [Normal Gait] : normal gait [Normal Affect] : the affect was normal [Alert and Oriented x3] : oriented to person, place, and time [Normal Insight/Judgement] : insight and judgment were intact [de-identified] : +soft tissue mass measuring 5mm on forehead; +firm mobile mass measuring 2cm on level of T3 of back

## 2024-04-18 NOTE — HISTORY OF PRESENT ILLNESS
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 Pt frequently travels between here and his country, [place]. Pt was unable to get refills on medications and advised to come into clinic for follow-up. Accompanied by son. Patient has not been taking Entresto medication. States that refill was not available at pharmacy. He is following with out of system Cardio, Neuro, and EP. However, pt does not remember if he had a recent echocardiogram. Folllowing with EP doctor on 7/8/24. Pt reports that he his feeling well. He is concerned about erectile dysfunction at this time and would like to see a urologist. He is also concerned about dryness and itchiness of his skin on lower extremities and a soft tissue mass that he has on his forehead and on his back. Denies fevers, chills, fatigue, night sweats, chest pain, palpitations, SOB, cough, dyspnea on exertion, nausea, vomiting, diarrhea, urinary frequency or burning with urination.

## 2024-04-24 ENCOUNTER — APPOINTMENT (OUTPATIENT)
Dept: SURGERY | Facility: HOSPITAL | Age: 64
End: 2024-04-24

## 2024-05-07 ENCOUNTER — OUTPATIENT (OUTPATIENT)
Dept: OUTPATIENT SERVICES | Facility: HOSPITAL | Age: 64
LOS: 1 days | End: 2024-05-07
Payer: COMMERCIAL

## 2024-05-07 ENCOUNTER — APPOINTMENT (OUTPATIENT)
Dept: OPHTHALMOLOGY | Facility: HOSPITAL | Age: 64
End: 2024-05-07

## 2024-05-07 VITALS
DIASTOLIC BLOOD PRESSURE: 80 MMHG | SYSTOLIC BLOOD PRESSURE: 158 MMHG | WEIGHT: 126 LBS | RESPIRATION RATE: 14 BRPM | OXYGEN SATURATION: 99 % | BODY MASS INDEX: 20.34 KG/M2 | HEART RATE: 75 BPM | TEMPERATURE: 98.1 F

## 2024-05-07 VITALS — DIASTOLIC BLOOD PRESSURE: 95 MMHG | SYSTOLIC BLOOD PRESSURE: 172 MMHG

## 2024-05-07 DIAGNOSIS — Z00.00 ENCOUNTER FOR GENERAL ADULT MEDICAL EXAMINATION WITHOUT ABNORMAL FINDINGS: ICD-10-CM

## 2024-05-07 DIAGNOSIS — Z13.5 ENCOUNTER FOR SCREENING FOR EYE AND EAR DISORDERS: ICD-10-CM

## 2024-05-07 PROCEDURE — G0463: CPT

## 2024-05-07 RX ORDER — SACUBITRIL AND VALSARTAN 49; 51 MG/1; MG/1
49-51 TABLET, FILM COATED ORAL
Qty: 42 | Refills: 2 | Status: DISCONTINUED | COMMUNITY
Start: 2022-05-17 | End: 2024-05-07

## 2024-05-07 RX ORDER — LORATADINE 10 MG/1
10 TABLET ORAL
Qty: 30 | Refills: 0 | Status: ACTIVE | COMMUNITY
Start: 2022-05-17 | End: 1900-01-01

## 2024-05-08 NOTE — REVIEW OF SYSTEMS
[Fever] : no fever [Chills] : no chills [Night Sweats] : no night sweats [Recent Change In Weight] : ~T no recent weight change [Pain] : no pain [Vision Problems] : no vision problems [Chest Pain] : no chest pain [Palpitations] : no palpitations [Shortness Of Breath] : no shortness of breath [Dyspnea on Exertion] : not dyspnea on exertion [Abdominal Pain] : no abdominal pain [Nausea] : no nausea [Vomiting] : no vomiting [Frequency] : no frequency [Headache] : no headache [Dizziness] : no dizziness

## 2024-05-08 NOTE — PHYSICAL EXAM
[No Acute Distress] : no acute distress [Normal Sclera/Conjunctiva] : normal sclera/conjunctiva [PERRL] : pupils equal round and reactive to light [EOMI] : extraocular movements intact [Supple] : supple [No Respiratory Distress] : no respiratory distress  [No Accessory Muscle Use] : no accessory muscle use [Clear to Auscultation] : lungs were clear to auscultation bilaterally [Normal Rate] : normal rate  [Regular Rhythm] : with a regular rhythm [Normal S1, S2] : normal S1 and S2 [No Murmur] : no murmur heard [No Edema] : there was no peripheral edema [Soft] : abdomen soft [Non Tender] : non-tender [Non-distended] : non-distended [Normal Bowel Sounds] : normal bowel sounds [Normal Gait] : normal gait [Alert and Oriented x3] : oriented to person, place, and time [de-identified] : thin body

## 2024-05-08 NOTE — HISTORY OF PRESENT ILLNESS
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 Pt denies any acute complaints. Denies any changes in vision since last being seen. Pt's last HbA1C was 8.6 on 4/9/2024. Pt's last CMP/Urine Creatine (85 mg/g) demonstrates impaired renal function. Pt's diabetes is currently managed on metformin and Jardiance. Pt has next appt set up w Dr. Momin for cardio clinic.

## 2024-05-14 LAB
BASOPHILS # BLD AUTO: 0.04 K/UL
BASOPHILS NFR BLD AUTO: 0.5 %
CHOLEST SERPL-MCNC: 135 MG/DL
EOSINOPHIL # BLD AUTO: 0.11 K/UL
EOSINOPHIL NFR BLD AUTO: 1.4 %
HCT VFR BLD CALC: 47.2 %
HDLC SERPL-MCNC: 53 MG/DL
HGB BLD-MCNC: 16 G/DL
IMM GRANULOCYTES NFR BLD AUTO: 0.4 %
LDLC SERPL CALC-MCNC: 63 MG/DL
LYMPHOCYTES # BLD AUTO: 1.38 K/UL
LYMPHOCYTES NFR BLD AUTO: 18.2 %
MAN DIFF?: NORMAL
MCHC RBC-ENTMCNC: 31.3 PG
MCHC RBC-ENTMCNC: 33.9 GM/DL
MCV RBC AUTO: 92.2 FL
MONOCYTES # BLD AUTO: 0.46 K/UL
MONOCYTES NFR BLD AUTO: 6.1 %
NEUTROPHILS # BLD AUTO: 5.57 K/UL
NEUTROPHILS NFR BLD AUTO: 73.4 %
NONHDLC SERPL-MCNC: 81 MG/DL
PLATELET # BLD AUTO: 171 K/UL
RBC # BLD: 5.12 M/UL
RBC # FLD: 11.7 %
TRIGL SERPL-MCNC: 102 MG/DL
TSH SERPL-ACNC: 0.98 UIU/ML
WBC # FLD AUTO: 7.59 K/UL

## 2024-06-05 ENCOUNTER — APPOINTMENT (OUTPATIENT)
Dept: UROLOGY | Facility: HOSPITAL | Age: 64
End: 2024-06-05

## 2024-06-05 ENCOUNTER — OUTPATIENT (OUTPATIENT)
Dept: OUTPATIENT SERVICES | Facility: HOSPITAL | Age: 64
LOS: 1 days | End: 2024-06-05
Payer: COMMERCIAL

## 2024-06-05 VITALS
TEMPERATURE: 97.7 F | WEIGHT: 124 LBS | HEART RATE: 70 BPM | BODY MASS INDEX: 20.01 KG/M2 | SYSTOLIC BLOOD PRESSURE: 158 MMHG | RESPIRATION RATE: 14 BRPM | OXYGEN SATURATION: 98 % | DIASTOLIC BLOOD PRESSURE: 74 MMHG

## 2024-06-05 VITALS — DIASTOLIC BLOOD PRESSURE: 56 MMHG | SYSTOLIC BLOOD PRESSURE: 146 MMHG

## 2024-06-05 DIAGNOSIS — I10 ESSENTIAL (PRIMARY) HYPERTENSION: ICD-10-CM

## 2024-06-05 DIAGNOSIS — N52.9 MALE ERECTILE DYSFUNCTION, UNSPECIFIED: ICD-10-CM

## 2024-06-05 DIAGNOSIS — I48.91 UNSPECIFIED ATRIAL FIBRILLATION: ICD-10-CM

## 2024-06-05 DIAGNOSIS — E11.9 TYPE 2 DIABETES MELLITUS WITHOUT COMPLICATIONS: ICD-10-CM

## 2024-06-05 DIAGNOSIS — Z23 ENCOUNTER FOR IMMUNIZATION: ICD-10-CM

## 2024-06-05 DIAGNOSIS — Z00.00 ENCOUNTER FOR GENERAL ADULT MEDICAL EXAMINATION WITHOUT ABNORMAL FINDINGS: ICD-10-CM

## 2024-06-05 DIAGNOSIS — E11.9 TYPE 2 DIABETES MELLITUS W/OUT COMPLICATIONS: ICD-10-CM

## 2024-06-05 RX ORDER — METFORMIN HYDROCHLORIDE 1000 MG/1
1000 TABLET, COATED ORAL TWICE DAILY
Qty: 180 | Refills: 1 | Status: ACTIVE | COMMUNITY
Start: 2022-05-11 | End: 1900-01-01

## 2024-06-05 RX ORDER — LISINOPRIL 5 MG/1
5 TABLET ORAL DAILY
Qty: 90 | Refills: 0 | Status: ACTIVE | COMMUNITY
Start: 2022-05-17 | End: 1900-01-01

## 2024-06-05 RX ORDER — EMPAGLIFLOZIN 10 MG/1
10 TABLET, FILM COATED ORAL
Qty: 90 | Refills: 0 | Status: ACTIVE | COMMUNITY
Start: 2022-05-18 | End: 1900-01-01

## 2024-06-05 RX ORDER — APIXABAN 5 MG/1
5 TABLET, FILM COATED ORAL
Qty: 180 | Refills: 0 | Status: ACTIVE | COMMUNITY
Start: 2022-05-10 | End: 1900-01-01

## 2024-06-05 RX ORDER — ATORVASTATIN CALCIUM 20 MG/1
20 TABLET, FILM COATED ORAL
Qty: 90 | Refills: 0 | Status: ACTIVE | COMMUNITY
Start: 2022-05-10 | End: 1900-01-01

## 2024-06-21 ENCOUNTER — OUTPATIENT (OUTPATIENT)
Dept: OUTPATIENT SERVICES | Facility: HOSPITAL | Age: 64
LOS: 1 days | End: 2024-06-21
Payer: COMMERCIAL

## 2024-06-21 ENCOUNTER — APPOINTMENT (OUTPATIENT)
Dept: CARDIOLOGY | Facility: HOSPITAL | Age: 64
End: 2024-06-21
Payer: COMMERCIAL

## 2024-06-21 VITALS
OXYGEN SATURATION: 97 % | SYSTOLIC BLOOD PRESSURE: 168 MMHG | BODY MASS INDEX: 20.01 KG/M2 | WEIGHT: 124 LBS | TEMPERATURE: 97.7 F | HEART RATE: 79 BPM | DIASTOLIC BLOOD PRESSURE: 76 MMHG | RESPIRATION RATE: 14 BRPM

## 2024-06-21 VITALS — DIASTOLIC BLOOD PRESSURE: 63 MMHG | SYSTOLIC BLOOD PRESSURE: 153 MMHG

## 2024-06-21 DIAGNOSIS — Z00.00 ENCOUNTER FOR GENERAL ADULT MEDICAL EXAMINATION WITHOUT ABNORMAL FINDINGS: ICD-10-CM

## 2024-06-21 DIAGNOSIS — N18.9 CHRONIC KIDNEY DISEASE, UNSPECIFIED: ICD-10-CM

## 2024-06-21 DIAGNOSIS — I50.22 CHRONIC SYSTOLIC (CONGESTIVE) HEART FAILURE: ICD-10-CM

## 2024-06-21 DIAGNOSIS — I50.32 CHRONIC DIASTOLIC (CONGESTIVE) HEART FAILURE: ICD-10-CM

## 2024-06-21 PROCEDURE — 36415 COLL VENOUS BLD VENIPUNCTURE: CPT

## 2024-06-21 PROCEDURE — 84154 ASSAY OF PSA FREE: CPT

## 2024-06-21 PROCEDURE — G0463: CPT

## 2024-06-21 PROCEDURE — 84153 ASSAY OF PSA TOTAL: CPT

## 2024-06-21 PROCEDURE — 85027 COMPLETE CBC AUTOMATED: CPT

## 2024-06-21 PROCEDURE — 84443 ASSAY THYROID STIM HORMONE: CPT

## 2024-06-21 PROCEDURE — 82043 UR ALBUMIN QUANTITATIVE: CPT

## 2024-06-21 PROCEDURE — 85025 COMPLETE CBC W/AUTO DIFF WBC: CPT

## 2024-06-21 PROCEDURE — 93005 ELECTROCARDIOGRAM TRACING: CPT

## 2024-06-21 PROCEDURE — 80053 COMPREHEN METABOLIC PANEL: CPT

## 2024-06-21 PROCEDURE — 80061 LIPID PANEL: CPT

## 2024-06-21 PROCEDURE — 83002 ASSAY OF GONADOTROPIN (LH): CPT

## 2024-06-21 PROCEDURE — 93010 ELECTROCARDIOGRAM REPORT: CPT

## 2024-06-21 RX ORDER — METOPROLOL SUCCINATE 50 MG/1
50 TABLET, EXTENDED RELEASE ORAL
Qty: 30 | Refills: 3 | Status: ACTIVE | COMMUNITY
Start: 2022-05-10 | End: 1900-01-01

## 2024-06-22 DIAGNOSIS — I50.22 CHRONIC SYSTOLIC (CONGESTIVE) HEART FAILURE: ICD-10-CM

## 2024-06-22 LAB
ALBUMIN SERPL ELPH-MCNC: 4.8 G/DL
ALP BLD-CCNC: 76 U/L
ALT SERPL-CCNC: 28 U/L
ANION GAP SERPL CALC-SCNC: 14 MMOL/L
AST SERPL-CCNC: 18 U/L
BILIRUB SERPL-MCNC: 0.3 MG/DL
BUN SERPL-MCNC: 30 MG/DL
CALCIUM SERPL-MCNC: 10.1 MG/DL
CHLORIDE SERPL-SCNC: 100 MMOL/L
CO2 SERPL-SCNC: 25 MMOL/L
CREAT SERPL-MCNC: 1.25 MG/DL
EGFR: 65 ML/MIN/1.73M2
GLUCOSE SERPL-MCNC: 242 MG/DL
HCT VFR BLD CALC: 45.4 %
HGB BLD-MCNC: 15.2 G/DL
LH SERPL-ACNC: 6 IU/L
MCHC RBC-ENTMCNC: 32.2 PG
MCHC RBC-ENTMCNC: 33.5 GM/DL
MCV RBC AUTO: 96.2 FL
PLATELET # BLD AUTO: 206 K/UL
POTASSIUM SERPL-SCNC: 5.4 MMOL/L
PROT SERPL-MCNC: 7.4 G/DL
PSA FREE FLD-MCNC: 39 %
PSA FREE SERPL-MCNC: 1.19 NG/ML
PSA SERPL-MCNC: 3.06 NG/ML
RBC # BLD: 4.72 M/UL
RBC # FLD: 12 %
SODIUM SERPL-SCNC: 139 MMOL/L
WBC # FLD AUTO: 8.38 K/UL

## 2024-06-24 PROBLEM — N18.9 CHRONIC KIDNEY DISEASE: Status: ACTIVE | Noted: 2024-06-21

## 2024-06-24 PROBLEM — I50.22 CHRONIC SYSTOLIC HEART FAILURE: Status: ACTIVE | Noted: 2024-06-21

## 2024-06-25 LAB
ALBUMIN SERPL ELPH-MCNC: 5.1 G/DL
ALP BLD-CCNC: 73 U/L
ALT SERPL-CCNC: 25 U/L
ANION GAP SERPL CALC-SCNC: 14 MMOL/L
AST SERPL-CCNC: 18 U/L
BILIRUB SERPL-MCNC: 0.3 MG/DL
BUN SERPL-MCNC: 32 MG/DL
CALCIUM SERPL-MCNC: 10.4 MG/DL
CHLORIDE SERPL-SCNC: 98 MMOL/L
CO2 SERPL-SCNC: 26 MMOL/L
CREAT SERPL-MCNC: 1.28 MG/DL
EGFR: 63 ML/MIN/1.73M2
GLUCOSE SERPL-MCNC: 168 MG/DL
POTASSIUM SERPL-SCNC: 5.3 MMOL/L
PROT SERPL-MCNC: 7.7 G/DL
SODIUM SERPL-SCNC: 138 MMOL/L

## 2024-07-08 ENCOUNTER — APPOINTMENT (OUTPATIENT)
Dept: ELECTROPHYSIOLOGY | Facility: CLINIC | Age: 64
End: 2024-07-08
Payer: COMMERCIAL

## 2024-07-08 ENCOUNTER — NON-APPOINTMENT (OUTPATIENT)
Age: 64
End: 2024-07-08

## 2024-07-08 PROCEDURE — 93295 DEV INTERROG REMOTE 1/2/MLT: CPT

## 2024-07-08 PROCEDURE — 93296 REM INTERROG EVL PM/IDS: CPT | Mod: NC

## 2024-08-01 ENCOUNTER — RESULT CHARGE (OUTPATIENT)
Age: 64
End: 2024-08-01

## 2024-08-02 ENCOUNTER — OUTPATIENT (OUTPATIENT)
Dept: OUTPATIENT SERVICES | Facility: HOSPITAL | Age: 64
LOS: 1 days | End: 2024-08-02
Payer: COMMERCIAL

## 2024-08-02 ENCOUNTER — APPOINTMENT (OUTPATIENT)
Dept: CARDIOLOGY | Facility: HOSPITAL | Age: 64
End: 2024-08-02
Payer: COMMERCIAL

## 2024-08-02 VITALS
BODY MASS INDEX: 19.93 KG/M2 | HEART RATE: 64 BPM | HEIGHT: 66 IN | SYSTOLIC BLOOD PRESSURE: 160 MMHG | RESPIRATION RATE: 14 BRPM | DIASTOLIC BLOOD PRESSURE: 82 MMHG | WEIGHT: 124 LBS | TEMPERATURE: 97.8 F | OXYGEN SATURATION: 98 %

## 2024-08-02 DIAGNOSIS — N52.9 MALE ERECTILE DYSFUNCTION, UNSPECIFIED: ICD-10-CM

## 2024-08-02 DIAGNOSIS — I50.22 CHRONIC SYSTOLIC (CONGESTIVE) HEART FAILURE: ICD-10-CM

## 2024-08-02 DIAGNOSIS — I48.91 UNSPECIFIED ATRIAL FIBRILLATION: ICD-10-CM

## 2024-08-02 DIAGNOSIS — Z00.00 ENCOUNTER FOR GENERAL ADULT MEDICAL EXAMINATION WITHOUT ABNORMAL FINDINGS: ICD-10-CM

## 2024-08-02 DIAGNOSIS — I50.32 CHRONIC DIASTOLIC (CONGESTIVE) HEART FAILURE: ICD-10-CM

## 2024-08-02 PROCEDURE — 93005 ELECTROCARDIOGRAM TRACING: CPT

## 2024-08-02 PROCEDURE — G0463: CPT

## 2024-08-02 PROCEDURE — 93010 ELECTROCARDIOGRAM REPORT: CPT

## 2024-08-02 RX ORDER — LISINOPRIL 20 MG/1
20 TABLET ORAL DAILY
Qty: 90 | Refills: 0 | Status: ACTIVE | COMMUNITY
Start: 2024-08-02 | End: 1900-01-01

## 2024-08-05 ENCOUNTER — NON-APPOINTMENT (OUTPATIENT)
Age: 64
End: 2024-08-05

## 2024-08-12 ENCOUNTER — EMERGENCY (EMERGENCY)
Facility: HOSPITAL | Age: 64
LOS: 1 days | Discharge: HOME | End: 2024-08-12
Attending: STUDENT IN AN ORGANIZED HEALTH CARE EDUCATION/TRAINING PROGRAM | Admitting: STUDENT IN AN ORGANIZED HEALTH CARE EDUCATION/TRAINING PROGRAM
Payer: COMMERCIAL

## 2024-08-12 ENCOUNTER — APPOINTMENT (OUTPATIENT)
Dept: FAMILY MEDICINE | Facility: HOSPITAL | Age: 64
End: 2024-08-12

## 2024-08-12 VITALS
WEIGHT: 123.9 LBS | TEMPERATURE: 99 F | HEIGHT: 71 IN | DIASTOLIC BLOOD PRESSURE: 96 MMHG | OXYGEN SATURATION: 100 % | SYSTOLIC BLOOD PRESSURE: 155 MMHG | RESPIRATION RATE: 16 BRPM | HEART RATE: 73 BPM

## 2024-08-12 VITALS
OXYGEN SATURATION: 99 % | BODY MASS INDEX: 19.21 KG/M2 | DIASTOLIC BLOOD PRESSURE: 72 MMHG | RESPIRATION RATE: 16 BRPM | SYSTOLIC BLOOD PRESSURE: 163 MMHG | WEIGHT: 119 LBS | HEART RATE: 74 BPM | TEMPERATURE: 98.4 F

## 2024-08-12 VITALS — SYSTOLIC BLOOD PRESSURE: 160 MMHG | DIASTOLIC BLOOD PRESSURE: 70 MMHG

## 2024-08-12 PROCEDURE — 99282 EMERGENCY DEPT VISIT SF MDM: CPT

## 2024-08-12 PROCEDURE — 99283 EMERGENCY DEPT VISIT LOW MDM: CPT

## 2024-08-12 NOTE — ED PROVIDER NOTE - NSFOLLOWUPINSTRUCTIONS_ED_ALL_ED_FT
Retención urinaria    La retención urinaria es la incapacidad de vaciar completamente la vejiga. Sandra es un problema común en hombres mayores, especialmente con próstata agrandada. Si lo envían a casa con un catéter de Valencia y un sistema de drenaje, asegúrese de mantener la bolsa de drenaje vacía y más baja que el catéter. Mantenga el catéter de Valencia colocado hasta que tenga greg visita de seguimiento con un urólogo.    BUSQUE ATENCIÓN MÉDICA INMEDIATA SI PRESENTA LOS SIGUIENTES SÍNTOMAS: el catéter alf de drenar la orina, el catéter se , dolor abdominal, náuseas/vómitos o escalofríos/fiebre.     Descanse, michael muchos líquidos.  Avanzar en la actividad según se tolere.  Continúe con todos los medicamentos recetados anteriormente según las indicaciones.  Demetrice un seguimiento con ruby urólogo en 2 a 5 días y traiga copias de edelmira resultados.  Regrese a la meg de emergencias si los síntomas empeoran      Urinary Retention    Urinary retention is the inability to completely empty your bladder. This is a common problem in older men, especially with enlarged prostates. If you are sent home with a valencia catheter and a drainage system make sure to keep the drainage bag emptied and lower than your catheter. Keep the valencia catheter in until you follow up with a urologist.    SEEK IMMEDIATE MEDICAL CARE IF YOU DEVELOP THE FOLLOWING SYMPTOMS: the catheter stops draining urine, the catheter falls out, abdominal pain, nausea/vomiting, or chills/fever.     Rest, drink plenty of fluids.  Advance activity as tolerated.  Continue all previously prescribed medications as directed.  Follow up with your urologist in 2-5 days and bring copies of your results.  Return to the ER for worsening symptoms,

## 2024-08-12 NOTE — ED PROVIDER NOTE - CARE PROVIDER_API CALL
Jayjay Pillai  Urology  [address], [address]  [place] [address]  Phone: [phone]  Fax: [phone]  Follow Up Time:

## 2024-08-12 NOTE — ED PROVIDER NOTE - PHYSICAL EXAMINATION
VITAL SIGNS: I have reviewed nursing notes and confirm.   GEN: Well-developed; well-nourished; in no acute distress. Speaking full sentences.  SKIN: Warm, pink, no rash, no diaphoresis, no cyanosis, well perfused.   HEAD: Normocephalic; atraumatic.    NECK: Supple; non tender.   EYES: Pupils 3mm equal, round, reactive to light and accomodation, conjunctiva and sclera clear. Extra-ocular movements intact bilaterally.  ENT: No nasal discharge; airway clear. Trachea is midline.    CV: RRR. S1, S2 normal; no murmurs, gallops, or rubs. Capillary refill < 2 seconds throughout. Distal pulses intact 2+ throughout.  RESP: CTA bilaterally. No wheezes, rales, or rhonchi.   ABD: Normal bowel sounds, soft, non-distended, non-tender, no rebound, no guarding, no rigidity, no hepatosplenomegaly. : Ernestina RN valencia cath in place, draining clear yellow urine.  MSK: Normal range of motion and movement of all 4 extremities. No apparent joint or muscular pain throughout.    BACK: No thoracolumbar midline or paravertebral tenderness.    NEURO: Alert & oriented x 3, Grossly unremarkable. Sensory and motor intact throughout. No focal deficits. Gait: Fluid. Normal speech and coordination.

## 2024-08-12 NOTE — ED PROVIDER NOTE - CLINICAL SUMMARY MEDICAL DECISION MAKING FREE TEXT BOX
Dr. Gurpreet Meyers MD, EM And Medical Toxicology Attendin-year-old male with a past medical history of BPH, hyperlipidemia, hypertension presenting with "I want my Valencia taken out".   he was recently at [facility] after a procedure and was evaluated for urinary retention, had a Valencia catheter placed about 7 days ago and was told to follow-up with urology outpatient but they were unable to make an appointment.  otherwise he denies any abdominal pain, suprapubic pain, urinary Valencia malfunction, decreased urine output from the Valencia catheter, hematuria, dysuria, fevers/chills, purulent drainage, chest pain, shortness of breath, nausea vomiting.  History obtained from independent historian: N/A  External note reviewed: N/A  DDx includes but is not limited to: urinary valencia removal, bph  Decision making, ED course, independent interpretation of imaging studies, and consults:     patient with urinary Valencia catheter in place putting out clear yellow urine.  There is no malfunction.  They were supposed to follow-up with urology outpatient but was unable to make an appointment we will  given new urology contacts for follow-up within the next week.  They verbalized understanding and will make the appointment themselves.  Otherwise the patient is without any abdominal pain, suprapubic pain, nausea vomiting, fevers/chills.    Consideration hospitalization vs de-escalation of care: dc  Disposition: DC

## 2024-08-12 NOTE — ED PROVIDER NOTE - OBJECTIVE STATEMENT
63-year-old male with a past medical history of BPH, hyperlipidemia, hypertension presenting with "I want my Taylor taken out".   he was recently at [facility] after a procedure and was evaluated for urinary retention, had a Taylor catheter placed about 7 days ago and was told to follow-up with urology outpatient but they were unable to make an appointment.  otherwise he denies any abdominal pain, suprapubic pain, urinary Taylor malfunction, decreased urine output from the Taylor catheter, hematuria, dysuria, fevers/chills, purulent drainage, chest pain, shortness of breath, nausea vomiting.

## 2024-08-12 NOTE — ED ADULT TRIAGE NOTE - CHIEF COMPLAINT QUOTE
Patient presents to ED after having a urinary catheter placed at [facility] ED on Friday. Patient states he was told to follow up with a urologist but did not make an appointment. Patient came to ED to have catheter removed. Patient denies fever, denies pain. Patient states he had catheter placed for urinary retention (was unable to urinate for 7 hours). Patient has a pacemaker.

## 2024-08-12 NOTE — ED ADULT NURSE NOTE - OBJECTIVE STATEMENT
Pt came from home for valencia catheter renewal. Pt states that he was seen at [facility] on Friday for urinary retention and had a valencia catheter placed. Pt states he was unable to make a f/u appt with urology to have catheter removed. Pt stating that he is here today to have valencia catheter removed. Pt denies pain/discomfort or fever/chills. States that he is going on vacation on 8/21 and needs the valencia removed before then.

## 2024-08-12 NOTE — ED PROVIDER NOTE - PATIENT PORTAL LINK FT
You can access the FollowMyHealth Patient Portal offered by [facility] by registering at the following website: [email or web address]. By joining [facility]’s FollowMyHealth portal, you will also be able to view your health information using other applications (apps) compatible with our system.

## 2024-08-12 NOTE — ED ADULT NURSE NOTE - NSFALLRISK_ED_ALL_ED
Quality 431: Preventive Care And Screening: Unhealthy Alcohol Use - Screening: Patient screened for unhealthy alcohol use using a single question and scores less than 2 times per year Quality 110: Preventive Care And Screening: Influenza Immunization: Influenza Immunization not Administered because Patient Refused. Detail Level: Detailed No

## 2024-08-12 NOTE — ED PROVIDER NOTE - CARE PROVIDERS DIRECT ADDRESSES
[email or web address]

## 2024-08-12 NOTE — ED ADULT NURSE NOTE - CHIEF COMPLAINT QUOTE
Patient presents to ED after having a urinary catheter placed at [facility] ED on Friday. Patient states he was told to follow up with a urologist but did not make an appointment. Patient came to ED to have catheter removed. Patient denies fever, denies pain. Patient states he had catheter placed for urinary retention (was unable to urinate for 7 hours). Patient has a pacemaker.

## 2024-08-13 ENCOUNTER — APPOINTMENT (OUTPATIENT)
Dept: UROLOGY | Facility: CLINIC | Age: 64
End: 2024-08-13

## 2024-08-13 VITALS
OXYGEN SATURATION: 98 % | WEIGHT: 124 LBS | TEMPERATURE: 97.3 F | SYSTOLIC BLOOD PRESSURE: 134 MMHG | BODY MASS INDEX: 19.93 KG/M2 | DIASTOLIC BLOOD PRESSURE: 68 MMHG | RESPIRATION RATE: 18 BRPM | HEIGHT: 66 IN | HEART RATE: 74 BPM

## 2024-08-13 DIAGNOSIS — N13.8 BENIGN PROSTATIC HYPERPLASIA WITH LOWER URINARY TRACT SYMPMS: ICD-10-CM

## 2024-08-13 DIAGNOSIS — N40.0 BENIGN PROSTATIC HYPERPLASIA WITHOUT LOWER URINARY TRACT SYMPMS: ICD-10-CM

## 2024-08-13 DIAGNOSIS — N40.1 BENIGN PROSTATIC HYPERPLASIA WITH LOWER URINARY TRACT SYMPMS: ICD-10-CM

## 2024-08-13 DIAGNOSIS — R33.9 RETENTION OF URINE, UNSPECIFIED: ICD-10-CM

## 2024-08-13 PROCEDURE — 99204 OFFICE O/P NEW MOD 45 MIN: CPT | Mod: 25

## 2024-08-13 PROCEDURE — A4216: CPT

## 2024-08-13 PROCEDURE — 51720 TREATMENT OF BLADDER LESION: CPT

## 2024-08-13 PROCEDURE — 51798 US URINE CAPACITY MEASURE: CPT

## 2024-08-13 RX ORDER — TAMSULOSIN HYDROCHLORIDE 0.4 MG/1
0.4 CAPSULE ORAL
Qty: 90 | Refills: 3 | Status: ACTIVE | COMMUNITY
Start: 2024-08-13 | End: 1900-01-01

## 2024-08-13 NOTE — HISTORY OF PRESENT ILLNESS
[FreeTextEntry1] : 63-year-old male consulting for urinary retention.  Patient reporting urinary symptoms leading to urinary retention about a week ago.  Patient has not been started on tamsulosin yet.  Patient passed trial of void today, PVR 83 cc. Denies fever chills dysuria hematuria.

## 2024-08-13 NOTE — CHART NOTE - NSCHARTNOTEFT_GEN_A_CORE
63 y o male presenting to the ED on 8/12/24 for urinary catheter complications.  ED Provider recommended urology follow up and patient was agreeable to an appointment with Dr. Mosher at 1:00 pm today 8/13/24.   service Graciela # [id number] and [employer] # [id number] use in communication.

## 2024-08-13 NOTE — ASSESSMENT
[FreeTextEntry1] : 63-year-old male with urinary retention, passed trial of void today.  Discussed tamsulosin daily, patient amenable. Follow-up uroflow PVR IPSS.  We discussed the effects that BPH can cause on the bladder: BPH can cause detrusor overactivity which results in urgency and frequency and in some cases, incontinence. Over a longer time,some men may develop large volume/acontractile bladders, while other men develop small or normal volume bladders with loss of compliance. Neither is ideal and both represent end stage bladder damage that can not be fixed and can cause kidney injury. I explained the 3 main jobs of the bladder which are to 1) store urine, 2) evacuate urine and 3) keep urine at low pressure to prevent upper tract injury. I explained the mechanisms of urinary tract infections, hydronephrosis and kidney injury due to urinary retention.  These consequences can be severe, irreversible and even life threatening.   We then discussed the treatment options for BPH. We generally start with medical management and proceed to surgery if necessary. Alpha blockers act to relax the prostate while 5-alpha reductase inhibitors (5-Mojgan) shrink the prostate. 5-JON's are most effective in patients who have prostates that are >40gm. The former are fairly fast acting (in a matter of days-weeks) while the latter can take up to 3-6 months to take effect. PDE5 inhibitors have been shown in several randomized trials to be beneficial in improving symptom scores in patients with LUTS/BPH.

## 2024-08-16 ENCOUNTER — NON-APPOINTMENT (OUTPATIENT)
Age: 64
End: 2024-08-16

## 2024-08-16 RX ORDER — TAMSULOSIN HYDROCHLORIDE 0.4 MG/1
0.4 CAPSULE ORAL
Qty: 90 | Refills: 1 | Status: ACTIVE | COMMUNITY
Start: 2024-08-12 | End: 1900-01-01

## 2024-08-17 NOTE — PHYSICAL EXAM
[No Acute Distress] : no acute distress [Normal Sclera/Conjunctiva] : normal sclera/conjunctiva [Normal Oropharynx] : the oropharynx was normal [Supple] : supple [No Respiratory Distress] : no respiratory distress  [No Accessory Muscle Use] : no accessory muscle use [Clear to Auscultation] : lungs were clear to auscultation bilaterally [Normal Rate] : normal rate  [Regular Rhythm] : with a regular rhythm [No Murmur] : no murmur heard [No Edema] : there was no peripheral edema [Soft] : abdomen soft [Non Tender] : non-tender [Non-distended] : non-distended [No Masses] : no abdominal mass palpated [Normal Bowel Sounds] : normal bowel sounds [No CVA Tenderness] : no CVA  tenderness [Grossly Normal Strength/Tone] : grossly normal strength/tone [No Rash] : no rash [Normal Gait] : normal gait [Alert and Oriented x3] : oriented to person, place, and time [de-identified] : indwelling catheter properly placed, no swelling, erythema, warmth on/around the insertion site

## 2024-08-17 NOTE — PHYSICAL EXAM
[No Acute Distress] : no acute distress [Normal Sclera/Conjunctiva] : normal sclera/conjunctiva [Normal Oropharynx] : the oropharynx was normal [Supple] : supple [No Respiratory Distress] : no respiratory distress  [No Accessory Muscle Use] : no accessory muscle use [Clear to Auscultation] : lungs were clear to auscultation bilaterally [Normal Rate] : normal rate  [Regular Rhythm] : with a regular rhythm [No Murmur] : no murmur heard [No Edema] : there was no peripheral edema [Soft] : abdomen soft [Non Tender] : non-tender [Non-distended] : non-distended [No Masses] : no abdominal mass palpated [Normal Bowel Sounds] : normal bowel sounds [No CVA Tenderness] : no CVA  tenderness [Grossly Normal Strength/Tone] : grossly normal strength/tone [No Rash] : no rash [Normal Gait] : normal gait [Alert and Oriented x3] : oriented to person, place, and time [de-identified] : indwelling catheter properly placed, no swelling, erythema, warmth on/around the insertion site

## 2024-08-17 NOTE — HISTORY OF PRESENT ILLNESS
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 On 8/9/24, pt called 911 due to inability to urinate overnight. Pt was BIBEMS to [facility] where he was placed indwelling catheter and discharged home to be followed up with PCP and urology. In the ED, labs (CBC, CMP, UA) was unremarkable. Pt was not given any meds. Today in the clinic, pt reports indwelling catheter is working well. Denies fever, chills, n/v, abdominal pain, dark/bloody urine.

## 2024-08-17 NOTE — PHYSICAL EXAM
[No Acute Distress] : no acute distress [Normal Sclera/Conjunctiva] : normal sclera/conjunctiva [Normal Oropharynx] : the oropharynx was normal [Supple] : supple [No Respiratory Distress] : no respiratory distress  [No Accessory Muscle Use] : no accessory muscle use [Clear to Auscultation] : lungs were clear to auscultation bilaterally [Normal Rate] : normal rate  [Regular Rhythm] : with a regular rhythm [No Murmur] : no murmur heard [No Edema] : there was no peripheral edema [Soft] : abdomen soft [Non Tender] : non-tender [Non-distended] : non-distended [No Masses] : no abdominal mass palpated [Normal Bowel Sounds] : normal bowel sounds [No CVA Tenderness] : no CVA  tenderness [Grossly Normal Strength/Tone] : grossly normal strength/tone [No Rash] : no rash [Normal Gait] : normal gait [Alert and Oriented x3] : oriented to person, place, and time [de-identified] : indwelling catheter properly placed, no swelling, erythema, warmth on/around the insertion site

## 2024-08-17 NOTE — REVIEW OF SYSTEMS
[Fever] : no fever [Chills] : no chills [Chest Pain] : no chest pain [Palpitations] : no palpitations [Shortness Of Breath] : no shortness of breath [Cough] : no cough [Abdominal Pain] : no abdominal pain [Nausea] : no nausea [Vomiting] : no vomiting [Dysuria] : no dysuria [Hematuria] : no hematuria [Back Pain] : no back pain

## 2024-08-17 NOTE — HISTORY OF PRESENT ILLNESS
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 On 8/9/24, pt called 911 due to inability to urinate overnight. Pt was BIBEMS to [facility] where he was placed indwelling catheter and discharged home to be followed up with PCP and urology. In the ED, labs (CBC, CMP, UA) was unremarkable. Pt was not given any meds. Today in the clinic, pt reports indwelling catheter is working well. Denies fever, chills, n/v, abdominal pain, dark/bloody urine.

## 2024-08-17 NOTE — HISTORY OF PRESENT ILLNESS
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 On 8/9/24, pt called 911 due to inability to urinate overnight. Pt was BIBEMS to [facility] where he was placed indwelling catheter and discharged home to be followed up with PCP and urology. In the ED, labs (CBC, CMP, UA) was unremarkable. Pt was not given any meds. Today in the clinic, pt reports indwelling catheter is working well. Denies fever, chills, n/v, abdominal pain, dark/bloody urine.

## 2024-08-22 DIAGNOSIS — Z12.11 ENCOUNTER FOR SCREENING FOR MALIGNANT NEOPLASM OF COLON: ICD-10-CM

## 2024-09-19 NOTE — PROGRESS NOTE ADULT - ASSESSMENT
CT Brain stroke protocol, CTA H&N, CTP 4/21  IMPRESSION:    CT head:  -Loss of gray-white matter differentiation in the right lateral frontal   lobe and insula concerning for acute infarction.  -No acute intracranial hemorrhage.    CT angiogram neck:  -At least mild stenosis of the left vertebral artery origin.  -Atherosclerotic changes at the bilateral carotid bifurcations without   stenosis.    CT angiogram head:  -Acute occlusion of the right MCA distal M1 and proximal M2 divisions.    CT perfusion:  At risk ischemic tissue: 125 mL, right MCA territory.  Core infarct: 6 mL, right MCA territory.  Mismatch volume: 119 mL.  Mismatch ratio: 20.8.    CTH non con 4/22  IMPRESSION: New right frontal temporal cortical lucency consistent with a   small infarct in the right middle cerebral artery distribution compared   with 4/21/2022.    MR Head non con 4/23  IMPRESSION:  Acute infarct in the right MCA territory involving the anterior temporal   segment extending intothe insula and right posterior frontal parietal   region. Some associated petechial hemorrhage suggested on the   susceptibility weighted imaging. Subtle mass effect on the right lateral   ventricle. Suspicion of a small amount of thrombus in the distal right M1   proximal M2 segment     ASSESSMENT: Patient is a 62yo RT handed Georgian speaking M with pmh of HTN, DM-II,  dilated cardiomyopathy presents to [facility] as a transfer for Rt M1 occlusion. Per chart review, patient's LWK was at midnight on 4/21. Patient had CT and CTA which was noted to show Rt M1 occlusion. Patient was not a TPa candidate due to out of time window. Patient was MT candidate at that time thus was agreed to transfer patient. Patient had initial NIHSS 4 whcih increased to NIHSS 9 then NIHSS 6 on repeat examination. Upon transfer patient deemed medically unstable for thrombectomy due to BRASH syndrome and admitted to MICU for further monitoring.     Impression. Mild agitation and very mild left hemiparesis, due to a right MCA infarct with a right M1 occlusion. Mechanism may be cardioembolism related to cardiomyopathy,     NEURO: Patient neurologically overall improved with no acute changes. Continue close monitoring for neurologic deterioration, permissive HTN to gradual normotension, holding antihypertensives, avoid fluctuations in BP. LDL 44, titrate statin to LDL goal less than 70, MR Head non con as noted above. Patient having episodes of agitation in MICU, was placed on Precedex drip. Agitation improved and now off Precedex. If patient becomes agitated when transferred to  [place] can try Seroquel if QTC not prolonged on EKG. Physical therapy/OT: no skilled PT needs at discharge.     ANTITHROMBOTIC THERAPY: ASA 81 mg for now. Given AF noted on admission, may change to Eliquis in the next day or 2.     PULMONARY: CXR clear, protecting airway, saturating well     CARDIOVASCULAR: TTE: Severe left ventricular enlargement. Estimated ejection fraction 25%. Diffuse hypokinesis, with inferior and inferoseptal akinesis. On admission patient was found to be in Atrial fibrillation slow ventricular response and with IVCD/BBB, now sinus bradycardia with LBBB. Cardiology consulted and recommends to continue holding AV kevin blockers.      SBP goal: permissive HTN to gradual normotension    GASTROINTESTINAL: Passed dysphagia screening, tolerating diet.     Diet: Consistent carb no snacks    RENAL: Patient initially with NANCY, creatinine 2.76 & K+ 7.5. Renal was consulted, no HD needed and was treated with a bicarb drip. NANCY and hyperkalemia resolved & patient off bicarb drip. Creatinine now 1.26 & K+ 4.6. Will continue to monitor kidney function and electrolytes. Taylor removed.  Monitor for changes in UO.     Na Goal: Greater than 135     Taylor: n    HEMATOLOGY: H/H without change, Hgb 11.3 Platelets normal. Will monitor for signs of bleeding.       DVT ppx: Heparin s.c [] LMWH []     ID: afebrile, no leukocytosis     OTHER: Patient clinically much improved. Accepted for transfer to stroke unit for ongoing workup and management.    DISPOSITION: Rehab or home depending on PT eval once stable and workup is complete    CORE MEASURES:        Admission NIHSS: 6     TPA: [] YES [x] NO      LDL/HDL: 44/41     Depression Screen: p     Statin Therapy: y     Dysphagia Screen: [x] PASS [] FAIL     Smoking [] YES [] NO      Afib [x] YES [] NO     Stroke Education [x] YES [] NO    Obtain screening lower extremity venous ultrasound in patients who meet 1 or more of the following criteria as patient is high risk for DVT/PE on admission:   [] History of DVT/PE  []Hypercoagulable states (Factor V Leiden, Cancer, OCP, etc. )  []Prolonged immobility (hemiplegia/hemiparesis/post operative or any other extended immobilization)  [] Transferred from outside facility (Rehab or Long term care)  [] Age </= to 50   ASSESSMENT: Patient is a 62yo RT handed Mongolian speaking M with pmh of HTN, DM-II,  dilated cardiomyopathy presents to [facility] as a transfer for Rt M1 occlusion. Per chart review, patient's LWK was at midnight on 4/21. Patient had CT and CTA which was noted to show Rt M1 occlusion. Patient was not a TPa candidate due to out of time window. Patient was MT candidate at that time thus was agreed to transfer patient. Patient had initial NIHSS 4 whcih increased to NIHSS 9 then NIHSS 6 on repeat examination. Upon transfer patient deemed medically unstable for thrombectomy due to BRASH syndrome and admitted to MICU for further monitoring.     Impression. Mild agitation and very mild left hemiparesis, due to a right MCA infarct with a right M1 occlusion. Mechanism may be cardioembolism related to afib,     NEURO: Patient neurologically overall improved with no acute changes. Continue close monitoring for neurologic deterioration, permissive HTN to gradual normotension, holding antihypertensives, avoid fluctuations in BP. LDL 44, titrate statin to LDL goal less than 70, MR Head non con as noted above. Patient having episodes of agitation in MICU, was placed on Precedex drip. Agitation improved and now off Precedex. If patient becomes agitated when transferred to  [place] can try Seroquel if QTC not prolonged on EKG. Physical therapy/OT: no skilled PT needs at discharge.     ANTITHROMBOTIC THERAPY: ASA 81 mg for now. Given AF noted on admission, plan to start Eliquis tomorrow on 4/25 and will discontinue ASA at that time.    PULMONARY: CXR clear, protecting airway, saturating well     CARDIOVASCULAR: TTE: Severe left ventricular enlargement. Estimated ejection fraction 25%. Diffuse hypokinesis, with inferior and inferoseptal akinesis. On admission patient was found to be in Atrial fibrillation slow ventricular response and with IVCD/BBB, now sinus bradycardia with LBBB. Cardiology consulted and recommends to continue holding AV kevin blockers.      SBP goal: permissive HTN to gradual normotension    GASTROINTESTINAL: Passed dysphagia screening, tolerating diet.     Diet: Consistent carb no snacks    RENAL: Patient initially with NANCY, creatinine 2.76 & K+ 7.5. Renal was consulted, no HD needed and was treated with a bicarb drip. NANCY and hyperkalemia resolved & patient off bicarb drip. Creatinine now 1.26 & K+ 4.6. Will continue to monitor kidney function and electrolytes. Taylor removed.  Monitor for changes in UO.     Na Goal: Greater than 135     Taylor: n    HEMATOLOGY: H/H stable, Hgb 11.7 Platelets normal. Will monitor for signs of bleeding.       DVT ppx: Heparin s.c [] LMWH [x]     ID: afebrile, no leukocytosis     OTHER: Patient clinically much improved. Accepted for transfer to stroke unit for ongoing workup and management.    DISPOSITION: Rehab or home depending on PT eval once stable and workup is complete    CORE MEASURES:        Admission NIHSS: 6     TPA: [] YES [x] NO      LDL/HDL: 44/41     Depression Screen: p     Statin Therapy: y     Dysphagia Screen: [x] PASS [] FAIL     Smoking [] YES [] NO      Afib [x] YES [] NO     Stroke Education [x] YES [] NO    Obtain screening lower extremity venous ultrasound in patients who meet 1 or more of the following criteria as patient is high risk for DVT/PE on admission:   [] History of DVT/PE  []Hypercoagulable states (Factor V Leiden, Cancer, OCP, etc. )  []Prolonged immobility (hemiplegia/hemiparesis/post operative or any other extended immobilization)  [] Transferred from outside facility (Rehab or Long term care)  [] Age </= to 50   ASSESSMENT: Patient is a 60yo RT handed Slovenian speaking M with pmh of HTN, DM-II,  dilated cardiomyopathy presents to [facility] as a transfer for Rt M1 occlusion. Per chart review, patient's LWK was at midnight on 4/21. Patient had CT and CTA which was noted to show Rt M1 occlusion. Patient was not a TPa candidate due to out of time window. Patient was MT candidate at that time thus was agreed to transfer patient. Patient had initial NIHSS 4 whcih increased to NIHSS 9 then NIHSS 6 on repeat examination. Upon transfer patient deemed medically unstable for thrombectomy due to BRASH syndrome and admitted to MICU for further monitoring.     Impression. Mild agitation and very mild left hemiparesis, due to a right MCA infarct with a right M1 occlusion. Mechanism may be cardioembolism related to afib.    NEURO: Patient neurologically overall improved with no acute changes. Continue close monitoring for neurologic deterioration, permissive HTN to gradual normotension, holding antihypertensives, avoid fluctuations in BP. LDL 44, titrate statin to LDL goal less than 70, MR Head non con as noted above. Patient having episodes of agitation in MICU, was placed on Precedex drip. Agitation improved and now off Precedex. If patient becomes agitated when transferred to  [place] can try Seroquel if QTC not prolonged on EKG. Physical therapy/OT: no skilled PT needs at discharge.     ANTITHROMBOTIC THERAPY: ASA 81 mg for now. Given AF noted on admission, plan to start Eliquis tomorrow on 4/25 and will discontinue ASA at that time.    PULMONARY: CXR clear, protecting airway, saturating well     CARDIOVASCULAR: TTE: Severe left ventricular enlargement. Estimated ejection fraction 25%. Diffuse hypokinesis, with inferior and inferoseptal akinesis. On admission patient was found to be in Atrial fibrillation slow ventricular response and with IVCD/BBB, now sinus bradycardia with LBBB. Cardiology consulted and recommends to continue holding AV kevin blockers.      SBP goal: permissive HTN to gradual normotension    GASTROINTESTINAL: Passed dysphagia screening, tolerating diet.     Diet: Consistent carb no snacks    RENAL: Patient initially with NANCY, creatinine 2.76 & K+ 7.5. Renal was consulted, no HD needed and was treated with a bicarb drip. NANCY and hyperkalemia resolved & patient off bicarb drip. Creatinine now 1.26 & K+ 4.6. Will continue to monitor kidney function and electrolytes. Taylor removed.  Monitor for changes in UO.     Na Goal: Greater than 135     Taylor: n    HEMATOLOGY: H/H stable, Hgb 11.7 Platelets normal. Will monitor for signs of bleeding.       DVT ppx: Heparin s.c [] LMWH [x]     ID: afebrile, no leukocytosis     OTHER: SLP consult for speech language eval pending.    DISPOSITION: PT/OT recommends home with no skilled needs. Discharge pending completion of workup.    CORE MEASURES:        Admission NIHSS: 6     TPA: [] YES [x] NO      LDL/HDL: 44/41     Depression Screen: p     Statin Therapy: y     Dysphagia Screen: [x] PASS [] FAIL     Smoking [] YES [] NO      Afib [x] YES [] NO     Stroke Education [x] YES [] NO    Obtain screening lower extremity venous ultrasound in patients who meet 1 or more of the following criteria as patient is high risk for DVT/PE on admission:   [] History of DVT/PE  []Hypercoagulable states (Factor V Leiden, Cancer, OCP, etc. )  []Prolonged immobility (hemiplegia/hemiparesis/post operative or any other extended immobilization)  [] Transferred from outside facility (Rehab or Long term care)  [] Age </= to 50   ASSESSMENT: Patient is a 60yo RT handed Uzbek speaking M with pmh of HTN, DM-II,  dilated cardiomyopathy presents to [facility] as a transfer for Rt M1 occlusion. Per chart review, patient's LWK was at midnight on 4/21. Patient had CT and CTA which was noted to show Rt M1 occlusion. Patient was not a TPa candidate due to out of time window. Patient was MT candidate at that time thus was agreed to transfer patient. Patient had initial NIHSS 4 whcih increased to NIHSS 9 then NIHSS 6 on repeat examination. Upon transfer patient deemed medically unstable for thrombectomy due to BRASH syndrome and admitted to MICU for further monitoring.     Impression. Mild agitation and very mild left hemiparesis, due to a right MCA infarct with a right M1 occlusion. Mechanism may be cardioembolism related to afib.    NEURO: Patient neurologically overall improved with no acute changes. Continue close monitoring for neurologic deterioration, permissive HTN to gradual normotension, holding home antihypertensives, avoid fluctuations in BP. LDL 44, titrate statin to LDL goal less than 70, MR Head non con as noted above. Patient having episodes of agitation in MICU, was placed on Precedex drip. Agitation improved and now off Precedex. If patient becomes agitated when transferred to  [place] can try Seroquel if QTC not prolonged on EKG, currently calm and cooperative. Physical therapy/OT: no skilled PT needs at discharge.     ANTITHROMBOTIC THERAPY: ASA 81 mg for now. Given AF noted on admission, plan to start Eliquis tomorrow on 4/25 and will discontinue ASA at that time.    PULMONARY: CXR clear, protecting airway, saturating well     CARDIOVASCULAR: TTE: Severe left ventricular enlargement. Estimated ejection fraction 25%. Diffuse hypokinesis, with inferior and inferoseptal akinesis. On admission patient was found to be in Atrial fibrillation slow ventricular response and with IVCD/BBB, now sinus bradycardia with LBBB. Cardiology consulted and recommends to continue holding AV kevin blockers. Will discuss with cardiology outpatient medication regimen for discharge planning.     SBP goal: permissive HTN to gradual normotension    GASTROINTESTINAL: Passed dysphagia screening, tolerating diet.     Diet: Consistent carb no snacks    RENAL: Patient initially with NANCY, creatinine 2.76 & K+ 7.5. Renal was consulted, no HD needed and was treated with a bicarb drip. NANCY and hyperkalemia resolved & patient off bicarb drip. Creatinine remains stable 1.23 & K+ 4.6. Will continue to monitor kidney function and electrolytes. Taylor removed.  Monitor for changes in UO.     Na Goal: Greater than 135     Taylor: n    HEMATOLOGY: H/H stable, Hgb 11.7 Platelets normal. Will monitor for signs of bleeding.       DVT ppx: Heparin s.c [] LMWH [x]     ID: afebrile, no leukocytosis     OTHER: SLP consult for speech language eval pending.    DISPOSITION: PT/OT recommends home with no skilled needs. Discharge pending completion of workup.    CORE MEASURES:        Admission NIHSS: 6     TPA: [] YES [x] NO      LDL/HDL: 44/41     Depression Screen: p     Statin Therapy: y     Dysphagia Screen: [x] PASS [] FAIL     Smoking [] YES [] NO      Afib [x] YES [] NO     Stroke Education [x] YES [] NO    Obtain screening lower extremity venous ultrasound in patients who meet 1 or more of the following criteria as patient is high risk for DVT/PE on admission:   [] History of DVT/PE  []Hypercoagulable states (Factor V Leiden, Cancer, OCP, etc. )  []Prolonged immobility (hemiplegia/hemiparesis/post operative or any other extended immobilization)  [] Transferred from outside facility (Rehab or Long term care)  [] Age </= to 50   ASSESSMENT: Patient is a 62yo RT handed Greek speaking M with pmh of HTN, DM-II,  dilated cardiomyopathy presents to [facility] as a transfer for Rt M1 occlusion. Per chart review, patient's LWK was at midnight on 4/21. Patient had CT and CTA which was noted to show Rt M1 occlusion. Patient was not a TPa candidate due to out of time window. Patient was MT candidate at that time thus was agreed to transfer patient. Patient had initial NIHSS 4 whcih increased to NIHSS 9 then NIHSS 6 on repeat examination. Upon transfer patient deemed medically unstable for thrombectomy due to BRASH syndrome and admitted to MICU for further monitoring.     Impression. Mild agitation and very mild left hemiparesis, due to a right MCA infarct with a right M1 occlusion. Mechanism may be cardioembolism related to afib.    NEURO: Patient neurologically overall improved with no acute changes. Continue close monitoring for neurologic deterioration, permissive HTN to gradual normotension, holding home antihypertensives, avoid fluctuations in BP. LDL 44, titrate statin to LDL goal less than 70, MR Head non con as noted above. Patient having episodes of agitation in MICU, was placed on Precedex drip. Agitation improved and now off Precedex. If patient becomes agitated when transferred to  [place] can try Seroquel if QTC not prolonged on EKG, currently calm and cooperative. Physical therapy/OT: no skilled PT needs at discharge.     ANTITHROMBOTIC THERAPY: ASA 81 mg for now. Given AF noted on admission, plan to start Eliquis tomorrow on 4/25 and will discontinue ASA at that time.    PULMONARY: CXR clear, protecting airway, saturating well.    CARDIOVASCULAR: TTE: Severe left ventricular enlargement. Estimated ejection fraction 25%. Diffuse hypokinesis, with inferior and inferoseptal akinesis. On admission patient was found to be in Atrial fibrillation slow ventricular response and with IVCD/BBB, now sinus bradycardia with LBBB. Cardiology consulted and recommends to continue holding AV kevin blockers. Currently holding cardiac medications,  no signs of fluid overload, will continue to monitor and introduce diuretics as needed. Will discuss with cardiology regarding heart failure management.     SBP goal: permissive HTN to gradual normotension    GASTROINTESTINAL: Passed dysphagia screening, tolerating diet.     Diet: Consistent carb no snacks    RENAL: Patient initially with NANCY, creatinine 2.76 & K+ 7.5. Renal was consulted, no HD needed and was treated with a bicarb drip. NANCY and hyperkalemia resolved & patient off bicarb drip. Creatinine remains stable 1.23 & K+ 4.6. Will continue to monitor kidney function and electrolytes. Taylor removed.  Monitor for changes in UO.     Na Goal: Greater than 135     Taylor: n    HEMATOLOGY: H/H stable, Hgb 11.7 Platelets normal. Will monitor for signs of bleeding.       DVT ppx: Heparin s.c [] LMWH [x]     ID: afebrile, no leukocytosis     OTHER: SLP consult for speech language eval pending.    DISPOSITION: PT/OT recommends home with no skilled needs. Discharge pending completion of workup.    CORE MEASURES:        Admission NIHSS: 6     TPA: [] YES [x] NO      LDL/HDL: 44/41     Depression Screen: p     Statin Therapy: y     Dysphagia Screen: [x] PASS [] FAIL     Smoking [] YES [] NO      Afib [x] YES [] NO     Stroke Education [x] YES [] NO    Obtain screening lower extremity venous ultrasound in patients who meet 1 or more of the following criteria as patient is high risk for DVT/PE on admission:   [] History of DVT/PE  []Hypercoagulable states (Factor V Leiden, Cancer, OCP, etc. )  []Prolonged immobility (hemiplegia/hemiparesis/post operative or any other extended immobilization)  [] Transferred from outside facility (Rehab or Long term care)  [] Age </= to 50   No

## 2024-10-07 ENCOUNTER — APPOINTMENT (OUTPATIENT)
Dept: ELECTROPHYSIOLOGY | Facility: CLINIC | Age: 64
End: 2024-10-07
Payer: COMMERCIAL

## 2024-10-07 ENCOUNTER — NON-APPOINTMENT (OUTPATIENT)
Age: 64
End: 2024-10-07

## 2024-10-07 PROCEDURE — 93296 REM INTERROG EVL PM/IDS: CPT | Mod: NC

## 2024-10-07 PROCEDURE — 93295 DEV INTERROG REMOTE 1/2/MLT: CPT

## 2024-11-12 NOTE — DISCHARGE NOTE NURSING/CASE MANAGEMENT/SOCIAL WORK - HISTORY OF COVID-19 VACCINATION
PRE COLONOSCOPY EVALUATION   H&P       ASSESSMENT     Miles Valencia is a 59 year old male who is in today for a Screening Colonoscopy.  The patient has not had a stool screening test.         PLAN      Screening Colonoscopy         HPI     Miles Valencia is a 59 year old male who presents for a colonoscopy.  They do not have a family history of colon cancer  They do not have a history of polyps  They have not been loosing weight  They have not noted any change in bowel habits   They have not had blood in their stool.         PAST MEDICAL HISTORY SURGICAL HISTORY     Past Medical History:   Diagnosis Date    Achilles bursitis or tendinitis 05/04/2014    Allergic rhinitis     Asthma     Benign positional vertigo     Congestion     Gastroesophageal reflux disease     Hypertension     Low back pain     Migraine     Multinodular goiter     Osteoarthritis     Other chronic pain     Pain in joint, shoulder region 04/18/2014    Pneumonia     Prostate cancer (H)     RLS (restless legs syndrome)     Sleep problems     Past Surgical History:   Procedure Laterality Date    ARTHROPLASTY SHOULDER Left 6/13/2017    Procedure: ARTHROPLASTY SHOULDER;  Left Total Shoulder Arthroplasty  ;  Surgeon: Jossy Swanson MD;  Location: [facility]    BIOPSY      Prostate    COLONOSCOPY N/A 3/21/2016    Procedure: COLONOSCOPY;  Surgeon: Toy Lima MD;  Location: [facility]    DAVINCI PROSTATECTOMY, LYMPHADENECTOMY N/A 4/7/2021    Procedure: Robot-assisted laparoscopic radical prostatectomy, pelvic lymphadenectomy,;  Surgeon: Eber Pereyra MD;  Location: [facility]    THYROIDECTOMY Left 5/27/2020    Procedure: Left Lobe THYROIDECTOMY;  Surgeon: Evelin Cutler MD;  Location: [facility]          CURRENT MEDICATIONS ALLERGIES     Current Outpatient Rx   Medication Sig Dispense Refill    acetaminophen (TYLENOL) 325 MG tablet Take 2 tablets (650 mg) by mouth every 4 hours as needed for other (mild pain) 30 tablet 0     albuterol (PROAIR HFA/PROVENTIL HFA/VENTOLIN HFA) 108 (90 BASE) MCG/ACT Inhaler Inhale 2 puffs into the lungs every 4 hours as needed for shortness of breath / dyspnea or wheezing      cholecalciferol 125 MCG (5000 UT) CAPS Take 5,000 Units by mouth every morning       coenzyme Q-10 200 MG CAPS       Cyanocobalamin (VITAMIN B-12 PO) Take 1 tablet by mouth every evening       cyclobenzaprine (FLEXERIL) 5 MG tablet Take 1-2 tablets (5-10 mg) by mouth nightly as needed for muscle spasms. 60 tablet 1    DIAZEPAM PO Take by mouth.      diltiazem ER COATED BEADS (CARDIZEM CD/CARTIA XT) 240 MG 24 hr capsule Take 240 mg by mouth every morning       gabapentin (NEURONTIN) 300 MG capsule Take 1 capsule (300 mg) by mouth 3 times daily (Patient taking differently: Take 600 mg by mouth at bedtime.) 60 capsule 0    ibuprofen (ADVIL/MOTRIN) 200 MG tablet Take 800 mg by mouth every 8 hours as needed for mild pain       meclizine (ANTIVERT) 12.5 MG tablet Take 2 tablets (25 mg) by mouth 4 times daily as needed for dizziness 30 tablet 0    omega 3 1000 MG CAPS Take by mouth At Bedtime      sildenafil (REVATIO) 20 MG tablet Take 1 tab daily (Patient taking differently: Take 1 tab daily as needed) 90 tablet 11    zolpidem (AMBIEN) 5 MG tablet Take 5 mg by mouth nightly as needed for sleep      ammonium lactate (AMLACTIN) 12 % cream Apply topically daily as needed  (Patient not taking: Reported on 3/11/2024)      aspirin-acetaminophen-caffeine (EXCEDRIN MIGRAINE) 250-250-65 MG per tablet Take 2 tablets by mouth every 6 hours as needed for headaches       atorvastatin (LIPITOR) 10 MG tablet Take 10 mg by mouth At Bedtime (Patient not taking: Reported on 5/29/2024)      camphor-menthol (DERMASARRA) 0.5-0.5 % LOTN Apply topically every 6 hours as needed for skin care (Patient not taking: Reported on 3/11/2024)      fexofenadine (ALLEGRA) 180 MG tablet Take 180 mg by mouth every morning       HEMP OIL OR EXTRACT OR OTHER CBD CANNABINOID,  NOT MEDICAL CANNABIS, Take 1 capsule by mouth every evening      hydrochlorothiazide (HYDRODIURIL) 25 MG tablet Take 25 mg by mouth every morning       hydrocortisone (CORTAID) 1 % cream Apply topically 2 times daily as needed (Patient not taking: Reported on 3/11/2024)      MELATONIN PO Take 2.5 mg by mouth At Bedtime       NONFORMULARY Magnesium/MSM lotion - Brand: Aincient minerals  2 pumps every evening applied to feet/legs    20 mg elemental magnesium and 25 mg MSM Per 1 pump      rizatriptan (MAXALT-MLT) 10 MG ODT Take 10 mg by mouth at onset of headache for migraine      rosuvastatin (CRESTOR) 5 MG tablet Take 1 tablet by mouth daily at 2 pm      Allergies   Allergen Reactions    Latex Shortness Of Breath    Amoxicillin Diarrhea    Nickel Itching and Rash          FAMILY HISTORY     Family History   Problem Relation Age of Onset    Eczema Mother     Skin Cancer Mother     Hypertension Mother     Hypertension Father     Alcoholism Father     Eczema Brother     Hypertension Brother     Migraines Brother     Testicular cancer Brother     Factor V Leiden deficiency Brother     Deep Vein Thrombosis Brother     Cancer Maternal Grandmother         Breast    Alcoholism Maternal Grandfather     Cerebrovascular Disease Paternal Grandmother     Alcoholism Paternal Grandfather     Unknown/Adopted Paternal Half-Sister     Other - See Comments Niece         [place]    Asthma Niece     Other - See Comments Nephew         [place], Factor V Leiden    Cancer Cousin     Thyroid Disease Other     Anesthesia Reaction No family hx of          SOCIAL HISTORY     Social History     Socioeconomic History    Marital status:      Spouse name: None    Number of children: 0    Years of education: None    Highest education level: None   Occupational History    Occupation:      Employer: [employer]   Tobacco Use    Smoking status: Never    Smokeless tobacco: Never   Substance and Sexual Activity  "   Alcohol use: Yes     Alcohol/week: 0.0 standard drinks of alcohol     Comment: 1-2 drinks a week    Drug use: No     Social Drivers of Health     Interpersonal Safety: Low Risk  (11/8/2024)    Interpersonal Safety     Do you feel physically and emotionally safe where you currently live?: Yes     Within the past 12 months, have you been hit, slapped, kicked or otherwise physically hurt by someone?: No     Within the past 12 months, have you been humiliated or emotionally abused in other ways by your partner or ex-partner?: No         REVIEW OF SYSTEMS       10 point review of systems are unremarkable except for the symptoms described in the HPI    PHYSICAL EXAM     VITAL SIGNS: BP (!) 144/91   Temp 97.1  F (36.2  C) (Temporal)   Resp 16   Ht 1.753 m (5' 9\")   Wt 91.2 kg (201 lb)   SpO2 97%   BMI 29.68 kg/m     General : Alert, cooperative, appears stated age   Head: Normocephalic, without obvious abnormality,   HEENT:  conjunctiva/corneas clear, EOM's intact, no scleral icterus   Lungs: Clear to auscultation bilaterally, respirations unlabored  Heart: Regular rate and rhythm, S1, S2 normal, no murmur, rub or gallop   Abdomen: Soft, non-tender, no guarding, + bowel sounds active,   Extremities : No obvious swelling,  Neurologic:  moves all extremities to command, no focal findings    Airway: Class 2  ASA:   2      [facility]  [phone]   " Vaccine status unknown